# Patient Record
Sex: FEMALE | Race: WHITE | NOT HISPANIC OR LATINO | Employment: OTHER | URBAN - METROPOLITAN AREA
[De-identification: names, ages, dates, MRNs, and addresses within clinical notes are randomized per-mention and may not be internally consistent; named-entity substitution may affect disease eponyms.]

---

## 2017-01-17 ENCOUNTER — TELEPHONE (OUTPATIENT)
Dept: INTERNAL MEDICINE | Facility: CLINIC | Age: 82
End: 2017-01-17

## 2017-01-17 ENCOUNTER — HOSPITAL ENCOUNTER (EMERGENCY)
Facility: HOSPITAL | Age: 82
Discharge: HOME | End: 2017-01-17
Attending: EMERGENCY MEDICINE | Admitting: EMERGENCY MEDICINE

## 2017-01-17 VITALS
OXYGEN SATURATION: 95 % | DIASTOLIC BLOOD PRESSURE: 68 MMHG | HEART RATE: 83 BPM | BODY MASS INDEX: 21.53 KG/M2 | HEIGHT: 62 IN | TEMPERATURE: 98.3 F | WEIGHT: 117 LBS | SYSTOLIC BLOOD PRESSURE: 134 MMHG | RESPIRATION RATE: 18 BRPM

## 2017-01-17 DIAGNOSIS — K92.2 LOWER GI BLEED: Primary | ICD-10-CM

## 2017-01-17 LAB
ABO GROUP BLD: NORMAL
ALBUMIN SERPL-MCNC: 4.2 G/DL (ref 3.2–4.8)
ALBUMIN/GLOB SERPL: 1.5 G/DL (ref 1.5–2.5)
ALP SERPL-CCNC: 94 U/L (ref 25–100)
ALT SERPL W P-5'-P-CCNC: 12 U/L (ref 7–40)
ANION GAP SERPL CALCULATED.3IONS-SCNC: 9 MMOL/L (ref 3–11)
AST SERPL-CCNC: 18 U/L (ref 0–33)
BASOPHILS # BLD AUTO: 0.05 10*3/MM3 (ref 0–0.2)
BASOPHILS NFR BLD AUTO: 0.7 % (ref 0–1)
BILIRUB SERPL-MCNC: 0.5 MG/DL (ref 0.3–1.2)
BLD GP AB SCN SERPL QL: NEGATIVE
BUN BLD-MCNC: 18 MG/DL (ref 9–23)
BUN/CREAT SERPL: 20 (ref 7–25)
CALCIUM SPEC-SCNC: 10.1 MG/DL (ref 8.7–10.4)
CHLORIDE SERPL-SCNC: 104 MMOL/L (ref 99–109)
CO2 SERPL-SCNC: 32 MMOL/L (ref 20–31)
CREAT BLD-MCNC: 0.9 MG/DL (ref 0.6–1.3)
DEPRECATED RDW RBC AUTO: 48.8 FL (ref 37–54)
EOSINOPHIL # BLD AUTO: 0.07 10*3/MM3 (ref 0.1–0.3)
EOSINOPHIL NFR BLD AUTO: 1 % (ref 0–3)
ERYTHROCYTE [DISTWIDTH] IN BLOOD BY AUTOMATED COUNT: 14.6 % (ref 11.3–14.5)
GFR SERPL CREATININE-BSD FRML MDRD: 59 ML/MIN/1.73
GLOBULIN UR ELPH-MCNC: 2.8 GM/DL
GLUCOSE BLD-MCNC: 150 MG/DL (ref 70–100)
HCT VFR BLD AUTO: 39.4 % (ref 34.5–44)
HGB BLD-MCNC: 13.1 G/DL (ref 11.5–15.5)
HOLD SPECIMEN: NORMAL
HOLD SPECIMEN: NORMAL
IMM GRANULOCYTES # BLD: 0.02 10*3/MM3 (ref 0–0.03)
IMM GRANULOCYTES NFR BLD: 0.3 % (ref 0–0.6)
LYMPHOCYTES # BLD AUTO: 1.48 10*3/MM3 (ref 0.6–4.8)
LYMPHOCYTES NFR BLD AUTO: 21.4 % (ref 24–44)
MCH RBC QN AUTO: 30.2 PG (ref 27–31)
MCHC RBC AUTO-ENTMCNC: 33.2 G/DL (ref 32–36)
MCV RBC AUTO: 90.8 FL (ref 80–99)
MONOCYTES # BLD AUTO: 0.67 10*3/MM3 (ref 0–1)
MONOCYTES NFR BLD AUTO: 9.7 % (ref 0–12)
NEUTROPHILS # BLD AUTO: 4.63 10*3/MM3 (ref 1.5–8.3)
NEUTROPHILS NFR BLD AUTO: 66.9 % (ref 41–71)
PLATELET # BLD AUTO: 312 10*3/MM3 (ref 150–450)
PMV BLD AUTO: 9.9 FL (ref 6–12)
POTASSIUM BLD-SCNC: 4.2 MMOL/L (ref 3.5–5.5)
PROT SERPL-MCNC: 7 G/DL (ref 5.7–8.2)
RBC # BLD AUTO: 4.34 10*6/MM3 (ref 3.89–5.14)
RH BLD: POSITIVE
SODIUM BLD-SCNC: 145 MMOL/L (ref 132–146)
WBC NRBC COR # BLD: 6.92 10*3/MM3 (ref 3.5–10.8)
WHOLE BLOOD HOLD SPECIMEN: NORMAL
WHOLE BLOOD HOLD SPECIMEN: NORMAL

## 2017-01-17 PROCEDURE — 86850 RBC ANTIBODY SCREEN: CPT

## 2017-01-17 PROCEDURE — 86901 BLOOD TYPING SEROLOGIC RH(D): CPT

## 2017-01-17 PROCEDURE — 36415 COLL VENOUS BLD VENIPUNCTURE: CPT

## 2017-01-17 PROCEDURE — 99284 EMERGENCY DEPT VISIT MOD MDM: CPT

## 2017-01-17 PROCEDURE — 85025 COMPLETE CBC W/AUTO DIFF WBC: CPT | Performed by: EMERGENCY MEDICINE

## 2017-01-17 PROCEDURE — 80053 COMPREHEN METABOLIC PANEL: CPT | Performed by: EMERGENCY MEDICINE

## 2017-01-17 PROCEDURE — 86900 BLOOD TYPING SEROLOGIC ABO: CPT

## 2017-01-17 RX ORDER — SODIUM CHLORIDE 0.9 % (FLUSH) 0.9 %
10 SYRINGE (ML) INJECTION AS NEEDED
Status: DISCONTINUED | OUTPATIENT
Start: 2017-01-17 | End: 2017-01-17 | Stop reason: HOSPADM

## 2017-01-17 NOTE — ED PROVIDER NOTES
Subjective   HPI Comments: 89 y.o. female presents to the ED w/ c/o rectal bleeding starting at 0230 this morning. Pt passed a copious amount of bright red blood this morning without stool. She had a second episode of bleeding this afternoon around 1500 with dark red blood and clots. She is not having dizziness or light-headedness. She is not on any blood thinners.    Pt had a colonoscopy 15-20 years ago and had a polyp removed at that time. She has not been back for a follow-up colonoscopy.    Patient is a 89 y.o. female presenting with hematochezia.   History provided by:  Patient and relative  Rectal Bleeding   Quality:  Bright red and maroon  Amount:  Copious  Duration:  16 hours  Timin episodes.  Chronicity:  New  Context: spontaneously    Relieved by:  Nothing  Worsened by:  Nothing  Ineffective treatments:  None tried  Associated symptoms: no abdominal pain, no dizziness, no epistaxis, no fever, no hematemesis, no light-headedness, no loss of consciousness, no recent illness and no vomiting    Risk factors comment:  Hx polyps      Review of Systems   Constitutional: Negative for chills and fever.   HENT: Negative for nosebleeds.    Gastrointestinal: Positive for hematochezia. Negative for abdominal pain, hematemesis and vomiting.   Neurological: Negative for dizziness, loss of consciousness and light-headedness.   All other systems reviewed and are negative.      Past Medical History   Diagnosis Date   • Cataracts, bilateral      removed   • Generalized osteoarthritis    • Glaucoma    • Hearing loss      Wears hearing aids   • Herpes zoster    • History of compression fracture of spine    • Hyperlipidemia    • Hypertension    • Iron deficiency    • Low back pain    • Lumbosacral injury    • Shoulder pain, right    • Uses hearing aid      bilat    • Vitamin D deficiency    • Wears glasses    • Wrist fracture, left        Allergies   Allergen Reactions   • Lisinopril-Hydrochlorothiazide Rash   • Other Rash      STEROID INJECTION IN BACK       Past Surgical History   Procedure Laterality Date   • Hip fracture surgery Left 2014      hip pinning for acute fracture   • Colonoscopy     • Knee arthroplasty unicompartmental Right 9/22/2016     Procedure: RIGHT KNEE ARTHROPLASTY UNICOMPARTMENTAL;  Surgeon: Jesse Taveras MD;  Location:  [facility];  Service:    • Tonsillectomy  1947   • Eye surgery       bilat cataracts without lens    • Knee arthroplasty unicompartmental Left 12/1/2016     Procedure: LEFT KNEE ARTHROPLASTY UNICOMPARTMENTAL;  Surgeon: Jesse Taveras MD;  Location:  [facility];  Service:        Family History   Problem Relation Age of Onset   • Alzheimer's disease Mother    • No Known Problems Father    • Arthritis Sister    • Asthma Sister    • Breast cancer Sister    • Colon cancer Sister    • Lung cancer Brother    • Skin cancer Brother    • Heart disease Other      2009       Social History     Social History   • Marital status:      Spouse name: N/A   • Number of children: N/A   • Years of education: N/A     Social History Main Topics   • Smoking status: Never Smoker   • Smokeless tobacco: Never Used   • Alcohol use No   • Drug use: No   • Sexual activity: Defer     Other Topics Concern   • None     Social History Narrative    Domestic life- [place] -  independently alone good support from grown daughter who lives in [facility] 1 hour away        Buddhism- [employer]        Sleep hygiene- 7 hours nightly, adequate         Caffeine use- 2 cups coffee daily        Exercise habits- Walking daily as tolerated        Diet- Prudent well-balanced diet         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 She appears well-developed and well-nourished. No distress.   HENT:   Head: Normocephalic and atraumatic.    Eyes: Conjunctivae are normal. Pupils are equal, round, and reactive to light.   Neck: Normal range of motion. Neck supple.   Cardiovascular: Normal rate, regular rhythm and normal heart sounds.    Pulmonary/Chest: Effort normal and breath sounds normal. No respiratory distress. She exhibits no tenderness.   Abdominal: Soft. There is no tenderness.   Genitourinary:   Genitourinary Comments: Dark brown, nearly black stool. No active bleeding.   Musculoskeletal: Normal range of motion. She exhibits no edema.   Neurological: She is alert and oriented to person, place, and time.   Skin: Skin is warm and dry. She is not diaphoretic.   Psychiatric: She has a normal mood and affect. Her behavior is normal.   Nursing note and vitals reviewed.      Procedures         ED Course  ED Course         Course of Care      Lab Results (last 24 hours)     ** No results found for the last 24 hours. **          Note: In addition to lab results from this visit, the labs listed above may include labs taken at another facility or during a different encounter within the last 24 hours. Please correlate lab times with ED admission and discharge times for further clarification of the services performed during this visit.    No orders to display       Vitals:    01/17/17 1850 01/17/17 1853 01/17/17 1857 01/17/17 1900   BP: 135/65 118/81 133/84 134/68   BP Location:       Patient Position: Lying Sitting Standing    Pulse: 85 100 101 83   Resp:       Temp:       TempSrc:       SpO2:    95%   Weight:       Height:           Medications - No data to display    ECG/EMG Results (last 24 hours)     ** No results found for the last 24 hours. **                      [facility]    Final diagnoses:   Lower GI bleed       Documentation assistance provided by caridad Tatum.  Information recorded by the caridad was done at my direction and has been verified and validated by me.     Rosalia Tatum  01/17/17 1825       Rosalia Tatum  01/17/17 1826       Rosalia  Deepti  01/17/17 2008       Tino Benton MD  01/19/17 3644

## 2017-01-17 NOTE — TELEPHONE ENCOUNTER
Pt called very concern about uncontrollable rectal bleeding that started earlier this am and happened 2 more times since. She states just blood coming out, no stool, started as bright red now darker, denies any pain. Pt strongly advised to go to ER per TGF but pt insistent on seeing TGF this pm. Per TGF unable to see pt this pm, she needs to go to ER and call back tomorrow am. Pt refuses for me to call and talk with her dght and states she will call her dght and then call us back.

## 2017-01-18 NOTE — DISCHARGE INSTRUCTIONS
Dr. Bales's office staff should call you tomorrow to arrange bowel prep and colonoscopy.  If you haven't heard from them by midday, call the office.

## 2017-01-23 ENCOUNTER — HOSPITAL ENCOUNTER (INPATIENT)
Facility: HOSPITAL | Age: 82
LOS: 3 days | Discharge: HOME HEALTH | End: 2017-01-26
Attending: INTERNAL MEDICINE | Admitting: INTERNAL MEDICINE

## 2017-01-23 ENCOUNTER — APPOINTMENT (OUTPATIENT)
Dept: NUCLEAR MEDICINE | Facility: HOSPITAL | Age: 82
End: 2017-01-23

## 2017-01-23 DIAGNOSIS — R26.9 ABNORMAL GAIT: ICD-10-CM

## 2017-01-23 DIAGNOSIS — K92.1 HEMATOCHEZIA: Primary | ICD-10-CM

## 2017-01-23 DIAGNOSIS — D62 ACUTE BLOOD LOSS ANEMIA: ICD-10-CM

## 2017-01-23 PROBLEM — F41.9 ANXIETY: Status: ACTIVE | Noted: 2017-01-23

## 2017-01-23 PROBLEM — R30.0 DYSURIA: Status: ACTIVE | Noted: 2017-01-23

## 2017-01-23 PROBLEM — K92.2 GI BLEED: Status: ACTIVE | Noted: 2017-01-23

## 2017-01-23 LAB
ABO GROUP BLD: NORMAL
ALBUMIN SERPL-MCNC: 3.6 G/DL (ref 3.2–4.8)
ALBUMIN/GLOB SERPL: 1.6 G/DL (ref 1.5–2.5)
ALP SERPL-CCNC: 73 U/L (ref 25–100)
ALT SERPL W P-5'-P-CCNC: 8 U/L (ref 7–40)
ANION GAP SERPL CALCULATED.3IONS-SCNC: 9 MMOL/L (ref 3–11)
APTT PPP: 27.8 SECONDS (ref 24–31)
AST SERPL-CCNC: 19 U/L (ref 0–33)
BACTERIA UR QL AUTO: ABNORMAL /HPF
BASOPHILS # BLD AUTO: 0.03 10*3/MM3 (ref 0–0.2)
BASOPHILS NFR BLD AUTO: 0.3 % (ref 0–1)
BILIRUB SERPL-MCNC: 0.5 MG/DL (ref 0.3–1.2)
BILIRUB UR QL STRIP: NEGATIVE
BLD GP AB SCN SERPL QL: NEGATIVE
BUN BLD-MCNC: 11 MG/DL (ref 9–23)
BUN/CREAT SERPL: 15.7 (ref 7–25)
CALCIUM SPEC-SCNC: 9.2 MG/DL (ref 8.7–10.4)
CHLORIDE SERPL-SCNC: 109 MMOL/L (ref 99–109)
CLARITY UR: ABNORMAL
CO2 SERPL-SCNC: 27 MMOL/L (ref 20–31)
COLOR UR: YELLOW
CREAT BLD-MCNC: 0.7 MG/DL (ref 0.6–1.3)
DEPRECATED RDW RBC AUTO: 48.7 FL (ref 37–54)
EOSINOPHIL # BLD AUTO: 0.03 10*3/MM3 (ref 0.1–0.3)
EOSINOPHIL NFR BLD AUTO: 0.3 % (ref 0–3)
ERYTHROCYTE [DISTWIDTH] IN BLOOD BY AUTOMATED COUNT: 14.6 % (ref 11.3–14.5)
GFR SERPL CREATININE-BSD FRML MDRD: 79 ML/MIN/1.73
GLOBULIN UR ELPH-MCNC: 2.3 GM/DL
GLUCOSE BLD-MCNC: 92 MG/DL (ref 70–100)
GLUCOSE UR STRIP-MCNC: NEGATIVE MG/DL
HCT VFR BLD AUTO: 30.5 % (ref 34.5–44)
HGB BLD-MCNC: 10.1 G/DL (ref 11.5–15.5)
HGB UR QL STRIP.AUTO: ABNORMAL
HYALINE CASTS UR QL AUTO: ABNORMAL /LPF
IMM GRANULOCYTES # BLD: 0.03 10*3/MM3 (ref 0–0.03)
IMM GRANULOCYTES NFR BLD: 0.3 % (ref 0–0.6)
INR PPP: 1
KETONES UR QL STRIP: ABNORMAL
LEUKOCYTE ESTERASE UR QL STRIP.AUTO: ABNORMAL
LYMPHOCYTES # BLD AUTO: 1.47 10*3/MM3 (ref 0.6–4.8)
LYMPHOCYTES NFR BLD AUTO: 13.8 % (ref 24–44)
MAGNESIUM SERPL-MCNC: 1.8 MG/DL (ref 1.3–2.7)
MCH RBC QN AUTO: 30.1 PG (ref 27–31)
MCHC RBC AUTO-ENTMCNC: 33.1 G/DL (ref 32–36)
MCV RBC AUTO: 91 FL (ref 80–99)
MONOCYTES # BLD AUTO: 0.47 10*3/MM3 (ref 0–1)
MONOCYTES NFR BLD AUTO: 4.4 % (ref 0–12)
NEUTROPHILS # BLD AUTO: 8.66 10*3/MM3 (ref 1.5–8.3)
NEUTROPHILS NFR BLD AUTO: 80.9 % (ref 41–71)
NITRITE UR QL STRIP: NEGATIVE
PH UR STRIP.AUTO: 7 [PH] (ref 5–8)
PLATELET # BLD AUTO: 239 10*3/MM3 (ref 150–450)
PMV BLD AUTO: 9.6 FL (ref 6–12)
POTASSIUM BLD-SCNC: 4.1 MMOL/L (ref 3.5–5.5)
PROT SERPL-MCNC: 5.9 G/DL (ref 5.7–8.2)
PROT UR QL STRIP: ABNORMAL
PROTHROMBIN TIME: 10.9 SECONDS (ref 9.6–11.5)
RBC # BLD AUTO: 3.35 10*6/MM3 (ref 3.89–5.14)
RBC # UR: ABNORMAL /HPF
REF LAB TEST METHOD: ABNORMAL
RH BLD: POSITIVE
SODIUM BLD-SCNC: 145 MMOL/L (ref 132–146)
SP GR UR STRIP: 1.01 (ref 1–1.03)
SQUAMOUS #/AREA URNS HPF: ABNORMAL /HPF
TSH SERPL DL<=0.05 MIU/L-ACNC: 1.69 MIU/ML (ref 0.35–5.35)
UROBILINOGEN UR QL STRIP: ABNORMAL
WBC NRBC COR # BLD: 10.69 10*3/MM3 (ref 3.5–10.8)
WBC UR QL AUTO: ABNORMAL /HPF

## 2017-01-23 PROCEDURE — 84443 ASSAY THYROID STIM HORMONE: CPT | Performed by: NURSE PRACTITIONER

## 2017-01-23 PROCEDURE — 87086 URINE CULTURE/COLONY COUNT: CPT | Performed by: NURSE PRACTITIONER

## 2017-01-23 PROCEDURE — 83735 ASSAY OF MAGNESIUM: CPT | Performed by: NURSE PRACTITIONER

## 2017-01-23 PROCEDURE — 85025 COMPLETE CBC W/AUTO DIFF WBC: CPT | Performed by: NURSE PRACTITIONER

## 2017-01-23 PROCEDURE — 85610 PROTHROMBIN TIME: CPT | Performed by: NURSE PRACTITIONER

## 2017-01-23 PROCEDURE — A9560 TC99M LABELED RBC: HCPCS | Performed by: INTERNAL MEDICINE

## 2017-01-23 PROCEDURE — 78278 ACUTE GI BLOOD LOSS IMAGING: CPT

## 2017-01-23 PROCEDURE — 25010000002 LORAZEPAM PER 2 MG: Performed by: NURSE PRACTITIONER

## 2017-01-23 PROCEDURE — 86850 RBC ANTIBODY SCREEN: CPT

## 2017-01-23 PROCEDURE — 85730 THROMBOPLASTIN TIME PARTIAL: CPT | Performed by: NURSE PRACTITIONER

## 2017-01-23 PROCEDURE — 99223 1ST HOSP IP/OBS HIGH 75: CPT | Performed by: INTERNAL MEDICINE

## 2017-01-23 PROCEDURE — 81001 URINALYSIS AUTO W/SCOPE: CPT | Performed by: NURSE PRACTITIONER

## 2017-01-23 PROCEDURE — G0378 HOSPITAL OBSERVATION PER HR: HCPCS

## 2017-01-23 PROCEDURE — 25010000002 MAGNESIUM SULFATE IN D5W 1G/100ML (PREMIX) 10-5 MG/ML-% SOLUTION: Performed by: NURSE PRACTITIONER

## 2017-01-23 PROCEDURE — 86901 BLOOD TYPING SEROLOGIC RH(D): CPT

## 2017-01-23 PROCEDURE — 86900 BLOOD TYPING SEROLOGIC ABO: CPT

## 2017-01-23 PROCEDURE — 0 TECHNETIUM LABELED RED BLOOD CELLS: Performed by: INTERNAL MEDICINE

## 2017-01-23 PROCEDURE — 87077 CULTURE AEROBIC IDENTIFY: CPT | Performed by: NURSE PRACTITIONER

## 2017-01-23 PROCEDURE — 80053 COMPREHEN METABOLIC PANEL: CPT | Performed by: NURSE PRACTITIONER

## 2017-01-23 PROCEDURE — 87186 SC STD MICRODIL/AGAR DIL: CPT | Performed by: NURSE PRACTITIONER

## 2017-01-23 RX ORDER — POTASSIUM CHLORIDE 750 MG/1
40 CAPSULE, EXTENDED RELEASE ORAL AS NEEDED
Status: DISCONTINUED | OUTPATIENT
Start: 2017-01-23 | End: 2017-01-26 | Stop reason: HOSPADM

## 2017-01-23 RX ORDER — POTASSIUM CHLORIDE 1.5 G/1.77G
40 POWDER, FOR SOLUTION ORAL AS NEEDED
Status: DISCONTINUED | OUTPATIENT
Start: 2017-01-23 | End: 2017-01-26 | Stop reason: HOSPADM

## 2017-01-23 RX ORDER — ONDANSETRON 4 MG/1
4 TABLET, FILM COATED ORAL EVERY 6 HOURS PRN
Status: DISCONTINUED | OUTPATIENT
Start: 2017-01-23 | End: 2017-01-26 | Stop reason: HOSPADM

## 2017-01-23 RX ORDER — POLYVINYL ALCOHOL 14 MG/ML
1 SOLUTION/ DROPS OPHTHALMIC
Status: DISCONTINUED | OUTPATIENT
Start: 2017-01-23 | End: 2017-01-26 | Stop reason: HOSPADM

## 2017-01-23 RX ORDER — ONDANSETRON 2 MG/ML
4 INJECTION INTRAMUSCULAR; INTRAVENOUS EVERY 6 HOURS PRN
Status: DISCONTINUED | OUTPATIENT
Start: 2017-01-23 | End: 2017-01-26 | Stop reason: HOSPADM

## 2017-01-23 RX ORDER — PANTOPRAZOLE SODIUM 40 MG/10ML
80 INJECTION, POWDER, LYOPHILIZED, FOR SOLUTION INTRAVENOUS ONCE
Status: COMPLETED | OUTPATIENT
Start: 2017-01-23 | End: 2017-01-23

## 2017-01-23 RX ORDER — CEFTRIAXONE SODIUM 1 G/50ML
1 INJECTION, SOLUTION INTRAVENOUS
Status: DISCONTINUED | OUTPATIENT
Start: 2017-01-23 | End: 2017-01-26 | Stop reason: HOSPADM

## 2017-01-23 RX ORDER — HYDROXYZINE HYDROCHLORIDE 50 MG/ML
25 INJECTION, SOLUTION INTRAMUSCULAR EVERY 8 HOURS PRN
Status: DISCONTINUED | OUTPATIENT
Start: 2017-01-23 | End: 2017-01-26 | Stop reason: HOSPADM

## 2017-01-23 RX ORDER — AMLODIPINE BESYLATE 2.5 MG/1
2.5 TABLET ORAL
Status: DISCONTINUED | OUTPATIENT
Start: 2017-01-23 | End: 2017-01-26 | Stop reason: HOSPADM

## 2017-01-23 RX ORDER — LORAZEPAM 2 MG/ML
0.25 INJECTION INTRAMUSCULAR EVERY 6 HOURS PRN
Status: DISCONTINUED | OUTPATIENT
Start: 2017-01-23 | End: 2017-01-26 | Stop reason: HOSPADM

## 2017-01-23 RX ORDER — SODIUM CHLORIDE 0.9 % (FLUSH) 0.9 %
1-10 SYRINGE (ML) INJECTION AS NEEDED
Status: DISCONTINUED | OUTPATIENT
Start: 2017-01-23 | End: 2017-01-26 | Stop reason: HOSPADM

## 2017-01-23 RX ORDER — POTASSIUM CHLORIDE 29.8 MG/ML
20 INJECTION INTRAVENOUS
Status: DISCONTINUED | OUTPATIENT
Start: 2017-01-23 | End: 2017-01-26 | Stop reason: HOSPADM

## 2017-01-23 RX ORDER — SACCHAROMYCES BOULARDII 250 MG
250 CAPSULE ORAL 2 TIMES DAILY
Status: DISCONTINUED | OUTPATIENT
Start: 2017-01-23 | End: 2017-01-26 | Stop reason: HOSPADM

## 2017-01-23 RX ORDER — SODIUM CHLORIDE 9 MG/ML
100 INJECTION, SOLUTION INTRAVENOUS CONTINUOUS
Status: DISCONTINUED | OUTPATIENT
Start: 2017-01-23 | End: 2017-01-24

## 2017-01-23 RX ADMIN — SODIUM CHLORIDE 8 MG/HR: 900 INJECTION INTRAVENOUS at 21:33

## 2017-01-23 RX ADMIN — LORAZEPAM 0.25 MG: 2 INJECTION INTRAMUSCULAR; INTRAVENOUS at 21:39

## 2017-01-23 RX ADMIN — PANTOPRAZOLE SODIUM 80 MG: 40 INJECTION, POWDER, FOR SOLUTION INTRAVENOUS at 16:05

## 2017-01-23 RX ADMIN — Medication 1 DOSE: at 17:12

## 2017-01-23 RX ADMIN — MAGNESIUM SULFATE HEPTAHYDRATE 1 G: 1 INJECTION, SOLUTION INTRAVENOUS at 21:34

## 2017-01-23 RX ADMIN — MAGNESIUM SULFATE HEPTAHYDRATE 1 G: 1 INJECTION, SOLUTION INTRAVENOUS at 18:35

## 2017-01-23 RX ADMIN — SODIUM CHLORIDE 100 ML/HR: 9 INJECTION, SOLUTION INTRAVENOUS at 15:43

## 2017-01-23 RX ADMIN — Medication 250 MG: at 21:33

## 2017-01-23 RX ADMIN — AMLODIPINE BESYLATE 2.5 MG: 2.5 TABLET ORAL at 16:06

## 2017-01-23 RX ADMIN — SODIUM CHLORIDE 8 MG/HR: 900 INJECTION INTRAVENOUS at 16:06

## 2017-01-23 NOTE — PLAN OF CARE
Problem: Patient Care Overview (Adult)  Goal: Plan of Care Review  Outcome: Ongoing (interventions implemented as appropriate)    01/23/17 1301   Coping/Psychosocial Response Interventions   Plan Of Care Reviewed With patient;daughter   Patient Care Overview   Progress improving         Problem: Fall Risk (Adult)  Goal: Identify Related Risk Factors and Signs and Symptoms  Outcome: Ongoing (interventions implemented as appropriate)    01/23/17 1301   Fall Risk   Fall Risk: Related Risk Factors age-related changes   Fall Risk: Signs and Symptoms presence of risk factors       Goal: Absence of Falls  Outcome: Ongoing (interventions implemented as appropriate)    01/23/17 1301   Fall Risk (Adult)   Absence of Falls making progress toward outcome         Problem: Pressure Ulcer (Adult)  Goal: Signs and Symptoms of Listed Potential Problems Will be Absent or Manageable (Pressure Ulcer)  Outcome: Ongoing (interventions implemented as appropriate)

## 2017-01-23 NOTE — H&P
Hospital Medicine History and Physical    Primary Care Physician: Jony Singh MD    Chief complaint BRBPR    Subjective     History of Present Illness  Ms. Mesa is an 89 year old female with essential hypertension, dyslipidemia, osteoarthritis who recently underwent left knee arthroscopy in December 2016 that presented to [facility] ED on 1/17/2017 with complaints of bright red bleeding per rectum.  While in the ER, bleeding spontaneously resolved and was associated with no abdominal pain, no nausea/vomiting/diarrhea.  She also denied any fever or chills.  CBC was stable and patient was discharged home with a follow-up to Dr. Johnson the same week.  She underwent colonoscopy with Dr. Johnson on 1/20/2017 with findings of diverticulosis active bleeding.    Patient returns today as a direct admission with complaints of bright red bleeding per rectum that started minimally yesterday evening with stool  This increased today to her she's had multiple episodes of bright red bleeding per rectum.  She denies any pain in rectum or abdomen.  Denies nausea/vomiting.  She did have her last regular bowel movement yesterday.  Patient also denies any fever, but states she has had chills since yesterday.  She also states she has had dysuria starting yesterday evening with increased frequency, dysuria and difficulty urinating.    Patient states she has been eating well, no change in appetite and no recent weight loss.  Patient was feeling at her regular state of health up until today, and still feels well with the exception of having bright red bleeding per rectum a few times today and dysuria..  Patient recently underwent left knee arthroscopically per Dr. Taveras and was placed on regular aspirin daily and has since decreased to half of a baby aspirin daily for the last 2 weeks. Patient has no other history of GI bleeding that she can recall.    Review of Systems   Constitutional: Positive for activity change and chills.  Negative for appetite change, fatigue and fever.   HENT: Negative.    Eyes: Negative.    Respiratory: Negative.    Cardiovascular: Negative.    Gastrointestinal: Positive for anal bleeding and blood in stool. Negative for abdominal distention, abdominal pain, constipation, diarrhea, nausea, rectal pain and vomiting.   Endocrine: Negative.    Genitourinary: Positive for difficulty urinating, dysuria and frequency.   Musculoskeletal: Positive for arthralgias (chronic), back pain (chronic) and joint swelling (mild- left knee).   Skin: Negative.    Neurological: Negative.    Psychiatric/Behavioral: Negative.     - reviewed and agree  Otherwise complete ROS is negative except as mentioned in the HPI.    Past Medical History:   Past Medical History   Diagnosis Date   • Cataracts, bilateral      removed   • Generalized osteoarthritis    • Glaucoma    • Hearing loss      Wears hearing aids   • Herpes zoster    • History of compression fracture of spine    • Hyperlipidemia    • Hypertension    • Iron deficiency    • Low back pain    • Lumbosacral injury    • Shoulder pain, right    • Uses hearing aid      bilat    • Vitamin D deficiency    • Wears glasses    • Wrist fracture, left      - reviewed and agree  Past Surgical History:  Past Surgical History   Procedure Laterality Date   • Hip fracture surgery Left 2014      hip pinning for acute fracture   • Colonoscopy     • Knee arthroplasty unicompartmental Right 9/22/2016     Procedure: RIGHT KNEE ARTHROPLASTY UNICOMPARTMENTAL;  Surgeon: Jesse Taveras MD;  Location:  [facility];  Service:    • Tonsillectomy  1947   • Eye surgery       bilat cataracts without lens    • Knee arthroplasty unicompartmental Left 12/1/2016     Procedure: LEFT KNEE ARTHROPLASTY UNICOMPARTMENTAL;  Surgeon: Jesse Taveras MD;  Location:  [facility];  Service:      - reviewed and agree  Family History: family history includes Alzheimer's disease in her mother; Arthritis in her sister; Asthma in her  sister; Breast cancer in her sister; Colon cancer in her sister; Heart disease in her other; Lung cancer in her brother; No Known Problems in her father; Skin cancer in her brother.   - reviewed and agree  Social History:  reports that she has never smoked. She has never used smokeless tobacco. She reports that she does not drink alcohol or use illicit drugs.  - reviewed and agree  Medications:  Prescriptions Prior to Admission   Medication Sig Dispense Refill Last Dose   • acetaminophen (TYLENOL) 500 MG tablet Take 1,000 mg by mouth 2 (two) times a day as needed for mild pain (1-3).   Past Week at Unknown time   • amLODIPine (NORVASC) 2.5 MG tablet Take 1 tablet by mouth Daily. (Patient taking differently: Take 2.5 mg by mouth Daily.) 90 tablet 0 1/22/2017 at Unknown time   • aspirin 81 MG tablet Take 0.5 tablets by mouth Daily. Resume in 30 days   Past Week at Unknown time   • calcium-vitamin D (OSCAL 500/200 D-3) 500-200 MG-UNIT per tablet Take 1 tablet by mouth daily.   1/22/2017 at Unknown time   • carboxymethylcellulose 1 % ophthalmic solution Apply 1 drop to eye every night. (Patient taking differently: Apply 2 drops to eye Every Night.)  12 1/22/2017 at Unknown time   • Cholecalciferol (VITAMIN D) 2000 UNITS capsule Take 1 capsule by mouth daily.   1/22/2017 at Unknown time   • CRANBERRY EXTRACT PO Take 2 capsules by mouth Daily.   1/22/2017 at Unknown time   • ferrous sulfate 325 (65 FE) MG tablet Take 325 mg by mouth daily with breakfast.   1/22/2017 at Unknown time   • Multiple Vitamins-Minerals (CENTRUM ADULTS) tablet Take 1 tablet by mouth daily.   1/22/2017 at Unknown time   • aspirin 325 MG EC tablet Take 1 tablet by mouth Daily. For 1 month 30 tablet 0 Unknown at Unknown time   • Docusate Sodium (DSS) 100 MG capsule Take 100 mg by mouth 2 (Two) Times a Day. 60 each 0 Unknown at Unknown time   - reviewed and agree  Allergies   Allergen Reactions   • Lisinopril-Hydrochlorothiazide Rash   • Other Rash      STEROID INJECTION IN BACK   - reviewed and agree    Objective    Physical Exam:   Vital Signs:   Visit Vitals   • /74 (BP Location: Left arm, Patient Position: Lying)   • Pulse 82   • Temp 97.3 °F (36.3 °C) (Oral)   • Resp 16   • Wt 114 lb 3.2 oz (51.8 kg)   • SpO2 97%   • BMI 20.89 kg/m2     Physical Exam   Constitutional: She is oriented to person, place, and time. She appears well-developed and well-nourished. No distress.   HENT:   Head: Normocephalic.   Eyes: Pupils are equal, round, and reactive to light. No scleral icterus.   Neck: Normal range of motion. No JVD present.   Cardiovascular: Normal rate, regular rhythm, normal heart sounds and intact distal pulses.    No murmur heard.  Pulmonary/Chest: Effort normal and breath sounds normal. No respiratory distress.   Abdominal: Soft. Bowel sounds are normal. She exhibits no distension. There is no tenderness.   Musculoskeletal: Normal range of motion. She exhibits no edema.   Neurological: She is alert and oriented to person, place, and time.   Skin: Skin is warm and dry.   Psychiatric: She has a normal mood and affect. Her behavior is normal. Judgment and thought content normal.         Results Reviewed:  I have personally reviewed current lab, radiology, and data and agree.    Results from last 7 days  Lab Units 01/17/17  1648   WBC 10*3/mm3 6.92   HEMOGLOBIN g/dL 13.1   PLATELETS 10*3/mm3 312       Results from last 7 days  Lab Units 01/17/17  1648   SODIUM mmol/L 145   POTASSIUM mmol/L 4.2   TOTAL CO2 mmol/L 32.0*   CREATININE mg/dL 0.90   GLUCOSE mg/dL 150*   CALCIUM mg/dL 10.1           Assessment/Plan   Assessment & Plan  Principal Problem:    Hematochezia  Active Problems:    Iron deficiency anemia    Essential hypertension    Anxiety    Dysuria        Plan:  Hematochezia  -- 2nd occurrence within one week. Recently in ER 1/17/17 for BRBPR without stool. Spontaneously resolved. Patient underwent Colonoscopy with Dr. Johnson on 1/20/17 with  reported findings of diverticulosis.  -- consult Dr. Johnson, aware of patient arrival- recommends tagged RBC scan which has been ordered  -- stat labs: cbc, pt/ptt, cmp. Then q 8hr h/h; am labs  -- protonix  -- npo for now  -- currently no pain or nausea, but treat prn    Dysuria  -- check UA, culture if indicated  -- start ABX prn    Anxiety/ itching  -- ongoing itchy scalp/ ears x 1 month since knee surgery. Suspect anxiety playing a role since patient has been taking care of self since recent left knee surgery and daughter's surgery.  -- vistaril prn  -- may need outpatient derm eval    Essential HTN  -- continue home norvasc with hold parameters    CHAYITO  -- monitor H/H last h/h 1/17: 13/39    Recent left knee surgery 12/2016  -- encourage ambulation with assist  -- PT/ OT    FULL CODE    I discussed the patients findings and my recommendations with:Patient, family, Dr. Ryder Langley, APRN 01/23/17 3:21 PM     Brief Attending Note       I have seen and examined the patient, performing an independent face-to-face diagnostic evaluation with plan of care reviewed and developed with the advanced practice clinician (APC).       Brief Summary Statement/HPI:   Delightful 88 yo with recent GI bleeding presents with recurrent hematochezia. Patient initially seen in ED on 1/17/17 for BRBPR - discharged home, then followed up with CRS Dr Pa on Friday for a colonoscopy. By patient and family report, only diverticular disease seen during this procedure. Ms Mesa had recurrent bleeding last night (light), with more frequent (10 total) bowel movements this morning consisting mostly of bright red blood. Denies abdominal pain. She had no prior history of PUD, but recently was taking full dose  mg after knee surgery. Currently she takes 1/2 of a baby aspirin daily. Rare NSAID use (less than 1x week) for insomnia - sometimes alleve, other times tylenol pm.         Attending Physical Exam:  Thin female  Non  toxic  Alert, speech clear, answers questions appropriately  RRR, no MRG  CTAB  abd soft, non tender, non distended, hyperactive bowel sounds, no high pitched sounds though  No LE Cyanosis, clubbing or edema  Moves all extremities      Brief Assessment/Plan:    Acute GI Bleed  - recurrent  - possibly diverticular, but cannot rule out brisk upper bleed  - serial hemoglobin checks, type and screen, protonix IV  - tagged RBC scan  - discussed IR regarding possible embolization  - CRS consult    Dysuria  - check UA    HTN    VTE proph: mechanical    For additional information see above per APC which I have reviewed and/or edited.      Roger Soler MD  01/23/17  4:25 PM

## 2017-01-23 NOTE — PROGRESS NOTES
Discharge Planning Assessment  [facility]     Patient Name: Marcelino Mesa  MRN: [id number]  Today's Date: 1/23/2017    Admit Date: 1/23/2017          Discharge Needs Assessment       01/23/17 1515    Living Environment    Lives With alone    Living Arrangements house    Provides Primary Care For no one    Quality Of Family Relationships supportive;helpful;involved    Able to Return to Prior Living Arrangements yes    Discharge Needs Assessment    Concerns To Be Addressed home safety concerns    Readmission Within The Last 30 Days no previous admission in last 30 days    Anticipated Changes Related to Illness inability to care for self    Equipment Currently Used at Home cane, quad;raised toilet   Has Rolling Walker, shower chair and stair lift    Equipment Needed After Discharge none    Transportation Available car;family or friend will provide    Discharge Disposition home or self-care    Discharge Contact Information if Applicable [place] daughter  [phone]- [phone]  [phone]- H            Discharge Plan       01/23/17 1517    Case Management/Social Work Plan    Plan Home    Patient/Family In Agreement With Plan yes    Additional Comments Spoke with patient and daughter at bedside regarding discharge planning.  Patient currently using [employer].  Uses a Quad Cane for ambulation and has a Rolling Walker and Shower Chair that she does not use.  Patient has a Stairlift and Raised Toilet Seat.  Denies difficulty affording medications.  Patient lives alone in a house with a basement.  CM to follow for discharge needs.  Patient will need a resumption of care order for Home Health at discharge.  Goal for patient is to discharge home via car with family to transport when medically ready.        Discharge Placement     No information found                Demographic Summary       01/23/17 1513    Referral Information    Arrived From home or self-care    Referral Source admission list    Reason  For Consult discharge planning    Record Reviewed clinical discipline documentation;history and physical;patient profile    Primary Care Physician Information    Name Jony Singh MD            Functional Status       01/23/17 9606    Functional Status Current    Current Functional Level Comment Per Nursing Assessment    Functional Status Prior    Ambulation 1-->assistive equipment    Transferring 1-->assistive equipment    Toileting 1-->assistive equipment    Bathing 0-->independent    Dressing 0-->independent    Eating 0-->independent    Communication 0-->understands/communicates without difficulty    Swallowing 0-->swallows foods/liquids without difficulty    IADL    Medications independent    Meal Preparation assistive equipment    Housekeeping assistive equipment    Laundry assistive equipment    Shopping assistive equipment    Oral Care independent    Activity Tolerance    Current Activity Limitations none    Usual Activity Tolerance good    Current Activity Tolerance moderate    Employment/Financial    Employment/Finance Comments Medicare/[employer] Med Supp            Psychosocial     None            Abuse/Neglect     None            Legal     None            Substance Abuse     None            Patient Forms     None          Diana Queen RN

## 2017-01-23 NOTE — IP AVS SNAPSHOT
AFTER VISIT SUMMARY             Marcelino Mesa           About your hospitalization     You were admitted on:  January 23, 2017 You last received care in the:  [facility]       Procedures & Surgeries         Medications    If you or your caregiver advised us that you are currently taking a medication and that medication is marked below as “Resume”, this simply indicates that we have reviewed those medications to make sure our new therapy recommendations do not interfere.  If you have concerns about medications other than those new ones which we are prescribing today, please consult the physician who prescribed them (or your primary physician).  Our review of your home medications is not meant to indicate that we are directing their use.             Your Medications      START taking these medications     cefpodoxime 100 MG tablet   Take 1 tablet by mouth Every 12 (Twelve) Hours for 4 days.   Commonly known as:  VANTIN             CHANGE how you take these medications     amLODIPine 2.5 MG tablet   Take 1 tablet by mouth Daily.   Last time this was given:  1/26/2017  9:03 AM   According to our records, you may have been taking this medication differently.   Commonly known as:  NORVASC   What changed:  when to take this   Notes to Patient:  Next dose will be tomorrow morning 1/27/2017.            carboxymethylcellulose 1 % ophthalmic solution   Apply 1 drop to eye every night.   According to our records, you may have been taking this medication differently.   What changed:  how much to take             CONTINUE taking these medications     CENTRUM ADULTS tablet   Take 1 tablet by mouth daily.   Notes to Patient:  Resume normal schedule, you did not take this medication today.            CRANBERRY EXTRACT PO   Take 2 capsules by mouth Daily.   Notes to Patient:  Resume normal schedule, you did not take this medication today.             MG capsule   Take 100 mg by mouth 2 (Two) Times a Day.    Notes to Patient:  Resume normal schedule, you did not take this medication today.            ferrous sulfate 325 (65 FE) MG tablet   Take 325 mg by mouth daily with breakfast.   Notes to Patient:  Resume normal schedule, you did not take this medication this morning.            OSCAL 500/200 D-3 500-200 MG-UNIT per tablet   Take 1 tablet by mouth daily.   Generic drug:  calcium-vitamin D   Notes to Patient:  Resume normal schedule, you did not take this medication today.            Vitamin D 2000 UNITS capsule   Take 1 capsule by mouth daily.   Notes to Patient:  Resume your normal schedule, you did not take this medication today.              STOP taking these medications     aspirin 325 MG EC tablet   Notes to Patient:  Do not resume this medication until you follow up with your primary care provider.            aspirin 81 MG tablet   Notes to Patient:  Do not resume until you follow up with your primary care provider. Ask them about resuming medication at your follow up.            TYLENOL 500 MG tablet   Generic drug:  acetaminophen                Where to Get Your Medications      These medications were sent to [id number] - [address] [phone] [facility] [phone]   [address] [facility] [address] [place] [address]     Phone:  [phone]     cefpodoxime 100 MG tablet                  Your Medications      Your Medication List           Morning Noon Evening Bedtime As Needed    amLODIPine 2.5 MG tablet   Take 1 tablet by mouth Daily.   Commonly known as:  NORVASC   Notes to Patient:  Next dose will be tomorrow morning 1/27/2017.                                    carboxymethylcellulose 1 % ophthalmic solution   Apply 1 drop to eye every night.                                   cefpodoxime 100 MG tablet   Take 1 tablet by mouth Every 12 (Twelve) Hours for 4 days.   Commonly known as:  VANTIN                                      CENTRUM ADULTS tablet   Take 1 tablet  by mouth daily.   Notes to Patient:  Resume normal schedule, you did not take this medication today.                                    CRANBERRY EXTRACT PO   Take 2 capsules by mouth Daily.   Notes to Patient:  Resume normal schedule, you did not take this medication today.                                     MG capsule   Take 100 mg by mouth 2 (Two) Times a Day.   Notes to Patient:  Resume normal schedule, you did not take this medication today.                                       ferrous sulfate 325 (65 FE) MG tablet   Take 325 mg by mouth daily with breakfast.   Notes to Patient:  Resume normal schedule, you did not take this medication this morning.                                    OSCAL 500/200 D-3 500-200 MG-UNIT per tablet   Take 1 tablet by mouth daily.   Generic drug:  calcium-vitamin D   Notes to Patient:  Resume normal schedule, you did not take this medication today.                                    Vitamin D 2000 UNITS capsule   Take 1 capsule by mouth daily.   Notes to Patient:  Resume your normal schedule, you did not take this medication today.                                             Instructions for After Discharge        Activity Instructions     Activity as Tolerated                 Diet Instructions     Diet: Regular, Cardiac; Thin Liquids, No Restrictions       Discharge Diet:   Regular  Cardiac      Fluid Consistency:  Thin Liquids, No Restrictions             Discharge References/Attachments     GASTROINTESTINAL BLEEDING (ENGLISH)    CEFPODOXIME TABLETS (ENGLISH)       Follow-ups for After Discharge        Follow-up Information     Follow up with Jony Singh MD .    Specialties:  Internal Medicine, Cardiology    Contact information:    [address]  [facility] [address] [phone]          Follow up with Riki Bales MD .    Specialty:  Colon and Rectal Surgery    Contact information:    [address]  [facility]  [address]  [phone]        Referrals and Follow-ups to Schedule     Follow-Up    As directed    PCP within 1 week for hospital follow up  Dr. Bales per his recommendation             Scheduled Appointments     Feb 09, 2017  1:30 PM EST   Follow Up with Jony Singh MD   [facility] (--)    [address]  [facility] [address] [phone]           Arrive 15 minutes prior to appointment.            Additional instructions:      Follow up with Dr. Bales if bleeding returns.               Mape Signup     Our records indicate that you have an active [employer] account.    You can view your After Visit Summary by going to [employer] and logging in with your Mape username and password.  If you don't have a Mape username and password but a parent or guardian has access to your record, the parent or guardian should login with their own Mape username and password and access your record to view the After Visit Summary.    If you have questions, you can email [email or web address] or call [phone] to talk to our Mape staff.  Remember, Mape is NOT to be used for urgent needs.  For medical emergencies, dial 911.           Summary of Your Hospitalization        Reason for Hospitalization     Your primary diagnosis was:  Hematochezia    Your diagnoses also included:  Iron Deficiency Anemia, High Blood Pressure, Anxiety Problem, Difficult Or Painful Urination, Bleeding Of The Stomach And Intestines      Care Providers     Provider Service Role Specialty    Charlie Cassidy MD Medicine Attending Provider Hospitalist    Riki Bales MD Colorectal Consulting Physician  Colon and Rectal Surgery      Your Allergies  Date Reviewed: 1/23/2017    Allergen Reactions    Lisinopril-Hydrochlorothiazide Rash         Other Rash    STEROID INJECTION IN BACK      Pending Labs     Order Current Status    Urine Culture  Preliminary result      Patient Belongings Returned     Document Return of Belongings Flowsheet     Were the patient bedside belongings sent home?   Yes   Belongings Retrieved from Security & Sent Home   N/A    Belongings Sent to Safe   --   Medications Retrieved from Pharmacy & Sent Home   N/A              More Information      Gastrointestinal Bleeding  Gastrointestinal (GI) bleeding means there is bleeding somewhere along the digestive tract, between the mouth and anus.  CAUSES   There are many different problems that can cause GI bleeding. Possible causes include:  · Esophagitis. This is inflammation, irritation, or swelling of the esophagus.  · Hemorrhoids. These are veins that are full of blood (engorged) in the rectum. They cause pain, inflammation, and may bleed.  · Anal fissures. These are areas of painful tearing which may bleed. They are often caused by passing hard stool.  · Diverticulosis. These are pouches that form on the colon over time, with age, and may bleed significantly.  · Diverticulitis. This is inflammation in areas with diverticulosis. It can cause pain, fever, and bloody stools, although bleeding is rare.  · Polyps and cancer. Colon cancer often starts out as precancerous polyps.  · Gastritis and ulcers. Bleeding from the upper gastrointestinal tract (near the stomach) may travel through the intestines and produce black, sometimes tarry, often bad smelling stools. In certain cases, if the bleeding is fast enough, the stools may not be black, but red. This condition may be life-threatening.  SYMPTOMS   · Vomiting bright red blood or material that looks like coffee grounds.  · Bloody, black, or tarry stools.  DIAGNOSIS   Your caregiver may diagnose your condition by taking your history and performing a physical exam. More tests may be needed, including:  · X-rays and other imaging tests.  · Esophagogastroduodenoscopy (EGD). This test uses a flexible, lighted tube to look at your esophagus,  stomach, and small intestine.  · Colonoscopy. This test uses a flexible, lighted tube to look at your colon.  TREATMENT   Treatment depends on the cause of your bleeding.   · For bleeding from the esophagus, stomach, small intestine, or colon, the caregiver doing your EGD or colonoscopy may be able to stop the bleeding as part of the procedure.  · Inflammation or infection of the colon can be treated with medicines.  · Many rectal problems can be treated with creams, suppositories, or warm baths.  · Surgery is sometimes needed.  · Blood transfusions are sometimes needed if you have lost a lot of blood.  If bleeding is slow, you may be allowed to go home. If there is a lot of bleeding, you will need to stay in the hospital for observation.  HOME CARE INSTRUCTIONS   · Take any medicines exactly as prescribed.  · Keep your stools soft by eating foods that are high in fiber. These foods include whole grains, legumes, fruits, and vegetables. Prunes (1 to 3 a day) work well for many people.  · Drink enough fluids to keep your urine clear or pale yellow.  SEEK IMMEDIATE MEDICAL CARE IF:   · Your bleeding increases.  · You feel lightheaded, weak, or you faint.  · You have severe cramps in your back or abdomen.  · You pass large blood clots in your stool.  · Your problems are getting worse.  MAKE SURE YOU:   · Understand these instructions.  · Will watch your condition.  · Will get help right away if you are not doing well or get worse.     This information is not intended to replace advice given to you by your health care provider. Make sure you discuss any questions you have with your health care provider.     Document Released: 12/15/2001 Document Revised: 12/04/2013 Document Reviewed: 06/06/2016  [employer] Interactive Patient Education ©2016 Elsevier Inc.          Cefpodoxime tablets  What is this medicine?  CEFPODOXIME (sef pode OX eem) is a cephalosporin antibiotic. It is used to treat certain kinds of bacterial  infections. It will not work for colds, flu, or other viral infections.  This medicine may be used for other purposes; ask your health care provider or pharmacist if you have questions.  What should I tell my health care provider before I take this medicine?  They need to know if you have any of these conditions:  -bleeding problems  -bowel disease, like colitis  -kidney disease  -other chronic illness  -an unusual or allergic reaction to cefpodoxime, other cephalosporin antibiotics, penicillin, penicillamine, other foods, dyes or preservatives  -pregnant or trying to get pregnant  -breast-feeding  How should I use this medicine?  Take this medicine by mouth with a full glass of water. Follow the directions on the prescription label. Take with food. Take your medicine at regular intervals. Do not take your medicine more often than directed. Take all of your medicine as directed even if you think you are better. Do not skip doses or stop your medicine early.  Talk to your pediatrician regarding the use of this medicine in children. Special care may be needed.  Overdosage: If you think you have taken too much of this medicine contact a poison control center or emergency room at once.  NOTE: This medicine is only for you. Do not share this medicine with others.  What if I miss a dose?  If you miss a dose, take it as soon as you can. If it is almost time for your next dose, take only that dose. Do not take double or extra doses.  What may interact with this medicine?  -antacids  -diuretics  -medicines for stomach acid or ulcer like cimetidine, famotidine, lansoprazole, nizatidine, omeprazole, ranitidine  -probenecid  -sodium bicarbonate  This list may not describe all possible interactions. Give your health care provider a list of all the medicines, herbs, non-prescription drugs, or dietary supplements you use. Also tell them if you smoke, drink alcohol, or use illegal drugs. Some items may interact with your  medicine.  What should I watch for while using this medicine?  Tell your doctor or health care professional if your symptoms do not improve or if you get new symptoms.  Do not treat diarrhea with over the counter products. Contact your doctor if you have diarrhea that lasts more than 2 days or if it is severe and watery.  What side effects may I notice from receiving this medicine?  Side effects that you should report to your doctor or health care professional as soon as possible:  -allergic reactions like skin rash, itching or hives, swelling of the face, lips, or tongue  -breathing problems  -confused, nervous, shaky  -fast, irregular heartbeat  -redness, blistering, peeling or loosening of the skin, including inside the mouth  -unusually weak or tired  -vaginal irritation, discharge  Side effects that usually do not require medical attention (report to your doctor or health care professional if they continue or are bothersome):  -diarrhea  -dizzy, drowsy  -dry mouth  -headache  -joint or muscle pain  -stomach upset, gas  -trouble sleeping  -vomiting  This list may not describe all possible side effects. Call your doctor for medical advice about side effects. You may report side effects to FDA at 7-205-FDA-8109.  Where should I keep my medicine?  Keep out of the reach of children.  Store at room temperature between 20 and 25 degrees C (68 and 77 degrees F). Keep container closed tightly. Throw away any unused medicine after the expiration date.  NOTE: This sheet is a summary. It may not cover all possible information. If you have questions about this medicine, talk to your doctor, pharmacist, or health care provider.     © 2016, Elsevier/[employer]. (2009-03-24 13:24:38)            SYMPTOMS OF A STROKE    Call 911 or have someone take you to the Emergency Department if you have any of the following:    · Sudden numbness or weakness of your face, arm or leg especially on one side of the body  · Sudden confusion,  diffiiculty speaking or trouble understanding   · Changes in your vision or loss of sight in one eye  · Sudden severe headache with no known cause  · sudden dizziness, trouble walking, loss of balance or coordination    It is important to seek emergency care right away if you have further stroke symptoms. If you get emergency help quickly, the powerful clot-dissolving medicines can reduce the disabilities caused by a stroke.     For more information:    American Stroke Association  3-227-9-STROKE  www.strokeassociation.org           IF YOU SMOKE OR USE TOBACCO PLEASE READ THE FOLLOWING:    Why is smoking bad for me?  Smoking increases the risk of heart disease, lung disease, vascular disease, stroke, and cancer.     If you smoke, STOP!    If you would like more information on quitting smoking, please visit the [employer] website: www.[employer]/healthier-together/smoke   This link will provide additional resources including the QUIT line and the Beat the Pack support groups.     For more information:    American Cancer Society  (813) 488-2272    American Heart Association  1-253.254.1974               YOU ARE THE MOST IMPORTANT FACTOR IN YOUR RECOVERY.     Follow all instructions carefully.     I have reviewed my discharge instructions with my nurse, including the following information, if applicable:     Information about my illness and diagnosis   Follow up appointments (including lab draws)   Wound Care   Equipment Needs   Medications (new and continuing) along with side effects   Preventative information such as vaccines and smoking cessations   Diet   Pain   I know when to contact my Doctor's office or seek emergency care      I want my nurse to describe the side effects of my medications: YES NO   If the answer is no, I understand the side effects of my medications: YES NO   My nurse described the side effects of my medications in a way that I could understand: YES NO   I have  taken my personal belongings and my own medications with me at discharge: YES NO            I have received this information and my questions have been answered. I have discussed any concerns I see with this plan with the nurse or physician. I understand these instructions.    Signature of Patient or Responsible Person: _____________________________________    Date: _________________  Time: __________________    Signature of Healthcare Provider: _______________________________________  Date: _________________  Time: __________________

## 2017-01-24 LAB
ANION GAP SERPL CALCULATED.3IONS-SCNC: 10 MMOL/L (ref 3–11)
BUN BLD-MCNC: 9 MG/DL (ref 9–23)
BUN/CREAT SERPL: 15 (ref 7–25)
CALCIUM SPEC-SCNC: 8.3 MG/DL (ref 8.7–10.4)
CHLORIDE SERPL-SCNC: 105 MMOL/L (ref 99–109)
CO2 SERPL-SCNC: 28 MMOL/L (ref 20–31)
CREAT BLD-MCNC: 0.6 MG/DL (ref 0.6–1.3)
FERRITIN SERPL-MCNC: 72 NG/ML (ref 10–291)
FOLATE SERPL-MCNC: 19.47 NG/ML (ref 3.2–20)
GFR SERPL CREATININE-BSD FRML MDRD: 94 ML/MIN/1.73
GLUCOSE BLD-MCNC: 85 MG/DL (ref 70–100)
HCT VFR BLD AUTO: 26.4 % (ref 34.5–44)
HCT VFR BLD AUTO: 27.5 % (ref 34.5–44)
HCT VFR BLD AUTO: 27.6 % (ref 34.5–44)
HGB BLD-MCNC: 8.8 G/DL (ref 11.5–15.5)
HGB BLD-MCNC: 8.9 G/DL (ref 11.5–15.5)
HGB BLD-MCNC: 9 G/DL (ref 11.5–15.5)
IRON 24H UR-MRATE: 29 MCG/DL (ref 50–175)
IRON SATN MFR SERPL: 12 % (ref 15–50)
POTASSIUM BLD-SCNC: 3.8 MMOL/L (ref 3.5–5.5)
RETICS/RBC NFR AUTO: 2 % (ref 0.5–1.5)
SODIUM BLD-SCNC: 143 MMOL/L (ref 132–146)
TIBC SERPL-MCNC: 236 MCG/DL (ref 250–450)
VIT B12 BLD-MCNC: 512 PG/ML (ref 211–911)

## 2017-01-24 PROCEDURE — 82746 ASSAY OF FOLIC ACID SERUM: CPT | Performed by: INTERNAL MEDICINE

## 2017-01-24 PROCEDURE — 85045 AUTOMATED RETICULOCYTE COUNT: CPT | Performed by: INTERNAL MEDICINE

## 2017-01-24 PROCEDURE — 82728 ASSAY OF FERRITIN: CPT | Performed by: INTERNAL MEDICINE

## 2017-01-24 PROCEDURE — 25010000002 MAGNESIUM SULFATE IN D5W 1G/100ML (PREMIX) 10-5 MG/ML-% SOLUTION: Performed by: NURSE PRACTITIONER

## 2017-01-24 PROCEDURE — 83540 ASSAY OF IRON: CPT | Performed by: INTERNAL MEDICINE

## 2017-01-24 PROCEDURE — 85014 HEMATOCRIT: CPT | Performed by: NURSE PRACTITIONER

## 2017-01-24 PROCEDURE — 80048 BASIC METABOLIC PNL TOTAL CA: CPT | Performed by: NURSE PRACTITIONER

## 2017-01-24 PROCEDURE — 85018 HEMOGLOBIN: CPT | Performed by: NURSE PRACTITIONER

## 2017-01-24 PROCEDURE — 25010000003 CEFTRIAXONE PER 250 MG: Performed by: INTERNAL MEDICINE

## 2017-01-24 PROCEDURE — 82607 VITAMIN B-12: CPT | Performed by: INTERNAL MEDICINE

## 2017-01-24 PROCEDURE — 83550 IRON BINDING TEST: CPT | Performed by: INTERNAL MEDICINE

## 2017-01-24 PROCEDURE — 99232 SBSQ HOSP IP/OBS MODERATE 35: CPT | Performed by: INTERNAL MEDICINE

## 2017-01-24 RX ADMIN — MAGNESIUM SULFATE HEPTAHYDRATE 1 G: 1 INJECTION, SOLUTION INTRAVENOUS at 05:30

## 2017-01-24 RX ADMIN — CEFTRIAXONE SODIUM 1 G: 1 INJECTION, SOLUTION INTRAVENOUS at 21:03

## 2017-01-24 RX ADMIN — Medication 250 MG: at 17:07

## 2017-01-24 RX ADMIN — CEFTRIAXONE SODIUM 1 G: 1 INJECTION, SOLUTION INTRAVENOUS at 00:01

## 2017-01-24 RX ADMIN — SODIUM CHLORIDE 8 MG/HR: 900 INJECTION INTRAVENOUS at 08:47

## 2017-01-24 RX ADMIN — SODIUM CHLORIDE 100 ML/HR: 9 INJECTION, SOLUTION INTRAVENOUS at 08:49

## 2017-01-24 RX ADMIN — SODIUM CHLORIDE 8 MG/HR: 900 INJECTION INTRAVENOUS at 14:14

## 2017-01-24 RX ADMIN — Medication 250 MG: at 08:47

## 2017-01-24 RX ADMIN — MAGNESIUM SULFATE HEPTAHYDRATE 1 G: 1 INJECTION, SOLUTION INTRAVENOUS at 02:13

## 2017-01-24 RX ADMIN — AMLODIPINE BESYLATE 2.5 MG: 2.5 TABLET ORAL at 08:47

## 2017-01-24 RX ADMIN — SODIUM CHLORIDE 8 MG/HR: 900 INJECTION INTRAVENOUS at 02:13

## 2017-01-24 RX ADMIN — SODIUM CHLORIDE 8 MG/HR: 900 INJECTION INTRAVENOUS at 18:41

## 2017-01-24 NOTE — PROGRESS NOTES
Colon and Rectal [[facility]]    Patient Care Team:  Jony Singh MD as PCP - General  Jony Singh MD as PCP - Family Medicine    Chief complaint continued sigmoid diverticular bleed    Subjective     HPI: 87-year-old female presented to the emergency room a week ago with hematochezia.  She was stable with a hemoglobin of 13.  She re-bled and I scoped her 3 days ago and there was a slow trickle from a diverticulum in the sigmoid colon.  The terminal ileum was clean and there were no AVMs.  No outright diverticulitis but some edema.  Several small polyps.  She bled again last night and today with a hemoglobin down to 10.1.  No nausea, vomiting or abdominal pain.    Review of Systems: Walks with a cane and is hard of hearing but otherwise in good health.     History  Past Medical History   Diagnosis Date   • Cataracts, bilateral      removed   • Generalized osteoarthritis    • Glaucoma    • Hearing loss      Wears hearing aids   • Herpes zoster    • History of compression fracture of spine    • Hyperlipidemia    • Hypertension    • Iron deficiency    • Low back pain    • Lumbosacral injury    • Shoulder pain, right    • Uses hearing aid      bilat    • Vitamin D deficiency    • Wears glasses    • Wrist fracture, left      Past Surgical History   Procedure Laterality Date   • Hip fracture surgery Left 2014      hip pinning for acute fracture   • Colonoscopy     • Knee arthroplasty unicompartmental Right 9/22/2016     Procedure: RIGHT KNEE ARTHROPLASTY UNICOMPARTMENTAL;  Surgeon: Jesse Taveras MD;  Location:  [facility];  Service:    • Tonsillectomy  1947   • Eye surgery       bilat cataracts without lens    • Knee arthroplasty unicompartmental Left 12/1/2016     Procedure: LEFT KNEE ARTHROPLASTY UNICOMPARTMENTAL;  Surgeon: Jesse Taveras MD;  Location:  [facility];  Service:      Family History   Problem Relation Age of Onset   • Alzheimer's disease Mother    • No Known Problems Father    • Arthritis Sister    •  "Asthma Sister    • Breast cancer Sister    • Colon cancer Sister    • Lung cancer Brother    • Skin cancer Brother    • Heart disease Other      2009     Social History   Substance Use Topics   • Smoking status: Never Smoker   • Smokeless tobacco: Never Used   • Alcohol use No     Prescriptions Prior to Admission   Medication Sig Dispense Refill Last Dose   • acetaminophen (TYLENOL) 500 MG tablet Take 1,000 mg by mouth 2 (two) times a day as needed for mild pain (1-3).   Past Week at Unknown time   • amLODIPine (NORVASC) 2.5 MG tablet Take 1 tablet by mouth Daily. (Patient taking differently: Take 2.5 mg by mouth Daily.) 90 tablet 0 1/22/2017 at Unknown time   • aspirin 81 MG tablet Take 0.5 tablets by mouth Daily. Resume in 30 days   Past Week at Unknown time   • calcium-vitamin D (OSCAL 500/200 D-3) 500-200 MG-UNIT per tablet Take 1 tablet by mouth daily.   1/22/2017 at Unknown time   • carboxymethylcellulose 1 % ophthalmic solution Apply 1 drop to eye every night. (Patient taking differently: Apply 2 drops to eye Every Night.)  12 1/22/2017 at Unknown time   • Cholecalciferol (VITAMIN D) 2000 UNITS capsule Take 1 capsule by mouth daily.   1/22/2017 at Unknown time   • CRANBERRY EXTRACT PO Take 2 capsules by mouth Daily.   1/22/2017 at Unknown time   • ferrous sulfate 325 (65 FE) MG tablet Take 325 mg by mouth daily with breakfast.   1/22/2017 at Unknown time   • Multiple Vitamins-Minerals (CENTRUM ADULTS) tablet Take 1 tablet by mouth daily.   1/22/2017 at Unknown time   • aspirin 325 MG EC tablet Take 1 tablet by mouth Daily. For 1 month 30 tablet 0 Unknown at Unknown time   • Docusate Sodium (DSS) 100 MG capsule Take 100 mg by mouth 2 (Two) Times a Day. 60 each 0 Unknown at Unknown time     Allergies:  Lisinopril-hydrochlorothiazide and Other    Objective     Vital Signs  Blood pressure 124/68, pulse 82, temperature 97.3 °F (36.3 °C), temperature source Oral, resp. rate 16, height 62\" (157.5 cm), weight 114 lb " 3.2 oz (51.8 kg), SpO2 97 %, not currently breastfeeding.    Physical Exam: No acute distress  HEENT normal  Neck supple  Lungs clear  Heart regular  Abdomen benign  Rectal several days ago showed some mild decreased sphincter tone.  Extremities normal     Results Review:  Lab Results (last 24 hours)     Procedure Component Value Units Date/Time    CBC Auto Differential [[id number]]  (Abnormal) Collected:  01/23/17 1529    Specimen:  Blood Updated:  01/23/17 1543     WBC 10.69 10*3/mm3      RBC 3.35 (L) 10*6/mm3      Hemoglobin 10.1 (L) g/dL      Hematocrit 30.5 (L) %      MCV 91.0 fL      MCH 30.1 pg      MCHC 33.1 g/dL      RDW 14.6 (H) %      RDW-SD 48.7 fl      MPV 9.6 fL      Platelets 239 10*3/mm3      Neutrophil % 80.9 (H) %      Lymphocyte % 13.8 (L) %      Monocyte % 4.4 %      Eosinophil % 0.3 %      Basophil % 0.3 %      Immature Grans % 0.3 %      Neutrophils, Absolute 8.66 (H) 10*3/mm3      Lymphocytes, Absolute 1.47 10*3/mm3      Monocytes, Absolute 0.47 10*3/mm3      Eosinophils, Absolute 0.03 (L) 10*3/mm3      Basophils, Absolute 0.03 10*3/mm3      Immature Grans, Absolute 0.03 10*3/mm3     aPTT [[id number]]  (Normal) Collected:  01/23/17 1529    Specimen:  Blood Updated:  01/23/17 1555     PTT 27.8 seconds     Narrative:       PTT = The equivalent PTT values for the therapeutic range of heparin levels at 0.3 to 0.5 U/ml are 45 to 60 seconds.    Protime-INR [[id number]] Collected:  01/23/17 1529    Specimen:  Blood Updated:  01/23/17 1555     Protime 10.9 Seconds      INR 1.00     Narrative:       Therapeutic Ranges for INR: 2.0-3.0 (PT 20-30)                              2.5-3.5 (PT 25-34)    Urine Culture [[id number]] Collected:  01/23/17 1551    Specimen:  Urine from Urine, Clean Catch Updated:  01/23/17 1604    Urinalysis With / Culture If Indicated [[id number]]  (Abnormal) Collected:  01/23/17 1551    Specimen:  Urine from Urine, Clean Catch Updated:  01/23/17 1626     Color, UA Yellow      Appearance, UA  Cloudy (A)      pH, UA 7.0      Specific Gravity, UA 1.007      Glucose, UA Negative      Ketones, UA Trace (A)      Bilirubin, UA Negative      Blood, UA Large (3+) (A)      Protein, UA 30 mg/dL (1+) (A)      Leuk Esterase, UA Moderate (2+) (A)      Nitrite, UA Negative      Urobilinogen, UA 0.2 E.U./dL     Urinalysis, Microscopic Only [[id number]]  (Abnormal) Collected:  01/23/17 1551    Specimen:  Urine from Urine, Clean Catch Updated:  01/23/17 1626     RBC, UA 7-12 (A) /HPF      WBC, UA 31-50 (A) /HPF      Bacteria, UA None Seen /HPF      Squamous Epithelial Cells, UA 0-2 /HPF      Hyaline Casts, UA 0-6 /LPF      Methodology Manual Light Microscopy     TSH [[id number]]  (Normal) Collected:  01/23/17 1529    Specimen:  Blood Updated:  01/23/17 1631     TSH 1.691 mIU/mL     Magnesium [[id number]]  (Normal) Collected:  01/23/17 1529    Specimen:  Blood Updated:  01/23/17 1631     Magnesium 1.8 mg/dL     Comprehensive Metabolic Panel [[id number]] Collected:  01/23/17 1529    Specimen:  Blood Updated:  01/23/17 1631     Glucose 92 mg/dL      BUN 11 mg/dL      Creatinine 0.70 mg/dL      Sodium 145 mmol/L      Potassium 4.1 mmol/L      Chloride 109 mmol/L      CO2 27.0 mmol/L      Calcium 9.2 mg/dL      Total Protein 5.9 g/dL      Albumin 3.60 g/dL      ALT (SGPT) 8 U/L      AST (SGOT) 19 U/L      Alkaline Phosphatase 73 U/L      Total Bilirubin 0.5 mg/dL      eGFR Non African Amer 79 mL/min/1.73      Globulin 2.3 gm/dL      A/G Ratio 1.6 g/dL      BUN/Creatinine Ratio 15.7      Anion Gap 9.0 mmol/L     Narrative:       National [employer] Guidelines    Stage                           Description                             GFR                      1                               Normal or High                          90+  2                               Mild decrease                            60-89  3                               Moderate decrease                   30-59  4                               Severe  decrease                       15-29  5                               Kidney failure                             <15         Imaging Results (last 24 hours)     Procedure Component Value Units Date/Time    NM GI Blood Loss [[id number]]  (Abnormal) Collected:  01/23/17 1606     Updated:  01/23/17 1837    Narrative:       EXAM:    NM GI Bleeding Scan.    CLINICAL HISTORY:    89 years old, female; Signs and symptoms; Symptoms: Hematochezia; Patient HX:   Dx diverticulitis. Anal bleeding; Additional info: Brbpr continued S/P cscope   with only diverticulosis    TECHNIQUE:    Frontal images of the abdomen and pelvis were obtained over 60 minutes   following the intravenous administration of 24 [id number] red blood cells.    EXAM DATE/TIME:    1/23/2017 4:06 PM    COMPARISON:    No relevant prior studies available.    FINDINGS:    Stomach and bowel:    No active GI bleeding.      Impression:         No active GI bleeding at this time.  If there are clinical symptoms of   subsequent active hemorrhage additional followup scintigraphic imaging   recommended.      THIS DOCUMENT HAS BEEN ELECTRONICALLY SIGNED BY KATIE JACOB MD           Assessment/Plan     Principal Problem:    Hematochezia  Active Problems:    Iron deficiency anemia    Essential hypertension    Anxiety    Dysuria   recurring sigmoid diverticular bleeding.  Unfortunately, the bleeding scan was negative.  The scan can be repeated in 24 hours without reinfusion.  Ideally, she will bleed fast enough to warrant angiographic ablation otherwise if this continues to be a problem, a sigmoid colectomy and colostomy might be in order.      I discussed the patients findings and my recommendations with patient and family.     Riki Bales MD  01/23/17  8:45 PM

## 2017-01-24 NOTE — PLAN OF CARE
Problem: Patient Care Overview (Adult)  Goal: Adult Individualization and Mutuality    01/24/17 1746   Individualization   Patient Specific Goals No skin breakdown, no falls or injury   Patient Specific Interventions Encourage ambulation & repositioning, hourly rounds & call light within reach         Problem: Fall Risk (Adult)  Goal: Identify Related Risk Factors and Signs and Symptoms  Outcome: Ongoing (interventions implemented as appropriate)    01/24/17 1746   Fall Risk   Fall Risk: Related Risk Factors environment unfamiliar   Fall Risk: Signs and Symptoms presence of risk factors       Goal: Absence of Falls  Outcome: Ongoing (interventions implemented as appropriate)    01/24/17 1746   Fall Risk (Adult)   Absence of Falls making progress toward outcome         Problem: Pressure Ulcer (Adult)  Goal: Signs and Symptoms of Listed Potential Problems Will be Absent or Manageable (Pressure Ulcer)  Outcome: Ongoing (interventions implemented as appropriate)    01/24/17 1746   Pressure Ulcer   Problems Present (Pressure Ulcer) none         Problem: Gastrointestinal Bleeding (Adult)  Goal: Signs and Symptoms of Listed Potential Problems Will be Absent or Manageable (Gastrointestinal Bleeding)  Outcome: Ongoing (interventions implemented as appropriate)    01/24/17 1746   Gastrointestinal Bleeding   Problems Assessed (GI Bleeding) situational response   Problems Present (GI Bleeding) situational response

## 2017-01-24 NOTE — PROGRESS NOTES
"      HOSPITALIST DAILY PROGRESS NOTE    Chief Complaint: f/u for GI bleed    Subjective   SUBJECTIVE/OVERNIGHT EVENTS   No acute events overnight, patient states that she has not had any new bleeding episodes, she denies any abd pain, no fevers or chills no, n/v    Review of Systems:  Gen-no fevers, no chills  CV-no chest pain, no palpitations  Resp-no cough, no dyspnea  GI-no N/V/D, no abd pain    Objective   OBJECTIVE   I have reviewed the vital signs.  Visit Vitals   • /67 (BP Location: Right arm, Patient Position: Lying)   • Pulse 73   • Temp 98.1 °F (36.7 °C) (Oral)   • Resp 18   • Ht 62\" (157.5 cm)   • Wt 114 lb 3.2 oz (51.8 kg)   • SpO2 98%   • BMI 20.89 kg/m2       Physical Exam:  Gen-no acute distress,comfortable  CV-RRR, S1 S2 normal, no m/r/g  Resp-CTAB, no wheezes  Abd-soft, NT, ND, +BS  Ext-no edema  Neuro-A&Ox3, no focal deficits  Psych-appropriate mood and affect    Results:  I have reviewed the labs, radiology results, and diagnostic studies.      Results from last 7 days  Lab Units 01/24/17  0440 01/24/17  0022 01/23/17  1529 01/17/17  1648   WBC 10*3/mm3  --   --  10.69 6.92   HEMOGLOBIN g/dL 8.9* 8.8* 10.1* 13.1   HEMATOCRIT % 27.5* 26.4* 30.5* 39.4   PLATELETS 10*3/mm3  --   --  239 312       Results from last 7 days  Lab Units 01/24/17  0440   SODIUM mmol/L 143   POTASSIUM mmol/L 3.8   CHLORIDE mmol/L 105   TOTAL CO2 mmol/L 28.0   BUN mg/dL 9   CREATININE mg/dL 0.60   GLUCOSE mg/dL 85   CALCIUM mg/dL 8.3*       Radiology Results:  Imaging Results (last 24 hours)     Procedure Component Value Units Date/Time    NM GI Blood Loss [[id number]]  (Abnormal) Collected:  01/23/17 1606     Updated:  01/23/17 1837    Narrative:       EXAM:    NM GI Bleeding Scan.    CLINICAL HISTORY:    89 years old, female; Signs and symptoms; Symptoms: Hematochezia; Patient HX:   Dx diverticulitis. Anal bleeding; Additional info: Brbpr continued S/P cscope   with only diverticulosis    TECHNIQUE:    Frontal images " of the abdomen and pelvis were obtained over 60 minutes   following the intravenous administration of 24 [id number] red blood cells.    EXAM DATE/TIME:    1/23/2017 4:06 PM    COMPARISON:    No relevant prior studies available.    FINDINGS:    Stomach and bowel:    No active GI bleeding.      Impression:         No active GI bleeding at this time.  If there are clinical symptoms of   subsequent active hemorrhage additional followup scintigraphic imaging   recommended.      THIS DOCUMENT HAS BEEN ELECTRONICALLY SIGNED BY KATIE JACOB MD          I have reviewed the medications.    Assessment/Plan   ASSESSMENT/PLAN    Principal Problem:    Hematochezia  Active Problems:    Iron deficiency anemia    Essential hypertension    Anxiety    Dysuria    GI bleed    89 yof p/w recurrent hematochezia,likely secondary to sigmoid diverticular bleed    # Recurrent sigmoid diverticular bleed, s/p c-scope 1/20 per   # Hypertension, controlled  # Dysuria, likely UTI    Plan  -  following, s/p bleeding scan that was negative, Hg low but stable  - Plan for repeat scan and ablation if bleeding is brisk or sigmoid colectomy and colostomy  - BP control, hold if systolic <110  - start rocephin, f/u cultures    Dispo: pedning w/u as above    Charlie Cassidy MD  01/24/17  12:10 PM

## 2017-01-25 LAB
HCT VFR BLD AUTO: 27.7 % (ref 34.5–44)
HCT VFR BLD AUTO: 28.5 % (ref 34.5–44)
HCT VFR BLD AUTO: 28.8 % (ref 34.5–44)
HGB BLD-MCNC: 9 G/DL (ref 11.5–15.5)
HGB BLD-MCNC: 9.2 G/DL (ref 11.5–15.5)
HGB BLD-MCNC: 9.4 G/DL (ref 11.5–15.5)
MAGNESIUM SERPL-MCNC: 2 MG/DL (ref 1.3–2.7)

## 2017-01-25 PROCEDURE — 85018 HEMOGLOBIN: CPT | Performed by: INTERNAL MEDICINE

## 2017-01-25 PROCEDURE — 85014 HEMATOCRIT: CPT | Performed by: INTERNAL MEDICINE

## 2017-01-25 PROCEDURE — 25010000002 LORAZEPAM PER 2 MG: Performed by: NURSE PRACTITIONER

## 2017-01-25 PROCEDURE — 99232 SBSQ HOSP IP/OBS MODERATE 35: CPT | Performed by: INTERNAL MEDICINE

## 2017-01-25 PROCEDURE — 25010000003 CEFTRIAXONE PER 250 MG: Performed by: INTERNAL MEDICINE

## 2017-01-25 PROCEDURE — 83735 ASSAY OF MAGNESIUM: CPT | Performed by: INTERNAL MEDICINE

## 2017-01-25 RX ADMIN — Medication 250 MG: at 08:35

## 2017-01-25 RX ADMIN — LORAZEPAM 0.25 MG: 2 INJECTION INTRAMUSCULAR; INTRAVENOUS at 20:35

## 2017-01-25 RX ADMIN — CEFTRIAXONE SODIUM 1 G: 1 INJECTION, SOLUTION INTRAVENOUS at 20:34

## 2017-01-25 RX ADMIN — AMLODIPINE BESYLATE 2.5 MG: 2.5 TABLET ORAL at 08:35

## 2017-01-25 RX ADMIN — Medication 250 MG: at 17:26

## 2017-01-25 NOTE — PROGRESS NOTES
"      HOSPITALIST DAILY PROGRESS NOTE    Chief Complaint: f/u GI bleed    Subjective   SUBJECTIVE/OVERNIGHT EVENTS   No acute events overnight, patient is doing well, denies any n/v/d    Review of Systems:  Gen-no fevers, no chills  CV-no chest pain, no palpitations  Resp-no cough, no dyspnea  GI-no N/V/D, no abd pain    Objective   OBJECTIVE   I have reviewed the vital signs.  Visit Vitals   • /66 (BP Location: Right arm, Patient Position: Lying)   • Pulse 72   • Temp 98.1 °F (36.7 °C) (Oral)   • Resp 16   • Ht 62\" (157.5 cm)   • Wt 114 lb 3.2 oz (51.8 kg)   • SpO2 96%   • BMI 20.89 kg/m2       Physical Exam:  Gen-no acute distress, comfortable  CV-RRR, S1 S2 normal, no m/r/g  Resp-CTAB, no wheezes  Abd-soft, NT, ND, +BS  Ext-no edema  Neuro-A&Ox3, no focal deficits  Psych-appropriate mood    Results:  I have reviewed the labs,     Results from last 7 days  Lab Units 01/24/17  2350 01/24/17  1543 01/24/17  0440  01/23/17  1529   WBC 10*3/mm3  --   --   --   --  10.69   HEMOGLOBIN g/dL 9.0* 9.0* 8.9*  < > 10.1*   HEMATOCRIT % 27.7* 27.6* 27.5*  < > 30.5*   PLATELETS 10*3/mm3  --   --   --   --  239   < > = values in this interval not displayed.    Results from last 7 days  Lab Units 01/24/17  0440   SODIUM mmol/L 143   POTASSIUM mmol/L 3.8   CHLORIDE mmol/L 105   TOTAL CO2 mmol/L 28.0   BUN mg/dL 9   CREATININE mg/dL 0.60   GLUCOSE mg/dL 85   CALCIUM mg/dL 8.3*     I have reviewed the medications.    Assessment/Plan   ASSESSMENT/PLAN    Principal Problem:    Hematochezia  Active Problems:    Iron deficiency anemia    Essential hypertension    Anxiety    Dysuria    GI bleed    89 yof p/w recurrent hematochezia,likely secondary to sigmoid diverticular bleed     # Recurrent sigmoid diverticular bleed, s/p c-scope 1/20 per , h/h stable  # Blood loss anemia- seems stable  # Hypertension, controlled  # Dysuria, likely UTI     Plan  -  following, s/p bleeding scan that was negative, Hg low but " stable  - Plan for repeat scan and ablation if bleeding is brisk or sigmoid colectomy and colostomy  - BP control, hold if systolic <110  - continue rocephin, f/u cultures     Dispo: anticipate d/c in 1-2 if CRS agreeable    Charlie Cassidy MD  01/25/17  6:36 AM

## 2017-01-25 NOTE — PLAN OF CARE
Problem: Patient Care Overview (Adult)  Goal: Plan of Care Review  Outcome: Ongoing (interventions implemented as appropriate)  Goal: Adult Individualization and Mutuality  Outcome: Ongoing (interventions implemented as appropriate)  Goal: Discharge Needs Assessment  Outcome: Ongoing (interventions implemented as appropriate)    Problem: Fall Risk (Adult)  Goal: Identify Related Risk Factors and Signs and Symptoms  Outcome: Ongoing (interventions implemented as appropriate)  Goal: Absence of Falls  Outcome: Ongoing (interventions implemented as appropriate)    Problem: Pressure Ulcer (Adult)  Goal: Signs and Symptoms of Listed Potential Problems Will be Absent or Manageable (Pressure Ulcer)  Outcome: Ongoing (interventions implemented as appropriate)    Problem: Gastrointestinal Bleeding (Adult)  Goal: Signs and Symptoms of Listed Potential Problems Will be Absent or Manageable (Gastrointestinal Bleeding)  Outcome: Ongoing (interventions implemented as appropriate)

## 2017-01-25 NOTE — PLAN OF CARE
Problem: Patient Care Overview (Adult)  Goal: Adult Individualization and Mutuality  Outcome: Ongoing (interventions implemented as appropriate)    01/25/17 1503   Individualization   Patient Specific Interventions Encourage ambulation and repositioning. Pt denies pain.          Problem: Fall Risk (Adult)  Goal: Identify Related Risk Factors and Signs and Symptoms  Outcome: Ongoing (interventions implemented as appropriate)    01/24/17 1746 01/25/17 1503   Fall Risk   Fall Risk: Related Risk Factors environment unfamiliar --    Fall Risk: Signs and Symptoms --  presence of risk factors       Goal: Absence of Falls  Outcome: Ongoing (interventions implemented as appropriate)    01/25/17 1503   Fall Risk (Adult)   Absence of Falls making progress toward outcome         Problem: Pressure Ulcer (Adult)  Goal: Signs and Symptoms of Listed Potential Problems Will be Absent or Manageable (Pressure Ulcer)  Outcome: Ongoing (interventions implemented as appropriate)    01/25/17 1503   Pressure Ulcer   Problems Assessed (Pressure Ulcer) all   Problems Present (Pressure Ulcer) none         Problem: Gastrointestinal Bleeding (Adult)  Goal: Signs and Symptoms of Listed Potential Problems Will be Absent or Manageable (Gastrointestinal Bleeding)  Outcome: Ongoing (interventions implemented as appropriate)    01/25/17 1503   Gastrointestinal Bleeding   Problems Assessed (GI Bleeding) all   Problems Present (GI Bleeding) situational response         01/25/17 1503   Gastrointestinal Bleeding   Problems Assessed (GI Bleeding) all   Problems Present (GI Bleeding) situational response

## 2017-01-26 VITALS
WEIGHT: 114.2 LBS | TEMPERATURE: 97.5 F | BODY MASS INDEX: 21.02 KG/M2 | DIASTOLIC BLOOD PRESSURE: 65 MMHG | HEART RATE: 78 BPM | OXYGEN SATURATION: 96 % | SYSTOLIC BLOOD PRESSURE: 117 MMHG | RESPIRATION RATE: 16 BRPM | HEIGHT: 62 IN

## 2017-01-26 PROBLEM — K92.1 HEMATOCHEZIA: Status: RESOLVED | Noted: 2017-01-23 | Resolved: 2017-01-26

## 2017-01-26 PROBLEM — K92.2 GI BLEED: Status: RESOLVED | Noted: 2017-01-23 | Resolved: 2017-01-26

## 2017-01-26 LAB
BACTERIA SPEC AEROBE CULT: ABNORMAL
BACTERIA SPEC AEROBE CULT: ABNORMAL
HCT VFR BLD AUTO: 29.5 % (ref 34.5–44)
HGB BLD-MCNC: 9.8 G/DL (ref 11.5–15.5)

## 2017-01-26 PROCEDURE — 85018 HEMOGLOBIN: CPT | Performed by: INTERNAL MEDICINE

## 2017-01-26 PROCEDURE — 99239 HOSP IP/OBS DSCHRG MGMT >30: CPT | Performed by: NURSE PRACTITIONER

## 2017-01-26 PROCEDURE — 85014 HEMATOCRIT: CPT | Performed by: INTERNAL MEDICINE

## 2017-01-26 RX ORDER — CEFPODOXIME PROXETIL 100 MG/1
100 TABLET, FILM COATED ORAL EVERY 12 HOURS
Qty: 8 TABLET | Refills: 0 | Status: SHIPPED | OUTPATIENT
Start: 2017-01-26 | End: 2017-01-30

## 2017-01-26 RX ORDER — CEFPODOXIME PROXETIL 100 MG/1
100 TABLET, FILM COATED ORAL EVERY 12 HOURS
Qty: 8 TABLET | Refills: 0 | Status: SHIPPED | OUTPATIENT
Start: 2017-01-26 | End: 2017-01-26

## 2017-01-26 RX ADMIN — AMLODIPINE BESYLATE 2.5 MG: 2.5 TABLET ORAL at 09:03

## 2017-01-26 RX ADMIN — Medication 250 MG: at 09:04

## 2017-01-26 NOTE — PLAN OF CARE
Problem: Fall Risk (Adult)  Goal: Identify Related Risk Factors and Signs and Symptoms  Outcome: Ongoing (interventions implemented as appropriate)    01/26/17 0923   Fall Risk   Fall Risk: Related Risk Factors age-related changes;environment unfamiliar   Fall Risk: Signs and Symptoms presence of risk factors

## 2017-01-26 NOTE — PLAN OF CARE
Problem: Gastrointestinal Bleeding (Adult)  Goal: Signs and Symptoms of Listed Potential Problems Will be Absent or Manageable (Gastrointestinal Bleeding)  Outcome: Ongoing (interventions implemented as appropriate)    01/26/17 0956   Gastrointestinal Bleeding   Problems Assessed (GI Bleeding) all   Problems Present (GI Bleeding) none

## 2017-01-26 NOTE — PROGRESS NOTES
Good appetite.  Abdomen benign.  No significant bleeding.  Bleeding scan next time she bleeds.  This may be as an outpatient.

## 2017-01-26 NOTE — PLAN OF CARE
Problem: Patient Care Overview (Adult)  Goal: Plan of Care Review  Outcome: Ongoing (interventions implemented as appropriate)  Goal: Adult Individualization and Mutuality  Outcome: Ongoing (interventions implemented as appropriate)    Problem: Fall Risk (Adult)  Goal: Absence of Falls  Outcome: Ongoing (interventions implemented as appropriate)    Problem: Pressure Ulcer (Adult)  Goal: Signs and Symptoms of Listed Potential Problems Will be Absent or Manageable (Pressure Ulcer)  Outcome: Ongoing (interventions implemented as appropriate)    Problem: Gastrointestinal Bleeding (Adult)  Goal: Signs and Symptoms of Listed Potential Problems Will be Absent or Manageable (Gastrointestinal Bleeding)  Outcome: Ongoing (interventions implemented as appropriate)

## 2017-01-26 NOTE — PROGRESS NOTES
Continued Stay Note  [facility]     Patient Name: Marcelino Mesa  MRN: [id number]  Today's Date: 1/26/2017    Admit Date: 1/23/2017          Discharge Plan       01/26/17 1005    Case Management/Social Work Plan    Plan Home with [facility]    Patient/Family In Agreement With Plan yes    Additional Comments Patient hoping to go home soon.  Family in the room.  Patient would like to resume [facility] at discharge.  CM to follow for discharge needs.                Discharge Codes     None        Expected Discharge Date and Time     Expected Discharge Date Expected Discharge Time    Jan 26, 2017             Diana Queen RN

## 2017-01-26 NOTE — PLAN OF CARE
Problem: Patient Care Overview (Adult)  Goal: Plan of Care Review  Outcome: Ongoing (interventions implemented as appropriate)    01/26/17 0969   Coping/Psychosocial Response Interventions   Plan Of Care Reviewed With patient   Patient Care Overview   Progress improving   Outcome Evaluation   Outcome Summary/Follow up Plan pt denies bleeding at this time. pt reports she is ready to go home. has ambulated well with stand by assist. has no c/o at this time.

## 2017-01-26 NOTE — PLAN OF CARE
Problem: Fall Risk (Adult)  Goal: Absence of Falls  Outcome: Ongoing (interventions implemented as appropriate)    01/26/17 0955   Fall Risk (Adult)   Absence of Falls making progress toward outcome

## 2017-01-26 NOTE — DISCHARGE PLACEMENT REQUEST
"BRITTNEY HernandezN, RN  Case Management  [phone]    Moshe Elyse NICO (89 y.o. Female)     Date of Birth Social Security Number Address Home Phone MRN    02/19/1927  [address] [place], [place] [address] [phone] [id number]    Mormonism Marital Status          Moravian        Admission Date Admission Type Admitting Provider Attending Provider Department, Room/Bed    1/23/17 Elective Charlie Cassidy MD Opii, [place], MD [address], [address]/1    Discharge Date Discharge Disposition Discharge Destination         Home or Self Care             Attending Provider: Charlie Cassidy MD     Allergies:  Lisinopril-hydrochlorothiazide, Other    Isolation:  None   Infection:  None   Code Status:  FULL    Ht:  62\" (157.5 cm)   Wt:  114 lb 3.2 oz (51.8 kg)    Admission Cmt:  None   Principal Problem:  Hematochezia [K92.1]                 Active Insurance as of 1/23/2017     Primary Coverage     Payor Plan Insurance Group Employer/Plan Group    MEDICARE MEDICARE A & B      Payor Plan Address Payor Plan Phone Number Effective From Effective To    [address] [phone] 2/[birth year]1992     [place] [address]       Subscriber Name Subscriber Birth Date Member ID       ELYSE MESA 2/19/1927 [id number]           Secondary Coverage     Payor Plan Insurance Group Employer/Plan Group    AARP MED SUPP AAR HEALTH CARE OPTIONS      Payor Plan Address Payor Plan Phone Number Effective From Effective To    [facility] [phone] 1/1/2016     [address]       [place] [address]       Subscriber Name Subscriber Birth Date Member ID       ELYSE MESA 2/19/1927 [id number]                 Emergency Contacts      (Rel.) Home Phone Work Phone Mobile Phone    [facility] (Daughter) [phone] -- [phone]            Insurance Information                MEDICARE/MEDICARE A & B Phone: [phone]    Subscriber: Elyse Mesa Subscriber#: [id number]    Group#:  " Precert#:         AARP MED SUPP/AAR HEALTH CARE OPTIONS Phone: [phone]    Subscriber: Marcelino Mesa Subscriber#: [id number]    Group#:  Precert#:           [address]  [address] [facility] [address]  Phone: [phone]  Fax:         Patient:     Marcelino Mesa MRN: [id number]   [address] [facility] [address] : 1927  SSN:    Phone: [phone] Sex: F      INSURANCE PAYOR PLAN GROUP # SUBSCRIBER ID   Primary:  Secondary: MEDICARE  AARP MED SUPP [id number]  [id number]   [id number]  [id number]   Admitting Diagnosis: Hematochezia [K92.1]  Order Date: 2017               Ambulatory Referral to Home Health    (Order ID: [id number])   Diagnosis: Hematochezia (K92.1 [ICD-10-CM] 578.1 [ICD-9-CM])  Abnormal gait (R26.9 [ICD-10-CM] 781.2 [ICD-9-CM])  Acute blood loss anemia (D62 [ICD-10-CM] 285.1 [ICD-9-CM])      Priority: Routine Expected Date:   Expiration Date:        Interval:  Count:    Face to Face Visit Date: 2017  Follow-up Provider for Plan of Care? I treated the patient in an acute care facility and will not continue treatment after discharge.  Follow-up Provider: KIMO KAUR [[id number]]  Reason/Clinical Findings: weakness  Describe mobility limitations that make leaving home difficult: weakness,  Nursing/Therapeutic Services Requested: Other (Resume Care)     Specimen Type:   Specimen Source:   Specimen Taken Date:   Specimen Taken Time:         Electronically Signed by: DANNI Warner 2017 10:22 AM                     Electronically signed by: DANNI Warner (NPI: [id number])

## 2017-01-26 NOTE — PROGRESS NOTES
Continued Stay Note  [facility]     Patient Name: Marcelino Mesa  MRN: [id number]  Today's Date: 1/26/2017    Admit Date: 1/23/2017          Discharge Plan       01/26/17 1028    Case Management/Social Work Plan    Plan Home with Home Health    Patient/Family In Agreement With Plan yes    Additional Comments Spoke with patient retgarding discharge plan.  Patient happy to be going home.  Plan is to resume care with [facility], patient in agreement.  Called [facility] and spoke with Kaye who accepted the referral/resumttion of care order.  Faxed appropriate documents to [phone].      01/26/17 1005    Case Management/Social Work Plan    Plan Home with [facility]    Patient/Family In Agreement With Plan yes    Additional Comments Patient hoping to go home soon.  Family in the room.  Patient would like to resume [facility] at discharge.  CM to follow for discharge needs.                Discharge Codes     None        Expected Discharge Date and Time     Expected Discharge Date Expected Discharge Time    Jan 26, 2017             Diana Queen RN

## 2017-01-26 NOTE — PLAN OF CARE
Problem: Pressure Ulcer (Adult)  Goal: Signs and Symptoms of Listed Potential Problems Will be Absent or Manageable (Pressure Ulcer)  Outcome: Ongoing (interventions implemented as appropriate)    01/26/17 0955   Pressure Ulcer   Problems Assessed (Pressure Ulcer) all   Problems Present (Pressure Ulcer) none

## 2017-01-26 NOTE — DISCHARGE SUMMARY
[facility] Medicine Services  DISCHARGE SUMMARY       Date of Admission: 1/23/2017  Date of Discharge:  1/26/2017  Primary Care Physician: Jony Singh MD    Discharge Diagnoses:  Active Hospital Problems (** Indicates Principal Problem)    Diagnosis Date Noted   • Anxiety [F41.9] 01/23/2017   • Dysuria [R30.0] 01/23/2017   • Essential hypertension [I10] 07/12/2016   • Iron deficiency anemia [D50.9] 05/24/2016      Resolved Hospital Problems    Diagnosis Date Noted Date Resolved   • **Hematochezia [K92.1] 01/23/2017 01/26/2017   • GI bleed [K92.2] 01/23/2017 01/26/2017       Presenting Problem/History of Present Illness  Hematochezia [K92.1]  GI bleed [K92.2]  Hematochezia [K92.1]     Chief Complaint on Day of Discharge: f/u GI bleed    History of Present Illness on Day of Discharge:   Patient is sitting up in bed with no new complaints or issues.  She feels great.  Had BM this morning and no blood noted.  Denies nausea or vomiting.  No chest pain, shortness of breath, or abdominal pain.  She is ready to go home.  Hospital Course  Patient is a 89 y.o. female with past medical history of essential hypertension, dyslipidemia, and osteoarthritis who recently underwent left knee arthroscopy in December 2016.  She presented to [facility] ED on 1/17/17 with complaints of bright red blood per rectum.  While in the ED, bleeding spontaneously resolved and was associated with no abdominal pain or nausea/vomiting.  He was sent home and had follow-up with Dr. Brown the same week.  She underwent colonoscopy with Dr. Brown on 1/20/17 with findings of diverticulosis active bleeding.  Patient return to [facility] as a direct admit on 1/23/17 with complaints of bright red bleeding per rectum.  She denies any pain in rectum or abdomen.  No nausea or vomiting.  She did complain of dysuria with increased frequency and difficulty urinating.  Patient was admitted to the [facility] medicine  service for further evaluation and management.  Dr. Bales consulted and recommended bleeding scan which was negative.  Patient has had serial H&H's that have remained stable.  No further bleeding noted.  Bleeding scan can be repeated as outpatient if patient has recurrence of bleeding.  Aspirin has been held upon admission and will continue to hold at discharge.  Will defer to PCP to restart if no further GI bleeding noted.  Patient was started on IV Rocephin for likely UTI.  Urine culture positive for small amount of E. Coli bacteria.  Will give patietnt prescription for cefpodoxime at discharge for 4 additional days for a total of 7 day treatment course since patient is still having some dysuria.  Patient is medically stable and ready for discharge home today.  She will follow-up with her PCP within 1 week.  Discharge plans and instructions were reviewed with patient and she verbalized understanding.    Procedures Performed:  None       Consults:   Consults     Date and Time Order Name Status Description    1/23/2017 1515 Inpatient Consult to Colorectal Surgery          Pertinent Test Results:    Results from last 7 days  Lab Units 01/26/17  0815 01/25/17 2009 01/25/17  0733  01/23/17  1529   WBC 10*3/mm3  --   --   --   --  10.69   HEMOGLOBIN g/dL 9.8* 9.2* 9.4*  < > 10.1*   HEMATOCRIT % 29.5* 28.5* 28.8*  < > 30.5*   PLATELETS 10*3/mm3  --   --   --   --  239   < > = values in this interval not displayed.    Results from last 7 days  Lab Units 01/24/17  0440 01/23/17  1529   SODIUM mmol/L 143 145   POTASSIUM mmol/L 3.8 4.1   CHLORIDE mmol/L 105 109   TOTAL CO2 mmol/L 28.0 27.0   BUN mg/dL 9 11   CREATININE mg/dL 0.60 0.70   GLUCOSE mg/dL 85 92   CALCIUM mg/dL 8.3* 9.2     Imaging Results (last 72 hours)     Procedure Component Value Units Date/Time    NM GI Blood Loss [[id number]]  (Abnormal) Collected:  01/23/17 1606     Updated:  01/23/17 1837    Narrative:       EXAM:    NM GI Bleeding Scan.    CLINICAL  "HISTORY:    89 years old, female; Signs and symptoms; Symptoms: Hematochezia; Patient HX:   Dx diverticulitis. Anal bleeding; Additional info: Brbpr continued S/P cscope   with only diverticulosis    TECHNIQUE:    Frontal images of the abdomen and pelvis were obtained over 60 minutes   following the intravenous administration of 24 [id number] red blood cells.    EXAM DATE/TIME:    1/23/2017 4:06 PM    COMPARISON:    No relevant prior studies available.    FINDINGS:    Stomach and bowel:    No active GI bleeding.      Impression:         No active GI bleeding at this time.  If there are clinical symptoms of   subsequent active hemorrhage additional followup scintigraphic imaging   recommended.      THIS DOCUMENT HAS BEEN ELECTRONICALLY SIGNED BY KATIE JACOB MD      Cultures:    URINE CULTURE   Date Value Ref Range Status   01/23/2017 10,000-20,000 CFU/mL Escherichia coli (A)  Preliminary   01/23/2017 10,000-20,000 CFU/mL Morganella morganii (A)  Preliminary       Condition on Discharge:  stable    Physical Exam on Discharge:  Visit Vitals   • /65 (BP Location: Left arm, Patient Position: Sitting)   • Pulse 78   • Temp 97.6 °F (36.4 °C) (Axillary)   • Resp 16   • Ht 62\" (157.5 cm)   • Wt 114 lb 3.2 oz (51.8 kg)   • SpO2 96%   • BMI 20.89 kg/m2     Physical Exam   Constitutional: She is oriented to person, place, and time. She appears well-developed and well-nourished. No distress.   HENT:   Head: Normocephalic and atraumatic.   Eyes: Conjunctivae are normal.   Neck: Normal range of motion. Neck supple.   Cardiovascular: Normal rate, regular rhythm and normal heart sounds.    No murmur heard.  Pulmonary/Chest: Effort normal and breath sounds normal. No respiratory distress. She has no wheezes.   Abdominal: Soft. Bowel sounds are normal. She exhibits no distension. There is no tenderness.   BM today without blood noted   Musculoskeletal: Normal range of motion. She exhibits no edema.   Neurological: She is " alert and oriented to person, place, and time.   Skin: Skin is warm and dry. No erythema.   Psychiatric: She has a normal mood and affect. Her behavior is normal. Judgment and thought content normal.     Discharge Disposition  Home or Self Care    Discharge Medications   Marcelino Mesa   Home Medication Instructions SOCORRO:[id number]    Printed on:01/26/17 0933   Medication Information                      amLODIPine (NORVASC) 2.5 MG tablet  Take 1 tablet by mouth Daily.             calcium-vitamin D (OSCAL 500/200 D-3) 500-200 MG-UNIT per tablet  Take 1 tablet by mouth daily.             carboxymethylcellulose 1 % ophthalmic solution  Apply 1 drop to eye every night.             cefpodoxime (VANTIN) 100 MG tablet  Take 1 tablet by mouth Every 12 (Twelve) Hours for 4 days.             Cholecalciferol (VITAMIN D) 2000 UNITS capsule  Take 1 capsule by mouth daily.             CRANBERRY EXTRACT PO  Take 2 capsules by mouth Daily.             Docusate Sodium (DSS) 100 MG capsule  Take 100 mg by mouth 2 (Two) Times a Day.             ferrous sulfate 325 (65 FE) MG tablet  Take 325 mg by mouth daily with breakfast.             Multiple Vitamins-Minerals (CENTRUM ADULTS) tablet  Take 1 tablet by mouth daily.                 Discharge Diet:   Diet Instructions     Diet: Regular, Cardiac; Thin Liquids, No Restrictions       Discharge Diet:   Regular  Cardiac      Fluid Consistency:  Thin Liquids, No Restrictions               Activity at Discharge:   Activity Instructions     Activity as Tolerated                   Follow-up Appointments  Future Appointments  Date Time Provider Department Center   2/9/2017 1:30 PM MD PAULINE Moseley None     Referrals and Follow-ups to Schedule     Follow-Up    As directed    PCP within 1 week for hospital follow up  Dr. Bales per his recommendation               Test Results Pending at Discharge   Order Current Status    Urine Culture Preliminary result           Olga Lidia HUERTA  DANNI Pelayo 01/26/17 9:33 AM    Time: Discharge 45 min    Please note that portions of this note may have been completed with a voice recognition program. Efforts were made to edit the dictations, but occasionally words are mistranscribed.

## 2017-02-04 ENCOUNTER — TREATMENT (OUTPATIENT)
Dept: INTERNAL MEDICINE | Facility: CLINIC | Age: 82
End: 2017-02-04

## 2017-02-04 DIAGNOSIS — Z96.652 PRESENCE OF LEFT ARTIFICIAL KNEE JOINT: Primary | ICD-10-CM

## 2017-02-04 DIAGNOSIS — Z96.652 AFTERCARE FOLLOWING LEFT KNEE JOINT REPLACEMENT SURGERY: ICD-10-CM

## 2017-02-04 DIAGNOSIS — Z47.1 AFTERCARE FOLLOWING LEFT KNEE JOINT REPLACEMENT SURGERY: ICD-10-CM

## 2017-02-09 ENCOUNTER — APPOINTMENT (OUTPATIENT)
Dept: LAB | Facility: HOSPITAL | Age: 82
End: 2017-02-09

## 2017-02-09 ENCOUNTER — OFFICE VISIT (OUTPATIENT)
Dept: INTERNAL MEDICINE | Facility: CLINIC | Age: 82
End: 2017-02-09

## 2017-02-09 VITALS
SYSTOLIC BLOOD PRESSURE: 134 MMHG | RESPIRATION RATE: 16 BRPM | DIASTOLIC BLOOD PRESSURE: 74 MMHG | OXYGEN SATURATION: 96 % | TEMPERATURE: 98 F | BODY MASS INDEX: 21.77 KG/M2 | HEART RATE: 76 BPM | WEIGHT: 119 LBS

## 2017-02-09 DIAGNOSIS — D62 ACUTE BLOOD LOSS ANEMIA: ICD-10-CM

## 2017-02-09 DIAGNOSIS — M54.42 CHRONIC LEFT-SIDED LOW BACK PAIN WITH LEFT-SIDED SCIATICA: ICD-10-CM

## 2017-02-09 DIAGNOSIS — R26.9 ABNORMAL GAIT: ICD-10-CM

## 2017-02-09 DIAGNOSIS — G89.29 CHRONIC LEFT-SIDED LOW BACK PAIN WITH LEFT-SIDED SCIATICA: ICD-10-CM

## 2017-02-09 DIAGNOSIS — D50.8 OTHER IRON DEFICIENCY ANEMIA: ICD-10-CM

## 2017-02-09 DIAGNOSIS — E61.1 IRON DEFICIENCY: ICD-10-CM

## 2017-02-09 DIAGNOSIS — F41.9 ANXIETY: ICD-10-CM

## 2017-02-09 DIAGNOSIS — I10 ESSENTIAL HYPERTENSION: Primary | ICD-10-CM

## 2017-02-09 LAB
ALBUMIN SERPL-MCNC: 4.3 G/DL (ref 3.2–4.8)
ALBUMIN/GLOB SERPL: 1.8 G/DL (ref 1.5–2.5)
ALP SERPL-CCNC: 85 U/L (ref 25–100)
ALT SERPL W P-5'-P-CCNC: 12 U/L (ref 7–40)
ANION GAP SERPL CALCULATED.3IONS-SCNC: 3 MMOL/L (ref 3–11)
AST SERPL-CCNC: 16 U/L (ref 0–33)
BASOPHILS # BLD AUTO: 0.12 10*3/MM3 (ref 0–0.2)
BASOPHILS NFR BLD AUTO: 2.1 % (ref 0–1)
BILIRUB SERPL-MCNC: 0.6 MG/DL (ref 0.3–1.2)
BUN BLD-MCNC: 9 MG/DL (ref 9–23)
BUN/CREAT SERPL: 11.3 (ref 7–25)
CALCIUM SPEC-SCNC: 9.7 MG/DL (ref 8.7–10.4)
CHLORIDE SERPL-SCNC: 105 MMOL/L (ref 99–109)
CO2 SERPL-SCNC: 33 MMOL/L (ref 20–31)
CREAT BLD-MCNC: 0.8 MG/DL (ref 0.6–1.3)
CRP SERPL-MCNC: 1.2 MG/DL (ref 0–10)
DEPRECATED RDW RBC AUTO: 51.8 FL (ref 37–54)
EOSINOPHIL # BLD AUTO: 0.12 10*3/MM3 (ref 0.1–0.3)
EOSINOPHIL NFR BLD AUTO: 2.1 % (ref 0–3)
ERYTHROCYTE [DISTWIDTH] IN BLOOD BY AUTOMATED COUNT: 15.2 % (ref 11.3–14.5)
GFR SERPL CREATININE-BSD FRML MDRD: 68 ML/MIN/1.73
GLOBULIN UR ELPH-MCNC: 2.4 GM/DL
GLUCOSE BLD-MCNC: 91 MG/DL (ref 70–100)
HCT VFR BLD AUTO: 36.3 % (ref 34.5–44)
HGB BLD-MCNC: 11.6 G/DL (ref 11.5–15.5)
IMM GRANULOCYTES # BLD: 0.01 10*3/MM3 (ref 0–0.03)
IMM GRANULOCYTES NFR BLD: 0.2 % (ref 0–0.6)
IRON 24H UR-MRATE: 30 MCG/DL (ref 50–175)
IRON SATN MFR SERPL: 10 % (ref 15–50)
LYMPHOCYTES # BLD AUTO: 1.3 10*3/MM3 (ref 0.6–4.8)
LYMPHOCYTES NFR BLD AUTO: 23 % (ref 24–44)
MCH RBC QN AUTO: 29.8 PG (ref 27–31)
MCHC RBC AUTO-ENTMCNC: 32 G/DL (ref 32–36)
MCV RBC AUTO: 93.3 FL (ref 80–99)
MONOCYTES # BLD AUTO: 0.62 10*3/MM3 (ref 0–1)
MONOCYTES NFR BLD AUTO: 11 % (ref 0–12)
NEUTROPHILS # BLD AUTO: 3.49 10*3/MM3 (ref 1.5–8.3)
NEUTROPHILS NFR BLD AUTO: 61.6 % (ref 41–71)
PLATELET # BLD AUTO: 329 10*3/MM3 (ref 150–450)
PMV BLD AUTO: 10 FL (ref 6–12)
POTASSIUM BLD-SCNC: 4.1 MMOL/L (ref 3.5–5.5)
PROT SERPL-MCNC: 6.7 G/DL (ref 5.7–8.2)
RBC # BLD AUTO: 3.89 10*6/MM3 (ref 3.89–5.14)
RETICS/RBC NFR AUTO: 2.27 % (ref 0.5–1.5)
SODIUM BLD-SCNC: 141 MMOL/L (ref 132–146)
TIBC SERPL-MCNC: 315 MCG/DL (ref 250–450)
WBC NRBC COR # BLD: 5.66 10*3/MM3 (ref 3.5–10.8)

## 2017-02-09 PROCEDURE — 83550 IRON BINDING TEST: CPT | Performed by: INTERNAL MEDICINE

## 2017-02-09 PROCEDURE — 83540 ASSAY OF IRON: CPT | Performed by: INTERNAL MEDICINE

## 2017-02-09 PROCEDURE — 36415 COLL VENOUS BLD VENIPUNCTURE: CPT | Performed by: INTERNAL MEDICINE

## 2017-02-09 PROCEDURE — 85045 AUTOMATED RETICULOCYTE COUNT: CPT | Performed by: INTERNAL MEDICINE

## 2017-02-09 PROCEDURE — 80053 COMPREHEN METABOLIC PANEL: CPT | Performed by: INTERNAL MEDICINE

## 2017-02-09 PROCEDURE — 99214 OFFICE O/P EST MOD 30 MIN: CPT | Performed by: INTERNAL MEDICINE

## 2017-02-09 PROCEDURE — 86140 C-REACTIVE PROTEIN: CPT | Performed by: INTERNAL MEDICINE

## 2017-02-09 PROCEDURE — 85025 COMPLETE CBC W/AUTO DIFF WBC: CPT | Performed by: INTERNAL MEDICINE

## 2017-02-09 RX ORDER — AMLODIPINE BESYLATE 2.5 MG/1
2.5 TABLET ORAL
Qty: 90 TABLET | Refills: 1 | Status: SHIPPED | OUTPATIENT
Start: 2017-02-09 | End: 2017-07-26 | Stop reason: SDUPTHER

## 2017-02-09 NOTE — PATIENT INSTRUCTIONS
1.  Continue same medications and supplements - as listed.    2.  Use Tylenol as needed for pain - avoid all aspirin this winter.    3.  Use a cane or walker at all times - take short steps with good posture.    4.  The nurse will call - with test results.    5.  Return in 2 months - nonfasting checkup.

## 2017-02-09 NOTE — PROGRESS NOTES
Subjective   Marcelino Mesa is a 89 y.o. female.     History of Present Illness     The patient was admitted to [facility] January 23 through the 26th for acute hematochezia.  Her hematocrit had been 39% on January 17.  Her hematocrit dropped below 26% during the hospitalization.  Colonoscopy was performed which showed diverticulosis as the probable source of bleeding.  The patient was taken off of aspirin therapy and has had no bleeding since.  She is using stool softeners to prevent constipated bowels.  She remains on iron replacement.    The patient underwent left total knee arthroplasty on December 1.  She has worked with physical therapy and made steady progress.  She has good general strength range of motion and capacity for activities of daily living maintaining her own home.  She is using no pain medications this time and is ambulatory around her home using a cane.  She has generalized osteoarthritis and underwent right total knee arthroplasty last September.    The following portions of the patient's history were reviewed and updated as appropriate: allergies, current medications, past family history, past medical history, past social history, past surgical history and problem list.    Review of Systems   Constitutional: Negative for appetite change and fatigue.   HENT: Negative for ear pain and sore throat.    Respiratory: Negative for cough and shortness of breath.    Cardiovascular: Negative for chest pain and palpitations.   Gastrointestinal: Positive for blood in stool. Negative for abdominal pain and nausea.   Musculoskeletal: Positive for arthralgias, back pain and gait problem.   Neurological: Negative for dizziness and headaches.       Objective   Blood pressure 134/74, pulse 76, temperature 98 °F (36.7 °C), temperature source Oral, resp. rate 16, weight 119 lb (54 kg), SpO2 96 %, not currently breastfeeding.    Physical Exam   Constitutional: She is oriented to person, place, and  time. She appears well-developed and well-nourished. No distress.   HENT:   Right Ear: External ear normal.   Left Ear: External ear normal.   Nose: Nose normal.   Mouth/Throat: Oropharynx is clear and moist.   Eyes: EOM are normal. Pupils are equal, round, and reactive to light. No scleral icterus.   Cardiovascular: Normal rate, regular rhythm, normal heart sounds and intact distal pulses.    Pulmonary/Chest: Effort normal and breath sounds normal. She has no wheezes. She has no rales.   Abdominal: Soft. Bowel sounds are normal. She exhibits no distension and no mass. There is no tenderness.   Musculoskeletal: She exhibits no edema.   Left knee has 90° range of motion with 2+ stiffness minimal tenderness.  Gait is generally even with minimal limp favoring the left leg  Moderate osteoarthritis of hands with good  strength   Neurological: She is alert and oriented to person, place, and time. She exhibits normal muscle tone. Coordination normal.   Skin: Skin is warm and dry. No rash noted.   Left knee surgical wound well-healed   Psychiatric: She has a normal mood and affect. Her behavior is normal. Judgment and thought content normal.   Nursing note and vitals reviewed.    Procedures  Assessment/Plan   Marcelino was seen today for rectal bleeding.    Diagnoses and all orders for this visit:    Essential hypertension  -     Comprehensive Metabolic Panel    Iron deficiency    Chronic left-sided low back pain with left-sided sciatica  -     C-reactive Protein    Acute blood loss anemia, mild, asymptomatic    Other iron deficiency anemia  -     CBC & Differential  -     Iron Profile  -     Reticulocytes  -     CBC Auto Differential    Abnormal gait    Anxiety    Other orders  -     amLODIPine (NORVASC) 2.5 MG tablet; Take 1 tablet by mouth Daily.  -     carboxymethylcellulose 1 % ophthalmic solution; Apply 1 drop to eye Every Night.    The patient apparently had a proximally 3 movement bleed just 2 weeks ago.  This  likely was a diverticular bleed and a complication of aspirin.  I've asked the patient to stay off aspirin for the perceivable future and she may not be able to return at all that this medication.    The patient developed a profound anemia during her hospitalization just 2 weeks ago.  Her hematocrit has returned to normal and her reticulocyte count remains mildly elevated.  Her iron level is low and she will need long-term iron replacement likely for 1 year.    The patient's hypertension is well managed.  I've asked her to remain on amlodipine.  She apparently has had an intolerance to lisinopril HCTZ and we will monitor this expectantly.    Patient Instructions    1.  Continue same medications and supplements - as listed.    2.  Use Tylenol as needed for pain - avoid all aspirin this winter.    3.  Use a cane or walker at all times - take short steps with good posture.    4.  The nurse will call - with test results.    5.  Return in 2 months - nonfasting checkup.      Electronically signed Jony Singh M.D.2/12/2017 7:27 AM

## 2017-02-15 ENCOUNTER — TREATMENT (OUTPATIENT)
Dept: INTERNAL MEDICINE | Facility: CLINIC | Age: 82
End: 2017-02-15

## 2017-02-15 DIAGNOSIS — Z96.652 PRESENCE OF LEFT ARTIFICIAL KNEE JOINT: ICD-10-CM

## 2017-02-15 DIAGNOSIS — Z47.1 AFTERCARE FOLLOWING LEFT KNEE JOINT REPLACEMENT SURGERY: Primary | ICD-10-CM

## 2017-02-15 DIAGNOSIS — Z96.652 AFTERCARE FOLLOWING LEFT KNEE JOINT REPLACEMENT SURGERY: Primary | ICD-10-CM

## 2017-02-15 PROCEDURE — G0181 HOME HEALTH CARE SUPERVISION: HCPCS | Performed by: INTERNAL MEDICINE

## 2017-02-16 ENCOUNTER — TELEPHONE (OUTPATIENT)
Dept: INTERNAL MEDICINE | Facility: CLINIC | Age: 82
End: 2017-02-16

## 2017-02-16 NOTE — TELEPHONE ENCOUNTER
I called lab results to pt's daughter, [facility]. Left VM.  Per TGF iron BL low and pt will likely need to stay on 325 mg FeSul supplement QD for a year.

## 2017-03-06 PROCEDURE — G0179 MD RECERTIFICATION HHA PT: HCPCS | Performed by: INTERNAL MEDICINE

## 2017-04-07 NOTE — PROGRESS NOTES
Home health care plan oversight - greater than 30 minutes in this calender month.    Jony Singh M.D.

## 2017-04-17 ENCOUNTER — OFFICE VISIT (OUTPATIENT)
Dept: INTERNAL MEDICINE | Facility: CLINIC | Age: 82
End: 2017-04-17

## 2017-04-17 VITALS
HEIGHT: 62 IN | TEMPERATURE: 98.2 F | HEART RATE: 72 BPM | SYSTOLIC BLOOD PRESSURE: 144 MMHG | OXYGEN SATURATION: 95 % | DIASTOLIC BLOOD PRESSURE: 80 MMHG | BODY MASS INDEX: 22.08 KG/M2 | RESPIRATION RATE: 16 BRPM | WEIGHT: 120 LBS

## 2017-04-17 DIAGNOSIS — Z96.652 STATUS POST TOTAL LEFT KNEE REPLACEMENT: ICD-10-CM

## 2017-04-17 DIAGNOSIS — G89.29 CHRONIC LEFT-SIDED LOW BACK PAIN WITH LEFT-SIDED SCIATICA: ICD-10-CM

## 2017-04-17 DIAGNOSIS — D50.8 OTHER IRON DEFICIENCY ANEMIA: Primary | ICD-10-CM

## 2017-04-17 DIAGNOSIS — I10 ESSENTIAL HYPERTENSION: ICD-10-CM

## 2017-04-17 DIAGNOSIS — M54.42 CHRONIC LEFT-SIDED LOW BACK PAIN WITH LEFT-SIDED SCIATICA: ICD-10-CM

## 2017-04-17 DIAGNOSIS — M17.0 PRIMARY OSTEOARTHRITIS OF BOTH KNEES: ICD-10-CM

## 2017-04-17 PROBLEM — R30.0 DYSURIA: Status: RESOLVED | Noted: 2017-01-23 | Resolved: 2017-04-17

## 2017-04-17 LAB
ALBUMIN SERPL-MCNC: 3.9 G/DL (ref 3.2–4.8)
ALBUMIN/GLOB SERPL: 1.6 G/DL (ref 1.5–2.5)
ALP SERPL-CCNC: 82 U/L (ref 25–100)
ALT SERPL W P-5'-P-CCNC: 11 U/L (ref 7–40)
ANION GAP SERPL CALCULATED.3IONS-SCNC: 6 MMOL/L (ref 3–11)
AST SERPL-CCNC: 19 U/L (ref 0–33)
BASOPHILS # BLD AUTO: 0.08 10*3/MM3 (ref 0–0.2)
BASOPHILS NFR BLD AUTO: 1.4 % (ref 0–1)
BILIRUB SERPL-MCNC: 0.6 MG/DL (ref 0.3–1.2)
BUN BLD-MCNC: 14 MG/DL (ref 9–23)
BUN/CREAT SERPL: 20 (ref 7–25)
CALCIUM SPEC-SCNC: 9.5 MG/DL (ref 8.7–10.4)
CHLORIDE SERPL-SCNC: 108 MMOL/L (ref 99–109)
CO2 SERPL-SCNC: 28 MMOL/L (ref 20–31)
CREAT BLD-MCNC: 0.7 MG/DL (ref 0.6–1.3)
CRP SERPL-MCNC: 0.2 MG/DL (ref 0–1)
DEPRECATED RDW RBC AUTO: 48.1 FL (ref 37–54)
EOSINOPHIL # BLD AUTO: 0.07 10*3/MM3 (ref 0.1–0.3)
EOSINOPHIL NFR BLD AUTO: 1.2 % (ref 0–3)
ERYTHROCYTE [DISTWIDTH] IN BLOOD BY AUTOMATED COUNT: 14 % (ref 11.3–14.5)
GFR SERPL CREATININE-BSD FRML MDRD: 79 ML/MIN/1.73
GLOBULIN UR ELPH-MCNC: 2.5 GM/DL
GLUCOSE BLD-MCNC: 88 MG/DL (ref 70–100)
HCT VFR BLD AUTO: 40.2 % (ref 34.5–44)
HGB BLD-MCNC: 12.8 G/DL (ref 11.5–15.5)
IMM GRANULOCYTES # BLD: 0.02 10*3/MM3 (ref 0–0.03)
IMM GRANULOCYTES NFR BLD: 0.4 % (ref 0–0.6)
IRON 24H UR-MRATE: 47 MCG/DL (ref 50–175)
IRON SATN MFR SERPL: 16 % (ref 15–50)
LYMPHOCYTES # BLD AUTO: 1.81 10*3/MM3 (ref 0.6–4.8)
LYMPHOCYTES NFR BLD AUTO: 31.9 % (ref 24–44)
MCH RBC QN AUTO: 29.7 PG (ref 27–31)
MCHC RBC AUTO-ENTMCNC: 31.8 G/DL (ref 32–36)
MCV RBC AUTO: 93.3 FL (ref 80–99)
MONOCYTES # BLD AUTO: 0.56 10*3/MM3 (ref 0–1)
MONOCYTES NFR BLD AUTO: 9.9 % (ref 0–12)
NEUTROPHILS # BLD AUTO: 3.13 10*3/MM3 (ref 1.5–8.3)
NEUTROPHILS NFR BLD AUTO: 55.2 % (ref 41–71)
PLATELET # BLD AUTO: 280 10*3/MM3 (ref 150–450)
PMV BLD AUTO: 10.2 FL (ref 6–12)
POTASSIUM BLD-SCNC: 4 MMOL/L (ref 3.5–5.5)
PROT SERPL-MCNC: 6.4 G/DL (ref 5.7–8.2)
RBC # BLD AUTO: 4.31 10*6/MM3 (ref 3.89–5.14)
RETICS/RBC NFR AUTO: 0.92 % (ref 0.5–1.5)
SODIUM BLD-SCNC: 142 MMOL/L (ref 132–146)
TIBC SERPL-MCNC: 294 MCG/DL (ref 250–450)
WBC NRBC COR # BLD: 5.67 10*3/MM3 (ref 3.5–10.8)

## 2017-04-17 PROCEDURE — 85025 COMPLETE CBC W/AUTO DIFF WBC: CPT | Performed by: INTERNAL MEDICINE

## 2017-04-17 PROCEDURE — 86140 C-REACTIVE PROTEIN: CPT | Performed by: INTERNAL MEDICINE

## 2017-04-17 PROCEDURE — 83550 IRON BINDING TEST: CPT | Performed by: INTERNAL MEDICINE

## 2017-04-17 PROCEDURE — 99214 OFFICE O/P EST MOD 30 MIN: CPT | Performed by: INTERNAL MEDICINE

## 2017-04-17 PROCEDURE — 85045 AUTOMATED RETICULOCYTE COUNT: CPT | Performed by: INTERNAL MEDICINE

## 2017-04-17 PROCEDURE — 83540 ASSAY OF IRON: CPT | Performed by: INTERNAL MEDICINE

## 2017-04-17 PROCEDURE — 36415 COLL VENOUS BLD VENIPUNCTURE: CPT | Performed by: INTERNAL MEDICINE

## 2017-04-17 PROCEDURE — 80053 COMPREHEN METABOLIC PANEL: CPT | Performed by: INTERNAL MEDICINE

## 2017-04-17 NOTE — PATIENT INSTRUCTIONS
1.  Take walks every day - using the cane for safety.    2.  Continue usual medicines and supplements - as listed.    3.  Follow well-balanced diet - low in salt and low in sugar.    4.  The nurse will call with test results.    5.  Return in July - annual checkup fasting.

## 2017-04-21 ENCOUNTER — TELEPHONE (OUTPATIENT)
Dept: INTERNAL MEDICINE | Facility: CLINIC | Age: 82
End: 2017-04-21

## 2017-06-15 ENCOUNTER — TELEPHONE (OUTPATIENT)
Dept: INTERNAL MEDICINE | Facility: CLINIC | Age: 82
End: 2017-06-15

## 2017-06-15 NOTE — TELEPHONE ENCOUNTER
----- Message from Sheldon Hinojosa sent at 6/15/2017  3:55 PM EDT -----  Contact: ELYSE MORRIS COMPLAINT:  Monday WHEN SHE WOKE UP FROM A NAP, HER LEFT EYE WAS BLURRY, SHE WET TO THE OPTOMETRIST, HE TOLD HER TO SEE TGF.  SHE IS VERY CONCERNED THAT IT MIGHT HAPPEN AGAIN AND COULD BE A STROKE.  ELYSE [phone]    Called pt who was having a very difficult time hearing me on phone; calling pt back to schedule appt soon; Sheldon states pt said having no other symptoms

## 2017-06-20 ENCOUNTER — OFFICE VISIT (OUTPATIENT)
Dept: INTERNAL MEDICINE | Facility: CLINIC | Age: 82
End: 2017-06-20

## 2017-06-20 VITALS
HEIGHT: 62 IN | HEART RATE: 72 BPM | OXYGEN SATURATION: 95 % | BODY MASS INDEX: 22.08 KG/M2 | WEIGHT: 120 LBS | TEMPERATURE: 99 F | DIASTOLIC BLOOD PRESSURE: 76 MMHG | SYSTOLIC BLOOD PRESSURE: 130 MMHG | RESPIRATION RATE: 12 BRPM

## 2017-06-20 DIAGNOSIS — G89.29 CHRONIC LEFT-SIDED LOW BACK PAIN WITH LEFT-SIDED SCIATICA: ICD-10-CM

## 2017-06-20 DIAGNOSIS — G45.3 AMAUROSIS FUGAX: Primary | ICD-10-CM

## 2017-06-20 DIAGNOSIS — M41.9 SCOLIOSIS OF LUMBAR SPINE, UNSPECIFIED SCOLIOSIS TYPE: ICD-10-CM

## 2017-06-20 DIAGNOSIS — M54.42 CHRONIC LEFT-SIDED LOW BACK PAIN WITH LEFT-SIDED SCIATICA: ICD-10-CM

## 2017-06-20 DIAGNOSIS — I10 ESSENTIAL HYPERTENSION: ICD-10-CM

## 2017-06-20 PROCEDURE — 99214 OFFICE O/P EST MOD 30 MIN: CPT | Performed by: INTERNAL MEDICINE

## 2017-06-20 PROCEDURE — 93000 ELECTROCARDIOGRAM COMPLETE: CPT | Performed by: INTERNAL MEDICINE

## 2017-06-20 RX ORDER — MOMETASONE FUROATE 50 UG/1
1 SPRAY, METERED NASAL DAILY PRN
COMMUNITY
End: 2017-08-04

## 2017-06-20 RX ORDER — CLOPIDOGREL BISULFATE 75 MG/1
37.5 TABLET ORAL DAILY
Qty: 30 TABLET | Refills: 3 | Status: ON HOLD | OUTPATIENT
Start: 2017-06-20 | End: 2017-08-12

## 2017-06-20 NOTE — PROGRESS NOTES
"Subjective   Marcelino Mesa is a 89 y.o. female.     History of Present Illness     The patient will call from a nap yesterday afternoon and awoke with  loss of vision in her left eye.  Within 5 minutes of vision returned.  She had no eye pain or redness.  She's never had a similar episode.  She saw her ophthalmologist yesterday who felt that there was no residual eye disease.  She was referred here for further evaluation.  She does not have a family history of stroke or heart disease and has never used tobacco.  She has no severe cholesterol disease.  She has been on preventative aspirin until January of this year.  She experienced an acute GI bleed and aspirin was discontinued.    The following portions of the patient's history were reviewed and updated as appropriate: allergies, current medications, past family history, past medical history, past social history, past surgical history and problem list.    Review of Systems   Constitutional: Negative for appetite change and fatigue.   HENT: Negative for ear pain and sore throat.    Eyes: Positive for visual disturbance. Negative for photophobia and pain.   Respiratory: Negative for cough and shortness of breath.    Cardiovascular: Negative for chest pain and palpitations.   Gastrointestinal: Positive for blood in stool. Negative for abdominal pain and nausea.        GI bleed in January while on aspirin   Musculoskeletal: Positive for back pain and gait problem. Negative for arthralgias.   Neurological: Negative for dizziness and headaches.       Objective   Blood pressure 130/76, pulse 72, temperature 99 °F (37.2 °C), temperature source Temporal Artery , resp. rate 12, height 62\" (157.5 cm), weight 120 lb (54.4 kg), SpO2 95 %, not currently breastfeeding.    Physical Exam   Constitutional: She is oriented to person, place, and time. She appears well-developed and well-nourished. No distress.   Eyes: EOM are normal. Pupils are equal, round, and reactive to light. " No scleral icterus.   Funduscopic  examination normal   Neck: Normal range of motion. Neck supple. No JVD present.   There are no cervical bruits   Cardiovascular: Normal rate, regular rhythm and normal heart sounds.    Pulmonary/Chest: Effort normal and breath sounds normal. She has no wheezes. She has no rales.   Musculoskeletal: Normal range of motion. She exhibits no edema.   Moderate paralumbar tightness tenderness   Lymphadenopathy:     She has no cervical adenopathy.   Neurological: She is alert and oriented to person, place, and time. She exhibits normal muscle tone. Coordination normal.   Mild limp but independently safe with ambulation   Psychiatric: She has a normal mood and affect. Her behavior is normal. Judgment and thought content normal.   Nursing note and vitals reviewed.      ECG 12 Lead  Date/Time: 6/20/2017 3:02 PM  Performed by: JONY SINGH  Authorized by: JONY SINGH   Interpreted by ED physician: Jony Singh M.D.  Comparison: compared with previous ECG from 11/7/2016  Similar to previous ECG  Rhythm: sinus rhythm  Ectopy: atrial premature contractions  Rate: normal  BPM: 70  Conduction: right bundle branch block  ST Segments: ST segments normal  T Waves: T waves normal  QRS axis: left  Other: no other findings  Clinical impression: abnormal ECG  Comments: Indication - amaurosis fugax  PACs are new  Otherwise baseline EKG          Assessment/Plan   Marcelino was seen today for blurred vision.    Diagnoses and all orders for this visit:    Amaurosis fugax  -     ECG 12 Lead  -     Holter Monitor - 24 Hour  -     Adult Transthoracic Echo Complete; Future  -     Duplex Carotid - Left Ultrasound CAR; Future  -     Duplex Carotid - Right Ultrasound CAR; Future    Essential hypertension    Scoliosis of lumbar spine, unspecified scoliosis type    Chronic left-sided low back pain with left-sided sciatica    Other orders  -     clopidogrel (PLAVIX) 75 MG tablet; Take 0.5 tablets by  mouth Daily.      The patient's history is consistent with amaurosis fugax - left eye.  We will start her on Plavix because of her previous bleeding with aspirin.  In view of her age and her bleeding tendency we will start with one half dose.  I've cautioned her to avoid aspirin and Advil due to complicating side effects.    The patient has hypertension with a benign EKG.  She does have PACs and should be reevaluated for atrial fibrillation.  She should also be evaluated with echocardiography to look for source of embolus.    The patient does have advanced age would has no carotid bruits.  A carotid duplex will give us a understanding about her atherosclerotic risks in treatment.    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  She's been evaluated by the neurosurgeon is now seeing the anesthesia pain specialist.  She is scheduled for a lumbar injection tomorrow and I've encouraged her to follow through with this pain relieving treatment.    Patient Instructions   1.  Wear 24-hour Holter heart monitor - till tomorrow afternoon - return to this office.    2.  Schedule echocardiogram of heart and carotid artery ultrasound - in the next week.    3.  Start clopedigrel 75 MG - 1/2 TAB daily to prevent circulation.    4.  Avoid aspirin, Advil, and Aleve  -  due to side effects.    5.  Return visit August 10 - annual checkup fasting.    Electronically signed Jony Singh M.D.6/20/2017 3:10 PM

## 2017-06-20 NOTE — PATIENT INSTRUCTIONS
1.  Wear 24-hour Holter heart monitor - till tomorrow afternoon - return to this office.    2.  Schedule echocardiogram of heart and carotid artery ultrasound - in the next week.    3.  Start clopedigrel 75 MG - 1/2 TAB daily to prevent circulation.    4.  Avoid aspirin, Advil, and Aleve  -  due to side effects.    5.  Return visit August 10 - annual checkup fasting.

## 2017-06-22 ENCOUNTER — TELEPHONE (OUTPATIENT)
Dept: INTERNAL MEDICINE | Facility: CLINIC | Age: 82
End: 2017-06-22

## 2017-06-22 NOTE — TELEPHONE ENCOUNTER
Belia Gorman called and notified per TGF holter benign, just shows few skip beats. She will let pt know.

## 2017-07-11 ENCOUNTER — HOSPITAL ENCOUNTER (OUTPATIENT)
Dept: CARDIOLOGY | Facility: HOSPITAL | Age: 82
Discharge: HOME | End: 2017-07-11
Attending: INTERNAL MEDICINE

## 2017-07-11 ENCOUNTER — HOSPITAL ENCOUNTER (OUTPATIENT)
Dept: CARDIOLOGY | Facility: HOSPITAL | Age: 82
Discharge: HOME | End: 2017-07-11
Attending: INTERNAL MEDICINE | Admitting: INTERNAL MEDICINE

## 2017-07-11 VITALS — HEIGHT: 62 IN | WEIGHT: 120 LBS | BODY MASS INDEX: 22.08 KG/M2

## 2017-07-11 VITALS — WEIGHT: 120 LBS | BODY MASS INDEX: 22.08 KG/M2 | HEIGHT: 62 IN

## 2017-07-11 DIAGNOSIS — G45.3 AMAUROSIS FUGAX: ICD-10-CM

## 2017-07-11 LAB
ASCENDING AORTA: 3.4 CM
BH CV ECHO MEAS - AI DEC SLOPE: 234.5 CM/SEC^2
BH CV ECHO MEAS - AI MAX PG: 70.6 MMHG
BH CV ECHO MEAS - AI MAX VEL: 420 CM/SEC
BH CV ECHO MEAS - AI P1/2T: 524.6 MSEC
BH CV ECHO MEAS - AO ROOT AREA: 7.5 CM^2
BH CV ECHO MEAS - AO ROOT DIAM: 3.1 CM
BH CV ECHO MEAS - CONTRAST EF (2CH): 65.1 ML/M^2
BH CV ECHO MEAS - CONTRAST EF 4CH: 61.5 ML/M^2
BH CV ECHO MEAS - EDV(CUBED): 65.5 ML
BH CV ECHO MEAS - EDV(MOD-SP2): 43 ML
BH CV ECHO MEAS - EDV(MOD-SP4): 52 ML
BH CV ECHO MEAS - EDV(TEICH): 71.3 ML
BH CV ECHO MEAS - EF(CUBED): 57.5 %
BH CV ECHO MEAS - EF(MOD-SP2): 65.1 %
BH CV ECHO MEAS - EF(MOD-SP4): 61.5 %
BH CV ECHO MEAS - EF(TEICH): 49.7 %
BH CV ECHO MEAS - ESV(CUBED): 27.8 ML
BH CV ECHO MEAS - ESV(MOD-SP2): 15 ML
BH CV ECHO MEAS - ESV(MOD-SP4): 20 ML
BH CV ECHO MEAS - ESV(TEICH): 35.9 ML
BH CV ECHO MEAS - FS: 24.8 %
BH CV ECHO MEAS - IVS/LVPW: 1
BH CV ECHO MEAS - IVSD: 1.2 CM
BH CV ECHO MEAS - LA DIMENSION: 3 CM
BH CV ECHO MEAS - LA/AO: 0.97
BH CV ECHO MEAS - LAT PEAK E' VEL: 10.3 CM/SEC
BH CV ECHO MEAS - LV MASS(C)D: 160.2 GRAMS
BH CV ECHO MEAS - LVIDD: 4 CM
BH CV ECHO MEAS - LVIDS: 2.6 CM
BH CV ECHO MEAS - LVLD AP2: 6.4 CM
BH CV ECHO MEAS - LVLD AP4: 6.4 CM
BH CV ECHO MEAS - LVLS AP2: 5.1 CM
BH CV ECHO MEAS - LVLS AP4: 5.6 CM
BH CV ECHO MEAS - LVPWD: 1.2 CM
BH CV ECHO MEAS - MED PEAK E' VEL: 8.66 CM/SEC
BH CV ECHO MEAS - MV A MAX VEL: 101 CM/SEC
BH CV ECHO MEAS - MV DEC TIME: 0.22 SEC
BH CV ECHO MEAS - MV E MAX VEL: 61 CM/SEC
BH CV ECHO MEAS - MV E/A: 0.6
BH CV ECHO MEAS - PA ACC SLOPE: 348 CM/SEC^2
BH CV ECHO MEAS - PA ACC TIME: 0.18 SEC
BH CV ECHO MEAS - PA PR(ACCEL): -3.4 MMHG
BH CV ECHO MEAS - RAP SYSTOLE: 3 MMHG
BH CV ECHO MEAS - RVDD: 2.6 CM
BH CV ECHO MEAS - RVSP: 21.7 MMHG
BH CV ECHO MEAS - SV(CUBED): 37.6 ML
BH CV ECHO MEAS - SV(MOD-SP2): 28 ML
BH CV ECHO MEAS - SV(MOD-SP4): 32 ML
BH CV ECHO MEAS - SV(TEICH): 35.4 ML
BH CV ECHO MEAS - TAPSE (>1.6): 1.8 CM2
BH CV ECHO MEAS - TR MAX VEL: 215 CM/SEC
BH CV VAS BP LEFT ARM: NORMAL MMHG
BH CV XLRA - RV BASE: 3.4 CM
BH CV XLRA - RV LENGTH: 6.2 CM
BH CV XLRA - RV MID: 2.3 CM
BH CV XLRA - TDI S': 14.5 CM/SEC
BH CV XLRA MEAS LEFT CCA RATIO VEL: 65.6 CM/SEC
BH CV XLRA MEAS LEFT DIST CCA EDV: 15.4 CM/SEC
BH CV XLRA MEAS LEFT DIST CCA PSV: 65.6 CM/SEC
BH CV XLRA MEAS LEFT DIST ICA EDV: 22 CM/SEC
BH CV XLRA MEAS LEFT DIST ICA PSV: 67.3 CM/SEC
BH CV XLRA MEAS LEFT ICA RATIO VEL: 74.8 CM/SEC
BH CV XLRA MEAS LEFT ICA/CCA RATIO: 1.1
BH CV XLRA MEAS LEFT MID ICA EDV: 30.8 CM/SEC
BH CV XLRA MEAS LEFT MID ICA PSV: 74.8 CM/SEC
BH CV XLRA MEAS LEFT PROX CCA EDV: 15.7 CM/SEC
BH CV XLRA MEAS LEFT PROX CCA PSV: 68.8 CM/SEC
BH CV XLRA MEAS LEFT PROX ECA PSV: 90.1 CM/SEC
BH CV XLRA MEAS LEFT PROX ICA EDV: 16.3 CM/SEC
BH CV XLRA MEAS LEFT PROX ICA PSV: 63.5 CM/SEC
BH CV XLRA MEAS LEFT PROX SCLA PSV: 132 CM/SEC
BH CV XLRA MEAS LEFT VERTEBRAL A PSV: 53.4 CM/SEC
BH CV XLRA MEAS RIGHT CCA RATIO VEL: 57.2 CM/SEC
BH CV XLRA MEAS RIGHT DIST CCA EDV: 12.6 CM/SEC
BH CV XLRA MEAS RIGHT DIST CCA PSV: 57.2 CM/SEC
BH CV XLRA MEAS RIGHT DIST ICA EDV: 31.4 CM/SEC
BH CV XLRA MEAS RIGHT DIST ICA PSV: 93.7 CM/SEC
BH CV XLRA MEAS RIGHT ICA RATIO VEL: 84.9 CM/SEC
BH CV XLRA MEAS RIGHT ICA/CCA RATIO: 1.5
BH CV XLRA MEAS RIGHT MID ICA EDV: 25.1 CM/SEC
BH CV XLRA MEAS RIGHT MID ICA PSV: 84.9 CM/SEC
BH CV XLRA MEAS RIGHT PROX CCA EDV: 15.1 CM/SEC
BH CV XLRA MEAS RIGHT PROX CCA PSV: 73.5 CM/SEC
BH CV XLRA MEAS RIGHT PROX ECA PSV: 73.5 CM/SEC
BH CV XLRA MEAS RIGHT PROX ICA EDV: 19.5 CM/SEC
BH CV XLRA MEAS RIGHT PROX ICA PSV: 59.1 CM/SEC
BH CV XLRA MEAS RIGHT PROX SCLA PSV: 141 CM/SEC
BH CV XLRA MEAS RIGHT VERTEBRAL A PSV: 62.2 CM/SEC
E/E' RATIO: 6.4
LEFT ARM BP: NORMAL MMHG
LEFT ATRIUM VOLUME: 37 CM3
LV EF 2D ECHO EST: 65 %

## 2017-07-11 PROCEDURE — 93306 TTE W/DOPPLER COMPLETE: CPT | Performed by: INTERNAL MEDICINE

## 2017-07-11 PROCEDURE — 93880 EXTRACRANIAL BILAT STUDY: CPT | Performed by: INTERNAL MEDICINE

## 2017-07-11 PROCEDURE — 93880 EXTRACRANIAL BILAT STUDY: CPT

## 2017-07-11 PROCEDURE — 93306 TTE W/DOPPLER COMPLETE: CPT

## 2017-07-21 ENCOUNTER — TELEPHONE (OUTPATIENT)
Dept: INTERNAL MEDICINE | Facility: CLINIC | Age: 82
End: 2017-07-21

## 2017-07-21 NOTE — TELEPHONE ENCOUNTER
----- Message from Nargis Francisco sent at 7/21/2017  2:00 PM EDT -----  Contact: daughter- [facility]  CALL BACK:  Daughter states that patient had some tests run recently and has been waiting for TGF to call and discuss them.  Please call [phone] -cell      Per TGF Echo looks pretty good, shows strong, well functioning heart; carotid ultrasound just shows very mild hardening in the arteries but nothing that will cause problems; just make sure she's started on the plavix 75mg 1/2 tab daily. [facility] understanding.

## 2017-07-26 RX ORDER — AMLODIPINE BESYLATE 2.5 MG/1
TABLET ORAL
Qty: 90 TABLET | Refills: 1 | Status: SHIPPED | OUTPATIENT
Start: 2017-07-26 | End: 2017-11-06 | Stop reason: SDUPTHER

## 2017-08-04 ENCOUNTER — HOSPITAL ENCOUNTER (OUTPATIENT)
Dept: GENERAL RADIOLOGY | Facility: HOSPITAL | Age: 82
Discharge: HOME | End: 2017-08-04
Admitting: ORTHOPAEDIC SURGERY

## 2017-08-04 ENCOUNTER — APPOINTMENT (OUTPATIENT)
Dept: PREADMISSION TESTING | Facility: HOSPITAL | Age: 82
End: 2017-08-04

## 2017-08-04 VITALS — HEIGHT: 62 IN | WEIGHT: 119.93 LBS | BODY MASS INDEX: 22.07 KG/M2

## 2017-08-04 LAB
ALBUMIN SERPL-MCNC: 3.9 G/DL (ref 3.2–4.8)
ALBUMIN/GLOB SERPL: 1.4 G/DL (ref 1.5–2.5)
ALP SERPL-CCNC: 91 U/L (ref 25–100)
ALT SERPL W P-5'-P-CCNC: 15 U/L (ref 7–40)
ANION GAP SERPL CALCULATED.3IONS-SCNC: 6 MMOL/L (ref 3–11)
AST SERPL-CCNC: 19 U/L (ref 0–33)
BILIRUB SERPL-MCNC: 0.6 MG/DL (ref 0.3–1.2)
BUN BLD-MCNC: 13 MG/DL (ref 9–23)
BUN/CREAT SERPL: 18.6 (ref 7–25)
CALCIUM SPEC-SCNC: 9.1 MG/DL (ref 8.7–10.4)
CHLORIDE SERPL-SCNC: 104 MMOL/L (ref 99–109)
CO2 SERPL-SCNC: 28 MMOL/L (ref 20–31)
CREAT BLD-MCNC: 0.7 MG/DL (ref 0.6–1.3)
DEPRECATED RDW RBC AUTO: 49.9 FL (ref 37–54)
ERYTHROCYTE [DISTWIDTH] IN BLOOD BY AUTOMATED COUNT: 14.6 % (ref 11.3–14.5)
GFR SERPL CREATININE-BSD FRML MDRD: 79 ML/MIN/1.73
GLOBULIN UR ELPH-MCNC: 2.8 GM/DL
GLUCOSE BLD-MCNC: 88 MG/DL (ref 70–100)
HBA1C MFR BLD: 5.5 % (ref 4.8–5.6)
HCT VFR BLD AUTO: 41.6 % (ref 34.5–44)
HGB BLD-MCNC: 13.7 G/DL (ref 11.5–15.5)
MCH RBC QN AUTO: 30.9 PG (ref 27–31)
MCHC RBC AUTO-ENTMCNC: 32.9 G/DL (ref 32–36)
MCV RBC AUTO: 93.7 FL (ref 80–99)
PLATELET # BLD AUTO: 259 10*3/MM3 (ref 150–450)
PMV BLD AUTO: 9.7 FL (ref 6–12)
POTASSIUM BLD-SCNC: 3.8 MMOL/L (ref 3.5–5.5)
PROT SERPL-MCNC: 6.7 G/DL (ref 5.7–8.2)
RBC # BLD AUTO: 4.44 10*6/MM3 (ref 3.89–5.14)
SODIUM BLD-SCNC: 138 MMOL/L (ref 132–146)
WBC NRBC COR # BLD: 6.59 10*3/MM3 (ref 3.5–10.8)

## 2017-08-04 PROCEDURE — 80053 COMPREHEN METABOLIC PANEL: CPT | Performed by: ORTHOPAEDIC SURGERY

## 2017-08-04 PROCEDURE — 85027 COMPLETE CBC AUTOMATED: CPT | Performed by: ORTHOPAEDIC SURGERY

## 2017-08-04 PROCEDURE — 36415 COLL VENOUS BLD VENIPUNCTURE: CPT

## 2017-08-04 PROCEDURE — 71020 HC CHEST PA AND LATERAL: CPT

## 2017-08-04 PROCEDURE — 83036 HEMOGLOBIN GLYCOSYLATED A1C: CPT | Performed by: ORTHOPAEDIC SURGERY

## 2017-08-04 NOTE — PAT
T&s too early so please draw in preop and do blood permit.       Patient attend joint class about a year ago so will not be returning.       Measured for TEDS/SCDS in PAT-     calf measurement     14     Length measurement   18

## 2017-08-10 ENCOUNTER — APPOINTMENT (OUTPATIENT)
Dept: GENERAL RADIOLOGY | Facility: HOSPITAL | Age: 82
End: 2017-08-10

## 2017-08-10 ENCOUNTER — ANESTHESIA EVENT (OUTPATIENT)
Dept: PERIOP | Facility: HOSPITAL | Age: 82
End: 2017-08-10

## 2017-08-10 ENCOUNTER — ANESTHESIA (OUTPATIENT)
Dept: PERIOP | Facility: HOSPITAL | Age: 82
End: 2017-08-10

## 2017-08-10 ENCOUNTER — HOSPITAL ENCOUNTER (INPATIENT)
Facility: HOSPITAL | Age: 82
LOS: 2 days | Discharge: HOME HEALTH | End: 2017-08-12
Attending: ORTHOPAEDIC SURGERY | Admitting: ORTHOPAEDIC SURGERY

## 2017-08-10 DIAGNOSIS — Z78.9 IMPAIRED MOBILITY AND ADLS: ICD-10-CM

## 2017-08-10 DIAGNOSIS — Z74.09 IMPAIRED FUNCTIONAL MOBILITY, BALANCE, GAIT, AND ENDURANCE: Primary | ICD-10-CM

## 2017-08-10 DIAGNOSIS — Z96.649 S/P REVISION OF TOTAL HIP: ICD-10-CM

## 2017-08-10 DIAGNOSIS — Z74.09 IMPAIRED MOBILITY AND ADLS: ICD-10-CM

## 2017-08-10 PROBLEM — S72.009A FRACTURE OF HIP (HCC): Status: ACTIVE | Noted: 2017-08-10

## 2017-08-10 PROBLEM — Z96.642 STATUS POST TOTAL REPLACEMENT OF LEFT HIP: Status: ACTIVE | Noted: 2017-08-10

## 2017-08-10 LAB
ABO GROUP BLD: NORMAL
BLD GP AB SCN SERPL QL: NEGATIVE
POTASSIUM BLDA-SCNC: 3.84 MMOL/L (ref 3.5–5.3)
RH BLD: POSITIVE

## 2017-08-10 PROCEDURE — 25010000002 PROPOFOL 10 MG/ML EMULSION: Performed by: NURSE ANESTHETIST, CERTIFIED REGISTERED

## 2017-08-10 PROCEDURE — 25010000002 ROPIVACAINE PER 1 MG: Performed by: ORTHOPAEDIC SURGERY

## 2017-08-10 PROCEDURE — 94799 UNLISTED PULMONARY SVC/PX: CPT

## 2017-08-10 PROCEDURE — 25010000003 CEFAZOLIN IN DEXTROSE 2-4 GM/100ML-% SOLUTION: Performed by: ORTHOPAEDIC SURGERY

## 2017-08-10 PROCEDURE — 73502 X-RAY EXAM HIP UNI 2-3 VIEWS: CPT

## 2017-08-10 PROCEDURE — 86901 BLOOD TYPING SEROLOGIC RH(D): CPT | Performed by: ORTHOPAEDIC SURGERY

## 2017-08-10 PROCEDURE — 25010000002 PROPOFOL 1000 MG/ML EMULSION: Performed by: NURSE ANESTHETIST, CERTIFIED REGISTERED

## 2017-08-10 PROCEDURE — C1776 JOINT DEVICE (IMPLANTABLE): HCPCS | Performed by: ORTHOPAEDIC SURGERY

## 2017-08-10 PROCEDURE — 87116 MYCOBACTERIA CULTURE: CPT | Performed by: ORTHOPAEDIC SURGERY

## 2017-08-10 PROCEDURE — 0SRB02A REPLACEMENT OF LEFT HIP JOINT WITH METAL ON POLYETHYLENE SYNTHETIC SUBSTITUTE, UNCEMENTED, OPEN APPROACH: ICD-10-PCS | Performed by: ORTHOPAEDIC SURGERY

## 2017-08-10 PROCEDURE — 87070 CULTURE OTHR SPECIMN AEROBIC: CPT | Performed by: ORTHOPAEDIC SURGERY

## 2017-08-10 PROCEDURE — 86850 RBC ANTIBODY SCREEN: CPT | Performed by: ORTHOPAEDIC SURGERY

## 2017-08-10 PROCEDURE — C1713 ANCHOR/SCREW BN/BN,TIS/BN: HCPCS | Performed by: ORTHOPAEDIC SURGERY

## 2017-08-10 PROCEDURE — 87102 FUNGUS ISOLATION CULTURE: CPT | Performed by: ORTHOPAEDIC SURGERY

## 2017-08-10 PROCEDURE — 76001 HC FLUORO GREATER THAN 1 HOUR: CPT

## 2017-08-10 PROCEDURE — C1755 CATHETER, INTRASPINAL: HCPCS | Performed by: ORTHOPAEDIC SURGERY

## 2017-08-10 PROCEDURE — 87075 CULTR BACTERIA EXCEPT BLOOD: CPT | Performed by: ORTHOPAEDIC SURGERY

## 2017-08-10 PROCEDURE — 25010000002 PHENYLEPHRINE PER 1 ML: Performed by: NURSE ANESTHETIST, CERTIFIED REGISTERED

## 2017-08-10 PROCEDURE — 87205 SMEAR GRAM STAIN: CPT | Performed by: ORTHOPAEDIC SURGERY

## 2017-08-10 PROCEDURE — 97162 PT EVAL MOD COMPLEX 30 MIN: CPT

## 2017-08-10 PROCEDURE — 76000 FLUOROSCOPY <1 HR PHYS/QHP: CPT

## 2017-08-10 PROCEDURE — 87176 TISSUE HOMOGENIZATION CULTR: CPT | Performed by: ORTHOPAEDIC SURGERY

## 2017-08-10 PROCEDURE — 0QP704Z REMOVAL OF INTERNAL FIXATION DEVICE FROM LEFT UPPER FEMUR, OPEN APPROACH: ICD-10-PCS | Performed by: ORTHOPAEDIC SURGERY

## 2017-08-10 PROCEDURE — 86900 BLOOD TYPING SEROLOGIC ABO: CPT | Performed by: ORTHOPAEDIC SURGERY

## 2017-08-10 PROCEDURE — 97116 GAIT TRAINING THERAPY: CPT

## 2017-08-10 PROCEDURE — 87206 SMEAR FLUORESCENT/ACID STAI: CPT | Performed by: ORTHOPAEDIC SURGERY

## 2017-08-10 PROCEDURE — 84132 ASSAY OF SERUM POTASSIUM: CPT | Performed by: ANESTHESIOLOGY

## 2017-08-10 DEVICE — PINNACLE GRIPTION ACETABULAR SHELL SECTOR 52MM OD
Type: IMPLANTABLE DEVICE | Site: HIP | Status: FUNCTIONAL
Brand: PINNACLE GRIPTION

## 2017-08-10 DEVICE — PINNACLE HIP SOLUTIONS ALTRX POLYETHYLENE ACETABULAR LINER NEUTRAL 36MM ID 52MM OD
Type: IMPLANTABLE DEVICE | Site: HIP | Status: FUNCTIONAL
Brand: PINNACLE ALTRX

## 2017-08-10 DEVICE — PINNACLE CANCELLOUS BONE SCREW 6.5MM X 30MM
Type: IMPLANTABLE DEVICE | Site: HIP | Status: FUNCTIONAL
Brand: PINNACLE

## 2017-08-10 DEVICE — CORAIL HIP SYSTEM CEMENTLESS FEMORAL STEM HA COATED REVISION 12/14 AMT 135 DEGREES STANDARD COLLAR SIZE 12
Type: IMPLANTABLE DEVICE | Site: HIP | Status: FUNCTIONAL
Brand: CORAIL

## 2017-08-10 DEVICE — PINNACLE CANCELLOUS BONE SCREW 6.5MM X 20MM
Type: IMPLANTABLE DEVICE | Site: HIP | Status: FUNCTIONAL
Brand: PINNACLE

## 2017-08-10 DEVICE — M-SPEC METAL FEMORAL HEAD 12/14 TAPER DIAMETER 36MM +1.5: Type: IMPLANTABLE DEVICE | Site: HIP | Status: FUNCTIONAL

## 2017-08-10 RX ORDER — SODIUM CHLORIDE 9 MG/ML
150 INJECTION, SOLUTION INTRAVENOUS CONTINUOUS
Status: DISCONTINUED | OUTPATIENT
Start: 2017-08-10 | End: 2017-08-12 | Stop reason: HOSPADM

## 2017-08-10 RX ORDER — MAGNESIUM HYDROXIDE 1200 MG/15ML
LIQUID ORAL AS NEEDED
Status: DISCONTINUED | OUTPATIENT
Start: 2017-08-10 | End: 2017-08-10 | Stop reason: HOSPADM

## 2017-08-10 RX ORDER — ONDANSETRON 2 MG/ML
4 INJECTION INTRAMUSCULAR; INTRAVENOUS EVERY 6 HOURS PRN
Status: DISCONTINUED | OUTPATIENT
Start: 2017-08-10 | End: 2017-08-12 | Stop reason: HOSPADM

## 2017-08-10 RX ORDER — SODIUM CHLORIDE 0.9 % (FLUSH) 0.9 %
1-10 SYRINGE (ML) INJECTION AS NEEDED
Status: DISCONTINUED | OUTPATIENT
Start: 2017-08-10 | End: 2017-08-12 | Stop reason: HOSPADM

## 2017-08-10 RX ORDER — PROMETHAZINE HYDROCHLORIDE 25 MG/ML
6.25 INJECTION, SOLUTION INTRAMUSCULAR; INTRAVENOUS ONCE AS NEEDED
Status: DISCONTINUED | OUTPATIENT
Start: 2017-08-10 | End: 2017-08-10 | Stop reason: HOSPADM

## 2017-08-10 RX ORDER — PROPOFOL 10 MG/ML
VIAL (ML) INTRAVENOUS AS NEEDED
Status: DISCONTINUED | OUTPATIENT
Start: 2017-08-10 | End: 2017-08-10 | Stop reason: SURG

## 2017-08-10 RX ORDER — FENTANYL CITRATE 50 UG/ML
25 INJECTION, SOLUTION INTRAMUSCULAR; INTRAVENOUS
Status: DISCONTINUED | OUTPATIENT
Start: 2017-08-10 | End: 2017-08-10 | Stop reason: HOSPADM

## 2017-08-10 RX ORDER — PROMETHAZINE HYDROCHLORIDE 25 MG/1
25 SUPPOSITORY RECTAL ONCE AS NEEDED
Status: DISCONTINUED | OUTPATIENT
Start: 2017-08-10 | End: 2017-08-10 | Stop reason: HOSPADM

## 2017-08-10 RX ORDER — BISACODYL 5 MG/1
10 TABLET, DELAYED RELEASE ORAL DAILY PRN
Status: DISCONTINUED | OUTPATIENT
Start: 2017-08-10 | End: 2017-08-12 | Stop reason: HOSPADM

## 2017-08-10 RX ORDER — SODIUM CHLORIDE 0.9 % (FLUSH) 0.9 %
1-10 SYRINGE (ML) INJECTION AS NEEDED
Status: DISCONTINUED | OUTPATIENT
Start: 2017-08-10 | End: 2017-08-10 | Stop reason: HOSPADM

## 2017-08-10 RX ORDER — FAMOTIDINE 20 MG/1
20 TABLET, FILM COATED ORAL ONCE
Status: COMPLETED | OUTPATIENT
Start: 2017-08-10 | End: 2017-08-10

## 2017-08-10 RX ORDER — ACETAMINOPHEN 325 MG/1
650 TABLET ORAL EVERY 4 HOURS PRN
Status: DISCONTINUED | OUTPATIENT
Start: 2017-08-10 | End: 2017-08-12 | Stop reason: HOSPADM

## 2017-08-10 RX ORDER — PREGABALIN 75 MG/1
75 CAPSULE ORAL ONCE
Status: COMPLETED | OUTPATIENT
Start: 2017-08-10 | End: 2017-08-10

## 2017-08-10 RX ORDER — ACETAMINOPHEN 325 MG/1
650 TABLET ORAL ONCE
Status: COMPLETED | OUTPATIENT
Start: 2017-08-10 | End: 2017-08-10

## 2017-08-10 RX ORDER — BUPIVACAINE HYDROCHLORIDE 5 MG/ML
INJECTION, SOLUTION EPIDURAL; INTRACAUDAL AS NEEDED
Status: DISCONTINUED | OUTPATIENT
Start: 2017-08-10 | End: 2017-08-10 | Stop reason: SURG

## 2017-08-10 RX ORDER — TRAMADOL HYDROCHLORIDE 50 MG/1
50 TABLET ORAL EVERY 6 HOURS PRN
Status: DISCONTINUED | OUTPATIENT
Start: 2017-08-10 | End: 2017-08-12 | Stop reason: HOSPADM

## 2017-08-10 RX ORDER — BISACODYL 10 MG
10 SUPPOSITORY, RECTAL RECTAL DAILY PRN
Status: DISCONTINUED | OUTPATIENT
Start: 2017-08-10 | End: 2017-08-12 | Stop reason: HOSPADM

## 2017-08-10 RX ORDER — HYDROMORPHONE HYDROCHLORIDE 1 MG/ML
0.25 INJECTION, SOLUTION INTRAMUSCULAR; INTRAVENOUS; SUBCUTANEOUS
Status: DISCONTINUED | OUTPATIENT
Start: 2017-08-10 | End: 2017-08-10 | Stop reason: HOSPADM

## 2017-08-10 RX ORDER — ONDANSETRON 2 MG/ML
4 INJECTION INTRAMUSCULAR; INTRAVENOUS ONCE AS NEEDED
Status: DISCONTINUED | OUTPATIENT
Start: 2017-08-10 | End: 2017-08-10 | Stop reason: HOSPADM

## 2017-08-10 RX ORDER — HYDRALAZINE HYDROCHLORIDE 20 MG/ML
10 INJECTION INTRAMUSCULAR; INTRAVENOUS EVERY 6 HOURS PRN
Status: DISCONTINUED | OUTPATIENT
Start: 2017-08-10 | End: 2017-08-12 | Stop reason: HOSPADM

## 2017-08-10 RX ORDER — ASPIRIN 325 MG
325 TABLET, DELAYED RELEASE (ENTERIC COATED) ORAL DAILY
Status: DISCONTINUED | OUTPATIENT
Start: 2017-08-11 | End: 2017-08-11

## 2017-08-10 RX ORDER — LIDOCAINE HYDROCHLORIDE 10 MG/ML
0.5 INJECTION, SOLUTION EPIDURAL; INFILTRATION; INTRACAUDAL; PERINEURAL ONCE AS NEEDED
Status: COMPLETED | OUTPATIENT
Start: 2017-08-10 | End: 2017-08-10

## 2017-08-10 RX ORDER — DOCUSATE SODIUM 100 MG/1
100 CAPSULE, LIQUID FILLED ORAL 2 TIMES DAILY
Status: DISCONTINUED | OUTPATIENT
Start: 2017-08-10 | End: 2017-08-12 | Stop reason: HOSPADM

## 2017-08-10 RX ORDER — FAMOTIDINE 10 MG/ML
20 INJECTION, SOLUTION INTRAVENOUS ONCE
Status: DISCONTINUED | OUTPATIENT
Start: 2017-08-10 | End: 2017-08-10 | Stop reason: HOSPADM

## 2017-08-10 RX ORDER — PROMETHAZINE HYDROCHLORIDE 25 MG/1
25 TABLET ORAL ONCE AS NEEDED
Status: DISCONTINUED | OUTPATIENT
Start: 2017-08-10 | End: 2017-08-10 | Stop reason: HOSPADM

## 2017-08-10 RX ORDER — CEFAZOLIN SODIUM 2 G/100ML
2 INJECTION, SOLUTION INTRAVENOUS ONCE
Status: COMPLETED | OUTPATIENT
Start: 2017-08-10 | End: 2017-08-10

## 2017-08-10 RX ORDER — AMLODIPINE BESYLATE 2.5 MG/1
2.5 TABLET ORAL
Status: DISCONTINUED | OUTPATIENT
Start: 2017-08-10 | End: 2017-08-12 | Stop reason: HOSPADM

## 2017-08-10 RX ORDER — SODIUM CHLORIDE, SODIUM LACTATE, POTASSIUM CHLORIDE, CALCIUM CHLORIDE 600; 310; 30; 20 MG/100ML; MG/100ML; MG/100ML; MG/100ML
9 INJECTION, SOLUTION INTRAVENOUS CONTINUOUS
Status: DISCONTINUED | OUTPATIENT
Start: 2017-08-10 | End: 2017-08-12 | Stop reason: HOSPADM

## 2017-08-10 RX ORDER — CEFAZOLIN SODIUM 2 G/100ML
2 INJECTION, SOLUTION INTRAVENOUS EVERY 8 HOURS
Status: COMPLETED | OUTPATIENT
Start: 2017-08-10 | End: 2017-08-11

## 2017-08-10 RX ADMIN — SODIUM CHLORIDE, POTASSIUM CHLORIDE, SODIUM LACTATE AND CALCIUM CHLORIDE 9 ML/HR: 600; 310; 30; 20 INJECTION, SOLUTION INTRAVENOUS at 08:09

## 2017-08-10 RX ADMIN — ACETAMINOPHEN 650 MG: 325 TABLET, FILM COATED ORAL at 17:35

## 2017-08-10 RX ADMIN — AMLODIPINE BESYLATE 2.5 MG: 2.5 TABLET ORAL at 17:06

## 2017-08-10 RX ADMIN — TRANEXAMIC ACID 540 MG: 100 INJECTION, SOLUTION INTRAVENOUS at 10:51

## 2017-08-10 RX ADMIN — MUPIROCIN 1 APPLICATION: 20 OINTMENT TOPICAL at 08:10

## 2017-08-10 RX ADMIN — TRANEXAMIC ACID 540 MG: 100 INJECTION, SOLUTION INTRAVENOUS at 13:27

## 2017-08-10 RX ADMIN — PROPOFOL 50 MG: 10 INJECTION, EMULSION INTRAVENOUS at 13:19

## 2017-08-10 RX ADMIN — PREGABALIN 75 MG: 75 CAPSULE ORAL at 08:10

## 2017-08-10 RX ADMIN — BUPIVACAINE HYDROCHLORIDE 12 ML: 5 INJECTION, SOLUTION EPIDURAL; INTRACAUDAL; PERINEURAL at 10:37

## 2017-08-10 RX ADMIN — LIDOCAINE HYDROCHLORIDE 0.5 ML: 10 INJECTION, SOLUTION EPIDURAL; INFILTRATION; INTRACAUDAL; PERINEURAL at 08:09

## 2017-08-10 RX ADMIN — CEFAZOLIN SODIUM 2 G: 2 INJECTION, SOLUTION INTRAVENOUS at 10:27

## 2017-08-10 RX ADMIN — SODIUM CHLORIDE 150 ML/HR: 9 INJECTION, SOLUTION INTRAVENOUS at 17:03

## 2017-08-10 RX ADMIN — FAMOTIDINE 20 MG: 20 TABLET ORAL at 08:10

## 2017-08-10 RX ADMIN — SODIUM CHLORIDE, POTASSIUM CHLORIDE, SODIUM LACTATE AND CALCIUM CHLORIDE: 600; 310; 30; 20 INJECTION, SOLUTION INTRAVENOUS at 10:27

## 2017-08-10 RX ADMIN — CEFAZOLIN SODIUM 2 G: 2 INJECTION, SOLUTION INTRAVENOUS at 18:43

## 2017-08-10 RX ADMIN — SODIUM CHLORIDE, POTASSIUM CHLORIDE, SODIUM LACTATE AND CALCIUM CHLORIDE: 600; 310; 30; 20 INJECTION, SOLUTION INTRAVENOUS at 12:58

## 2017-08-10 RX ADMIN — ACETAMINOPHEN 650 MG: 325 TABLET ORAL at 08:09

## 2017-08-10 RX ADMIN — PROPOFOL 40 MCG/KG/MIN: 10 INJECTION, EMULSION INTRAVENOUS at 10:38

## 2017-08-10 RX ADMIN — PHENYLEPHRINE HYDROCHLORIDE 100 MCG: 10 INJECTION INTRAVENOUS at 13:21

## 2017-08-10 RX ADMIN — PHENYLEPHRINE HYDROCHLORIDE 100 MCG: 10 INJECTION INTRAVENOUS at 13:27

## 2017-08-10 RX ADMIN — PHENYLEPHRINE HYDROCHLORIDE 50 MCG: 10 INJECTION INTRAVENOUS at 12:32

## 2017-08-10 RX ADMIN — PHENYLEPHRINE HYDROCHLORIDE 100 MCG: 10 INJECTION INTRAVENOUS at 13:23

## 2017-08-10 NOTE — THERAPY EVALUATION
Acute Care - Physical Therapy Initial Evaluation  [facility]     Patient Name: Marcelino Mesa  : 1927  MRN: [id number]  Today's Date: 8/10/2017   Onset of Illness/Injury or Date of Surgery Date: 08/10/17  Date of Referral to PT: 08/10/17  Referring Physician: MD Nitin      Admit Date: 8/10/2017     Visit Dx:    ICD-10-CM ICD-9-CM   1. Impaired functional mobility, balance, gait, and endurance Z74.09 V49.89   2. S/P revision of total hip Z96.649 V43.64     Patient Active Problem List   Diagnosis   • Abnormal gait   • Generalized osteoarthritis   • Hyperlipidemia   • Iron deficiency anemia   • Left-sided low back pain with left-sided sciatica   • Macular degeneration   • Scoliosis of lumbar spine   • Post-menopausal osteoporosis   • Vaginitis   • Vitamin D deficiency   • Hip fracture, left   • Essential hypertension   • Preventative health care   • Incomplete RBBB   • Status post unicompartmental right knee arthroplasty   • Primary osteoarthritis of both knees   • Status post left knee medial unicompartmental replacement   • Anxiety   • Amaurosis fugax   • Fracture of hip   • 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     Past Medical History:   Diagnosis Date   • Amaurosis fugax of left eye 2017    5 minutes at home   • Cataracts, bilateral     removed   • Compression fracture of L1 lumbar vertebra    • Generalized osteoarthritis     Bilateral knee replacements   • Glaucoma     h/o   • Hearing impairment     Wears hearing aids   • Herpes zoster    • Hip fracture, left    • [place] (hard of hearing)    • Hyperlipidemia    • Hypertension    • Iron deficiency    • Low back pain    • Macular degeneration    • PONV (postoperative nausea and vomiting)     preprocedural medications will help    • Scoliosis    • Vitamin D deficiency    • Wears glasses    • Wears hearing aid     bilat ears   • Wrist fracture, left      Past Surgical History:   Procedure Laterality Date   • CATARACT  EXTRACTION Bilateral    • COLONOSCOPY  2006   • HIP FRACTURE SURGERY Left 2014     hip pinning for acute fracture   • KNEE ARTHROPLASTY UNICOMPARTMENTAL Right 9/22/2016    Procedure: RIGHT KNEE ARTHROPLASTY UNICOMPARTMENTAL;  Surgeon: Jesse Taveras MD;  Location:  [facility];  Service:    • KNEE ARTHROPLASTY UNICOMPARTMENTAL Left 12/1/2016    Procedure: LEFT KNEE ARTHROPLASTY UNICOMPARTMENTAL;  Surgeon: Jesse Taveras MD;  Location:  [facility];  Service:    • TONSILLECTOMY  1947          PT ASSESSMENT (last 72 hours)      PT Evaluation       08/10/17 1326       Rehab Evaluation    Document Type evaluation  -MJ     Subjective Information agree to therapy;complains of;pain  -MJ     Patient Effort, Rehab Treatment good  -MJ     Symptoms Noted During/After Treatment increased pain  -MJ     Symptoms Noted Comment RN notified  -     General Information    Patient Profile Review yes  -MJ     Onset of Illness/Injury or Date of Surgery Date 08/10/17  -     Referring Physician MD Nitin  -     General Observations Pt supine in bed, IV, SCDs. Family present  -MJ     Pertinent History Of Current Problem Pt presents for surgical revision of L hip pain and dysfunction. Pt underwent L hip pinning for hip fx in 2014  -MJ     Precautions/Limitations anterior hip precautions- left;fall precautions;other (see comments)   very [place]  -MJ     Prior Level of Function min assist:;all household mobility;community mobility;gait;transfer;bed mobility   Pain with activity  -MJ     Equipment Currently Used at Home walker, rolling;commode;bath bench  -MJ     Plans/Goals Discussed With patient and family;agreed upon  -MJ     Risks Reviewed patient and family:;LOB;increased discomfort  -MJ     Benefits Reviewed patient and family:;improve function;increase independence;increase strength;increase balance;decrease pain  -MJ     Barriers to Rehab hearing deficit  -MJ     Living Environment    Lives With alone  -MJ     Living Arrangements house  -      Home Accessibility ramps present at home;tub/shower is not walk in;stairs to enter home  -MJ     Number of Stairs to Enter Home 1  -MJ     Living Environment Comment Pt's dtr to assist at discharge  -MJ     Clinical Impression    Date of Referral to PT 08/10/17  -MJ     PT Diagnosis s/p L MALINI revision via anterior approach  -     Criteria for Skilled Therapeutic Interventions Met yes;treatment indicated  -MJ     Pain Assessment    Pain Assessment 0-10  -MJ     Pain Score 4  -MJ     Post Pain Score 10  -MJ     Pain Type Acute pain  -MJ     Pain Location Hip  -MJ     Pain Orientation Left  -MJ     Pain Intervention(s) Repositioned;Ambulation/increased activity;Cold pack  -MJ     Cognitive Assessment/Intervention    Current Cognitive/Communication Assessment functional  -MJ     Orientation Status oriented x 4  -MJ     Follows Commands/Answers Questions 100% of the time;able to follow single-step instructions;needs cueing;needs repetition  -MJ     Personal Safety mild impairment  -MJ     ROM (Range of Motion)    General ROM lower extremity range of motion deficits identified  -MJ     General LE Assessment    ROM Detail L hip AROM impaired 25%  -MJ     MMT (Manual Muscle Testing)    General MMT Assessment lower extremity strength deficits identified  -MJ     Lower Extremity    Lower Ext Manual Muscle Testing Detail L LE 4-/5; R LE 4+/5  -MJ     Mobility Assessment/Training    Extremity Weight-Bearing Status left lower extremity  -MJ     Left Lower Extremity Weight-Bearing weight-bearing as tolerated  -MJ     Bed Mobility, Assessment/Treatment    Bed Mobility, Assistive Device bed rails;head of bed elevated  -MJ     Bed Mob, Supine to Sit, [place] supervision required;verbal cues required  -MJ     Bed Mob, Sit to Supine, [place] not tested   Pt UIC  -MJ     Bed Mobility, Safety Issues decreased use of legs for bridging/pushing  -MJ     Bed Mobility, Impairments ROM decreased;strength decreased;pain  -MJ      Bed Mobility, Comment Verbal cues to move LEs off EOB and to use UEs to push trunk to sitting. Increased time and effort to perform  -MJ     Transfer Assessment/Treatment    Transfers, Sit-Stand [place] contact guard assist;2 person assist required;verbal cues required  -MJ     Transfers, Stand-Sit [place] contact guard assist;2 person assist required;verbal cues required  -MJ     Transfers, Sit-Stand-Sit, Assist Device rolling walker  -MJ     Transfer, Safety Issues step length decreased;weight-shifting ability decreased  -MJ     Transfer, Impairments ROM decreased;strength decreased;pain  -MJ     Transfer, Comment Verbal cues to push up from bed with UEs to stand and to reach back for chair to lower into sitting. Cues to step L LE out prior to t/f.   -     Gait Assessment/Treatment    Gait, [place] Level contact guard assist;2 person assist required;verbal cues required  -MJ     Gait, Assistive Device rolling walker  -     Gait, Distance (Feet) 80  -MJ     Gait, Gait Pattern Analysis swing-through gait  -     Gait, Gait Deviations left:;antalgic;janina decreased;step length decreased;weight-shifting ability decreased;forward flexed posture;narrow base  -MJ     Gait, Safety Issues step length decreased;weight-shifting ability decreased  -     Gait, Impairments ROM decreased;strength decreased;pain  -MJ     Gait, Comment Pt demo step through gait pattern at slow pace. Verbal cues for upright posture and to stay in middle of RW. Narrow base initially due to spinal, improved with cues and practice. Gait limited by pain  -MJ     Therapy Exercises    Exercise Protocols total hip   anterior revision  -MJ     Total Hip Exercises left:;10 reps;ankle pumps/circles;quad set;glut set  -     Sensory Assessment/Intervention    Light Touch LLE  -MJ     LLE Light Touch --   denies numbness/tingling; can actively DF both ankles  -MJ     Positioning and Restraints    Pre-Treatment Position in bed  -MJ      Post Treatment Position chair  -MJ     In Chair notified nsg;reclined;call light within reach;encouraged to call for assist;with family/caregiver  -       User Key  (r) = Recorded By, (t) = Taken By, (c) = Cosigned By    Initials Name Provider Type    ELIAS Garza PT Physical Therapist          Physical Therapy Education     Title: PT OT SLP Therapies (Active)     Topic: Physical Therapy (Active)     Point: Mobility training (Done)    Learning Progress Summary    Learner Readiness Method Response Comment Documented by Status   Patient Acceptance E,D VU,NR Reviewed anterior hip precautions  08/10/17 1811 Done   Family Acceptance E,D VU,NR Reviewed anterior hip precautions  08/10/17 1811 Done               Point: Body mechanics (Done)    Learning Progress Summary    Learner Readiness Method Response Comment Documented by Status   Patient Acceptance ED VU,NR Reviewed anterior hip precautions  08/10/17 1811 Done   Family Acceptance ED VUNR Reviewed anterior hip precautions  08/10/17 1811 Done               Point: Precautions (Done)    Learning Progress Summary    Learner Readiness Method Response Comment Documented by Status   Patient Acceptance MG BHAKTA VU,NR Reviewed anterior hip precautions  08/10/17 1811 Done   Family Acceptance E,D VUNR Reviewed anterior hip precautions  08/10/17 1811 Done                      User Key     Initials Effective Dates Name Provider Type Discipline     03/14/16 -  Alexis Garza PT Physical Therapist PT                PT Recommendation and Plan  Anticipated Discharge Disposition: home with assist, home with home health  Demonstrates Need for Referral to Another Service: home health care  Planned Therapy Interventions: balance training, bed mobility training, gait training, home exercise program, patient/family education, stair training, strengthening, transfer training  PT Frequency: 2 times/day  Plan of Care Review  Plan Of Care Reviewed With: patient  Outcome  Summary/Follow up Plan: Pt ambulated 80 feet with CGAx2 and RW, limited by pain. PT will follow to increase strength and progress functional mobility. Plan is home with assist and HHPT at discharge          IP PT Goals       08/10/17 1812          Bed Mobility PT LTG    Bed Mobility PT LTG, Date Established 08/10/17  -MJ      Bed Mobility PT LTG, Time to Achieve 5 days  -MJ      Bed Mobility PT LTG, Activity Type supine to sit/sit to supine  -MJ      Bed Mobility PT LTG, [place] Level conditional independence  -MJ      Transfer Training PT LTG    Transfer Training PT LTG, Date Established 08/10/17  -MJ      Transfer Training PT LTG, Time to Achieve 5 days  -MJ      Transfer Training PT LTG, Activity Type sit to stand/stand to sit  -MJ      Transfer Training PT LTG, [place] Level conditional independence  -MJ      Transfer Training PT LTG, Assist Device walker, rolling  -MJ      Gait Training PT LTG    Gait Training Goal PT LTG, Date Established 08/10/17  -MJ      Gait Training Goal PT LTG, Time to Achieve 5 days  -MJ      Gait Training Goal PT LTG, [place] Level conditional independence  -MJ      Gait Training Goal PT LTG, Assist Device walker, rolling  -MJ      Gait Training Goal PT LTG, Distance to Achieve 500 feet  -MJ        User Key  (r) = Recorded By, (t) = Taken By, (c) = Cosigned By    Initials Name Provider Type    ELIAS Garza, PT Physical Therapist                Outcome Measures       08/10/17 1715          How much help from another person do you currently need...    Turning from your back to your side while in flat bed without using bedrails? 3  -MJ      Moving from lying on back to sitting on the side of a flat bed without bedrails? 3  -MJ      Moving to and from a bed to a chair (including a wheelchair)? 3  -MJ      Standing up from a chair using your arms (e.g., wheelchair, bedside chair)? 3  -MJ      Climbing 3-5 steps with a railing? 2  -MJ      To walk in hospital room? 3  -MJ       AM-PAC 6 Clicks Score 17  -      Functional Assessment    Outcome Measure Options AM-PAC 6 Clicks Basic Mobility (PT)  -        User Key  (r) = Recorded By, (t) = Taken By, (c) = Cosigned By    Initials Name Provider Type    ELIAS Garza PT Physical Therapist           Time Calculation:         PT Charges       08/10/17 1813          Time Calculation    Start Time 1715  -MJ      PT Received On 08/10/17  -      PT Goal Re-Cert Due Date 08/20/17  -      Time Calculation- PT    Total Timed Code Minutes- PT 10 minute(s)  -        User Key  (r) = Recorded By, (t) = Taken By, (c) = Cosigned By    Initials Name Provider Type    ELIAS Garza PT Physical Therapist          Therapy Charges for Today     Code Description Service Date Service Provider Modifiers Qty    33433285346 HC PT EVAL MOD COMPLEXITY 2 8/10/2017 Alexis Garza, PT GP 1    06603639886 HC GAIT TRAINING EA 15 MIN 8/10/2017 Alexis Garza, PT GP 1    82030364296 HC PT THER SUPP EA 15 MIN 8/10/2017 Alexis Garza, PT GP 3          PT G-Codes  Outcome Measure Options: AM-PAC 6 Clicks Basic Mobility (PT)      Alexis Garza PT  8/10/2017

## 2017-08-10 NOTE — H&P
Patient Name: Marcelino Mesa  MRN: [id number]  : 1927  DOS: 8/10/2017    Attending: Jesse Taveras MD    Primary Care Provider: Jony Singh MD      Chief complaint:  left hip and thigh pain    Subjective   Patient is a 89 y.o. female presented for removal of left hip hardware and total left hip arthroplasty by Dr. Taveras under spinal anesthesia. She tolerated surgery well and is admitted for further medical management. She had previous cephalomedullary left intertrochanteric hip fracture fixation in . 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    She is known to us from previous admissions. She had unicompartmental right and left knee replacements in Sept and Dec of 2016 and recovered well.     When seen postop she is drowsy. She denies pain, but is still under effects of spinal anesthesia. She denies nausea, shortness of breath or chest pain. No hx of DVT or PE.       Allergies:  Allergies   Allergen Reactions   • Asa [Aspirin] GI Bleeding   • Lisinopril-Hydrochlorothiazide Rash   • Other Rash     STEROID INJECTION IN BACK       Meds:  Prescriptions Prior to Admission   Medication Sig Dispense Refill Last Dose   • amLODIPine (NORVASC) 2.5 MG tablet TAKE 1 TABLET ONCE A DAY 90 tablet 1 2017 at 0700   • calcium-vitamin D (OSCAL 500/200 D-3) 500-200 MG-UNIT per tablet Take 1 tablet by mouth daily.   2017   • carboxymethylcellulose 1 % ophthalmic solution Apply 1 drop to eye Every Night.  12 2017   • Cholecalciferol (VITAMIN D) 2000 UNITS capsule Take 1 capsule by mouth daily.   2017   • clopidogrel (PLAVIX) 75 MG tablet Take 0.5 tablets by mouth Daily. (Patient taking differently: Take 37.5 mg by mouth Daily. Instructed to stop for surgery per dr taveras) 30 tablet 3 2017   • CRANBERRY EXTRACT PO Take 2 capsules by mouth Daily.   2017   • ferrous sulfate 325 (65 FE) MG tablet Take 325 mg by mouth daily with breakfast.   2017   • Multiple Vitamins-Minerals (CENTRUM ADULTS)  tablet Take 1 tablet by mouth daily.   8/9/2017       History:   Past Medical History:   Diagnosis Date   • Amaurosis fugax of left eye 06/19/2017    5 minutes at home   • Cataracts, bilateral     removed   • Compression fracture of L1 lumbar vertebra    • Generalized osteoarthritis     Bilateral knee replacements   • Glaucoma     h/o   • Hearing impairment     Wears hearing aids   • Herpes zoster    • Hip fracture, left    • [place] (hard of hearing)    • Hyperlipidemia    • Hypertension    • Iron deficiency    • Low back pain    • Macular degeneration    • PONV (postoperative nausea and vomiting)     preprocedural medications will help    • Scoliosis    • Vitamin D deficiency    • Wears glasses    • Wears hearing aid     bilat ears   • Wrist fracture, left      Past Surgical History:   Procedure Laterality Date   • CATARACT EXTRACTION Bilateral    • COLONOSCOPY  2006   • HIP FRACTURE SURGERY Left 2014     hip pinning for acute fracture   • KNEE ARTHROPLASTY UNICOMPARTMENTAL Right 9/22/2016    Procedure: RIGHT KNEE ARTHROPLASTY UNICOMPARTMENTAL;  Surgeon: Jesse Taveras MD;  Location:  [facility];  Service:    • KNEE ARTHROPLASTY UNICOMPARTMENTAL Left 12/1/2016    Procedure: LEFT KNEE ARTHROPLASTY UNICOMPARTMENTAL;  Surgeon: Jesse Taveras MD;  Location:  [facility];  Service:    • TONSILLECTOMY  1947     Family History   Problem Relation Age of Onset   • Alzheimer's disease Mother    • No Known Problems Father    • Arthritis Sister    • Breast cancer Sister    • Colon cancer Sister    • Lung cancer Brother    • Heart disease Other      2009   • Asthma Sister    • Colon cancer Sister    • Breast cancer Sister    • Lumbar disc disease Sister    • Skin cancer Brother      Social History   Substance Use Topics   • Smoking status: Never Smoker   • Smokeless tobacco: Never Used   • Alcohol use No   Lives home alone ( daughter expected to stay with her several days after discharge)wy    Review of Systems  Review of systems could  not be obtained due to   patient sedation status.    Vital Signs  /70 (BP Location: Right arm, Patient Position: Lying)  Pulse 65  Temp 97.5 °F (36.4 °C) (Axillary)   Resp 16  SpO2 96%    Physical Exam:    General Appearance:    Drowsy, in no acute distress( more alert later/NAD)wy   Head:    Normocephalic, without obvious abnormality, atraumatic   Eyes:            Lids and lashes normal, conjunctivae and sclerae normal, no   icterus, no pallor, corneas clear, PERRLA   Ears:    Ears appear intact with no abnormalities noted   Neck:   No adenopathy, supple, trachea midline, no thyromegaly, no     carotid bruit    Lungs:     Clear to auscultation,respirations regular, even and                   unlabored    Heart:    Regular rhythm and normal rate, normal S1 and S2, no            murmur, no gallop, no rub, no click   Abdomen:     Normal bowel sounds, no masses, no organomegaly, soft        non-tender, non-distended, no guarding, no rebound                 tenderness   Genitalia:    Deferred   Extremities:   Left hip with Prineo x2 CDI.   Pulses:   Pulses palpable and equal bilaterally   Skin:   No bleeding, bruising or rash   Neurologic:   Cranial nerves 2 - 12 grossly intact, sensation and movement limited due to spinal block  Exam later afternoon: Flexion and dorsiflexion intact bilateral feet.          I reviewed the patient's new clinical results.         Results from last 7 days  Lab Units 08/04/17  1145   WBC 10*3/mm3 6.59   HEMOGLOBIN g/dL 13.7   HEMATOCRIT % 41.6   PLATELETS 10*3/mm3 259       Results from last 7 days  Lab Units 08/04/17  1145   SODIUM mmol/L 138   POTASSIUM mmol/L 3.8   CHLORIDE mmol/L 104   CO2 mmol/L 28.0   BUN mg/dL 13   CREATININE mg/dL 0.70   CALCIUM mg/dL 9.1   BILIRUBIN mg/dL 0.6   ALK PHOS U/L 91   ALT (SGPT) U/L 15   AST (SGOT) U/L 19   GLUCOSE mg/dL 88     Lab Results   Component Value Date    HGBA1C 5.50 08/04/2017       Assessment and Plan:   Principal Problem:    S/P  removal of left hip hardware and total left hip arthroplasty  Active Problems:    Hyperlipidemia    Essential hypertension    Fracture of hip      Plan  1. PT/OT- early ambulation post op  2. Pain control-prns   3. IS-encourage  4. DVT proph- Mechs/ASA  5. Bowel regimen  6. Resume home medications as appropriate  7. Monitor post-op labs  8. DC planning for home when ready    HTN, HLD,   - Continue home Norvasc  - Resume Plavix when ok with Dr. Taveras  - Monitor BP q4 hrs  - Holding parameters for BP meds  - PRN hydralazine for SBP >170    Will consider Lovenox at discharge after reviewing reported history of GIB January of 2017.     Edmund Cates MD  08/10/17  6:55 PM

## 2017-08-10 NOTE — PLAN OF CARE
Problem: Patient Care Overview (Adult)  Goal: Plan of Care Review  Outcome: Ongoing (interventions implemented as appropriate)    08/10/17 1943   Coping/Psychosocial Response Interventions   Plan Of Care Reviewed With patient   Patient Care Overview   Progress improving         Problem: Perioperative Period (Adult)  Goal: Signs and Symptoms of Listed Potential Problems Will be Absent or Manageable (Perioperative Period)  Outcome: Ongoing (interventions implemented as appropriate)    08/10/17 1943   Perioperative Period   Problems Assessed (Perioperative Period) all   Problems Present (Perioperative Period) pain

## 2017-08-10 NOTE — PLAN OF CARE
Problem: Patient Care Overview (Adult)  Goal: Plan of Care Review  Outcome: Ongoing (interventions implemented as appropriate)    08/10/17 1812   Coping/Psychosocial Response Interventions   Plan Of Care Reviewed With patient   Outcome Evaluation   Outcome Summary/Follow up Plan Pt ambulated 80 feet with CGAx2 and RW, limited by pain. PT will follow to increase strength and progress functional mobility. Plan is home with assist and HHPT at discharge         Problem: Inpatient Physical Therapy  Goal: Bed Mobility Goal LTG- PT  Outcome: Ongoing (interventions implemented as appropriate)    08/10/17 1812   Bed Mobility PT LTG   Bed Mobility PT LTG, Date Established 08/10/17   Bed Mobility PT LTG, Time to Achieve 5 days   Bed Mobility PT LTG, Activity Type supine to sit/sit to supine   Bed Mobility PT LTG, [place] Level conditional independence       Goal: Transfer Training Goal 1 LTG- PT  Outcome: Ongoing (interventions implemented as appropriate)    08/10/17 1812   Transfer Training PT LTG   Transfer Training PT LTG, Date Established 08/10/17   Transfer Training PT LTG, Time to Achieve 5 days   Transfer Training PT LTG, Activity Type sit to stand/stand to sit   Transfer Training PT LTG, [place] Level conditional independence   Transfer Training PT LTG, Assist Device walker, rolling       Goal: Gait Training Goal LTG- PT  Outcome: Ongoing (interventions implemented as appropriate)    08/10/17 1812   Gait Training PT LTG   Gait Training Goal PT LTG, Date Established 08/10/17   Gait Training Goal PT LTG, Time to Achieve 5 days   Gait Training Goal PT LTG, [place] Level conditional independence   Gait Training Goal PT LTG, Assist Device walker, rolling   Gait Training Goal PT LTG, Distance to Achieve 500 feet

## 2017-08-10 NOTE — H&P
Patient Care Team:      Chief complaint: left hip and thigh pain    Subjective:    Patient is a 89 y.o.female with increasing pain left hip anterior and posterior radiating the left thigh. She had a left hip fracture treated by Dr. Jasmine at [facility] in [place] of 2014. 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 She is here today for surgical intervention    Review of Systems:  General ROS: negative for - chills, fatigue or fever  Cardiovascular ROS: no chest pain or dyspnea on exertion. HAS BEEN OFF PLAVIX FOR 7 DAYS.  Respiratory ROS: no cough, shortness of breath, or wheezingM N           [address]      Allergies:   Allergies   Allergen Reactions   • Asa [Aspirin] GI Bleeding   • Lisinopril-Hydrochlorothiazide Rash   • Other Rash     STEROID INJECTION IN BACK          Latex:Denies  Contrast Dye: Denies    Home Meds    Prescriptions Prior to Admission   Medication Sig Dispense Refill Last Dose   • amLODIPine (NORVASC) 2.5 MG tablet TAKE 1 TABLET ONCE A DAY 90 tablet 1    • calcium-vitamin D (OSCAL 500/200 D-3) 500-200 MG-UNIT per tablet Take 1 tablet by mouth daily.   1/22/2017 at Unknown time   • carboxymethylcellulose 1 % ophthalmic solution Apply 1 drop to eye Every Night.  12    • Cholecalciferol (VITAMIN D) 2000 UNITS capsule Take 1 capsule by mouth daily.   1/22/2017 at Unknown time   • clopidogrel (PLAVIX) 75 MG tablet Take 0.5 tablets by mouth Daily. (Patient taking differently: Take 37.5 mg by mouth Daily. Instructed to stop for surgery per dr nelson) 30 tablet 3    • CRANBERRY EXTRACT PO Take 2 capsules by mouth Daily.   1/22/2017 at Unknown time   • ferrous sulfate 325 (65 FE) MG tablet Take 325 mg by mouth daily with breakfast.   1/22/2017 at Unknown time   • Multiple Vitamins-Minerals (CENTRUM ADULTS) tablet Take 1 tablet by mouth daily.   1/22/2017 at Unknown time     PMH:   Past Medical History:   Diagnosis Date   • Amaurosis fugax of left eye 06/19/2017    5 minutes at home   • Cataracts,  bilateral     removed   • Compression fracture of L1 lumbar vertebra    • Generalized osteoarthritis     Bilateral knee replacements   • Glaucoma     h/o   • Hearing impairment     Wears hearing aids   • Herpes zoster    • Hip fracture, left    • [place] (hard of hearing)    • Hyperlipidemia    • Hypertension    • Iron deficiency    • Low back pain    • Macular degeneration    • PONV (postoperative nausea and vomiting)     preprocedural medications will help    • Scoliosis    • Vitamin D deficiency    • Wears glasses    • Wears hearing aid     bilat ears   • Wrist fracture, left      PSH:    Past Surgical History:   Procedure Laterality Date   • CATARACT EXTRACTION Bilateral    • COLONOSCOPY  2006   • HIP FRACTURE SURGERY Left 2014     hip pinning for acute fracture   • KNEE ARTHROPLASTY UNICOMPARTMENTAL Right 9/22/2016    Procedure: RIGHT KNEE ARTHROPLASTY UNICOMPARTMENTAL;  Surgeon: Jesse Taveras MD;  Location:  [facility];  Service:    • KNEE ARTHROPLASTY UNICOMPARTMENTAL Left 12/1/2016    Procedure: LEFT KNEE ARTHROPLASTY UNICOMPARTMENTAL;  Surgeon: Jesse Taveras MD;  Location:  [facility];  Service:    • TONSILLECTOMY  1947     Immunization History: pneumo: Yes   Flu: Yes Tetanus: Unknown      Social History:   Tobacco: Never   Alcohol: No      Physical Exam:    Visit Vitals   • Pulse 72   • Temp 97.7 °F (36.5 °C) (Temporal Artery )   • Resp 18   • SpO2 95%         General Appearance:    Alert, cooperative, no distress, appears stated age   Head:    Normocephalic, without obvious abnormality, atraumatic   Lungs:     Clear to auscultation bilaterally, respirations unlabored    Heart: Regular rate and rhythm, S1 and S2 normal, no murmur, rub    or gallop    Abdomen:    Soft without tenderness   Breast Exam:    deferred   Genitalia:    deferred   Extremities:   Extremities normal, atraumatic, no cyanosis or edema   Skin:   Skin color, texture, turgor normal, no rashes or lesions   Neurologic:   Grossly intact      Results Review: Labs were reviewed    Results for ELYSE BOWLING (MRN [id number]) as of 8/10/2017 08:09   Ref. Range 8/4/2017 11:45   Glucose Latest Ref Range: 70 - 100 mg/dL 88   Sodium Latest Ref Range: 132 - 146 mmol/L 138   Potassium Latest Ref Range: 3.5 - 5.5 mmol/L 3.8   CO2 Latest Ref Range: 20.0 - 31.0 mmol/L 28.0   Chloride Latest Ref Range: 99 - 109 mmol/L 104   Anion Gap Latest Ref Range: 3.0 - 11.0 mmol/L 6.0   Creatinine Latest Ref Range: 0.60 - 1.30 mg/dL 0.70   BUN Latest Ref Range: 9 - 23 mg/dL 13   BUN/Creatinine Ratio Latest Ref Range: 7.0 - 25.0  18.6   Calcium Latest Ref Range: 8.7 - 10.4 mg/dL 9.1   eGFR Non African Amer Latest Ref Range: >60 mL/min/1.73 79   Alkaline Phosphatase Latest Ref Range: 25 - 100 U/L 91   Total Protein Latest Ref Range: 5.7 - 8.2 g/dL 6.7   ALT (SGPT) Latest Ref Range: 7 - 40 U/L 15   AST (SGOT) Latest Ref Range: 0 - 33 U/L 19   Total Bilirubin Latest Ref Range: 0.3 - 1.2 mg/dL 0.6   Albumin Latest Ref Range: 3.20 - 4.80 g/dL 3.90   Globulin Latest Units: gm/dL 2.8   A/G Ratio Latest Ref Range: 1.5 - 2.5 g/dL 1.4 (L)   Hemoglobin A1C Latest Ref Range: 4.80 - 5.60 % 5.50   WBC Latest Ref Range: 3.50 - 10.80 10*3/mm3 6.59   RBC Latest Ref Range: 3.89 - 5.14 10*6/mm3 4.44   Hemoglobin Latest Ref Range: 11.5 - 15.5 g/dL 13.7   Hematocrit Latest Ref Range: 34.5 - 44.0 % 41.6   RDW Latest Ref Range: 11.3 - 14.5 % 14.6 (H)   MCV Latest Ref Range: 80.0 - 99.0 fL 93.7   MCH Latest Ref Range: 27.0 - 31.0 pg 30.9   MCHC Latest Ref Range: 32.0 - 36.0 g/dL 32.9   MPV Latest Ref Range: 6.0 - 12.0 fL 9.7   Platelets Latest Ref Range: 150 - 450 10*3/mm3 259   RDW-SD Latest Ref Range: 37.0 - 54.0 fl 49.9         Impression: post traumatic osteoarthritis of left hip    Plan:Left MALINI, revision left, removal of hardware    DANNI Phoenix 8/10/2017 7:57 AM

## 2017-08-10 NOTE — ANESTHESIA PREPROCEDURE EVALUATION
Anesthesia Evaluation     Patient summary reviewed and Nursing notes reviewed   history of anesthetic complications: PONV    NPO Liquid Status: > 8 hours     Airway   Mallampati: II  TM distance: >3 FB  Neck ROM: full  no difficulty expected  Dental      Pulmonary    Cardiovascular     ECG reviewed    (+) hypertension, dysrhythmias PAC, hyperlipidemia    ROS comment: EKG   NSR P{AC's RBBB     ECHO  LVH Concen   LV DD (grade I) consistent with impaired relaxation.  EF65%.  Mild TVR MVR  AV sclerosis  ·         Neuro/Psych  (+) TIA (possible TIA in eye no residual.  Put on plavix),    GI/Hepatic/Renal/Endo      Musculoskeletal     (+) back pain,   Abdominal    Substance History      OB/GYN          Other   (+) arthritis     ROS/Med Hx Other: plavix stopped 2 Aug   9 days ago                                 Anesthesia Plan    ASA 3     MAC and spinal   (Aim to keep BIS >40 ; MAP >65 (risk for POCD)  Prop Inf )  intravenous induction   Anesthetic plan and risks discussed with patient.    Plan discussed with CRNA.

## 2017-08-10 NOTE — BRIEF OP NOTE
TOTAL HIP ARTHROPLASTY ANTERIOR WITH HANA TABLE  Procedure Note    Marcelino Mesa  8/10/2017    Pre-op Diagnosis:   Left hip Post-traumatic arthritis with retained hardware    Post-op Diagnosis:     Left hip Post-traumatic arthritis with retained hardware    Procedure/CPT® Codes:  21058 - removal of old hardware and total hip arthroplasty    Procedure(s):  REMOVAL OF HARDWARE LEFT HIP, LEFT ANTERIOR TOTAL HIP ARTHROPLASTY    Surgeon(s):  Jesse Taveras MD    Anesthesia: Spinal    Staff:   Circulator: Aldair Vu, RN; Jamaica Dillon RN; Loretta Francisco RN  Scrub Person: David Villanueva; Jacoby Bustillo  Vendor Representative: Kory Garcia; Norman Lai  Nursing Assistant: Elle Marlow; Christina Walters; Trey Sutton  Assistant: Jared Copeland PA-C    Estimated Blood Loss: 400 mL  Urine Voided: * No values recorded between 8/10/2017 10:27 AM and 8/10/2017  1:44 PM *    Specimens:                  ID Type Source Tests Collected by Time Destination   1 : left hip joint synovium Synovium Hip, Left ANAEROBIC CULTURE, FUNGAL CULTURE, TISSUE/BONE CULTURE, AFB CULTURE Jesse Taveras MD 8/10/2017 1156          Drains:           Findings: Expected    Complications: None    Indications: 89-year-old woman with left hip intertrochanteric fracture 3 years ago treated with gamma nail. She has had increasing left hip pain with x-rays identifying superior femoral head collapse but no gross hardware loosening or protrusion. Risks and benefits indication rationale for total hip arthroplasty simultaneous with removal discussed with patient and family. She is eager to proceed pain relief and signs are all questions were answered.    Procedure: While in the OR spinal anesthetic started. Ultimately general by laryngeal mask supplemental was required. Patient turned to the supine position and prepped and draped in standard fashion for anterior approach left hip on the [place] table. Fluoroscopy used to assess  limb lengths. These were somewhat lengthened with final component selection compensating for her limb length shortening from fracture subsidence. Standard anterior incision made and routine dissection carried down to the fascia which was then raised over the medial aspect of the tensor fascia hadley. Circumflex vessels identified and cauterized. Incision made independently over the distal screw which was fluoroscopic assistance. Femoral neck isolated and T-shaped capsulotomy created and anterior capsular releases was performed. It was then extended and dissection carried down posteriorly to the greater trochanter and proximal hardware. Bone removal was required for exposure of the hardware. Sensory was released. New dissection carried around the distal and lateral protruding lag screw. Lag screw removed without difficulty. Remaining nail removed without further difficulty. Standard neck cut made and head and neck removed piecemeal. Attention was then returned to the acetabulum which was exposed circumferentially. Reamed from size 43 to 51 with good exposure of subchondral bone in satisfactory radiographic fit and fill. Final 52 component seated with excellent press-fit characteristics. 2 additional fixation screws were used due to the soft bone. Final position showed horizontal tilt around 40° from [place] line and anteversion about 20-25°. Polyethylene insert seated without difficulty. Acetabulum was thoroughly irrigated before during and after component placement.    Attention was turned to the proximal femur which was externally rotated initially to 160° and eventually to 170°. After adequate extension and comprehensive lateral releases there was adequate exposure of the proximal canal. Broached with standard sizes 8 and 9 and 10. Canal was then reamed and sizes 11 and 12 and 13. Broached with the revision [place] stems from sized 10 to size 13. Size 13 had excessive height. Size 12 had adequate height with  good recovery of limb lengths as above and safe soft tissue tension using the +1.5 mm head and neck adapter. Calcar reamer was passed. Trials were removed. Canal thoroughly irrigated. Final component was seated to a full depth relative to the remaining. The final head and neck size +1.5 x 36 mm was assembled and reduced with fluoroscopy showing adequate position. Wound was then thoroughly irrigated and closed in layers without a drain. Joint space injected through a spinal needle placed under direct vision prior to closure. Standard Prineo dressing applied. Estimated blood loss 400 mL. Patient stable to recovery having tolerated procedure well throughout with all counts correct.        Jesse Taveras MD     Date: 8/10/2017  Time: 2:20 PM

## 2017-08-10 NOTE — ANESTHESIA POSTPROCEDURE EVALUATION
Patient: Marcelino Mesa    Procedure Summary     Date Anesthesia Start Anesthesia Stop Room / Location    08/10/17 1027 1349 [facility] / [facility]       Procedure Diagnosis Surgeon Provider    REMOVAL OF HARDWARE LEFT HIP, LEFT ANTERIOR TOTAL HIP ARTHROPLASTY (Left Hip) No diagnosis on file. MD Clay Yanez MD          Anesthesia Type: MAC, spinal  Last vitals  BP   95/59 (08/10/17 1345)    Temp   98.5 °F (36.9 °C) (08/10/17 1345)    Pulse   64 (08/10/17 1345)   Resp   18 (08/10/17 1345)    SpO2   94 % (08/10/17 1345)      Post Anesthesia Care and Evaluation    Patient location during evaluation: PACU  Patient participation: complete - patient participated  Level of consciousness: sleepy but conscious  Pain management: adequate  Airway patency: patent  Anesthetic complications: No anesthetic complications  PONV Status: none  Cardiovascular status: hemodynamically stable and acceptable  Respiratory status: nonlabored ventilation, acceptable and nasal cannula  Hydration status: acceptable    Comments: Pt transported to PACU with O2 via nasal cannula at 4 L/MIN. Vital signs stable.  Pt shows no signs of distress.  Report given to PACU RN. BP 95/59 (BP Location: Left arm, Patient Position: Lying)  Pulse 64  Temp 98.5 °F (36.9 °C) (Tympanic)   Resp 18  SpO2 94%

## 2017-08-11 LAB
ANION GAP SERPL CALCULATED.3IONS-SCNC: 4 MMOL/L (ref 3–11)
BASOPHILS # BLD AUTO: 0.03 10*3/MM3 (ref 0–0.2)
BASOPHILS NFR BLD AUTO: 0.4 % (ref 0–1)
BUN BLD-MCNC: 12 MG/DL (ref 9–23)
BUN/CREAT SERPL: 20 (ref 7–25)
CALCIUM SPEC-SCNC: 7.6 MG/DL (ref 8.7–10.4)
CHLORIDE SERPL-SCNC: 110 MMOL/L (ref 99–109)
CO2 SERPL-SCNC: 24 MMOL/L (ref 20–31)
CREAT BLD-MCNC: 0.6 MG/DL (ref 0.6–1.3)
DEPRECATED RDW RBC AUTO: 50.3 FL (ref 37–54)
EOSINOPHIL # BLD AUTO: 0.01 10*3/MM3 (ref 0–0.3)
EOSINOPHIL NFR BLD AUTO: 0.1 % (ref 0–3)
ERYTHROCYTE [DISTWIDTH] IN BLOOD BY AUTOMATED COUNT: 14.5 % (ref 11.3–14.5)
GFR SERPL CREATININE-BSD FRML MDRD: 94 ML/MIN/1.73
GLUCOSE BLD-MCNC: 130 MG/DL (ref 70–100)
HCT VFR BLD AUTO: 29.2 % (ref 34.5–44)
HGB BLD-MCNC: 9.6 G/DL (ref 11.5–15.5)
IMM GRANULOCYTES # BLD: 0.03 10*3/MM3 (ref 0–0.03)
IMM GRANULOCYTES NFR BLD: 0.4 % (ref 0–0.6)
LYMPHOCYTES # BLD AUTO: 0.74 10*3/MM3 (ref 0.6–4.8)
LYMPHOCYTES NFR BLD AUTO: 8.8 % (ref 24–44)
MCH RBC QN AUTO: 31.1 PG (ref 27–31)
MCHC RBC AUTO-ENTMCNC: 32.9 G/DL (ref 32–36)
MCV RBC AUTO: 94.5 FL (ref 80–99)
MONOCYTES # BLD AUTO: 0.93 10*3/MM3 (ref 0–1)
MONOCYTES NFR BLD AUTO: 11 % (ref 0–12)
NEUTROPHILS # BLD AUTO: 6.7 10*3/MM3 (ref 1.5–8.3)
NEUTROPHILS NFR BLD AUTO: 79.3 % (ref 41–71)
PLATELET # BLD AUTO: 205 10*3/MM3 (ref 150–450)
PMV BLD AUTO: 10.1 FL (ref 6–12)
POTASSIUM BLD-SCNC: 3.5 MMOL/L (ref 3.5–5.5)
RBC # BLD AUTO: 3.09 10*6/MM3 (ref 3.89–5.14)
SODIUM BLD-SCNC: 138 MMOL/L (ref 132–146)
WBC NRBC COR # BLD: 8.44 10*3/MM3 (ref 3.5–10.8)

## 2017-08-11 PROCEDURE — 97116 GAIT TRAINING THERAPY: CPT

## 2017-08-11 PROCEDURE — 97166 OT EVAL MOD COMPLEX 45 MIN: CPT

## 2017-08-11 PROCEDURE — 85025 COMPLETE CBC W/AUTO DIFF WBC: CPT | Performed by: ORTHOPAEDIC SURGERY

## 2017-08-11 PROCEDURE — 80048 BASIC METABOLIC PNL TOTAL CA: CPT | Performed by: NURSE PRACTITIONER

## 2017-08-11 PROCEDURE — 94799 UNLISTED PULMONARY SVC/PX: CPT

## 2017-08-11 PROCEDURE — 97110 THERAPEUTIC EXERCISES: CPT

## 2017-08-11 PROCEDURE — 25010000002 ONDANSETRON PER 1 MG: Performed by: NURSE PRACTITIONER

## 2017-08-11 PROCEDURE — 25010000003 CEFAZOLIN IN DEXTROSE 2-4 GM/100ML-% SOLUTION: Performed by: ORTHOPAEDIC SURGERY

## 2017-08-11 RX ADMIN — ONDANSETRON 4 MG: 2 INJECTION INTRAMUSCULAR; INTRAVENOUS at 03:14

## 2017-08-11 RX ADMIN — DOCUSATE SODIUM 100 MG: 100 CAPSULE, LIQUID FILLED ORAL at 08:52

## 2017-08-11 RX ADMIN — ACETAMINOPHEN 650 MG: 325 TABLET, FILM COATED ORAL at 08:52

## 2017-08-11 RX ADMIN — ASPIRIN 325 MG: 325 TABLET, DELAYED RELEASE ORAL at 08:52

## 2017-08-11 RX ADMIN — SODIUM CHLORIDE 150 ML/HR: 9 INJECTION, SOLUTION INTRAVENOUS at 00:24

## 2017-08-11 RX ADMIN — DOCUSATE SODIUM 100 MG: 100 CAPSULE, LIQUID FILLED ORAL at 17:17

## 2017-08-11 RX ADMIN — CEFAZOLIN SODIUM 2 G: 2 INJECTION, SOLUTION INTRAVENOUS at 02:18

## 2017-08-11 RX ADMIN — ACETAMINOPHEN 650 MG: 325 TABLET, FILM COATED ORAL at 17:17

## 2017-08-11 RX ADMIN — ACETAMINOPHEN 650 MG: 325 TABLET, FILM COATED ORAL at 03:13

## 2017-08-11 NOTE — PROGRESS NOTES
"Discharge Planning Assessment  [facility]     Patient Name: Marcelino Meas  MRN: [id number]  Today's Date: 8/11/2017    Admit Date: 8/10/2017          Discharge Needs Assessment       08/11/17 1520    Living Environment    Lives With alone    Living Arrangements house    Home Accessibility no concerns    Stair Railings at Home outside, present at both sides    Type of Financial/Environmental Concern none    Transportation Available car;family or friend will provide    Living Environment    Provides Primary Care For no one    Quality Of Family Relationships supportive    Able to Return to Prior Living Arrangements yes    Discharge Needs Assessment    Concerns To Be Addressed discharge planning concerns    Readmission Within The Last 30 Days no previous admission in last 30 days    Anticipated Changes Related to Illness none    Equipment Currently Used at Home walker, rolling;cane, straight    Equipment Needed After Discharge none    Discharge Disposition home or self-care    Discharge Planning Comments [phone]- Vita (daughter)            Discharge Plan       08/11/17 1521    Case Management/Social Work Plan    Plan IDP    Patient/Family In Agreement With Plan yes    Additional Comments Pt lives alone in [place] and has transportation home. pt has a RW and a cane that she has had for \"years\" and couldn't remember the company they came from. Pt has used HH in the past w [employer] and prefers \"[facility]eriy\" for the PT who works w this company. Pt will need HH PT at d/c.  set this up w Valerie at [employer] who spoke w [employer]. and Valeriy christianson call pt on Monday or Tuesday to work w her. Pt goal is to return home w family in car and HH/PT w [employer]. No other needs at this time.        Discharge Placement     No information found        Expected Discharge Date and Time     Expected Discharge Date Expected Discharge Time    Aug 12, 2017               Demographic Summary       08/11/17 1518    Referral Information    " Admission Type inpatient    Arrived From admitted as an inpatient    Referral Source physician    Reason For Consult discharge planning    Record Reviewed history and physical    Contact Information    Permission Granted to Share Information With     Primary Care Physician Information    Name KIMO KAUR            Functional Status       08/11/17 1519    Functional Status Prior    Ambulation 1-->assistive equipment    Transferring 1-->assistive equipment    Toileting 1-->assistive equipment    Bathing 0-->independent    Dressing 0-->independent    Eating 0-->independent    Communication 0-->understands/communicates without difficulty    Swallowing 0-->swallows foods/liquids without difficulty    IADL    Medications independent    Meal Preparation independent    Housekeeping independent    Laundry independent    Shopping independent    Oral Care independent    Activity Tolerance    Usual Activity Tolerance moderate    Employment/Financial    Financial Concerns none            Psychosocial     None            Abuse/Neglect     None            Legal     None            Substance Abuse     None            Patient Forms     None          Claudia Stephens, RN

## 2017-08-11 NOTE — THERAPY TREATMENT NOTE
Acute Care - Physical Therapy Treatment Note  [facility]     Patient Name: Marcelino Mesa  : 1927  MRN: [id number]  Today's Date: 2017  Onset of Illness/Injury or Date of Surgery Date: 08/10/17  Date of Referral to PT: 08/10/17  Referring Physician: MD Nitin    Admit Date: 8/10/2017    Visit Dx:    ICD-10-CM ICD-9-CM   1. Impaired functional mobility, balance, gait, and endurance Z74.09 V49.89   2. S/P revision of total hip Z96.649 V43.64     Patient Active Problem List   Diagnosis   • Abnormal gait   • Generalized osteoarthritis   • Hyperlipidemia   • Iron deficiency anemia   • Left-sided low back pain with left-sided sciatica   • Macular degeneration   • Scoliosis of lumbar spine   • Post-menopausal osteoporosis   • Vaginitis   • Vitamin D deficiency   • Hip fracture, left   • Essential hypertension   • Preventative health care   • Incomplete RBBB   • Status post unicompartmental right knee arthroplasty   • Primary osteoarthritis of both knees   • Status post left knee medial unicompartmental replacement   • Acute blood loss anemia, mild, asymptomatic   • Anxiety   • Amaurosis fugax   • Fracture of hip   • 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               Adult Rehabilitation Note       17 1101          Rehab Assessment/Intervention    Discipline physical therapist  -MJ      Document Type therapy note (daily note)  -      Subjective Information agree to therapy;complains of;pain  -MJ      Patient Effort, Rehab Treatment good  -MJ      Precautions/Limitations fall precautions;anterior hip precautions- left   very [place]  -MJ      Recorded by [MJ] Alexis Garza, PT      Pain Assessment    Pain Assessment 0-10  -MJ      Pain Score 3  -MJ      Post Pain Score 2  -MJ      Pain Type Acute pain  -MJ      Pain Location Hip  -MJ      Pain Orientation Left  -MJ      Pain Intervention(s) Repositioned;Ambulation/increased activity  -MJ      Recorded by [MJ] Alexis Garza, PT       Cognitive Assessment/Intervention    Current Cognitive/Communication Assessment functional  -MJ      Orientation Status oriented x 4  -MJ      Follows Commands/Answers Questions 100% of the time;able to follow multi-step instructions;needs cueing  -MJ      Personal Safety WNL/WFL  -MJ      Recorded by [MJ] Alexis Garza, PT      Mobility Assessment/Training    Extremity Weight-Bearing Status left lower extremity  -MJ      Left Lower Extremity Weight-Bearing weight-bearing as tolerated  -MJ      Recorded by [MJ] Alexis Garza, PT      Bed Mobility, Assessment/Treatment    Bed Mobility, Assistive Device bed rails;head of bed elevated  -MJ      Bed Mob, Supine to Sit, Cecil not tested   Pt UIC  -MJ      Bed Mob, Sit to Supine, [place] minimum assist (75% patient effort);verbal cues required  -MJ      Bed Mobility, Safety Issues decreased use of legs for bridging/pushing  -MJ      Bed Mobility, Impairments ROM decreased;strength decreased;pain  -MJ      Bed Mobility, Comment Verbal cues for sequencing, assist with L LE, increased time and effort to perform  -MJ      Recorded by [MJ] Alexis Garza, PT      Transfer Assessment/Treatment    Transfers, Sit-Stand Cecil contact guard assist;verbal cues required  -MJ      Transfers, Stand-Sit [place] contact guard assist;verbal cues required  -MJ      Transfers, Sit-Stand-Sit, Assist Device rolling walker  -MJ      Toilet Transfer, [place] contact guard assist;verbal cues required  -MJ      Toilet Transfer, Assistive Device bedside commode without drop arms;rolling walker  -MJ      Transfer, Safety Issues step length decreased;weight-shifting ability decreased  -MJ      Transfer, Impairments ROM decreased;strength decreased;pain  -MJ      Transfer, Comment Verbal cues for correct hand placement and to step L LE out prior to t/f. Assisted pt to bathroom, verbal cues to reach back for BSC prior to t/f.  -MJ      Recorded by [MJ] Alexis Garza, PT      Gait  Assessment/Treatment    Gait, [place] Level contact guard assist;verbal cues required  -      Gait, Assistive Device rolling walker  -      Gait, Distance (Feet) 120  -      Gait, Gait Pattern Analysis swing-to gait  -      Gait, Gait Deviations left:;antalgic;janina decreased;step length decreased;weight-shifting ability decreased;forward flexed posture  -      Gait, Safety Issues step length decreased;weight-shifting ability decreased  -      Gait, Impairments ROM decreased;strength decreased;pain  -      Gait, Comment Pt initially demo step to gait pattern at slow pace, moments of progression to step through with practice but regressed with fatigue. Cues to  feet during turn to avoid pivoting on hip. Gait limited by pain and fatigue  -      Recorded by [ELIAS] Alexis Garza PT      Therapy Exercises    Exercise Protocols total hip   anterior revision  -      Total Hip Exercises left:;15 reps;completed protocol;ankle pumps/circles;quad set;glut set;heel slides;hip abduction;SAQ;LAQ;SLR;hip flexion   Cues for technique. David w/ SLR, hip abd  -MJ      Recorded by [MJ] Alexis Garaz PT      Positioning and Restraints    Pre-Treatment Position sitting in chair/recliner  -      Post Treatment Position bed  -MJ      In Bed notified nsg;supine;call light within reach;encouraged to call for assist  -MJ      Recorded by [ELIAS] Alexis Garza PT        User Key  (r) = Recorded By, (t) = Taken By, (c) = Cosigned By    Initials Name Effective Dates    ELIAS Garza PT 03/14/16 -                 IP PT Goals       08/11/17 1329 08/10/17 1812       Bed Mobility PT LTG    Bed Mobility PT LTG, Date Established  08/10/17  -     Bed Mobility PT LTG, Time to Achieve  5 days  -     Bed Mobility PT LTG, Activity Type  supine to sit/sit to supine  -     Bed Mobility PT LTG, [place] Level  conditional independence  -     Bed Mobility PT LTG, Date Goal Reviewed 08/11/17  -      Bed Mobility PT LTG,  Outcome goal ongoing  -MJ      Transfer Training PT LTG    Transfer Training PT LTG, Date Established  08/10/17  -MJ     Transfer Training PT LTG, Time to Achieve  5 days  -MJ     Transfer Training PT LTG, Activity Type  sit to stand/stand to sit  -MJ     Transfer Training PT LTG, [place] Level  conditional independence  -MJ     Transfer Training PT LTG, Assist Device  walker, rolling  -MJ     Transfer Training PT  LTG, Date Goal Reviewed 08/11/17  -MJ      Transfer Training PT LTG, Outcome goal ongoing  -MJ      Gait Training PT LTG    Gait Training Goal PT LTG, Date Established  08/10/17  -MJ     Gait Training Goal PT LTG, Time to Achieve  5 days  -MJ     Gait Training Goal PT LTG, [place] Level  conditional independence  -MJ     Gait Training Goal PT LTG, Assist Device  walker, rolling  -MJ     Gait Training Goal PT LTG, Distance to Achieve  500 feet  -MJ     Gait Training Goal PT LTG, Date Goal Reviewed 08/11/17  -MJ      Gait Training Goal PT LTG, Outcome goal ongoing  -MJ        User Key  (r) = Recorded By, (t) = Taken By, (c) = Cosigned By    Initials Name Provider Type    ELIAS Garza PT Physical Therapist          Physical Therapy Education     Title: PT OT SLP Therapies (Active)     Topic: Physical Therapy (Done)     Point: Mobility training (Done)    Learning Progress Summary    Learner Readiness Method Response Comment Documented by Status   Patient Acceptance MG BHAKTA NR Reviewed anterionr hip precautions, HEP, benefits of activity  08/11/17 1329 Done    Acceptance MG BHAKTA NR Reviewed anterior hip precautions  08/10/17 1811 Done   Family Acceptance MG BHAKTA NR Reviewed anterior hip precautions  08/10/17 1811 Done               Point: Home exercise program (Done)    Learning Progress Summary    Learner Readiness Method Response Comment Documented by Status   Patient Acceptance MG BHAKTA NR Reviewed anterionr hip precautions, HEP, benefits of activity  08/11/17 1329 Done               Point:  Body mechanics (Done)    Learning Progress Summary    Learner Readiness Method Response Comment Documented by Status   Patient Acceptance E,D VU,NR Reviewed anterionr hip precautions, HEP, benefits of activity  08/11/17 1329 Done    Acceptance MG BHAKTA,NR Reviewed anterior hip precautions  08/10/17 1811 Done   Family Acceptance MG BHAKTA,NR Reviewed anterior hip precautions  08/10/17 1811 Done               Point: Precautions (Done)    Learning Progress Summary    Learner Readiness Method Response Comment Documented by Status   Patient Acceptance YONYD VU,NR Reviewed anterionr hip precautions, HEP, benefits of activity  08/11/17 1329 Done    Acceptance MG BHAKTA,NR Reviewed anterior hip precautions  08/10/17 1811 Done   Family Acceptance MG BHAKTANR Reviewed anterior hip precautions  08/10/17 1811 Done                      User Key     Initials Effective Dates Name Provider Type Discipline     03/14/16 -  Alexis Garza, COREY Physical Therapist PT                    PT Recommendation and Plan  Anticipated Discharge Disposition: home with assist, home with home health  Demonstrates Need for Referral to Another Service: home health care  Planned Therapy Interventions: balance training, bed mobility training, gait training, home exercise program, patient/family education, stair training, strengthening, transfer training  PT Frequency: 2 times/day  Plan of Care Review  Plan Of Care Reviewed With: patient  Progress: improving  Outcome Summary/Follow up Plan: Pt increased gait distance to 120 feet with CGA and RW, limited by pain and fatigue. Will continue to progress mobility as able.           Outcome Measures       08/11/17 1101 08/10/17 1715       How much help from another person do you currently need...    Turning from your back to your side while in flat bed without using bedrails? 3  -MJ 3  -MJ     Moving from lying on back to sitting on the side of a flat bed without bedrails? 3  -MJ 3  -MJ     Moving to and from a  bed to a chair (including a wheelchair)? 3  -MJ 3  -MJ     Standing up from a chair using your arms (e.g., wheelchair, bedside chair)? 3  -MJ 3  -MJ     Climbing 3-5 steps with a railing? 2  -MJ 2  -MJ     To walk in hospital room? 3  -MJ 3  -MJ     AM-PAC 6 Clicks Score 17  -MJ 17  -MJ     Functional Assessment    Outcome Measure Options AM-PAC 6 Clicks Basic Mobility (PT)  -MJ AM-PAC 6 Clicks Basic Mobility (PT)  -MJ       User Key  (r) = Recorded By, (t) = Taken By, (c) = Cosigned By    Initials Name Provider Type    ELIAS Garza PT Physical Therapist           Time Calculation:         PT Charges       08/11/17 1331          Time Calculation    Start Time 1101  -MJ      PT Received On 08/11/17  -MJ      PT Goal Re-Cert Due Date 08/20/17  -      Time Calculation- PT    Total Timed Code Minutes- PT 23 minute(s)  -MJ        User Key  (r) = Recorded By, (t) = Taken By, (c) = Cosigned By    Initials Name Provider Type    ELIAS Garza PT Physical Therapist          Therapy Charges for Today     Code Description Service Date Service Provider Modifiers Qty    92298829385 HC PT EVAL MOD COMPLEXITY 2 8/10/2017 Alexis Garza, PT GP 1    93026392142 HC GAIT TRAINING EA 15 MIN 8/10/2017 Alexis Garza PT GP 1    72469924702 HC PT THER SUPP EA 15 MIN 8/10/2017 Alexis Garza PT GP 3    94597261436 HC GAIT TRAINING EA 15 MIN 8/11/2017 Alexis Garza, PT GP 1    87849616201 HC PT THER PROC EA 15 MIN 8/11/2017 Alexis Garza PT GP 1          PT G-Codes  Outcome Measure Options: AM-PAC 6 Clicks Basic Mobility (PT)    Alexis Garza PT  8/11/2017

## 2017-08-11 NOTE — PROGRESS NOTES
"Marcelino Mesa  [id number]  2/19/1927    /58 (BP Location: Left arm, Patient Position: Lying)  Pulse 80  Temp 99.4 °F (37.4 °C) (Tympanic)   Resp 16  Ht 62\" (157.5 cm)  Wt 119 lb (54 kg)  SpO2 92%  BMI 21.77 kg/m2    Lab Results (last 24 hours)     Procedure Component Value Units Date/Time    OR Potassium [[id number]]  (Normal) Collected:  08/10/17 0814    Specimen:  Blood Updated:  08/10/17 0827     Potassium, OR 3.84 mmol/L     Anaerobic Culture [[id number]] Collected:  08/10/17 1156    Specimen:  Synovium from Hip, Left Updated:  08/10/17 1223    Fungus Culture [[id number]] Collected:  08/10/17 1156    Specimen:  Synovium from Hip, Left Updated:  08/10/17 1223    AFB Culture [[id number]] Collected:  08/10/17 1156    Specimen:  Synovium from Hip, Left Updated:  08/10/17 1223    Tissue/Bone Culture [[id number]] Collected:  08/10/17 1156    Specimen:  Synovium from Hip, Left Updated:  08/10/17 1423     Gram Stain Result Many (4+) WBCs seen      No organisms seen    CBC & Differential [[id number]] Collected:  08/11/17 0454    Specimen:  Blood Updated:  08/11/17 0619    Narrative:       The following orders were created for panel order CBC & Differential.  Procedure                               Abnormality         Status                     ---------                               -----------         ------                     CBC Auto Differential[[id number]]        Abnormal            Final result                 Please view results for these tests on the individual orders.    CBC Auto Differential [[id number]]  (Abnormal) Collected:  08/11/17 0454    Specimen:  Blood Updated:  08/11/17 0619     WBC 8.44 10*3/mm3      RBC 3.09 (L) 10*6/mm3      Hemoglobin 9.6 (L) g/dL      Hematocrit 29.2 (L) %      MCV 94.5 fL      MCH 31.1 (H) pg      MCHC 32.9 g/dL      RDW 14.5 %      RDW-SD 50.3 fl      MPV 10.1 fL      Platelets 205 10*3/mm3      Neutrophil % 79.3 (H) %      Lymphocyte % 8.8 (L) %      Monocyte % 11.0 % "      Eosinophil % 0.1 %      Basophil % 0.4 %      Immature Grans % 0.4 %      Neutrophils, Absolute 6.70 10*3/mm3      Lymphocytes, Absolute 0.74 10*3/mm3      Monocytes, Absolute 0.93 10*3/mm3      Eosinophils, Absolute 0.01 10*3/mm3      Basophils, Absolute 0.03 10*3/mm3      Immature Grans, Absolute 0.03 10*3/mm3     Basic Metabolic Panel [[id number]]  (Abnormal) Collected:  08/11/17 0454    Specimen:  Blood Updated:  08/11/17 0626     Glucose 130 (H) mg/dL      BUN 12 mg/dL      Creatinine 0.60 mg/dL      Sodium 138 mmol/L      Potassium 3.5 mmol/L      Chloride 110 (H) mmol/L      CO2 24.0 mmol/L      Calcium 7.6 (L) mg/dL      eGFR Non African Amer 94 mL/min/1.73      BUN/Creatinine Ratio 20.0     Anion Gap 4.0 mmol/L     Narrative:       [employer] Guidelines    Stage     Description        GFR  1         Normal or High     90+  2         Mild decrease      60-89  3         Moderate decrease  30-59  4         Severe decrease    15-29  5         Kidney failure     <15          Patient Care Team:  Jony Singh MD as PCP - General  Jony Singh MD as PCP - Family Medicine  Jony Singh MD as PCP - Claims Attributed    SUBJECTIVE  Pain well controlled.  Good start in physical therapy.    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

## 2017-08-11 NOTE — PLAN OF CARE
Problem: Patient Care Overview (Adult)  Goal: Plan of Care Review  Outcome: Ongoing (interventions implemented as appropriate)    08/11/17 0423   Coping/Psychosocial Response Interventions   Plan Of Care Reviewed With patient   Outcome Evaluation   Outcome Summary/Follow up Plan Pt sat in the chair for one hour at the beginning of the shift. VSS, pain controlled with oral medication, UOP-WNL. Pt slept well throughout the shift.    Patient Care Overview   Progress improving       Goal: Adult Individualization and Mutuality  Outcome: Ongoing (interventions implemented as appropriate)  Goal: Discharge Needs Assessment  Outcome: Ongoing (interventions implemented as appropriate)    Problem: Perioperative Period (Adult)  Goal: Signs and Symptoms of Listed Potential Problems Will be Absent or Manageable (Perioperative Period)  Outcome: Ongoing (interventions implemented as appropriate)

## 2017-08-11 NOTE — PLAN OF CARE
Problem: Hip Replacement, Total (Adult)  Goal: Signs and Symptoms of Listed Potential Problems Will be Absent or Manageable (Hip Replacement, Total)  Outcome: Ongoing (interventions implemented as appropriate)    08/11/17 1851   Hip Replacement, Total   Problems Assessed (Total Hip Replacement) all   Problems Present (Total Hip Replacement) pain

## 2017-08-11 NOTE — THERAPY EVALUATION
Acute Care - Occupational Therapy Initial Evaluation  [facility]     Patient Name: Marcelino Mesa  : 1927  MRN: [id number]  Today's Date: 2017  Onset of Illness/Injury or Date of Surgery Date: 08/10/17  Date of Referral to OT: 08/10/17  Referring Physician: MD Nitin    Admit Date: 8/10/2017       ICD-10-CM ICD-9-CM   1. Impaired functional mobility, balance, gait, and endurance Z74.09 V49.89   2. S/P revision of total hip Z96.649 V43.64   3. Impaired mobility and ADLs Z74.09 799.89     Patient Active Problem List   Diagnosis   • Abnormal gait   • Generalized osteoarthritis   • Hyperlipidemia   • Iron deficiency anemia   • Left-sided low back pain with left-sided sciatica   • Macular degeneration   • Scoliosis of lumbar spine   • Post-menopausal osteoporosis   • Vaginitis   • Vitamin D deficiency   • Hip fracture, left   • Essential hypertension   • Preventative health care   • Incomplete RBBB   • Status post unicompartmental right knee arthroplasty   • Primary osteoarthritis of both knees   • Status post left knee medial unicompartmental replacement   • Acute blood loss anemia, mild, asymptomatic   • Anxiety   • Amaurosis fugax   • Fracture of hip   • 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     Past Medical History:   Diagnosis Date   • Amaurosis fugax of left eye 2017    5 minutes at home   • Cataracts, bilateral     removed   • Compression fracture of L1 lumbar vertebra    • Generalized osteoarthritis     Bilateral knee replacements   • Glaucoma     h/o   • Hearing impairment     Wears hearing aids   • Herpes zoster    • Hip fracture, left    • [place] (hard of hearing)    • Hyperlipidemia    • Hypertension    • Iron deficiency    • Low back pain    • Macular degeneration    • PONV (postoperative nausea and vomiting)     preprocedural medications will help    • Scoliosis    • Vitamin D deficiency    • Wears glasses    • Wears hearing aid     bilat ears   • Wrist  fracture, left      Past Surgical History:   Procedure Laterality Date   • CATARACT EXTRACTION Bilateral    • COLONOSCOPY  2006   • HIP FRACTURE SURGERY Left 2014     hip pinning for acute fracture   • KNEE ARTHROPLASTY UNICOMPARTMENTAL Right 9/22/2016    Procedure: RIGHT KNEE ARTHROPLASTY UNICOMPARTMENTAL;  Surgeon: Jesse Taveras MD;  Location:  [facility];  Service:    • KNEE ARTHROPLASTY UNICOMPARTMENTAL Left 12/1/2016    Procedure: LEFT KNEE ARTHROPLASTY UNICOMPARTMENTAL;  Surgeon: Jesse Taveras MD;  Location:  [facility];  Service:    • TONSILLECTOMY  1947   • TOTAL HIP ARTHROPLASTY Left 8/10/2017    Procedure: REMOVAL OF HARDWARE LEFT HIP, LEFT ANTERIOR TOTAL HIP ARTHROPLASTY;  Surgeon: Jesse Taveras MD;  Location:  [facility];  Service:           OT ASSESSMENT FLOWSHEET (last 72 hours)      OT Evaluation       08/11/17 1336 08/11/17 1330 08/11/17 1101 08/11/17 0852 08/10/17 2000    Rehab Evaluation    Document Type therapy note (daily note)  - evaluation  -AC therapy note (daily note)  -      Subjective Information agree to therapy;complains of;pain  - agree to therapy  -AC agree to therapy;complains of;pain  -MJ      Patient Effort, Rehab Treatment good  -MJ good  -AC good  -MJ      General Information    Patient Profile Review  yes  -AC       Onset of Illness/Injury or Date of Surgery Date  08/10/17  -       Referring Physician  MD Nitin  -       General Observations  Pt received supine in bed.  -       Pertinent History Of Current Problem  S/P L MALINI anterior approach  -AC       Precautions/Limitations fall precautions;anterior hip precautions- left;other (see comments)   very [place]  -MJ fall precautions;anterior hip precautions- left  -AC fall precautions;anterior hip precautions- left   very [place]  -MJ      Prior Level of Function  min assist:;all household mobility;ADL's  -AC       Equipment Currently Used at Home  walker, rolling;bath bench;commode   has AE kit from prior surgery  -AC        Plans/Goals Discussed With  patient and family;agreed upon  -       Risks Reviewed  patient and family:;LOB  -AC       Benefits Reviewed  patient:;improve function;increase independence;increase balance;increase knowledge  -       Barriers to Rehab  hearing deficit  -       Living Environment    Lives With  alone  -       Living Arrangements  house  -AC       Home Accessibility  ramps present at home;tub/shower is not walk in;stairs to enter home  -       Number of Stairs to Enter Home  1  -AC       Living Environment Comment  Dtr will assist at d/c  -       Clinical Impression    Date of Referral to OT  08/10/17  -       OT Diagnosis  ADL decline  -AC       Patient/Family Goals Statement  pt would like ot increase independence with self care  -       Criteria for Skilled Therapeutic Interventions Met  yes;treatment indicated  -       Rehab Potential  good, to achieve stated therapy goals  -       Therapy Frequency  daily  -       Anticipated Equipment Needs At Discharge  --   has AE kit from prior surgery; issued leg   -       Anticipated Discharge Disposition  home with assist;home with home health  -       Pain Assessment    Pain Assessment 0-10  -MJ 0-10  -AC 0-10  -MJ      Pain Score 2  -MJ 0   4/10 WB with movement  -AC 3  -MJ      Post Pain Score 0  -MJ 0  -AC 2  -MJ      Pain Type Acute pain  -MJ Acute pain  -AC Acute pain  -MJ      Pain Location Hip  -MJ Hip  -AC Hip  -MJ      Pain Orientation Left  -MJ Left  -AC Left  -MJ      Pain Intervention(s) Repositioned;Ambulation/increased activity  -MJ Repositioned  -AC Repositioned;Ambulation/increased activity  -MJ      Vision Assessment/Intervention    Visual Impairment  WFL with corrective lenses  -       Cognitive Assessment/Intervention    Current Cognitive/Communication Assessment functional  -MJ --   [place]  -AC functional  -MJ      Orientation Status oriented x 4  -MJ oriented x 4  -AC oriented x 4  -MJ      Follows  Commands/Answers Questions 100% of the time;able to follow multi-step instructions;needs cueing  -% of the time  -% of the time;able to follow multi-step instructions;needs cueing  -MJ      Personal Safety WNL/WFL  -MJ WNL/WFL  -AC WNL/WFL  -MJ      ROM (Range of Motion)    General ROM  no range of motion deficits identified  -AC       MMT (Manual Muscle Testing)    General MMT Assessment  no strength deficits identified  -AC       Mobility Assessment/Training    Extremity Weight-Bearing Status left lower extremity  -MJ left lower extremity  -AC left lower extremity  -MJ      Left Lower Extremity Weight-Bearing weight-bearing as tolerated  -MJ weight-bearing as tolerated  -AC weight-bearing as tolerated  -MJ      Bed Mobility, Assessment/Treatment    Bed Mobility, Assistive Device bed rails;head of bed elevated  -MJ bed rails;head of bed elevated;leg   -AC bed rails;head of bed elevated  -MJ      Bed Mob, Supine to Sit, [place] not tested   Pt sitting EOB with OT upon arrival  -MJ verbal cues required;contact guard assist  -AC not tested   Pt UIC  -MJ      Bed Mob, Sit to Supine, [place] minimum assist (75% patient effort);verbal cues required  -MJ  minimum assist (75% patient effort);verbal cues required  -MJ      Bed Mobility, Safety Issues decreased use of legs for bridging/pushing  -MJ decreased use of legs for bridging/pushing  -AC decreased use of legs for bridging/pushing  -MJ      Bed Mobility, Impairments ROM decreased;strength decreased;pain  -MJ strength decreased;impaired balance  -AC ROM decreased;strength decreased;pain  -MJ      Bed Mobility, Comment Verbal cues for sequencing, assist with L LE into bed  -MJ VCs for use of leg   -AC Verbal cues for sequencing, assist with L LE, increased time and effort to perform  -MJ      Transfer Assessment/Treatment    Transfers, Sit-Stand [place] contact guard assist;verbal cues required  -MJ verbal cues required;contact  guard assist;2 person assist required  -AC contact guard assist;verbal cues required  -MJ      Transfers, Stand-Sit [place] contact guard assist;verbal cues required  -MJ verbal cues required;contact guard assist;2 person assist required  -AC contact guard assist;verbal cues required  -MJ      Transfers, Sit-Stand-Sit, Assist Device rolling walker  -MJ rolling walker  -AC rolling walker  -MJ      Toilet Transfer, [place]   contact guard assist;verbal cues required  -MJ      Toilet Transfer, Assistive Device   bedside commode without drop arms;rolling walker  -MJ      Transfer, Safety Issues step length decreased;weight-shifting ability decreased  -MJ weight-shifting ability decreased  -AC step length decreased;weight-shifting ability decreased  -MJ      Transfer, Impairments ROM decreased;strength decreased;pain  -MJ impaired balance;strength decreased  -AC ROM decreased;strength decreased;pain  -MJ      Transfer, Comment Verbal cues for correct hand placement and to step L LE out prior to t/f  -MJ  Verbal cues for correct hand placement and to step L LE out prior to t/f. Assisted pt to bathroom, verbal cues to reach back for BSC prior to t/f.  -MJ      Functional Mobility    Functional Mobility- Ind. Level  verbal cues required;contact guard assist;2 person assist required  -AC       Functional Mobility- Device  rolling walker  -AC       Functional Mobility-Distance (Feet)  134  -AC       Functional Mobility- Safety Issues  step length decreased;weight-shifting ability decreased  -AC       Lower Body Dressing Assessment/Training    LB Dressing Assess/Train, Clothing Type  donning:;doffing:;slipper socks  -AC       LB Dressing Assess/Train, Assist Device  reacher;sock-aid  -AC       LB Dressing Assess/Train, Position  sitting;edge of bed  -       LB Dressing Assess/Train, Comment  Vcs to doff socks, min a to don socks  -AC       Therapy Exercises    Exercise Protocols total hip   anterior revision  -   total hip   anterior revision  -      Total Hip Exercises left:;15 reps;completed protocol;ankle pumps/circles;quad set;glut set;heel slides;hip abduction;SAQ;LAQ;hip flexion;SLR   Cues for technique. David w/ SLR, hip abd  -MJ  left:;15 reps;completed protocol;ankle pumps/circles;quad set;glut set;heel slides;hip abduction;SAQ;LAQ;SLR;hip flexion   Cues for technique. David w/ SLR, hip abd  -MJ      Sensory Assessment/Intervention    Light Touch    LLE  - LLE  -    LLE Light Touch    WNL  - WNL  -    General Therapy Interventions    Planned Therapy Interventions  ADL retraining;adaptive equipment training;balance training;bed mobility training;transfer training  -       Positioning and Restraints    Pre-Treatment Position in bed  - in bed  - sitting in chair/recliner  -      Post Treatment Position bed  - bed  -AC bed  -      In Bed notified nsg;supine;call light within reach;encouraged to call for assist;with family/caregiver  - notified nsg;supine;with PT  - notified nsg;supine;call light within reach;encouraged to call for assist  -        08/10/17 1800 08/10/17 0788             Rehab Evaluation    Document Type  evaluation  -       Subjective Information  agree to therapy;complains of;pain  -       Patient Effort, Rehab Treatment  good  -       Symptoms Noted During/After Treatment  increased pain  -       Symptoms Noted Comment  RN notified  -       General Information    Patient Profile Review  yes  -       Onset of Illness/Injury or Date of Surgery Date  08/10/17  -       Referring Physician  MD Nitin  -       General Observations  Pt supine in bed, IV, SCDs. Family present  -       Pertinent History Of Current Problem  Pt presents for surgical revision of L hip pain and dysfunction. Pt underwent L hip pinning for hip fx in 2014  -       Precautions/Limitations  anterior hip precautions- left;fall precautions;other (see comments)   very [place]  -       Prior Level  of Function  min assist:;all household mobility;community mobility;gait;transfer;bed mobility   Pain with activity  -MJ       Equipment Currently Used at Home  walker, rolling;commode;bath bench  -MJ       Plans/Goals Discussed With  patient and family;agreed upon  -MJ       Risks Reviewed  patient and family:;LOB;increased discomfort  -MJ       Benefits Reviewed  patient and family:;improve function;increase independence;increase strength;increase balance;decrease pain  -MJ       Barriers to Rehab  hearing deficit  -MJ       Living Environment    Lives With  alone  -MJ       Living Arrangements  house  -MJ       Home Accessibility  ramps present at home;tub/shower is not walk in;stairs to enter home  -MJ       Number of Stairs to Enter Home  1  -MJ       Living Environment Comment  Pt's dtr to assist at discharge  -MJ       Functional Level Prior    Ambulation  1-->assistive equipment  -MW       Transferring  1-->assistive equipment  -MW       Toileting  0-->independent  -MW       Bathing  0-->independent  -MW       Dressing  0-->independent  -MW       Eating  0-->independent  -MW       Communication  0-->understands/communicates without difficulty  -MW       Swallowing  0-->swallows foods/liquids without difficulty  -MW       Prior Functional Level Comment  mostly independent  -MW       Pain Assessment    Pain Assessment  0-10  -MJ       Pain Score  4  -MJ       Post Pain Score  10  -MJ       Pain Type  Acute pain  -MJ       Pain Location  Hip  -MJ       Pain Orientation  Left  -MJ       Pain Intervention(s)  Repositioned;Ambulation/increased activity;Cold pack  -MJ       Cognitive Assessment/Intervention    Current Cognitive/Communication Assessment  functional  -MJ       Orientation Status  oriented x 4  -MJ       Follows Commands/Answers Questions  100% of the time;able to follow single-step instructions;needs cueing;needs repetition  -MJ       Personal Safety  mild impairment  -MJ       ROM (Range of Motion)     General ROM  lower extremity range of motion deficits identified  -MJ       MMT (Manual Muscle Testing)    General MMT Assessment  lower extremity strength deficits identified  -MJ       Mobility Assessment/Training    Extremity Weight-Bearing Status  left lower extremity  -MJ       Left Lower Extremity Weight-Bearing  weight-bearing as tolerated  -MJ       Bed Mobility, Assessment/Treatment    Bed Mobility, Assistive Device  bed rails;head of bed elevated  -MJ       Bed Mob, Supine to Sit, [place]  supervision required;verbal cues required  -MJ       Bed Mob, Sit to Supine, [place]  not tested   Pt UIC  -MJ       Bed Mobility, Safety Issues  decreased use of legs for bridging/pushing  -MJ       Bed Mobility, Impairments  ROM decreased;strength decreased;pain  -MJ       Bed Mobility, Comment  Verbal cues to move LEs off EOB and to use UEs to push trunk to sitting. Increased time and effort to perform  -MJ       Transfer Assessment/Treatment    Transfers, Sit-Stand [place]  contact guard assist;2 person assist required;verbal cues required  -MJ       Transfers, Stand-Sit [place]  contact guard assist;2 person assist required;verbal cues required  -MJ       Transfers, Sit-Stand-Sit, Assist Device  rolling walker  -MJ       Transfer, Safety Issues  step length decreased;weight-shifting ability decreased  -MJ       Transfer, Impairments  ROM decreased;strength decreased;pain  -MJ       Transfer, Comment  Verbal cues to push up from bed with UEs to stand and to reach back for chair to lower into sitting. Cues to step L LE out prior to t/f.   -       Therapy Exercises    Exercise Protocols  total hip   anterior revision  -MJ       Total Hip Exercises  left:;10 reps;ankle pumps/circles;quad set;glut set  -       Sensory Assessment/Intervention    Light Touch  LLE  -MJ       LLE Light Touch WNL  -MW --   denies numbness/tingling; can actively DF both ankles  -MJ       Positioning and Restraints     Pre-Treatment Position  in bed  -MJ       Post Treatment Position  chair  -MJ       In Chair  notified nsg;reclined;call light within reach;encouraged to call for assist;with family/caregiver  -MJ       General LE Assessment    ROM Detail  L hip AROM impaired 25%  -MJ       Lower Extremity    Lower Ext Manual Muscle Testing Detail  L LE 4-/5; R LE 4+/5  -MJ         User Key  (r) = Recorded By, (t) = Taken By, (c) = Cosigned By    Initials Name Effective Dates     Mary Bishop, OT 06/23/15 -     AUGUST Weston, LPN 01/25/16 -     JS [place] Carlos, RN 04/11/17 -     ELIAS Garza, PT 03/14/16 -     EMILY Recinos, CHRISTINE 06/16/16 -            Occupational Therapy Education     Title: PT OT SLP Therapies (Active)     Topic: Occupational Therapy (Active)     Point: ADL training (Active)    Description: Instruct learner(s) on proper safety adaptation and remediation techniques during self care or transfers.   Instruct in proper use of assistive devices.    Learning Progress Summary    Learner Readiness Method Response Comment Documented by Status   Patient Acceptance E NR benefits of activity, role of OT, us eof AE for LBB/LBD  08/11/17 1433 Active                      User Key     Initials Effective Dates Name Provider Type Discipline     06/23/15 -  Mary Bishop OT Occupational Therapist OT                  OT Recommendation and Plan  Anticipated Equipment Needs At Discharge:  (has AE kit from prior surgery; issued leg )  Anticipated Discharge Disposition: home with assist, home with home health  Planned Therapy Interventions: ADL retraining, adaptive equipment training, balance training, bed mobility training, transfer training  Therapy Frequency: daily  Plan of Care Review  Plan Of Care Reviewed With: patient  Outcome Summary/Follow up Plan: OT eval complete.  Pt owns AE kit from prior surgery.  Reviewed use of AE for LBB/LBD.  Pt min a to don socks with sockaide,.  Pt will benefit from OT to  advance pt toward increased independence with self care. Recommend home with HH and assist.           OT Goals       08/11/17 1434          Bed Mobility OT LTG    Bed Mobility OT LTG, Date Established 08/11/17  -AC      Bed Mobility OT LTG, Time to Achieve by discharge  -AC      Bed Mobility OT LTG, Activity Type supine to sit/sit to supine  -AC      Bed Mobility OT LTG, [place] Level supervision required  -AC      Bed Mobility OT LTG, Assist Device leg   -AC      Transfer Training OT LTG    Transfer Training OT LTG, Date Established 08/11/17  -AC      Transfer Training OT LTG, Time to Achieve by discharge  -AC      Transfer Training OT LTG, Activity Type tub  -AC      Transfer Training OT LTG, [place] Level contact guard assist  -AC      Transfer Training OT LTG, Assist Device tub bench   leg   -AC      Patient Education OT LTG    Patient Education OT LTG, Date Established 08/11/17  -AC      Patient Education OT LTG, Time to Achieve by discharge  -AC      Patient Education OT LTG, Education Type home safety;adaptive equipment mgmt  -AC      Patient Education OT LTG, Education Understanding demonstrates adequately;verbalizes understanding  -AC      LB Dressing OT LTG    LB Dressing Goal OT LTG, Date Established 08/11/17  -AC      LB Dressing Goal OT LTG, Time to Achieve by discharge  -AC      LB Dressing Goal OT LTG, [place] Level set up required;supervision required  -AC      LB Dressing Goal OT LTG, Adaptive Equipment reacher;shoe horn, long handled;sock-aid  -AC        User Key  (r) = Recorded By, (t) = Taken By, (c) = Cosigned By    Initials Name Provider Type    RENEE Bishop, OT Occupational Therapist                Outcome Measures       08/11/17 1336 08/11/17 1330 08/11/17 1101    How much help from another person do you currently need...    Turning from your back to your side while in flat bed without using bedrails? 3  -MJ  3  -MJ    Moving from lying on back to sitting on the  side of a flat bed without bedrails? 3  -MJ  3  -MJ    Moving to and from a bed to a chair (including a wheelchair)? 3  -MJ  3  -MJ    Standing up from a chair using your arms (e.g., wheelchair, bedside chair)? 3  -MJ  3  -MJ    Climbing 3-5 steps with a railing? 3  -MJ  2  -MJ    To walk in hospital room? 3  -MJ  3  -MJ    AM-PAC 6 Clicks Score 18  -MJ  17  -MJ    How much help from another is currently needed...    Putting on and taking off regular lower body clothing?  3  -AC     Bathing (including washing, rinsing, and drying)  3  -AC     Toileting (which includes using toilet bed pan or urinal)  3  -AC     Putting on and taking off regular upper body clothing  4  -AC     Taking care of personal grooming (such as brushing teeth)  4  -AC     Eating meals  4  -AC     Score  21  -AC     Functional Assessment    Outcome Measure Options AM-PAC 6 Clicks Basic Mobility (PT)  -MJ AM-PAC 6 Clicks Daily Activity (OT)  -AC AM-PAC 6 Clicks Basic Mobility (PT)  -MJ      08/10/17 1715          How much help from another person do you currently need...    Turning from your back to your side while in flat bed without using bedrails? 3  -MJ      Moving from lying on back to sitting on the side of a flat bed without bedrails? 3  -MJ      Moving to and from a bed to a chair (including a wheelchair)? 3  -MJ      Standing up from a chair using your arms (e.g., wheelchair, bedside chair)? 3  -MJ      Climbing 3-5 steps with a railing? 2  -MJ      To walk in hospital room? 3  -MJ      AM-PAC 6 Clicks Score 17  -MJ      Functional Assessment    Outcome Measure Options AM-PAC 6 Clicks Basic Mobility (PT)  -MJ        User Key  (r) = Recorded By, (t) = Taken By, (c) = Cosigned By    Initials Name Provider Type    RENEE Bishop OT Occupational Therapist    ELIAS Garza, PT Physical Therapist          Time Calculation:   OT Start Time: 1330    Therapy Charges for Today     Code Description Service Date Service Provider Modifiers Qty     [id number]  OT EVAL MOD COMPLEXITY 4 8/11/2017 Mary Bishop, OT GO 1               Mary Bishop, OT  8/11/2017

## 2017-08-11 NOTE — PLAN OF CARE
Problem: Patient Care Overview (Adult)  Goal: Plan of Care Review  Outcome: Ongoing (interventions implemented as appropriate)    08/11/17 1434   Coping/Psychosocial Response Interventions   Plan Of Care Reviewed With patient   Outcome Evaluation   Outcome Summary/Follow up Plan OT eval complete. Pt owns AE kit from prior surgery. Reviewed use of AE for LBB/LBD. Pt min a to don socks with sockaide,. Pt will benefit from OT to advance pt toward increased independence with self care. Recommend home with HH and assist.          Problem: Inpatient Occupational Therapy  Goal: Bed Mobility Goal LTG- OT  Outcome: Ongoing (interventions implemented as appropriate)    08/11/17 1434   Bed Mobility OT LTG   Bed Mobility OT LTG, Date Established 08/11/17   Bed Mobility OT LTG, Time to Achieve by discharge   Bed Mobility OT LTG, Activity Type supine to sit/sit to supine   Bed Mobility OT LTG, [place] Level supervision required   Bed Mobility OT LTG, Assist Device leg        Goal: Transfer Training Goal 1 LTG- OT  Outcome: Ongoing (interventions implemented as appropriate)    08/11/17 1434   Transfer Training OT LTG   Transfer Training OT LTG, Date Established 08/11/17   Transfer Training OT LTG, Time to Achieve by discharge   Transfer Training OT LTG, Activity Type tub   Transfer Training OT LTG, [place] Level contact guard assist   Transfer Training OT LTG, Assist Device tub bench  (leg )       Goal: Patient Education Goal LTG- OT  Outcome: Ongoing (interventions implemented as appropriate)    08/11/17 1434   Patient Education OT LTG   Patient Education OT LTG, Date Established 08/11/17   Patient Education OT LTG, Time to Achieve by discharge   Patient Education OT LTG, Education Type home safety;adaptive equipment mgmt   Patient Education OT LTG, Education Understanding demonstrates adequately;verbalizes understanding       Goal: LB Dressing Goal LTG- OT  Outcome: Ongoing (interventions implemented as  appropriate)    08/11/17 1434   LB Dressing OT LTG   LB Dressing Goal OT LTG, Date Established 08/11/17   LB Dressing Goal OT LTG, Time to Achieve by discharge   LB Dressing Goal OT LTG, [place] Level set up required;supervision required   LB Dressing Goal OT LTG, Adaptive Equipment reacher;shoe horn, long handled;sock-aid

## 2017-08-11 NOTE — THERAPY TREATMENT NOTE
Acute Care - Physical Therapy Treatment Note  [facility]     Patient Name: Marcelino Mesa  : 1927  MRN: [id number]  Today's Date: 2017  Onset of Illness/Injury or Date of Surgery Date: 08/10/17  Date of Referral to PT: 08/10/17  Referring Physician: MD Nitin    Admit Date: 8/10/2017    Visit Dx:    ICD-10-CM ICD-9-CM   1. Impaired functional mobility, balance, gait, and endurance Z74.09 V49.89   2. S/P revision of total hip Z96.649 V43.64   3. Impaired mobility and ADLs Z74.09 799.89     Patient Active Problem List   Diagnosis   • Abnormal gait   • Generalized osteoarthritis   • Hyperlipidemia   • Iron deficiency anemia   • Left-sided low back pain with left-sided sciatica   • Macular degeneration   • Scoliosis of lumbar spine   • Post-menopausal osteoporosis   • Vaginitis   • Vitamin D deficiency   • Hip fracture, left   • Essential hypertension   • Preventative health care   • Incomplete RBBB   • Status post unicompartmental right knee arthroplasty   • Primary osteoarthritis of both knees   • Status post left knee medial unicompartmental replacement   • Acute blood loss anemia, mild, asymptomatic   • Anxiety   • Amaurosis fugax   • Fracture of hip   • 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               Adult Rehabilitation Note       17 1336 17 1101       Rehab Assessment/Intervention    Discipline physical therapist  -MJ physical therapist  -MJ     Document Type therapy note (daily note)  -MJ therapy note (daily note)  -MJ     Subjective Information agree to therapy;complains of;pain  -MJ agree to therapy;complains of;pain  -MJ     Patient Effort, Rehab Treatment good  -MJ good  -MJ     Precautions/Limitations fall precautions;anterior hip precautions- left;other (see comments)   very Jamul  -MJ fall precautions;anterior hip precautions- left   very Jamul  -MJ     Recorded by [MJ] Alexis Garza, PT [MJ] Alexis Garza, PT     Pain Assessment    Pain Assessment 0-10   -MJ 0-10  -MJ     Pain Score 2  -MJ 3  -MJ     Post Pain Score 0  -MJ 2  -MJ     Pain Type Acute pain  -MJ Acute pain  -MJ     Pain Location Hip  -MJ Hip  -MJ     Pain Orientation Left  -MJ Left  -MJ     Pain Intervention(s) Repositioned;Ambulation/increased activity  -MJ Repositioned;Ambulation/increased activity  -MJ     Recorded by [MJ] Alexis Garza, PT [MJ] Alexis Garza PT     Cognitive Assessment/Intervention    Current Cognitive/Communication Assessment functional  -MJ functional  -MJ     Orientation Status oriented x 4  -MJ oriented x 4  -MJ     Follows Commands/Answers Questions 100% of the time;able to follow multi-step instructions;needs cueing  -% of the time;able to follow multi-step instructions;needs cueing  -MJ     Personal Safety WNL/WFL  -MJ WNL/WFL  -MJ     Recorded by [MJ] Alexis Garza, PT [MJ] Alexis Garza PT     Mobility Assessment/Training    Extremity Weight-Bearing Status left lower extremity  -MJ left lower extremity  -MJ     Left Lower Extremity Weight-Bearing weight-bearing as tolerated  -MJ weight-bearing as tolerated  -MJ     Recorded by [MJ] Alexis aGrza, PT [MJ] Alexis Garza PT     Bed Mobility, Assessment/Treatment    Bed Mobility, Assistive Device bed rails;head of bed elevated  -MJ bed rails;head of bed elevated  -MJ     Bed Mob, Supine to Sit, [place] not tested   Pt sitting EOB with OT upon arrival  -MJ not tested   Pt UIC  -MJ     Bed Mob, Sit to Supine, [place] minimum assist (75% patient effort);verbal cues required  -MJ minimum assist (75% patient effort);verbal cues required  -MJ     Bed Mobility, Safety Issues decreased use of legs for bridging/pushing  -MJ decreased use of legs for bridging/pushing  -MJ     Bed Mobility, Impairments ROM decreased;strength decreased;pain  -MJ ROM decreased;strength decreased;pain  -MJ     Bed Mobility, Comment Verbal cues for sequencing, assist with L LE into bed  -MJ Verbal cues for sequencing, assist with L LE, increased time  and effort to perform  -MJ     Recorded by [MJ] Alexis Garza, PT [MJ] Alexis Garza, PT     Transfer Assessment/Treatment    Transfers, Sit-Stand [place] contact guard assist;verbal cues required  -MJ contact guard assist;verbal cues required  -MJ     Transfers, Stand-Sit [place] contact guard assist;verbal cues required  -MJ contact guard assist;verbal cues required  -MJ     Transfers, Sit-Stand-Sit, Assist Device rolling walker  -MJ rolling walker  -MJ     Toilet Transfer, [place]  contact guard assist;verbal cues required  -MJ     Toilet Transfer, Assistive Device  bedside commode without drop arms;rolling walker  -MJ     Transfer, Safety Issues step length decreased;weight-shifting ability decreased  -MJ step length decreased;weight-shifting ability decreased  -MJ     Transfer, Impairments ROM decreased;strength decreased;pain  -MJ ROM decreased;strength decreased;pain  -MJ     Transfer, Comment Verbal cues for correct hand placement and to step L LE out prior to t/f  -MJ Verbal cues for correct hand placement and to step L LE out prior to t/f. Assisted pt to bathroom, verbal cues to reach back for BSC prior to t/f.  -MJ     Recorded by [MJ] Alexis Garza, PT [MJ] Alexis Garza, PT     Gait Assessment/Treatment    Gait, [place] Level contact guard assist;verbal cues required  -MJ contact guard assist;verbal cues required  -MJ     Gait, Assistive Device rolling walker  -MJ rolling walker  -MJ     Gait, Distance (Feet) 134  -  -MJ     Gait, Gait Pattern Analysis swing-through gait  -MJ swing-to gait  -MJ     Gait, Gait Deviations left:;antalgic;janina decreased;decreased heel strike;forward flexed posture;step length decreased;weight-shifting ability decreased  -MJ left:;antalgic;janina decreased;step length decreased;weight-shifting ability decreased;forward flexed posture  -MJ     Gait, Safety Issues step length decreased;weight-shifting ability decreased  -MJ step length  decreased;weight-shifting ability decreased  -MJ     Gait, Impairments ROM decreased;strength decreased;pain  -MJ ROM decreased;strength decreased;pain  -MJ     Gait, Comment Pt demo step through gait pattern at slow pace. Pt tends to toe walk on L due to pain, mild improvement to heel strike with cues and practice. Cues to increase LE weight bearing, no improvement. Gait limited by pain and fatigue  - Pt initially demo step to gait pattern at slow pace, moments of progression to step through with practice but regressed with fatigue. Cues to  feet during turn to avoid pivoting on hip. Gait limited by pain and fatigue  -MJ     Recorded by [MJ] Alexis Garza PT [MJ] Alexis Garza PT     Therapy Exercises    Exercise Protocols total hip   anterior revision  -MJ total hip   anterior revision  -MJ     Total Hip Exercises left:;15 reps;completed protocol;ankle pumps/circles;quad set;glut set;heel slides;hip abduction;SAQ;LAQ;hip flexion;SLR   Cues for technique. David w/ SLR, hip abd  -MJ left:;15 reps;completed protocol;ankle pumps/circles;quad set;glut set;heel slides;hip abduction;SAQ;LAQ;SLR;hip flexion   Cues for technique. David w/ SLR, hip abd  -MJ     Recorded by [MJ] Alexis Garza PT [MJ] Alexis Garza PT     Positioning and Restraints    Pre-Treatment Position in bed  -MJ sitting in chair/recliner  -MJ     Post Treatment Position bed  -MJ bed  -MJ     In Bed notified nsg;supine;call light within reach;encouraged to call for assist;with family/caregiver  -MJ notified nsg;supine;call light within reach;encouraged to call for assist  -MJ     Recorded by [MJ] Alexis Garza PT [MJ] Alexis Garza PT       User Key  (r) = Recorded By, (t) = Taken By, (c) = Cosigned By    Initials Name Effective Dates     Alexis Garza PT 03/14/16 -                 IP PT Goals       08/11/17 1431 08/11/17 1329 08/10/17 1812    Bed Mobility PT LTG    Bed Mobility PT LTG, Date Established   08/10/17  -    Bed Mobility PT LTG, Time to  Achieve   5 days  -MJ    Bed Mobility PT LTG, Activity Type   supine to sit/sit to supine  -MJ    Bed Mobility PT LTG, [place] Level   conditional independence  -MJ    Bed Mobility PT LTG, Date Goal Reviewed 08/11/17  -MJ 08/11/17  -MJ     Bed Mobility PT LTG, Outcome goal ongoing  -MJ goal ongoing  -MJ     Transfer Training PT LTG    Transfer Training PT LTG, Date Established   08/10/17  -MJ    Transfer Training PT LTG, Time to Achieve   5 days  -MJ    Transfer Training PT LTG, Activity Type   sit to stand/stand to sit  -MJ    Transfer Training PT LTG, [place] Level   conditional independence  -MJ    Transfer Training PT LTG, Assist Device   walker, rolling  -MJ    Transfer Training PT  LTG, Date Goal Reviewed 08/11/17  -MJ 08/11/17  -MJ     Transfer Training PT LTG, Outcome goal ongoing  -MJ goal ongoing  -MJ     Gait Training PT LTG    Gait Training Goal PT LTG, Date Established   08/10/17  -MJ    Gait Training Goal PT LTG, Time to Achieve   5 days  -MJ    Gait Training Goal PT LTG, [place] Level   conditional independence  -MJ    Gait Training Goal PT LTG, Assist Device   walker, rolling  -MJ    Gait Training Goal PT LTG, Distance to Achieve   500 feet  -MJ    Gait Training Goal PT LTG, Date Goal Reviewed 08/11/17  -MJ 08/11/17  -MJ     Gait Training Goal PT LTG, Outcome goal ongoing  -MJ goal ongoing  -MJ       User Key  (r) = Recorded By, (t) = Taken By, (c) = Cosigned By    Initials Name Provider Type    ELIAS Garza, COREY Physical Therapist          Physical Therapy Education     Title: PT OT SLP Therapies (Active)     Topic: Physical Therapy (Done)     Point: Mobility training (Done)    Learning Progress Summary    Learner Readiness Method Response Comment Documented by Status   Patient Acceptance MG BHAKTA,GABY Reviewed HEP, gait mechanics  08/11/17 1430 Done    Acceptance MG BHAKTA NR Reviewed anterionr hip precautions, HEP, benefits of activity  08/11/17 1329 Done    Acceptance MG BHAKTA,NR  Reviewed anterior hip precautions  08/10/17 1811 Done   Family Acceptance MG BHAKTANR Reviewed HEP, gait mechanics  08/11/17 1430 Done    Acceptance MG BHAKTANR Reviewed anterior hip precautions  08/10/17 1811 Done               Point: Home exercise program (Done)    Learning Progress Summary    Learner Readiness Method Response Comment Documented by Status   Patient Acceptance MG BHAKTANR Reviewed HEP, gait mechanics  08/11/17 1430 Done    Acceptance MG BHAKTANR Reviewed anterionr hip precautions, HEP, benefits of activity  08/11/17 1329 Done   Family Acceptance MG BHAKTANR Reviewed HEP, gait mechanics  08/11/17 1430 Done               Point: Body mechanics (Done)    Learning Progress Summary    Learner Readiness Method Response Comment Documented by Status   Patient Acceptance MG BHAKTANR Reviewed HEP, gait mechanics  08/11/17 1430 Done    Acceptance MG BHAKTANR Reviewed anterionr hip precautions, HEP, benefits of activity  08/11/17 1329 Done    Acceptance MG BHAKTA NR Reviewed anterior hip precautions  08/10/17 1811 Done   Family Acceptance MG BHAKTA NR Reviewed HEP, gait mechanics  08/11/17 1430 Done    Acceptance MG BHAKTANR Reviewed anterior hip precautions  08/10/17 1811 Done               Point: Precautions (Done)    Learning Progress Summary    Learner Readiness Method Response Comment Documented by Status   Patient Acceptance MG BHAKTA NR Reviewed HEP, gait mechanics  08/11/17 1430 Done    Acceptance MG BHAKTA NR Reviewed anterionr hip precautions, HEP, benefits of activity  08/11/17 1329 Done    Acceptance MG BHAKTA NR Reviewed anterior hip precautions  08/10/17 1811 Done   Family Acceptance MG BHAKTA NR Reviewed HEP, gait mechanics  08/11/17 1430 Done    Acceptance MG BHAKTANR Reviewed anterior hip precautions  08/10/17 1811 Done                      User Key     Initials Effective Dates Name Provider Type Discipline     03/14/16 -  Alexis Garza PT Physical Therapist PT                    PT Recommendation  and Plan  Anticipated Discharge Disposition: home with assist, home with home health  Demonstrates Need for Referral to Another Service: home health care  Planned Therapy Interventions: balance training, bed mobility training, gait training, home exercise program, patient/family education, stair training, strengthening, transfer training  PT Frequency: 2 times/day  Plan of Care Review  Plan Of Care Reviewed With: patient  Progress: improving  Outcome Summary/Follow up Plan: Pt increased gait distance to 134 feet with CGA and RW, still limited by pain and requires cues for sequencing. Will continue to progress mobility as able.           Outcome Measures       08/11/17 1336 08/11/17 1101 08/10/17 1715    How much help from another person do you currently need...    Turning from your back to your side while in flat bed without using bedrails? 3  -MJ 3  -MJ 3  -MJ    Moving from lying on back to sitting on the side of a flat bed without bedrails? 3  -MJ 3  -MJ 3  -MJ    Moving to and from a bed to a chair (including a wheelchair)? 3  -MJ 3  -MJ 3  -MJ    Standing up from a chair using your arms (e.g., wheelchair, bedside chair)? 3  -MJ 3  -MJ 3  -MJ    Climbing 3-5 steps with a railing? 3  -MJ 2  -MJ 2  -MJ    To walk in hospital room? 3  -MJ 3  -MJ 3  -MJ    AM-PAC 6 Clicks Score 18  -MJ 17  -MJ 17  -MJ    Functional Assessment    Outcome Measure Options AM-PAC 6 Clicks Basic Mobility (PT)  -MJ AM-PAC 6 Clicks Basic Mobility (PT)  - AM-PAC 6 Clicks Basic Mobility (PT)  -      User Key  (r) = Recorded By, (t) = Taken By, (c) = Cosigned By    Initials Name Provider Type    ELIAS Garza PT Physical Therapist           Time Calculation:         PT Charges       08/11/17 1432 08/11/17 1331       Time Calculation    Start Time 1336  -MJ 1101  -MJ     PT Received On 08/11/17  -MJ 08/11/17  -MJ     PT Goal Re-Cert Due Date 08/20/17  -MJ 08/20/17  -MJ     Time Calculation- PT    Total Timed Code Minutes- PT 17 minute(s)   -MJ 23 minute(s)  -MJ       User Key  (r) = Recorded By, (t) = Taken By, (c) = Cosigned By    Initials Name Provider Type    ELIAS Garza, PT Physical Therapist          Therapy Charges for Today     Code Description Service Date Service Provider Modifiers Qty    30520098046 HC PT EVAL MOD COMPLEXITY 2 8/10/2017 Alexis Garza, PT GP 1    35252937546 HC GAIT TRAINING EA 15 MIN 8/10/2017 Alexis Garza, PT GP 1    34429496905 HC PT THER SUPP EA 15 MIN 8/10/2017 Alexis Garza, PT GP 3    39448102931 HC GAIT TRAINING EA 15 MIN 8/11/2017 Alexis Garza, PT GP 1    69452277048 HC PT THER PROC EA 15 MIN 8/11/2017 Alexis Garza, PT GP 1    21694713368 HC GAIT TRAINING EA 15 MIN 8/11/2017 Alexis Garza, PT GP 1          PT G-Codes  Outcome Measure Options: AM-PAC 6 Clicks Basic Mobility (PT)    Alexis Garza PT  8/11/2017

## 2017-08-11 NOTE — PLAN OF CARE
Problem: Patient Care Overview (Adult)  Goal: Plan of Care Review  Outcome: Ongoing (interventions implemented as appropriate)    08/11/17 1431   Coping/Psychosocial Response Interventions   Plan Of Care Reviewed With patient   Outcome Evaluation   Outcome Summary/Follow up Plan Pt increased gait distance to 134 feet with CGA and RW, still limited by pain and requires cues for sequencing. Will continue to progress mobility as able.    Patient Care Overview   Progress improving         Problem: Inpatient Physical Therapy  Goal: Bed Mobility Goal LTG- PT  Outcome: Ongoing (interventions implemented as appropriate)    08/10/17 1812 08/11/17 1431   Bed Mobility PT LTG   Bed Mobility PT LTG, Date Established 08/10/17 --    Bed Mobility PT LTG, Time to Achieve 5 days --    Bed Mobility PT LTG, Activity Type supine to sit/sit to supine --    Bed Mobility PT LTG, [place] Level conditional independence --    Bed Mobility PT LTG, Date Goal Reviewed --  08/11/17   Bed Mobility PT LTG, Outcome --  goal ongoing       Goal: Transfer Training Goal 1 LTG- PT  Outcome: Ongoing (interventions implemented as appropriate)    08/10/17 1812 08/11/17 1431   Transfer Training PT LTG   Transfer Training PT LTG, Date Established 08/10/17 --    Transfer Training PT LTG, Time to Achieve 5 days --    Transfer Training PT LTG, Activity Type sit to stand/stand to sit --    Transfer Training PT LTG, [place] Level conditional independence --    Transfer Training PT LTG, Assist Device walker, rolling --    Transfer Training PT LTG, Date Goal Reviewed --  08/11/17   Transfer Training PT LTG, Outcome --  goal ongoing       Goal: Gait Training Goal LTG- PT  Outcome: Ongoing (interventions implemented as appropriate)    08/10/17 1812 08/11/17 1431   Gait Training PT LTG   Gait Training Goal PT LTG, Date Established 08/10/17 --    Gait Training Goal PT LTG, Time to Achieve 5 days --    Gait Training Goal PT LTG, [place] Level conditional  independence --    Gait Training Goal PT LTG, Assist Device walker, rolling --    Gait Training Goal PT LTG, Distance to Achieve 500 feet --    Gait Training Goal PT LTG, Date Goal Reviewed --  08/11/17   Gait Training Goal PT LTG, Outcome --  goal ongoing

## 2017-08-11 NOTE — PLAN OF CARE
Problem: Patient Care Overview (Adult)  Goal: Plan of Care Review  Outcome: Ongoing (interventions implemented as appropriate)    08/11/17 1329   Coping/Psychosocial Response Interventions   Plan Of Care Reviewed With patient   Outcome Evaluation   Outcome Summary/Follow up Plan Pt increased gait distance to 120 feet with CGA and RW, limited by pain and fatigue. Will continue to progress mobility as able.    Patient Care Overview   Progress improving         Problem: Inpatient Physical Therapy  Goal: Bed Mobility Goal LTG- PT  Outcome: Ongoing (interventions implemented as appropriate)    08/10/17 1812 08/11/17 1329   Bed Mobility PT LTG   Bed Mobility PT LTG, Date Established 08/10/17 --    Bed Mobility PT LTG, Time to Achieve 5 days --    Bed Mobility PT LTG, Activity Type supine to sit/sit to supine --    Bed Mobility PT LTG, [place] Level conditional independence --    Bed Mobility PT LTG, Date Goal Reviewed --  08/11/17   Bed Mobility PT LTG, Outcome --  goal ongoing       Goal: Transfer Training Goal 1 LTG- PT  Outcome: Ongoing (interventions implemented as appropriate)    08/10/17 1812 08/11/17 1329   Transfer Training PT LTG   Transfer Training PT LTG, Date Established 08/10/17 --    Transfer Training PT LTG, Time to Achieve 5 days --    Transfer Training PT LTG, Activity Type sit to stand/stand to sit --    Transfer Training PT LTG, [place] Level conditional independence --    Transfer Training PT LTG, Assist Device walker, rolling --    Transfer Training PT LTG, Date Goal Reviewed --  08/11/17   Transfer Training PT LTG, Outcome --  goal ongoing       Goal: Gait Training Goal LTG- PT  Outcome: Ongoing (interventions implemented as appropriate)    08/10/17 1812 08/11/17 1329   Gait Training PT LTG   Gait Training Goal PT LTG, Date Established 08/10/17 --    Gait Training Goal PT LTG, Time to Achieve 5 days --    Gait Training Goal PT LTG, [place] Level conditional independence --    Gait  Training Goal PT LTG, Assist Device walker, rolling --    Gait Training Goal PT LTG, Distance to Achieve 500 feet --    Gait Training Goal PT LTG, Date Goal Reviewed --  08/11/17   Gait Training Goal PT LTG, Outcome --  goal ongoing

## 2017-08-11 NOTE — PROGRESS NOTES
"IM progress note      Marcelino Mesa  [id number]  1927     LOS: 1 day     Attending: Jesse Taveras MD    Primary Care Provider: Jony Singh MD      Chief Complaint/Reason for visit: left hip pain    Subjective   Doing ok. Pain control is adequate. Denies f/c/n/v/sob/cp.  ( fair progress overall.   Objective     Vital Signs  Blood pressure 108/58, pulse 80, temperature 99.4 °F (37.4 °C), temperature source Tympanic, resp. rate 16, height 62\" (157.5 cm), weight 119 lb (54 kg), SpO2 92 %   Temp (24hrs), Av.3 °F (36.8 °C), Min:97.5 °F (36.4 °C), Max:99.4 °F (37.4 °C)      Intake/Output:    Intake/Output Summary (Last 24 hours) at 17 1258  Last data filed at 17 0955   Gross per 24 hour   Intake           1602.5 ml   Output             2100 ml   Net           -497.5 ml       Nutrition: PO    Respiratory: RA    Physical Therapy: Pt ambulated 80 feet with CGAx2 and RW, limited by pain. PT will follow to increase strength and progress functional mobility. Plan is home with assist and HHPT at discharge    Physical Exam:     General Appearance:    Alert, cooperative, in no acute distress   Head:    Normocephalic, without obvious abnormality, atraumatic    Lungs:     Normal effort, symmetric chest rise, no crepitus, clear to      auscultation bilaterally             Heart:    Regular rhythm and normal rate, normal S1 and S2   Abdomen:     Normal bowel sounds, no masses, no organomegaly, soft        non-tender, non-distended, no guarding, no rebound                tenderness   Extremities:   No clubbing, cyanosis or edema.  No deformities. Left hip with [place] x2 CDI.   Pulses:   Pulses palpable and equal bilaterally   Skin:   No bleeding, bruising or rash   Neurologic:   Moves all extremities with no obvious focal motor deficit.  Cranial nerves 2 - 12 grossly intact. Flexion and dorsiflexion intact bilateral feet.       Results Review:     I reviewed the patient's new clinical results. "     Results from last 7 days  Lab Units 08/11/17  0454   WBC 10*3/mm3 8.44   HEMOGLOBIN g/dL 9.6*   HEMATOCRIT % 29.2*   PLATELETS 10*3/mm3 205       Results from last 7 days  Lab Units 08/11/17  0454   SODIUM mmol/L 138   POTASSIUM mmol/L 3.5   CHLORIDE mmol/L 110*   CO2 mmol/L 24.0   BUN mg/dL 12   CREATININE mg/dL 0.60   CALCIUM mg/dL 7.6*   GLUCOSE mg/dL 130*     I reviewed the patient's new imaging including images and reports.    All medications reviewed.     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       Plan  1. PT/OT- WBAT LLE  2. Pain control-prns   3. IS-encouraged  4. DVT proph- [facility]. Changed to Lovenox due to hx of GIB with ASA  5. Bowel regimen  6. Monitor post-op labs  7. DC planning for home, possibly tomorrow    HTN, HLD,   - Continue home Norvasc  - Resume Plavix when ok with Dr. Taveras  - Monitor BP q4 hrs  - Holding parameters for BP meds  - PRN hydralazine for SBP >170    ( consider plavix only vs plavix and lovenox low dose at discharge for DVT prophylaxis)     Edmund Cates MD  08/11/17  12:58 PM

## 2017-08-12 VITALS
SYSTOLIC BLOOD PRESSURE: 126 MMHG | WEIGHT: 119 LBS | HEIGHT: 62 IN | OXYGEN SATURATION: 93 % | HEART RATE: 82 BPM | RESPIRATION RATE: 16 BRPM | TEMPERATURE: 98.9 F | DIASTOLIC BLOOD PRESSURE: 67 MMHG | BODY MASS INDEX: 21.9 KG/M2

## 2017-08-12 PROCEDURE — 97116 GAIT TRAINING THERAPY: CPT

## 2017-08-12 PROCEDURE — 94799 UNLISTED PULMONARY SVC/PX: CPT

## 2017-08-12 PROCEDURE — 97110 THERAPEUTIC EXERCISES: CPT

## 2017-08-12 RX ORDER — ACETAMINOPHEN 325 MG/1
650 TABLET ORAL EVERY 4 HOURS PRN
Refills: 0
Start: 2017-08-12

## 2017-08-12 RX ORDER — CLOPIDOGREL BISULFATE 75 MG/1
37.5 TABLET ORAL DAILY
Qty: 30 TABLET | Refills: 3 | Status: SHIPPED | OUTPATIENT
Start: 2017-08-12 | End: 2018-01-22 | Stop reason: SDUPTHER

## 2017-08-12 RX ADMIN — DOCUSATE SODIUM 100 MG: 100 CAPSULE, LIQUID FILLED ORAL at 09:16

## 2017-08-12 RX ADMIN — ACETAMINOPHEN 650 MG: 325 TABLET, FILM COATED ORAL at 09:16

## 2017-08-12 RX ADMIN — AMLODIPINE BESYLATE 2.5 MG: 2.5 TABLET ORAL at 09:16

## 2017-08-12 NOTE — OP NOTE
Jesse Taveras MD Physician Signed Orthopedics Brief Op Note Date of Service: 8/10/2017  2:20 PM     Case ID: [id number] Case Time: 8/10/2017 11:10 AM Surgeon: Jesse Taveras MD     Procedure: REMOVAL OF HARDWARE LEFT HIP, LEFT ANTERIOR TOTAL HIP ARTHROPLASTY Location:  [facility]          []Hide copied text  TOTAL HIP ARTHROPLASTY ANTERIOR WITH HANA TABLE  Procedure Note     Marcelino Mesa  8/10/2017     Pre-op Diagnosis:   Left hip Post-traumatic arthritis with retained hardware     Post-op Diagnosis:     Left hip Post-traumatic arthritis with retained hardware     Procedure/CPT® Codes:  38448 - removal of old hardware and total hip arthroplasty     Procedure(s):  REMOVAL OF HARDWARE LEFT HIP, LEFT ANTERIOR TOTAL HIP ARTHROPLASTY     Surgeon(s):  Jesse Taveras MD     Anesthesia: Spinal     Staff:   Circulator: Aldair Vu RN; Jamaica Dillon RN; Loretta Francisco RN  Scrub Person: David Villanueva; Jacoby Bustillo  Vendor Representative: Kory Garcia; Norman Lai  Nursing Assistant: Elle Marlow; Christina Sutton  Assistant: Jared Copeland PA-C     Estimated Blood Loss: 400 mL  Urine Voided: * No values recorded between 8/10/2017 10:27 AM and 8/10/2017  1:44 PM *     Specimens:                   ID Type Source Tests Collected by Time Destination   1 : left hip joint synovium Synovium Hip, Left ANAEROBIC CULTURE, FUNGAL CULTURE, TISSUE/BONE CULTURE, AFB CULTURE Jesse Taveras MD 8/10/2017 1156            Drains:              Findings: Expected     Complications: None     Indications: 89-year-old woman with left hip intertrochanteric fracture 3 years ago treated with gamma nail. She has had increasing left hip pain with x-rays identifying superior femoral head collapse but no gross hardware loosening or protrusion. Risks and benefits indication rationale for total hip arthroplasty simultaneous with removal discussed with patient and family. She is eager to proceed pain relief  and signs are all questions were answered.     Procedure: While in the OR spinal anesthetic started. Ultimately general by laryngeal mask supplemental was required. Patient turned to the supine position and prepped and draped in standard fashion for anterior approach left hip on the [place] table. Fluoroscopy used to assess limb lengths. These were somewhat lengthened with final component selection compensating for her limb length shortening from fracture subsidence. Standard anterior incision made and routine dissection carried down to the fascia which was then raised over the medial aspect of the tensor fascia hadley. Circumflex vessels identified and cauterized. Incision made independently over the distal screw which was fluoroscopic assistance. Femoral neck isolated and T-shaped capsulotomy created and anterior capsular releases was performed. It was then extended and dissection carried down posteriorly to the greater trochanter and proximal hardware. Bone removal was required for exposure of the hardware. Sensory was released. New dissection carried around the distal and lateral protruding lag screw. Lag screw removed without difficulty. Remaining nail removed without further difficulty. Standard neck cut made and head and neck removed piecemeal. Attention was then returned to the acetabulum which was exposed circumferentially. Reamed from size 43 to 51 with good exposure of subchondral bone in satisfactory radiographic fit and fill. Final 52 component seated with excellent press-fit characteristics. 2 additional fixation screws were used due to the soft bone. Final position showed horizontal tilt around 40° from Hilgenre[place] line and anteversion about 20-25°. Polyethylene insert seated without difficulty. Acetabulum was thoroughly irrigated before during and after component placement.     Attention was turned to the proximal femur which was externally rotated initially to 160° and eventually to 170°. After  adequate extension and comprehensive lateral releases there was adequate exposure of the proximal canal. Broached with standard sizes 8 and 9 and 10. Canal was then reamed and sizes 11 and 12 and 13. Broached with the revision Corail stems from sized 10 to size 13. Size 13 had excessive height. Size 12 had adequate height with good recovery of limb lengths as above and safe soft tissue tension using the +1.5 mm head and neck adapter. Calcar reamer was passed. Trials were removed. Canal thoroughly irrigated. Final component was seated to a full depth relative to the remaining. The final head and neck size +1.5 x 36 mm was assembled and reduced with fluoroscopy showing adequate position. Wound was then thoroughly irrigated and closed in layers without a drain. Joint space injected through a spinal needle placed under direct vision prior to closure. Standard Prineo dressing applied. Estimated blood loss 400 mL. Patient stable to recovery having tolerated procedure well throughout with all counts correct.           Jesse Taveras MD

## 2017-08-12 NOTE — PLAN OF CARE
Problem: Patient Care Overview (Adult)  Goal: Plan of Care Review  Outcome: Ongoing (interventions implemented as appropriate)    08/11/17 2045 08/12/17 0157   Coping/Psychosocial Response Interventions   Plan Of Care Reviewed With patient --    Outcome Evaluation   Outcome Summary/Follow up Plan --  Patient rested well this shift. As of this point, no complaints of pain or discomfort; will continue to assess for pain for remainder of shift.    Patient Care Overview   Progress --  improving

## 2017-08-12 NOTE — PLAN OF CARE
Problem: Patient Care Overview (Adult)  Goal: Plan of Care Review  Outcome: Ongoing (interventions implemented as appropriate)    08/12/17 1042   Coping/Psychosocial Response Interventions   Plan Of Care Reviewed With patient   Outcome Evaluation   Outcome Summary/Follow up Plan Patient increased gait distance to 200 feet with improved gait mechanics with cues for correction. Only required CGA to supervision for all mobility. Should be able to safely mobilize about the home with assist from daughter upon d/c home today.    Patient Care Overview   Progress progress toward functional goals as expected         Problem: Inpatient Physical Therapy  Goal: Bed Mobility Goal LTG- PT  Outcome: Unable to achieve outcome(s) by discharge Date Met:  08/12/17 08/12/17 1042   Bed Mobility PT LTG   Bed Mobility PT LTG, Date Established 08/10/17   Bed Mobility PT LTG, Time to Achieve 5 days   Bed Mobility PT LTG, Activity Type supine to sit/sit to supine   Bed Mobility PT LTG, [place] Level conditional independence   Bed Mobility PT LTG, Date Goal Reviewed 08/12/17   Bed Mobility PT LTG, Outcome goal not met   Bed Mobility PT LTG, Reason Goal Not Met discharged from facility       Goal: Transfer Training Goal 1 LTG- PT  Outcome: Unable to achieve outcome(s) by discharge Date Met:  08/12/17 08/12/17 1042   Transfer Training PT LTG   Transfer Training PT LTG, Date Established 08/10/17   Transfer Training PT LTG, Time to Achieve 5 days   Transfer Training PT LTG, Activity Type sit to stand/stand to sit   Transfer Training PT LTG, [place] Level conditional independence   Transfer Training PT LTG, Assist Device walker, rolling   Transfer Training PT LTG, Date Goal Reviewed 08/12/17   Transfer Training PT LTG, Outcome goal not met   Transfer Training PT LTG, Reason Goal Not Met discharged from facility       Goal: Gait Training Goal LTG- PT  Outcome: Unable to achieve outcome(s) by discharge Date Met:  08/12/17 08/12/17  1042   Gait Training PT LTG   Gait Training Goal PT LTG, Date Established 08/10/17   Gait Training Goal PT LTG, Time to Achieve 5 days   Gait Training Goal PT LTG, [place] Level conditional independence   Gait Training Goal PT LTG, Assist Device walker, rolling   Gait Training Goal PT LTG, Distance to Achieve 500 feet   Gait Training Goal PT LTG, Date Goal Reviewed 08/12/17   Gait Training Goal PT LTG, Outcome goal not met   Gait Training Goal PT LTG, Reason Goal Not Met discharged from facility         Problem: Hip Replacement, Total (Adult)  Goal: Signs and Symptoms of Listed Potential Problems Will be Absent or Manageable (Hip Replacement, Total)  Outcome: Ongoing (interventions implemented as appropriate)    08/12/17 1042   Hip Replacement, Total   Problems Assessed (Total Hip Replacement) pain;displacement of prosthesis;functional decline/self care deficit   Problems Present (Total Hip Replacement) pain;displacement of prosthesis;functional decline/self care deficit

## 2017-08-12 NOTE — PROGRESS NOTES
"Marcelino Mesa  [id number]  2/19/1927    /67 (BP Location: Right arm, Patient Position: Lying)  Pulse 82  Temp 98.9 °F (37.2 °C) (Tympanic)   Resp 16  Ht 62\" (157.5 cm)  Wt 119 lb (54 kg)  SpO2 93%  BMI 21.77 kg/m2    Lab Results (last 24 hours)     Procedure Component Value Units Date/Time    AFB Culture [[id number]] Collected:  08/10/17 1156    Specimen:  Synovium from Hip, Left Updated:  08/11/17 1053     AFB Stain No acid fast bacilli seen on concentrated smear    Tissue/Bone Culture [[id number]]  (Normal) Collected:  08/10/17 1156    Specimen:  Synovium from Hip, Left Updated:  08/11/17 1323     Culture No growth     Gram Stain Result Many (4+) WBCs seen      No organisms seen    Anaerobic Culture [[id number]]  (Normal) Collected:  08/10/17 1156    Specimen:  Synovium from Hip, Left Updated:  08/11/17 1400     Culture No anaerobes isolated          Patient Care Team:  Jony Singh MD as PCP - General  Jony Singh MD as PCP - Family Medicine  Jony Singh MD as PCP - Claims Attributed    SUBJECTIVE  Pain well controlled.  Good start in physical therapy.    PHYSICAL EXAM  Incision benign  Minimal thigh swelling  Neurovascularly intact to knees and toes    Principal Problem:    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  Active Problems:    Hyperlipidemia    Essential hypertension    Acute blood loss anemia, mild, asymptomatic    Fracture of hip      PLAN / DISPOSITION:  Cultures pending - no gross evidence for infection  Discharge home today if cleared by PT    Jesse Taveras MD  08/12/17  8:49 AM  "

## 2017-08-12 NOTE — DISCHARGE SUMMARY
Patient Name: Marcelino Mesa  MRN: [id number]  : 1927  DOS: 2017    Attending: Jesse Taveras MD    Primary Care Provider: Jony Singh MD    Date of Admission:.8/10/2017  7:15 AM    Date of Discharge:  2017    Discharge Diagnosis: Principal Problem:    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  Active Problems:    Hyperlipidemia    Essential hypertension    Acute blood loss anemia, mild, asymptomatic    Fracture of hip    History of GI bleed.      Hospital Course  Patient is a 89 y.o. female presented for scheduled surgery by .     Per his note:(89-year-old woman with left hip intertrochanteric fracture 3 years ago treated with gamma nail. She has had increasing left hip pain with x-rays identifying superior femoral head collapse but no gross hardware loosening or protrusion. Risks and benefits indication rationale for total hip arthroplasty simultaneous with removal discussed with patient and family. She is eager to proceed pain relief and signs are all questions were answered.)    The patient has done well postop. The patient has been able to ambulate 200 feet with PT.    The patient has had good pain control with PO pain medications. ( only took tylenol, and even that makes her drowsy).    The patient was placed on DVT prophylaxis including Plavix ( ASA and Lovenox were considered, but at this point risk of bleed with her history of GIB probably favors keeping on only 1 agent).     The patient was encouraged to use IS for atelectasis prophylaxis.   The patient was placed on a bowel regimen to prevent constipation while on pain medication.   The patient's H/H was monitored with a slight decrease that remained asymptomatic.    It is felt by all involved that the patient can discharge home at this time, and the patient has no further questions.( her daughter will stay with her for several days while she is progressing with rehab, HHPT)    Procedures  "Performed  Procedure(s):  REMOVAL OF HARDWARE LEFT HIP, LEFT ANTERIOR TOTAL HIP ARTHROPLASTY       Pertinent Test Results:        I reviewed the patient's new clinical results.     Results for ELYSE BOWLING (MRN [id number]) as of 2017 11:54   Ref. Range 2017 11:45   WBC Latest Ref Range: 3.50 - 10.80 10*3/mm3 6.59   RBC Latest Ref Range: 3.89 - 5.14 10*6/mm3 4.44   Hemoglobin Latest Ref Range: 11.5 - 15.5 g/dL 13.7   Hematocrit Latest Ref Range: 34.5 - 44.0 % 41.6   RDW Latest Ref Range: 11.3 - 14.5 % 14.6 (H)   MCV Latest Ref Range: 80.0 - 99.0 fL 93.7   MCH Latest Ref Range: 27.0 - 31.0 pg 30.9   MCHC Latest Ref Range: 32.0 - 36.0 g/dL 32.9   MPV Latest Ref Range: 6.0 - 12.0 fL 9.7   Platelets Latest Ref Range: 150 - 450 10*3/mm3 259       Results from last 7 days  Lab Units 17  0454   WBC 10*3/mm3 8.44   HEMOGLOBIN g/dL 9.6*   HEMATOCRIT % 29.2*   PLATELETS 10*3/mm3 205       Results from last 7 days  Lab Units 17  0454   SODIUM mmol/L 138   POTASSIUM mmol/L 3.5   CHLORIDE mmol/L 110*   CO2 mmol/L 24.0   BUN mg/dL 12   CREATININE mg/dL 0.60   CALCIUM mg/dL 7.6*   GLUCOSE mg/dL 130*     I reviewed the patient's new imaging including images and reports.    Physical therapy    Discharge Assessment:    Vital Signs  /67 (BP Location: Right arm, Patient Position: Lying)  Pulse 82  Temp 98.9 °F (37.2 °C) (Tympanic)   Resp 16  Ht 62\" (157.5 cm)  Wt 119 lb (54 kg)  SpO2 93%  BMI 21.77 kg/m2  Temp (24hrs), Av.9 °F (37.2 °C), Min:98.5 °F (36.9 °C), Max:99.6 °F (37.6 °C)      General Appearance:    Alert, cooperative, in no acute distress   Lungs:     Clear to auscultation,respirations regular, even and                   unlabored    Heart:    Regular rhythm and normal rate, normal S1 and S2    Abdomen:     Normal bowel sounds, no masses, no organomegaly, soft        non-tender    Extremities:   Moves all extremities well, no edema, no cyanosis, no              Redness    Left hip " with mild swelling. CDI prineo.   Pulses:   Pulses palpable and equal bilaterally   Skin:   No bleeding, bruising or rash   Neurologic:   Cranial nerves 2 - 12 grossly intact, sensation intact Flexion and dorsiflexion intact bilateral feet.         Discharge Disposition: Home with HHPT.     Discharge Medications   Marcelino Mesa   Home Medication Instructions [place]:[id number]    Printed on:08/12/17 8681   Medication Information                      acetaminophen (TYLENOL) 325 MG tablet  Take 2 tablets by mouth Every 4 (Four) Hours As Needed for Mild Pain (1-3).             amLODIPine (NORVASC) 2.5 MG tablet  TAKE 1 TABLET ONCE A DAY             calcium-vitamin D (OSCAL 500/200 D-3) 500-200 MG-UNIT per tablet  Take 1 tablet by mouth daily.             carboxymethylcellulose 1 % ophthalmic solution  Apply 1 drop to eye Every Night.             Cholecalciferol (VITAMIN D) 2000 UNITS capsule  Take 1 capsule by mouth daily.             clopidogrel (PLAVIX) 75 MG tablet  Take 0.5 tablets by mouth Daily. Take one tablet daily for 2 weeks, then back to 0.5 tablet per day.             CRANBERRY EXTRACT PO  Take 2 capsules by mouth Daily.             ferrous sulfate 325 (65 FE) MG tablet  Take 325 mg by mouth daily with breakfast.             Multiple Vitamins-Minerals (CENTRUM ADULTS) tablet  Take 1 tablet by mouth daily.                 Discharge Diet:  Regular.    Activity at Discharge:  Weight bearing as tolerated LLE.    Follow-up Appointments   per his orders.   as previously scheduled.     Discussed with patient and RN.  Discharge took over 30 min    Discussed with  regarding DVT prophylaxis with Plavix . In agreement.    Edmund Cates MD  08/12/17  11:36 AM

## 2017-08-12 NOTE — THERAPY DISCHARGE NOTE
Acute Care - Physical Therapy Treatment Note/Discharge   [place]     Patient Name: Marcelino Mesa  : 1927  MRN: [id number]  Today's Date: 2017  Onset of Illness/Injury or Date of Surgery Date: 08/10/17  Date of Referral to PT: 08/10/17  Referring Physician: MD Nitin    Admit Date: 8/10/2017    Visit Dx:    ICD-10-CM ICD-9-CM   1. Impaired functional mobility, balance, gait, and endurance Z74.09 V49.89   2. S/P revision of total hip Z96.649 V43.64   3. Impaired mobility and ADLs Z74.09 799.89     Patient Active Problem List   Diagnosis   • Abnormal gait   • Generalized osteoarthritis   • Hyperlipidemia   • Iron deficiency anemia   • Left-sided low back pain with left-sided sciatica   • Macular degeneration   • Scoliosis of lumbar spine   • Post-menopausal osteoporosis   • Vaginitis   • Vitamin D deficiency   • Hip fracture, left   • Essential hypertension   • Preventative health care   • Incomplete RBBB   • Status post unicompartmental right knee arthroplasty   • Primary osteoarthritis of both knees   • Status post left knee medial unicompartmental replacement   • Acute blood loss anemia, mild, asymptomatic   • Anxiety   • Amaurosis fugax   • Fracture of hip   • 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 Issued and reviewed written/illustrated HEP.  17 1118 Done    Acceptance EMG NR Reviewed HEP, gait mechanics  17 1430 Done    Acceptance MG BHAKTA NR Reviewed anterionr hip precautions, HEP, benefits of activity  17 1329 Done    Acceptance MG BHAKTA NR Reviewed anterior hip precautions  08/10/17 1811 Done   Family Acceptance MG BHAKTA  VU,NR Reviewed HEP, gait mechanics  08/11/17 1430 Done    Acceptance E,D VU,NR Reviewed anterior hip precautions  08/10/17 1811 Done               Point: Home exercise program (Done)    Learning Progress Summary    Learner Readiness Method Response Comment Documented by Status   Patient Acceptance E,D,H VU,DU Educated on hip precautions and correct car t/f technique. Issued and reviewed written/illustrated HEP.  08/12/17 1118 Done    Acceptance E,D VU,NR Reviewed HEP, gait mechanics  08/11/17 1430 Done    Acceptance E,D VU,NR Reviewed anterionr hip precautions, HEP, benefits of activity  08/11/17 1329 Done   Family Acceptance E,D RAYMUNDO,NR Reviewed HEP, gait mechanics  08/11/17 1430 Done               Point: Body mechanics (Done)    Learning Progress Summary    Learner Readiness Method Response Comment Documented by Status   Patient Acceptance E,D,H RAYMUNDO,ALYSSA Educated on hip precautions and correct car t/f technique. Issued and reviewed written/illustrated HEP.  08/12/17 1118 Done    Acceptance E,D RAYMUNDO,NR Reviewed HEP, gait mechanics  08/11/17 1430 Done    Acceptance E,D VU,NR Reviewed anterionr hip precautions, HEP, benefits of activity  08/11/17 1329 Done    Acceptance ED RAYMUNDO,NR Reviewed anterior hip precautions  08/10/17 1811 Done   Family Acceptance ED RAYMUNDO,NR Reviewed HEP, gait mechanics  08/11/17 1430 Done    Acceptance ED RAYMUNDO,NR Reviewed anterior hip precautions  08/10/17 1811 Done               Point: Precautions (Done)    Learning Progress Summary    Learner Readiness Method Response Comment Documented by Status   Patient Acceptance E,D,H RAYMUNDO,ALYSSA Educated on hip precautions and correct car t/f technique. Issued and reviewed written/illustrated HEP.  08/12/17 1118 Done    Acceptance E,D VU,NR Reviewed HEP, gait mechanics  08/11/17 1430 Done    Acceptance E,D VU,NR Reviewed anterionr hip precautions, HEP, benefits of activity  08/11/17 1329 Done    Acceptance ED RAYMUNDO,NR Reviewed anterior hip  precautions  08/10/17 1811 Done   Family Acceptance MG BHAKTA,NR Reviewed HEP, gait mechanics  08/11/17 1430 Done    Acceptance MG BHAKTA,GABY Reviewed anterior hip precautions  08/10/17 1811 Done                      User Key     Initials Effective Dates Name Provider Type Discipline    LR 06/19/15 -  Sophia Lugo, PT Physical Therapist PT    MJ 03/14/16 -  Alexis Garza, PT Physical Therapist PT                    IP PT Goals       08/12/17 1042 08/11/17 1431 08/11/17 1329    Bed Mobility PT LTG    Bed Mobility PT LTG, Date Established 08/10/17  -LR      Bed Mobility PT LTG, Time to Achieve 5 days  -LR      Bed Mobility PT LTG, Activity Type supine to sit/sit to supine  -LR      Bed Mobility PT LTG, [place] Level conditional independence  -LR      Bed Mobility PT LTG, Date Goal Reviewed 08/12/17  -LR 08/11/17  -MJ 08/11/17  -    Bed Mobility PT LTG, Outcome goal not met  -LR goal ongoing  -MJ goal ongoing  -MJ    Bed Mobility PT LTG, Reason Goal Not Met discharged from facility  -LR      Transfer Training PT LTG    Transfer Training PT LTG, Date Established 08/10/17  -LR      Transfer Training PT LTG, Time to Achieve 5 days  -LR      Transfer Training PT LTG, Activity Type sit to stand/stand to sit  -LR      Transfer Training PT LTG, [place] Level conditional independence  -LR      Transfer Training PT LTG, Assist Device walker, rolling  -LR      Transfer Training PT  LTG, Date Goal Reviewed 08/12/17  -LR 08/11/17  -MJ 08/11/17  -MJ    Transfer Training PT LTG, Outcome goal not met  -LR goal ongoing  -MJ goal ongoing  -MJ    Transfer Training PT LTG, Reason Goal Not Met discharged from facility  -LR      Gait Training PT LTG    Gait Training Goal PT LTG, Date Established 08/10/17  -LR      Gait Training Goal PT LTG, Time to Achieve 5 days  -LR      Gait Training Goal PT LTG, [place] Level conditional independence  -LR      Gait Training Goal PT LTG, Assist Device walker, rolling  -LR       Gait Training Goal PT LTG, Distance to Achieve 500 feet  -LR      Gait Training Goal PT LTG, Date Goal Reviewed 08/12/17  -LR 08/11/17  -MJ 08/11/17  -MJ    Gait Training Goal PT LTG, Outcome goal not met  -LR goal ongoing  -MJ goal ongoing  -MJ    Gait Training Goal PT LTG, Reason Goal Not Met discharged from facility  -LR        08/10/17 1812          Bed Mobility PT LTG    Bed Mobility PT LTG, Date Established 08/10/17  -MJ      Bed Mobility PT LTG, Time to Achieve 5 days  -MJ      Bed Mobility PT LTG, Activity Type supine to sit/sit to supine  -MJ      Bed Mobility PT LTG, [place] Level conditional independence  -MJ      Transfer Training PT LTG    Transfer Training PT LTG, Date Established 08/10/17  -MJ      Transfer Training PT LTG, Time to Achieve 5 days  -MJ      Transfer Training PT LTG, Activity Type sit to stand/stand to sit  -MJ      Transfer Training PT LTG, [place] Level conditional independence  -MJ      Transfer Training PT LTG, Assist Device walker, rolling  -MJ      Gait Training PT LTG    Gait Training Goal PT LTG, Date Established 08/10/17  -MJ      Gait Training Goal PT LTG, Time to Achieve 5 days  -MJ      Gait Training Goal PT LTG, [place] Level conditional independence  -MJ      Gait Training Goal PT LTG, Assist Device walker, rolling  -MJ      Gait Training Goal PT LTG, Distance to Achieve 500 feet  -MJ        User Key  (r) = Recorded By, (t) = Taken By, (c) = Cosigned By    Initials Name Provider Type    LR Sophia Lugo, PT Physical Therapist    MJ Alexis Garza, PT Physical Therapist              Adult Rehabilitation Note       08/12/17 1042 08/11/17 1336 08/11/17 1101    Rehab Assessment/Intervention    Discipline physical therapist  -LR physical therapist  -MJ physical therapist  -MJ    Document Type therapy note (daily note);discharge summary  -LR therapy note (daily note)  -MJ therapy note (daily note)  -MJ    Subjective Information agree to therapy;no complaints    denies pain at rest  -LR agree to therapy;complains of;pain  -MJ agree to therapy;complains of;pain  -MJ    Patient Effort, Rehab Treatment good  -LR good  -MJ good  -MJ    Precautions/Limitations fall precautions;anterior hip precautions- left;other (see comments)   very [place]  -LR fall precautions;anterior hip precautions- left;other (see comments)   very [place]  -MJ fall precautions;anterior hip precautions- left   very [place]  -MJ    Recorded by [LR] Sophia Lugo, PT [MJ] Alexis Garza, PT [MJ] Alexis Garza, PT    Pain Assessment    Pain Assessment 0-10  -LR 0-10  -MJ 0-10  -MJ    Pain Score 0  -LR 2  -MJ 3  -MJ    Post Pain Score 2  -LR 0  -MJ 2  -MJ    Pain Type Acute pain  -LR Acute pain  -MJ Acute pain  -MJ    Pain Location Hip  -LR Hip  -MJ Hip  -MJ    Pain Orientation Left;Anterior  -LR Left  -MJ Left  -MJ    Pain Intervention(s) Repositioned;Ambulation/increased activity  -LR Repositioned;Ambulation/increased activity  -MJ Repositioned;Ambulation/increased activity  -MJ    Recorded by [LR] Sophia Lugo, PT [MJ] Alexis Garza, PT [MJ] Alexis Garza, PT    Cognitive Assessment/Intervention    Current Cognitive/Communication Assessment functional  -LR functional  -MJ functional  -MJ    Orientation Status oriented x 4;required verbal cueing (specifiy in comments)  -LR oriented x 4  -MJ oriented x 4  -MJ    Follows Commands/Answers Questions 100% of the time;able to follow single-step instructions;needs cueing;needs repetition  -% of the time;able to follow multi-step instructions;needs cueing  -% of the time;able to follow multi-step instructions;needs cueing  -MJ    Personal Safety WNL/WFL  -LR WNL/WFL  -MJ WNL/WFL  -MJ    Recorded by [LR] Sophia Lugo, PT [MJ] Alexis Garza, PT [MJ] Alexis Garza, PT    Mobility Assessment/Training    Extremity Weight-Bearing Status left lower extremity  -LR left lower extremity  -MJ left lower extremity  -MJ    Left Lower Extremity Weight-Bearing  weight-bearing as tolerated  -LR weight-bearing as tolerated  -MJ weight-bearing as tolerated  -MJ    Recorded by [LR] Sophia Lugo, PT [MJ] Alexis Garza, PT [MJ] Alexis Garza, PT    Bed Mobility, Assessment/Treatment    Bed Mobility, Assistive Device bed rails;head of bed elevated  -LR bed rails;head of bed elevated  -MJ bed rails;head of bed elevated  -MJ    Bed Mob, Supine to Sit, [place] verbal cues required;contact guard assist  -LR not tested   Pt sitting EOB with OT upon arrival  -MJ not tested   Pt UIC  -MJ    Bed Mob, Sit to Supine, [place] not tested   UIC at end of treatment.   -LR minimum assist (75% patient effort);verbal cues required  -MJ minimum assist (75% patient effort);verbal cues required  -MJ    Bed Mobility, Safety Issues decreased use of legs for bridging/pushing;decreased use of arms for pushing/pulling  -LR decreased use of legs for bridging/pushing  -MJ decreased use of legs for bridging/pushing  -MJ    Bed Mobility, Impairments ROM decreased;strength decreased;pain  -LR ROM decreased;strength decreased;pain  -MJ ROM decreased;strength decreased;pain  -MJ    Bed Mobility, Comment Verbal cues for correct technique with leg . CGA only to trunk.   -LR Verbal cues for sequencing, assist with L LE into bed  -MJ Verbal cues for sequencing, assist with L LE, increased time and effort to perform  -MJ    Recorded by [LR] Sophia Lugo, PT [MJ] Alexis Garza, PT [MJ] Alexis Garza, PT    Transfer Assessment/Treatment    Transfers, Sit-Stand [place] verbal cues required;contact guard assist  -LR contact guard assist;verbal cues required  -MJ contact guard assist;verbal cues required  -MJ    Transfers, Stand-Sit [place] verbal cues required;contact guard assist  -LR contact guard assist;verbal cues required  -MJ contact guard assist;verbal cues required  -MJ    Transfers, Sit-Stand-Sit, Assist Device rolling walker  -LR rolling walker  -MJ rolling walker  -MJ     Toilet Transfer, [place]   contact guard assist;verbal cues required  -MJ    Toilet Transfer, Assistive Device   bedside commode without drop arms;rolling walker  -MJ    Transfer, Safety Issues sequencing ability decreased;step length decreased;weight-shifting ability decreased  -LR step length decreased;weight-shifting ability decreased  -MJ step length decreased;weight-shifting ability decreased  -MJ    Transfer, Impairments ROM decreased;strength decreased;pain  -LR ROM decreased;strength decreased;pain  -MJ ROM decreased;strength decreased;pain  -MJ    Transfer, Comment Verbal cues to push up from bed to stand and to reach back for chair to lower into sitting. Verbal cues to step L LE out before t/f for comfort.   -LR Verbal cues for correct hand placement and to step L LE out prior to t/f  -MJ Verbal cues for correct hand placement and to step L LE out prior to t/f. Assisted pt to bathroom, verbal cues to reach back for BSC prior to t/f.  -MJ    Recorded by [LR] Sophia Lugo, PT [MJ] Alexis Garza PT [MJ] Alexis Garza, PT    Gait Assessment/Treatment    Gait, [place] Level verbal cues required;contact guard assist  -LR contact guard assist;verbal cues required  -MJ contact guard assist;verbal cues required  -MJ    Gait, Assistive Device rolling walker  -LR rolling walker  -MJ rolling walker  -MJ    Gait, Distance (Feet) 200  -  -  -MJ    Gait, Gait Pattern Analysis swing-through gait  -LR swing-through gait  -MJ swing-to gait  -MJ    Gait, Gait Deviations left:;antalgic;decreased heel strike;forward flexed posture;step length decreased;toe-to-floor clearance decreased;weight-shifting ability decreased  -LR left:;antalgic;jan[place] decreased;decreased heel strike;forward flexed posture;step length decreased;weight-shifting ability decreased  -MJ left:;antalgic;jan[place] decreased;step length decreased;weight-shifting ability decreased;forward flexed posture  -MJ    Gait, Safety Issues  sequencing ability decreased;step length decreased;weight-shifting ability decreased  -LR step length decreased;weight-shifting ability decreased  -MJ step length decreased;weight-shifting ability decreased  -MJ    Gait, Impairments ROM decreased;strength decreased;pain  -LR ROM decreased;strength decreased;pain  -MJ ROM decreased;strength decreased;pain  -MJ    Gait, Comment Patient initiated gait with step to gait pattern at slow pace with no heel contact on L. With cues for correction, patient was able to progress to step through gait pattern with increased weight bearing through L heel. Gait limited by pain and fatigue.   -LR Pt demo step through gait pattern at slow pace. Pt tends to toe walk on L due to pain, mild improvement to heel strike with cues and practice. Cues to increase LE weight bearing, no improvement. Gait limited by pain and fatigue  -MJ Pt initially demo step to gait pattern at slow pace, moments of progression to step through with practice but regressed with fatigue. Cues to  feet during turn to avoid pivoting on hip. Gait limited by pain and fatigue  -MJ    Recorded by [LR] Sophia Lugo, PT [MJ] Alexis Garza, PT [MJ] Alexis Garza, PT    Stairs Assessment/Treatment    Stairs, Comment Patient reported she has ramp access to home.   -LR      Recorded by [LR] Sophia Lugo, PT      Therapy Exercises    Exercise Protocols total hip   anterior  -LR total hip   anterior revision  -MJ total hip   anterior revision  -MJ    Total Hip Exercises left:;15 reps;completed protocol;with assist;ankle pumps/circles;quad set;glut set;heel slides;hip abduction;SAQ;LAQ;SLR   cues for technique; min assist hip abd, heel slides  -LR left:;15 reps;completed protocol;ankle pumps/circles;quad set;glut set;heel slides;hip abduction;SAQ;LAQ;hip flexion;SLR   Cues for technique. David w/ SLR, hip abd  -MJ left:;15 reps;completed protocol;ankle pumps/circles;quad set;glut set;heel slides;hip  abduction;SAQ;LAQ;SLR;hip flexion   Cues for technique. [place] w/ SLR, hip abd  -MJ    Recorded by [LR] Sophia Lugo, PT [MJ] Alexis Garza, PT [MJ] Alexis Garza PT    Positioning and Restraints    Pre-Treatment Position in bed  -LR in bed  -MJ sitting in chair/recliner  -MJ    Post Treatment Position chair  -LR bed  -MJ bed  -MJ    In Bed  notified nsg;supine;call light within reach;encouraged to call for assist;with family/caregiver  -MJ notified nsg;supine;call light within reach;encouraged to call for assist  -MJ    In Chair notified nsg;reclined;sitting;call light within reach;encouraged to call for assist;exit alarm on;with nsg;legs elevated  -LR      Recorded by [LR] Sophia Lugo, PT [MJ] Alexis Garza PT [MJ] Alexis Garza PT      User Key  (r) = Recorded By, (t) = Taken By, (c) = Cosigned By    Initials Name Effective Dates    LR Sophia Lugo, PT 06/19/15 -     MJ Alexis Garza PT 03/14/16 -           PT Recommendation and Plan  Anticipated Discharge Disposition: home with assist, home with home health  Demonstrates Need for Referral to Another Service: home health care  Planned Therapy Interventions: balance training, bed mobility training, gait training, home exercise program, patient/family education, stair training, strengthening, transfer training  PT Frequency: 2 times/day  Plan of Care Review  Plan Of Care Reviewed With: patient  Progress: progress toward functional goals as expected  Outcome Summary/Follow up Plan: Patient increased gait distance to 200 feet with improved gait mechanics with cues for correction. Only required CGA to supervision for all mobility. Should be able to safely mobilize about the home with assist from daughter upon d/c home today.           Outcome Measures       08/12/17 1042 08/11/17 1336 08/11/17 1330    How much help from another person do you currently need...    Turning from your back to your side while in flat bed without using bedrails? 3  -LR 3   -MJ     Moving from lying on back to sitting on the side of a flat bed without bedrails? 3  -LR 3  -MJ     Moving to and from a bed to a chair (including a wheelchair)? 3  -LR 3  -MJ     Standing up from a chair using your arms (e.g., wheelchair, bedside chair)? 3  -LR 3  -MJ     Climbing 3-5 steps with a railing? 3  -LR 3  -MJ     To walk in hospital room? 3  -LR 3  -MJ     AM-PAC 6 Clicks Score 18  -LR 18  -MJ     How much help from another is currently needed...    Putting on and taking off regular lower body clothing?   3  -AC    Bathing (including washing, rinsing, and drying)   3  -AC    Toileting (which includes using toilet bed pan or urinal)   3  -AC    Putting on and taking off regular upper body clothing   4  -AC    Taking care of personal grooming (such as brushing teeth)   4  -AC    Eating meals   4  -AC    Score   21  -AC    Functional Assessment    Outcome Measure Options AM-PAC 6 Clicks Basic Mobility (PT)  -LR AM-PAC 6 Clicks Basic Mobility (PT)  -MJ AM-PAC 6 Clicks Daily Activity (OT)  -AC      08/11/17 1101 08/10/17 1715       How much help from another person do you currently need...    Turning from your back to your side while in flat bed without using bedrails? 3  -MJ 3  -MJ     Moving from lying on back to sitting on the side of a flat bed without bedrails? 3  -MJ 3  -MJ     Moving to and from a bed to a chair (including a wheelchair)? 3  -MJ 3  -MJ     Standing up from a chair using your arms (e.g., wheelchair, bedside chair)? 3  -MJ 3  -MJ     Climbing 3-5 steps with a railing? 2  -MJ 2  -MJ     To walk in hospital room? 3  -MJ 3  -MJ     AM-PAC 6 Clicks Score 17  -MJ 17  -MJ     Functional Assessment    Outcome Measure Options AM-PAC 6 Clicks Basic Mobility (PT)  -MJ AM-PAC 6 Clicks Basic Mobility (PT)  -MJ       User Key  (r) = Recorded By, (t) = Taken By, (c) = Cosigned By    Initials Name Provider Type    AC Mary Bishop, OT Occupational Therapist    LR Sophia Lugo, PT  Physical Therapist    ELIAS Garza, PT Physical Therapist           Time Calculation:         PT Charges       08/12/17 1120          Time Calculation    Start Time 1042  -LR      PT Received On 08/12/17  -LR      PT Goal Re-Cert Due Date 08/20/17  -LR      Time Calculation- PT    Total Timed Code Minutes- PT 23 minute(s)  -LR        User Key  (r) = Recorded By, (t) = Taken By, (c) = Cosigned By    Initials Name Provider Type    LR Sophia Lugo, PT Physical Therapist          Therapy Charges for Today     Code Description Service Date Service Provider Modifiers Qty    43658940563 HC GAIT TRAINING EA 15 MIN 8/12/2017 Sophia Lugo, PT GP 1    05989184367 HC PT THER PROC EA 15 MIN 8/12/2017 Sophia Lugo, PT GP 1          PT G-Codes  Outcome Measure Options: AM-PAC 6 Clicks Basic Mobility (PT)    PT Discharge Summary  Anticipated Discharge Disposition: home with assist, home with home health  Reason for Discharge: Discharge from facility  Outcomes Achieved: Patient able to partially acheive established goals  Discharge Destination: Home with assist, Home with home health    Sophia Lugo, PT  8/12/2017

## 2017-08-14 ENCOUNTER — TRANSITIONAL CARE MANAGEMENT TELEPHONE ENCOUNTER (OUTPATIENT)
Dept: INTERNAL MEDICINE | Facility: CLINIC | Age: 82
End: 2017-08-14

## 2017-08-14 LAB
BACTERIA SPEC AEROBE CULT: NORMAL
GRAM STN SPEC: NORMAL
GRAM STN SPEC: NORMAL

## 2017-08-14 NOTE — THERAPY DISCHARGE NOTE
Acute Care - Occupational Therapy Discharge Summary  [facility]     Patient Name: Marcelino Mesa  : 1927  MRN: [id number]    Today's Date: 2017  Onset of Illness/Injury or Date of Surgery Date: 08/10/17    Date of Referral to OT: 08/10/17  Referring Physician: MD Nitin      Admit Date: 8/10/2017        OT Recommendation and Plan    Visit Dx:    ICD-10-CM ICD-9-CM   1. Impaired functional mobility, balance, gait, and endurance Z74.09 V49.89   2. S/P revision of total hip Z96.649 V43.64   3. Impaired mobility and ADLs Z74.09 799.89                     OT Goals       17 1434          Bed Mobility OT LTG    Bed Mobility OT LTG, Date Established 17  -AC      Bed Mobility OT LTG, Time to Achieve by discharge  -AC      Bed Mobility OT LTG, Activity Type supine to sit/sit to supine  -AC      Bed Mobility OT LTG, [place] Level supervision required  -AC      Bed Mobility OT LTG, Assist Device leg   -AC      Transfer Training OT LTG    Transfer Training OT LTG, Date Established 17  -AC      Transfer Training OT LTG, Time to Achieve by discharge  -AC      Transfer Training OT LTG, Activity Type tub  -AC      Transfer Training OT LTG, [place] Level contact guard assist  -AC      Transfer Training OT LTG, Assist Device tub bench   leg   -AC      Patient Education OT LTG    Patient Education OT LTG, Date Established 17  -AC      Patient Education OT LTG, Time to Achieve by discharge  -AC      Patient Education OT LTG, Education Type home safety;adaptive equipment mgmt  -AC      Patient Education OT LTG, Education Understanding demonstrates adequately;verbalizes understanding  -AC      LB Dressing OT LTG    LB Dressing Goal OT LTG, Date Established 17  -AC      LB Dressing Goal OT LTG, Time to Achieve by discharge  -AC      LB Dressing Goal OT LTG, [place] Level set up required;supervision required  -AC      LB Dressing Goal OT LTG, Adaptive Equipment  reacher;shoe horn, long handled;sock-aid  -AC        User Key  (r) = Recorded By, (t) = Taken By, (c) = Cosigned By    Initials Name Provider Type    AC Mary Bishop OT Occupational Therapist                Outcome Measures       08/12/17 1042          How much help from another person do you currently need...    Turning from your back to your side while in flat bed without using bedrails? 3  -LR      Moving from lying on back to sitting on the side of a flat bed without bedrails? 3  -LR      Moving to and from a bed to a chair (including a wheelchair)? 3  -LR      Standing up from a chair using your arms (e.g., wheelchair, bedside chair)? 3  -LR      Climbing 3-5 steps with a railing? 3  -LR      To walk in hospital room? 3  -LR      AM-PAC 6 Clicks Score 18  -LR      Functional Assessment    Outcome Measure Options AM-PAC 6 Clicks Basic Mobility (PT)  -LR        User Key  (r) = Recorded By, (t) = Taken By, (c) = Cosigned By    Initials Name Provider Type    LR Sophia Lugo, PT Physical Therapist              OT Discharge Summary  Reason for Discharge: Discharge from facility  Outcomes Achieved: Refer to plan of care for updates on goals achieved  Discharge Destination: Home      Leanna Scott OT  8/14/2017

## 2017-08-14 NOTE — OUTREACH NOTE
[facility] call completed.  Please refer to TCM call flowsheet for call documentation.  Appointment was declined.  Patient is bruising easily.  They would like plavix decreased and aspirin increased to decrease bruising from plavix, please advise.

## 2017-08-16 ENCOUNTER — TELEPHONE (OUTPATIENT)
Dept: INTERNAL MEDICINE | Facility: CLINIC | Age: 82
End: 2017-08-16

## 2017-08-16 NOTE — TELEPHONE ENCOUNTER
Called [facility] for pt status since home from hospital 8/12. She states pt doing well, up moving with HH PT, pain almost gone, has not had to take any pain med or tylenol since home. They're asking if pt can stop plavix and get back on baby aspirin to decrease bruising from plavix, pt bruising easily. Currently taking plavix 75 mg 1 tab daily. Advised needs OX no later than Fri 8/25. [facility] verb understanding. She will encourage pt and call back to schedule.

## 2017-08-23 ENCOUNTER — HOSPITAL ENCOUNTER (EMERGENCY)
Facility: HOSPITAL | Age: 82
Discharge: HOME | End: 2017-08-23
Attending: EMERGENCY MEDICINE | Admitting: EMERGENCY MEDICINE

## 2017-08-23 VITALS
OXYGEN SATURATION: 97 % | WEIGHT: 119 LBS | SYSTOLIC BLOOD PRESSURE: 131 MMHG | TEMPERATURE: 98.2 F | DIASTOLIC BLOOD PRESSURE: 88 MMHG | RESPIRATION RATE: 16 BRPM | HEIGHT: 62 IN | HEART RATE: 80 BPM | BODY MASS INDEX: 21.9 KG/M2

## 2017-08-23 DIAGNOSIS — IMO0001 WOUND INFECTION AFTER SURGERY, INITIAL ENCOUNTER: Primary | ICD-10-CM

## 2017-08-23 LAB
ALBUMIN SERPL-MCNC: 3.8 G/DL (ref 3.2–4.8)
ALBUMIN/GLOB SERPL: 1.3 G/DL (ref 1.5–2.5)
ALP SERPL-CCNC: 142 U/L (ref 25–100)
ALT SERPL W P-5'-P-CCNC: 27 U/L (ref 7–40)
ANION GAP SERPL CALCULATED.3IONS-SCNC: 7 MMOL/L (ref 3–11)
AST SERPL-CCNC: 23 U/L (ref 0–33)
BASOPHILS # BLD AUTO: 0.1 10*3/MM3 (ref 0–0.2)
BASOPHILS NFR BLD AUTO: 1 % (ref 0–1)
BILIRUB SERPL-MCNC: 0.4 MG/DL (ref 0.3–1.2)
BUN BLD-MCNC: 9 MG/DL (ref 9–23)
BUN/CREAT SERPL: 12.9 (ref 7–25)
CALCIUM SPEC-SCNC: 8.9 MG/DL (ref 8.7–10.4)
CHLORIDE SERPL-SCNC: 107 MMOL/L (ref 99–109)
CO2 SERPL-SCNC: 27 MMOL/L (ref 20–31)
CREAT BLD-MCNC: 0.7 MG/DL (ref 0.6–1.3)
CRP SERPL-MCNC: 2.55 MG/DL (ref 0–1)
DEPRECATED RDW RBC AUTO: 50.7 FL (ref 37–54)
EOSINOPHIL # BLD AUTO: 0.57 10*3/MM3 (ref 0–0.3)
EOSINOPHIL NFR BLD AUTO: 6 % (ref 0–3)
ERYTHROCYTE [DISTWIDTH] IN BLOOD BY AUTOMATED COUNT: 14.9 % (ref 11.3–14.5)
ERYTHROCYTE [SEDIMENTATION RATE] IN BLOOD: 35 MM/HR (ref 0–30)
GFR SERPL CREATININE-BSD FRML MDRD: 79 ML/MIN/1.73
GLOBULIN UR ELPH-MCNC: 2.9 GM/DL
GLUCOSE BLD-MCNC: 134 MG/DL (ref 70–100)
HCT VFR BLD AUTO: 32.8 % (ref 34.5–44)
HGB BLD-MCNC: 10.4 G/DL (ref 11.5–15.5)
IMM GRANULOCYTES # BLD: 0.04 10*3/MM3 (ref 0–0.03)
IMM GRANULOCYTES NFR BLD: 0.4 % (ref 0–0.6)
LYMPHOCYTES # BLD AUTO: 1.49 10*3/MM3 (ref 0.6–4.8)
LYMPHOCYTES NFR BLD AUTO: 15.6 % (ref 24–44)
MCH RBC QN AUTO: 29.9 PG (ref 27–31)
MCHC RBC AUTO-ENTMCNC: 31.7 G/DL (ref 32–36)
MCV RBC AUTO: 94.3 FL (ref 80–99)
MONOCYTES # BLD AUTO: 0.65 10*3/MM3 (ref 0–1)
MONOCYTES NFR BLD AUTO: 6.8 % (ref 0–12)
NEUTROPHILS # BLD AUTO: 6.7 10*3/MM3 (ref 1.5–8.3)
NEUTROPHILS NFR BLD AUTO: 70.2 % (ref 41–71)
PLATELET # BLD AUTO: 646 10*3/MM3 (ref 150–450)
PMV BLD AUTO: 8.2 FL (ref 6–12)
POTASSIUM BLD-SCNC: 3.7 MMOL/L (ref 3.5–5.5)
PROT SERPL-MCNC: 6.7 G/DL (ref 5.7–8.2)
RBC # BLD AUTO: 3.48 10*6/MM3 (ref 3.89–5.14)
SODIUM BLD-SCNC: 141 MMOL/L (ref 132–146)
WBC NRBC COR # BLD: 9.55 10*3/MM3 (ref 3.5–10.8)

## 2017-08-23 PROCEDURE — 87070 CULTURE OTHR SPECIMN AEROBIC: CPT | Performed by: EMERGENCY MEDICINE

## 2017-08-23 PROCEDURE — 87186 SC STD MICRODIL/AGAR DIL: CPT | Performed by: EMERGENCY MEDICINE

## 2017-08-23 PROCEDURE — 99284 EMERGENCY DEPT VISIT MOD MDM: CPT

## 2017-08-23 PROCEDURE — 25010000002 CEFTRIAXONE PER 250 MG: Performed by: EMERGENCY MEDICINE

## 2017-08-23 PROCEDURE — 87205 SMEAR GRAM STAIN: CPT | Performed by: EMERGENCY MEDICINE

## 2017-08-23 PROCEDURE — 86140 C-REACTIVE PROTEIN: CPT | Performed by: EMERGENCY MEDICINE

## 2017-08-23 PROCEDURE — 85652 RBC SED RATE AUTOMATED: CPT | Performed by: EMERGENCY MEDICINE

## 2017-08-23 PROCEDURE — 96365 THER/PROPH/DIAG IV INF INIT: CPT

## 2017-08-23 PROCEDURE — 87077 CULTURE AEROBIC IDENTIFY: CPT | Performed by: EMERGENCY MEDICINE

## 2017-08-23 PROCEDURE — 36415 COLL VENOUS BLD VENIPUNCTURE: CPT

## 2017-08-23 PROCEDURE — 80053 COMPREHEN METABOLIC PANEL: CPT | Performed by: EMERGENCY MEDICINE

## 2017-08-23 PROCEDURE — 85025 COMPLETE CBC W/AUTO DIFF WBC: CPT | Performed by: EMERGENCY MEDICINE

## 2017-08-23 RX ORDER — MUPIROCIN CALCIUM 20 MG/G
CREAM TOPICAL 3 TIMES DAILY
Qty: 30 G | Refills: 0 | Status: SHIPPED | OUTPATIENT
Start: 2017-08-23 | End: 2017-10-19

## 2017-08-23 RX ORDER — CEPHALEXIN 500 MG/1
500 CAPSULE ORAL 3 TIMES DAILY
Qty: 24 CAPSULE | Refills: 0 | Status: SHIPPED | OUTPATIENT
Start: 2017-08-23 | End: 2017-08-31

## 2017-08-23 RX ORDER — SODIUM CHLORIDE 0.9 % (FLUSH) 0.9 %
10 SYRINGE (ML) INJECTION AS NEEDED
Status: DISCONTINUED | OUTPATIENT
Start: 2017-08-23 | End: 2017-08-24 | Stop reason: HOSPADM

## 2017-08-23 RX ORDER — CEFTRIAXONE SODIUM 1 G/50ML
1 INJECTION, SOLUTION INTRAVENOUS ONCE
Status: COMPLETED | OUTPATIENT
Start: 2017-08-23 | End: 2017-08-23

## 2017-08-23 RX ADMIN — Medication 10 ML: at 22:06

## 2017-08-23 RX ADMIN — CEFTRIAXONE SODIUM 1 G: 1 INJECTION, SOLUTION INTRAVENOUS at 22:09

## 2017-08-23 NOTE — ED PROVIDER NOTES
Subjective   HPI Comments: Marcelino Mesa is a 89 y.o.female s/p left hip replacement surgery by Dr. Taveras 10 days ago. She reports that her home health nurse was at her house earlier today for evaluation. She was referred to the ED for a possible infection due to a purulent drainage. Her family also complains of erythema and swelling surrounding the surgery site. She has had a cough for the last 48 hours. She denies fever, shortness of breath, or any other acute complaints at this time.    Patient is a 89 y.o. female presenting with wound check.   History provided by:  Patient  Wound Check   Location:  Left hip  Quality:  S/p hip replacement 10 days ago  Severity:  Moderate  Onset quality:  Sudden  Duration:  1 day  Timing:  Constant  Progression:  Unchanged  Chronicity:  New  Context:  Marcelino Mesa is a 89 y.o.female s/p left hip replacement surgery by Dr. Taveras 10 days ago. She reports that her home health nurse was at her house earlier today for evaluation. She was referred to the ED for a possible infection due to a purulent drainage. Her family also complains of erythema and swelling surrounding the surgery site.  Relieved by:  Nothing  Worsened by:  Nothing  Ineffective treatments:  None tried  Associated symptoms: cough    Associated symptoms: no fever and no shortness of breath    Cough:     Cough characteristics:  Dry    Severity:  Moderate    Onset quality:  Sudden    Duration:  48 hours    Timing:  Intermittent    Progression:  Unchanged    Chronicity:  New      Review of Systems   Constitutional: Negative for fever.   Respiratory: Positive for cough. Negative for shortness of breath.    Skin: Positive for color change (erythema and swelling surrounding the surgical site) and wound (s/p hip replacement surgery).   All other systems reviewed and are negative.      Past Medical History:   Diagnosis Date   • Amaurosis fugax of left eye 06/19/2017    5 minutes at home   • Cataracts, bilateral     removed    • Compression fracture of L1 lumbar vertebra    • Generalized osteoarthritis     Bilateral knee replacements   • Glaucoma     h/o   • Hearing impairment     Wears hearing aids   • Herpes zoster    • Hip fracture, left    • Tejon (hard of hearing)    • Hyperlipidemia    • Hypertension    • Iron deficiency    • Low back pain    • Macular degeneration    • PONV (postoperative nausea and vomiting)     preprocedural medications will help    • Scoliosis    • Vitamin D deficiency    • Wears glasses    • Wears hearing aid     bilat ears   • Wrist fracture, left        Allergies   Allergen Reactions   • Asa [Aspirin] GI Bleeding   • Lisinopril-Hydrochlorothiazide Rash   • Other Rash     STEROID INJECTION IN BACK       Past Surgical History:   Procedure Laterality Date   • CATARACT EXTRACTION Bilateral    • COLONOSCOPY  2006   • HIP FRACTURE SURGERY Left 2014     hip pinning for acute fracture   • KNEE ARTHROPLASTY UNICOMPARTMENTAL Right 9/22/2016    Procedure: RIGHT KNEE ARTHROPLASTY UNICOMPARTMENTAL;  Surgeon: Jesse Taveras MD;  Location:  [facility];  Service:    • KNEE ARTHROPLASTY UNICOMPARTMENTAL Left 12/1/2016    Procedure: LEFT KNEE ARTHROPLASTY UNICOMPARTMENTAL;  Surgeon: Jesse Taveras MD;  Location:  [facility];  Service:    • TONSILLECTOMY  1947   • TOTAL HIP ARTHROPLASTY Left 8/10/2017    Procedure: REMOVAL OF HARDWARE LEFT HIP, LEFT ANTERIOR TOTAL HIP ARTHROPLASTY;  Surgeon: Jesse Taveras MD;  Location:  [facility];  Service:        Family History   Problem Relation Age of Onset   • Alzheimer's disease Mother    • No Known Problems Father    • Arthritis Sister    • Breast cancer Sister    • Colon cancer Sister    • Lung cancer Brother    • Heart disease Other      2009   • Asthma Sister    • Colon cancer Sister    • Breast cancer Sister    • Lumbar disc disease Sister    • Skin cancer Brother        Social History     Social History   • Marital status:      Spouse name: N/A   • Number of children: N/A   •  Years of education: N/A     Social History Main Topics   • Smoking status: Never Smoker   • Smokeless tobacco: Never Used   • Alcohol use No   • Drug use: No   • Sexual activity: Defer     Other Topics Concern   • None     Social History Narrative    Domestic life-      [place]                                   good support from local daughter - who lives in [facility] - 1 hour away        Spiritism- Yazidism        Sleep hygiene- 7 hours nightly, adequate         Caffeine use- 2 cups coffee daily        Exercise habits- Walking daily as tolerated        Diet- Prudent well-balanced diet         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 She appears well-developed and well-nourished. No distress.   Alert, oriented, smiling, conversive. No acute distress.   HENT:   Head: Normocephalic and atraumatic.   Nose: Nose normal.   Mouth/Throat: Oropharynx is clear and moist.   Eyes: Conjunctivae are normal. No scleral icterus.   Neck: Normal range of motion. Neck supple.   Cardiovascular: Normal rate, regular rhythm and normal heart sounds.    No murmur heard.  Pulmonary/Chest: Effort normal and breath sounds normal. No respiratory distress. She has no wheezes. She has no rales.   Abdominal: Soft. Bowel sounds are normal. She exhibits no mass. There is no tenderness. There is no rebound and no guarding.   Musculoskeletal: Normal range of motion. She exhibits tenderness. She exhibits no edema.   Pt has good ROM of left hip without significant pain. Neurovascularly intact in all extremities.   Neurological: She is alert and oriented to person, place, and time.   Skin: Skin is warm and dry. There is erythema.   Mild erythema over the incision on the left hip.  No drainaige currently, however, there is evidence of previous drainage visible. Mild TTP over this same area.   Psychiatric: She has a normal mood and affect. Her behavior is normal.   Nursing note and vitals reviewed.      Procedures         ED Course  ED Course     Recent Results (from the past 24 hour(s))   Comprehensive Metabolic Panel    Collection Time: 08/23/17  7:30 PM   Result Value Ref Range    Glucose 134 (H) 70 - 100 mg/dL    BUN 9 9 - 23 mg/dL    Creatinine 0.70 0.60 - 1.30 mg/dL    Sodium 141 132 - 146 mmol/L    Potassium 3.7 3.5 - 5.5 mmol/L    Chloride 107 99 - 109 mmol/L    CO2 27.0 20.0 - 31.0 mmol/L    Calcium 8.9 8.7 - 10.4 mg/dL    Total Protein 6.7 5.7 - 8.2 g/dL    Albumin 3.80 3.20 - 4.80 g/dL    ALT (SGPT) 27 7 - 40 U/L    AST (SGOT) 23 0 - 33 U/L    Alkaline Phosphatase 142 (H) 25 - 100 U/L    Total Bilirubin 0.4 0.3 - 1.2 mg/dL    eGFR Non African Amer 79 >60 mL/min/1.73    Globulin 2.9 gm/dL    A/G Ratio 1.3 (L) 1.5 - 2.5 g/dL    BUN/Creatinine Ratio 12.9 7.0 - 25.0    Anion Gap 7.0 3.0 - 11.0 mmol/L   CBC Auto Differential    Collection Time: 08/23/17  7:30 PM   Result Value Ref Range    WBC 9.55 3.50 - 10.80 10*3/mm3    RBC 3.48 (L) 3.89 - 5.14 10*6/mm3    Hemoglobin 10.4 (L) 11.5 - 15.5 g/dL    Hematocrit 32.8 (L) 34.5 - 44.0 %    MCV 94.3 80.0 - 99.0 fL    MCH 29.9 27.0 - 31.0 pg    MCHC 31.7 (L) 32.0 - 36.0 g/dL    RDW 14.9 (H) 11.3 - 14.5 %    RDW-SD 50.7 37.0 - 54.0 fl    MPV 8.2 6.0 - 12.0 fL    Platelets 646 (H) 150 - 450 10*3/mm3    Neutrophil % 70.2 41.0 - 71.0 %    Lymphocyte % 15.6 (L) 24.0 - 44.0 %    Monocyte % 6.8 0.0 - 12.0 %    Eosinophil % 6.0 (H) 0.0 - 3.0 %    Basophil % 1.0 0.0 - 1.0 %    Immature Grans % 0.4 0.0 - 0.6 %    Neutrophils, Absolute 6.70 1.50 - 8.30 10*3/mm3    Lymphocytes, Absolute 1.49 0.60 - 4.80 10*3/mm3    Monocytes, Absolute 0.65 0.00 - 1.00 10*3/mm3    Eosinophils, Absolute 0.57 (H) 0.00 - 0.30 10*3/mm3    Basophils, Absolute 0.10 0.00 - 0.20  "10*3/mm3    Immature Grans, Absolute 0.04 (H) 0.00 - 0.03 10*3/mm3     Note: In addition to lab results from this visit, the labs listed above may include labs taken at another facility or during a different encounter within the last 24 hours. Please correlate lab times with ED admission and discharge times for further clarification of the services performed during this visit.    No orders to display     Vitals:    08/23/17 1615 08/23/17 1743 08/23/17 1854 08/23/17 1900   BP: 136/75 151/70 149/77 (!) 116/105   BP Location: Left arm  Left arm    Patient Position: Sitting Sitting Lying    Pulse: 85 79 84    Resp: 16      Temp: 98.2 °F (36.8 °C)      TempSrc: Oral      SpO2: 95% 97% 100% 99%   Weight: 119 lb (54 kg)      Height: 62\" (157.5 cm)        Medications   sodium chloride 0.9 % flush 10 mL (not administered)     ECG/EMG Results (last 24 hours)     ** No results found for the last 24 hours. **                   I discussed the case with Dr. Miller, currently on call for Dr. Taveras.  She is reviewed images of the current infection.  He recommended that we cover the patient with Keflex and she follows up with Dr. Taveras in the office.  Patient is completely nontoxic.  Patient and family are very comfortable with this plan.  We have prescribed Bactroban to be applied daily with dressing changes along with the oral antibiotics.  We have outlined the area of cellulitis so the patient can easily assess if the infection is worsening.  Patient received a phone call from a nurse associated with Dr. Taveras's office who stated the patient follow-up with Dr. Hutson's partner on Friday.  Dr. Tsai also mentioned that the [facility] orthopedic office, Dr. Eber Cedillo, should be available for Friday follow-up also.  Given the patient's age and possibility of progression I recommended this to the patient.      MDM    Final diagnoses:   Wound infection after surgery, initial encounter       Documentation assistance provided by caridad " Rhea Chacon.  Information recorded by the scribe was done at my direction and has been verified and validated by me.     Rhea Chacon  08/23/17 2017       Aramis Roberson DO  08/24/17 0020

## 2017-08-24 LAB — BACTERIA SPEC ANAEROBE CULT: NORMAL

## 2017-08-24 NOTE — DISCHARGE INSTRUCTIONS
Follow-up with Dr. Taveras at next availability.  As you mentioned, his practice is apparently able to see you on Friday.  If not, the [facility] [place] orthopedic office with Dr. Eber Cedillo is available on Friday.  The number for this offices [phone].    Monitor his cellulitis as we discussed.  We have marked the boundary of the cellulitis with skin marker to assist with this.  If you develop fevers, spreading of the cellulitis, increasing pain, or other concerns return to the emergency department immediately    It is important that you change the dressing daily, and apply the Bactroban cream as prescribed.    Please review the medications you are supposed to be taking according to prior physician recommendations. I have not changed your home medications during this visit. If your discharge instructions indicate that I have changed your home medications, this is not the case, and you should disregard. If you have any questions about the medication you should be taking at home, please call your physician.

## 2017-08-26 LAB
BACTERIA SPEC AEROBE CULT: ABNORMAL
GRAM STN SPEC: ABNORMAL
GRAM STN SPEC: ABNORMAL

## 2017-08-31 ENCOUNTER — TELEPHONE (OUTPATIENT)
Dept: EMERGENCY DEPT | Facility: HOSPITAL | Age: 82
End: 2017-08-31

## 2017-09-21 LAB
FUNGUS WND CULT: NORMAL
MYCOBACTERIUM SPEC CULT: NORMAL
NIGHT BLUE STAIN TISS: NORMAL

## 2017-10-02 ENCOUNTER — TELEPHONE (OUTPATIENT)
Dept: INTERNAL MEDICINE | Facility: CLINIC | Age: 82
End: 2017-10-02

## 2017-10-02 NOTE — TELEPHONE ENCOUNTER
----- Message from Sheldon Hinojosa sent at 10/2/2017 11:09 AM EDT -----  Contact: Marcelino Gusman Complaint:  Has come congestion, wanted to schedule her physical.  TGF thought to send her to the [facility], then said to have Heather problem solve over the phone, might not have to go to the clinic.  After the phone call, sent the call up to the front and I'll schedule her physical out after the new year. Marcelino [phone]

## 2017-10-02 NOTE — TELEPHONE ENCOUNTER
Reports increased congestion, cough (clear sputum), and nasal drainage x1 month.  Per TGF - OTC mucinex, Claritin, and Flonase.  Pt verb understanding and wrote instructions down.  F/u appointment with TGF made.

## 2017-10-19 ENCOUNTER — OFFICE VISIT (OUTPATIENT)
Dept: INTERNAL MEDICINE | Facility: CLINIC | Age: 82
End: 2017-10-19

## 2017-10-19 VITALS
RESPIRATION RATE: 16 BRPM | TEMPERATURE: 97.6 F | OXYGEN SATURATION: 95 % | DIASTOLIC BLOOD PRESSURE: 80 MMHG | HEART RATE: 80 BPM | SYSTOLIC BLOOD PRESSURE: 124 MMHG | WEIGHT: 117 LBS | BODY MASS INDEX: 21.4 KG/M2

## 2017-10-19 DIAGNOSIS — D50.8 OTHER IRON DEFICIENCY ANEMIA: Primary | ICD-10-CM

## 2017-10-19 DIAGNOSIS — E78.00 PURE HYPERCHOLESTEROLEMIA: ICD-10-CM

## 2017-10-19 DIAGNOSIS — IMO0001 POSTOPERATIVE WOUND INFECTION, SUBSEQUENT ENCOUNTER: ICD-10-CM

## 2017-10-19 DIAGNOSIS — M15.9 GENERALIZED OSTEOARTHRITIS: ICD-10-CM

## 2017-10-19 DIAGNOSIS — M17.0 PRIMARY OSTEOARTHRITIS OF BOTH KNEES: ICD-10-CM

## 2017-10-19 DIAGNOSIS — I10 ESSENTIAL HYPERTENSION: ICD-10-CM

## 2017-10-19 DIAGNOSIS — M41.9 SCOLIOSIS OF LUMBAR SPINE, UNSPECIFIED SCOLIOSIS TYPE: ICD-10-CM

## 2017-10-19 DIAGNOSIS — R26.9 ABNORMAL GAIT: ICD-10-CM

## 2017-10-19 PROBLEM — S72.009A: Status: RESOLVED | Noted: 2017-08-10 | Resolved: 2017-10-19

## 2017-10-19 PROBLEM — Z96.642 STATUS POST TOTAL REPLACEMENT OF LEFT HIP: Status: RESOLVED | Noted: 2017-08-10 | Resolved: 2017-10-19

## 2017-10-19 PROBLEM — T81.49XA POSTOPERATIVE WOUND INFECTION: Status: ACTIVE | Noted: 2017-10-19

## 2017-10-19 LAB
ALBUMIN SERPL-MCNC: 4.2 G/DL (ref 3.2–4.8)
ALBUMIN/GLOB SERPL: 1.6 G/DL (ref 1.5–2.5)
ALP SERPL-CCNC: 126 U/L (ref 25–100)
ALT SERPL W P-5'-P-CCNC: 14 U/L (ref 7–40)
ANION GAP SERPL CALCULATED.3IONS-SCNC: 6 MMOL/L (ref 3–11)
AST SERPL-CCNC: 20 U/L (ref 0–33)
BASOPHILS # BLD AUTO: 0.06 10*3/MM3 (ref 0–0.2)
BASOPHILS NFR BLD AUTO: 1 % (ref 0–1)
BILIRUB SERPL-MCNC: 0.7 MG/DL (ref 0.3–1.2)
BUN BLD-MCNC: 11 MG/DL (ref 9–23)
BUN/CREAT SERPL: 13.8 (ref 7–25)
CALCIUM SPEC-SCNC: 9.5 MG/DL (ref 8.7–10.4)
CHLORIDE SERPL-SCNC: 107 MMOL/L (ref 99–109)
CO2 SERPL-SCNC: 27 MMOL/L (ref 20–31)
CREAT BLD-MCNC: 0.8 MG/DL (ref 0.6–1.3)
CRP SERPL-MCNC: 0.13 MG/DL (ref 0–1)
DEPRECATED RDW RBC AUTO: 47 FL (ref 37–54)
EOSINOPHIL # BLD AUTO: 0.1 10*3/MM3 (ref 0–0.3)
EOSINOPHIL NFR BLD AUTO: 1.6 % (ref 0–3)
ERYTHROCYTE [DISTWIDTH] IN BLOOD BY AUTOMATED COUNT: 14.1 % (ref 11.3–14.5)
ERYTHROCYTE [SEDIMENTATION RATE] IN BLOOD: 34 MM/HR (ref 0–30)
GFR SERPL CREATININE-BSD FRML MDRD: 67 ML/MIN/1.73
GLOBULIN UR ELPH-MCNC: 2.6 GM/DL
GLUCOSE BLD-MCNC: 91 MG/DL (ref 70–100)
HCT VFR BLD AUTO: 42.6 % (ref 34.5–44)
HGB BLD-MCNC: 13.5 G/DL (ref 11.5–15.5)
IMM GRANULOCYTES # BLD: 0.01 10*3/MM3 (ref 0–0.03)
IMM GRANULOCYTES NFR BLD: 0.2 % (ref 0–0.6)
IRON 24H UR-MRATE: 56 MCG/DL (ref 50–175)
IRON SATN MFR SERPL: 19 % (ref 15–50)
LYMPHOCYTES # BLD AUTO: 1.69 10*3/MM3 (ref 0.6–4.8)
LYMPHOCYTES NFR BLD AUTO: 27.2 % (ref 24–44)
MCH RBC QN AUTO: 29.2 PG (ref 27–31)
MCHC RBC AUTO-ENTMCNC: 31.7 G/DL (ref 32–36)
MCV RBC AUTO: 92 FL (ref 80–99)
MONOCYTES # BLD AUTO: 0.55 10*3/MM3 (ref 0–1)
MONOCYTES NFR BLD AUTO: 8.9 % (ref 0–12)
NEUTROPHILS # BLD AUTO: 3.8 10*3/MM3 (ref 1.5–8.3)
NEUTROPHILS NFR BLD AUTO: 61.1 % (ref 41–71)
PLATELET # BLD AUTO: 281 10*3/MM3 (ref 150–450)
PMV BLD AUTO: 10.7 FL (ref 6–12)
POTASSIUM BLD-SCNC: 4.3 MMOL/L (ref 3.5–5.5)
PROT SERPL-MCNC: 6.8 G/DL (ref 5.7–8.2)
RBC # BLD AUTO: 4.63 10*6/MM3 (ref 3.89–5.14)
SODIUM BLD-SCNC: 140 MMOL/L (ref 132–146)
TIBC SERPL-MCNC: 294 MCG/DL (ref 250–450)
WBC NRBC COR # BLD: 6.21 10*3/MM3 (ref 3.5–10.8)

## 2017-10-19 PROCEDURE — 85652 RBC SED RATE AUTOMATED: CPT | Performed by: INTERNAL MEDICINE

## 2017-10-19 PROCEDURE — 86140 C-REACTIVE PROTEIN: CPT | Performed by: INTERNAL MEDICINE

## 2017-10-19 PROCEDURE — 99214 OFFICE O/P EST MOD 30 MIN: CPT | Performed by: INTERNAL MEDICINE

## 2017-10-19 PROCEDURE — 36415 COLL VENOUS BLD VENIPUNCTURE: CPT | Performed by: INTERNAL MEDICINE

## 2017-10-19 PROCEDURE — 80053 COMPREHEN METABOLIC PANEL: CPT | Performed by: INTERNAL MEDICINE

## 2017-10-19 PROCEDURE — 85025 COMPLETE CBC W/AUTO DIFF WBC: CPT | Performed by: INTERNAL MEDICINE

## 2017-10-19 PROCEDURE — 83540 ASSAY OF IRON: CPT | Performed by: INTERNAL MEDICINE

## 2017-10-19 PROCEDURE — 83550 IRON BINDING TEST: CPT | Performed by: INTERNAL MEDICINE

## 2017-10-19 NOTE — PROGRESS NOTES
Subjective   Marcelino Mesa is a 89 y.o. female.     History of Present Illness     The patient underwent left total hip arthroplasty on August 10.  She experienced some postoperative wound infection but this responded well to antibiotic treatment.  She has made excellent progress and feels she is taking care of her home without significant pain.  She occasionally takes Tylenol only.  She had fractured the hip in 2014 and had pinning performed.  Because of refractory pain she agreed to revision and is very happy with the outcome.  She has generalized osteoarthritis and uses Tylenol for pain.    The following portions of the patient's history were reviewed and updated as appropriate: allergies, current medications, past family history, past medical history, past social history, past surgical history and problem list.    Review of Systems   Constitutional: Negative for appetite change and fatigue.   HENT: Negative for ear pain and sore throat.    Eyes: Negative for pain and visual disturbance.   Respiratory: Negative for cough and shortness of breath.    Cardiovascular: Negative for chest pain and palpitations.   Gastrointestinal: Negative for abdominal pain and nausea.   Genitourinary: Negative for dysuria and hematuria.   Musculoskeletal: Positive for arthralgias, back pain and gait problem.   Neurological: Negative for dizziness and headaches.       Objective   Blood pressure 124/80, pulse 80, temperature 97.6 °F (36.4 °C), temperature source Oral, resp. rate 16, weight 117 lb (53.1 kg), SpO2 95 %, not currently breastfeeding.    Physical Exam   Constitutional: She is oriented to person, place, and time. She appears well-developed and well-nourished. No distress.   HENT:   Right Ear: External ear normal.   Left Ear: External ear normal.   Mouth/Throat: Oropharynx is clear and moist.   Neck: Normal range of motion. Neck supple. No JVD present.   Cardiovascular: Normal rate, regular rhythm and normal heart sounds.     Pulmonary/Chest: Effort normal and breath sounds normal. She has no wheezes. She has no rales.   Abdominal: Soft. Bowel sounds are normal. She exhibits no distension and no mass. There is no tenderness.   Musculoskeletal: Normal range of motion. She exhibits no edema.   Hips and knees have good range of motion with no tenderness.  Motor testing normal with no guarding.  The gait is independent and safe with a mild limp favoring the left leg.  Moderate osteoarthritis of hands bilaterally.  Good range of motion of elbows and shoulders.   Lymphadenopathy:     She has no cervical adenopathy.   Neurological: She is alert and oriented to person, place, and time. She exhibits normal muscle tone. Coordination normal.   Skin:   5 by 20 mm area of shallow dry ulceration in the surgical scar over the right hip.  There is no surrounding erythema.    Psychiatric: She has a normal mood and affect. Her behavior is normal. Judgment and thought content normal.   Nursing note and vitals reviewed.    Procedures  Assessment/Plan   Marcelino was seen today for hip pain.    Diagnoses and all orders for this visit:    Other iron deficiency anemia  -     CBC & Differential  -     Iron Profile  -     CBC Auto Differential    Essential hypertension  -     Cancel: Comprehensive Metabolic Panel  -     Comprehensive Metabolic Panel    Pure hypercholesterolemia    Scoliosis of lumbar spine, unspecified scoliosis type    Generalized osteoarthritis    Abnormal gait    Primary osteoarthritis of both knees    Postoperative wound infection, subsequent encounter  -     Cancel: CBC & Differential  -     Cancel: Comprehensive Metabolic Panel  -     Cancel: C-reactive Protein  -     Cancel: Sedimentation Rate  -     CBC & Differential  -     C-reactive Protein  -     Sedimentation Rate  -     CBC Auto Differential    The patient has severe generalized osteoarthritis with stiffness and achiness.  She should continue Tylenol regularly for pain.  She has  "greatly restricted her activities over the last few years to maintain independent living at home.  I've asked her to keep\" with her daughter and arrange for help around the house.  She has expressed a desire to keep other people out of her house and maintain independent living.    The patient is moving both hips and knees well now and is walking well.  I've advised her to stay routinely active to rebuild her strength and endurance.    Patient had a postoperative wound infection and still has a 5 x 20 mm area of dry ulceration.  This should respond to daily cleaning only.    Patient Instructions   1.  Continue usual medicines and supplements - as listed.    2.  Walk daily - avoid excessive standing and bending.    3.  Follow well-balanced diet - low in salt and low in sugar.    4.  Clean left hip wound - with soap and water daily - to improve healing.    5.  Return in January - annual checkup fasting.    6.  All laboratory tests are acceptable and require no change in treatment.    Electronically signed Jony Singh M.D.10/21/2017 3:25 PM          "

## 2017-10-24 ENCOUNTER — TELEPHONE (OUTPATIENT)
Dept: INTERNAL MEDICINE | Facility: CLINIC | Age: 82
End: 2017-10-24

## 2017-10-24 NOTE — TELEPHONE ENCOUNTER
Called labs to pt's daughter [facility]. Left VM. Per TGF:  All laboratory tests are acceptable and require no change in treatment.

## 2017-11-06 RX ORDER — AMLODIPINE BESYLATE 2.5 MG/1
TABLET ORAL
Qty: 90 TABLET | Refills: 1 | Status: SHIPPED | OUTPATIENT
Start: 2017-11-06 | End: 2018-01-22 | Stop reason: SDUPTHER

## 2018-01-22 ENCOUNTER — TELEPHONE (OUTPATIENT)
Dept: INTERNAL MEDICINE | Facility: CLINIC | Age: 83
End: 2018-01-22

## 2018-01-22 RX ORDER — AMLODIPINE BESYLATE 2.5 MG/1
2.5 TABLET ORAL DAILY
Qty: 90 TABLET | Refills: 1 | Status: SHIPPED | OUTPATIENT
Start: 2018-01-22 | End: 2018-07-06 | Stop reason: SDUPTHER

## 2018-01-22 RX ORDER — CLOPIDOGREL BISULFATE 75 MG/1
TABLET ORAL
Qty: 15 TABLET | Refills: 2 | Status: SHIPPED | OUTPATIENT
Start: 2018-01-22 | End: 2018-05-07 | Stop reason: SDUPTHER

## 2018-01-22 NOTE — TELEPHONE ENCOUNTER
patient needs refills on clopidogrel as well as amlodipine  Wants to pick them both up at the same time today

## 2018-03-23 ENCOUNTER — OFFICE VISIT (OUTPATIENT)
Dept: INTERNAL MEDICINE | Facility: CLINIC | Age: 83
End: 2018-03-23

## 2018-03-23 ENCOUNTER — APPOINTMENT (OUTPATIENT)
Dept: LAB | Facility: HOSPITAL | Age: 83
End: 2018-03-23

## 2018-03-23 VITALS
HEART RATE: 85 BPM | DIASTOLIC BLOOD PRESSURE: 78 MMHG | BODY MASS INDEX: 21.9 KG/M2 | TEMPERATURE: 97 F | HEIGHT: 61 IN | OXYGEN SATURATION: 98 % | WEIGHT: 116 LBS | RESPIRATION RATE: 16 BRPM | SYSTOLIC BLOOD PRESSURE: 122 MMHG

## 2018-03-23 DIAGNOSIS — G45.3 AMAUROSIS FUGAX: Primary | ICD-10-CM

## 2018-03-23 DIAGNOSIS — I45.10 INCOMPLETE RBBB: ICD-10-CM

## 2018-03-23 DIAGNOSIS — M17.0 PRIMARY OSTEOARTHRITIS OF BOTH KNEES: ICD-10-CM

## 2018-03-23 DIAGNOSIS — E78.00 PURE HYPERCHOLESTEROLEMIA: ICD-10-CM

## 2018-03-23 DIAGNOSIS — M15.9 GENERALIZED OSTEOARTHRITIS: ICD-10-CM

## 2018-03-23 DIAGNOSIS — D50.8 OTHER IRON DEFICIENCY ANEMIA: ICD-10-CM

## 2018-03-23 DIAGNOSIS — F41.9 ANXIETY DISORDER, UNSPECIFIED TYPE: ICD-10-CM

## 2018-03-23 DIAGNOSIS — M81.0 POST-MENOPAUSAL OSTEOPOROSIS: ICD-10-CM

## 2018-03-23 DIAGNOSIS — E55.9 VITAMIN D DEFICIENCY: ICD-10-CM

## 2018-03-23 DIAGNOSIS — M41.9 SCOLIOSIS OF LUMBAR SPINE, UNSPECIFIED SCOLIOSIS TYPE: ICD-10-CM

## 2018-03-23 DIAGNOSIS — I10 ESSENTIAL HYPERTENSION: ICD-10-CM

## 2018-03-23 DIAGNOSIS — N30.00 ACUTE CYSTITIS WITHOUT HEMATURIA: ICD-10-CM

## 2018-03-23 PROBLEM — T81.49XA POSTOPERATIVE WOUND INFECTION: Status: RESOLVED | Noted: 2017-10-19 | Resolved: 2018-03-23

## 2018-03-23 LAB
25(OH)D3 SERPL-MCNC: 54.7 NG/ML
ALBUMIN SERPL-MCNC: 4.2 G/DL (ref 3.2–4.8)
ALBUMIN/GLOB SERPL: 1.6 G/DL (ref 1.5–2.5)
ALP SERPL-CCNC: 113 U/L (ref 25–100)
ALT SERPL W P-5'-P-CCNC: 13 U/L (ref 7–40)
ANION GAP SERPL CALCULATED.3IONS-SCNC: 7 MMOL/L (ref 3–11)
ARTICHOKE IGE QN: 136 MG/DL (ref 0–130)
AST SERPL-CCNC: 17 U/L (ref 0–33)
BACTERIA UR QL AUTO: ABNORMAL /HPF
BILIRUB SERPL-MCNC: 0.8 MG/DL (ref 0.3–1.2)
BILIRUB UR QL STRIP: NEGATIVE
BUN BLD-MCNC: 10 MG/DL (ref 9–23)
BUN/CREAT SERPL: 12.5 (ref 7–25)
CALCIUM SPEC-SCNC: 9.2 MG/DL (ref 8.7–10.4)
CHLORIDE SERPL-SCNC: 106 MMOL/L (ref 99–109)
CHOLEST SERPL-MCNC: 242 MG/DL (ref 0–200)
CLARITY UR: CLEAR
CO2 SERPL-SCNC: 29 MMOL/L (ref 20–31)
COLOR UR: YELLOW
CREAT BLD-MCNC: 0.8 MG/DL (ref 0.6–1.3)
CRP SERPL-MCNC: 0.43 MG/DL (ref 0–1)
GFR SERPL CREATININE-BSD FRML MDRD: 67 ML/MIN/1.73
GLOBULIN UR ELPH-MCNC: 2.6 GM/DL
GLUCOSE BLD-MCNC: 95 MG/DL (ref 70–100)
GLUCOSE UR STRIP-MCNC: NEGATIVE MG/DL
HDLC SERPL-MCNC: 78 MG/DL (ref 40–60)
HGB UR QL STRIP.AUTO: NEGATIVE
HYALINE CASTS UR QL AUTO: ABNORMAL /LPF
IRON 24H UR-MRATE: 64 MCG/DL (ref 50–175)
IRON SATN MFR SERPL: 20 % (ref 15–50)
KETONES UR QL STRIP: ABNORMAL
LEUKOCYTE ESTERASE UR QL STRIP.AUTO: ABNORMAL
NITRITE UR QL STRIP: NEGATIVE
PH UR STRIP.AUTO: 6 [PH] (ref 5–8)
POTASSIUM BLD-SCNC: 4.1 MMOL/L (ref 3.5–5.5)
PROT SERPL-MCNC: 6.8 G/DL (ref 5.7–8.2)
PROT UR QL STRIP: NEGATIVE
RBC # UR: ABNORMAL /HPF
REF LAB TEST METHOD: ABNORMAL
SODIUM BLD-SCNC: 142 MMOL/L (ref 132–146)
SP GR UR STRIP: 1.02 (ref 1–1.03)
SQUAMOUS #/AREA URNS HPF: ABNORMAL /HPF
TIBC SERPL-MCNC: 325 MCG/DL (ref 250–450)
TRIGL SERPL-MCNC: 155 MG/DL (ref 0–150)
TSH SERPL DL<=0.05 MIU/L-ACNC: 1.91 MIU/ML (ref 0.35–5.35)
UROBILINOGEN UR QL STRIP: ABNORMAL
VIT B12 BLD-MCNC: 763 PG/ML (ref 211–911)
WBC UR QL AUTO: ABNORMAL /HPF

## 2018-03-23 PROCEDURE — 87086 URINE CULTURE/COLONY COUNT: CPT | Performed by: INTERNAL MEDICINE

## 2018-03-23 PROCEDURE — 84443 ASSAY THYROID STIM HORMONE: CPT | Performed by: INTERNAL MEDICINE

## 2018-03-23 PROCEDURE — 82607 VITAMIN B-12: CPT | Performed by: INTERNAL MEDICINE

## 2018-03-23 PROCEDURE — 87186 SC STD MICRODIL/AGAR DIL: CPT | Performed by: INTERNAL MEDICINE

## 2018-03-23 PROCEDURE — 86140 C-REACTIVE PROTEIN: CPT | Performed by: INTERNAL MEDICINE

## 2018-03-23 PROCEDURE — 82306 VITAMIN D 25 HYDROXY: CPT | Performed by: INTERNAL MEDICINE

## 2018-03-23 PROCEDURE — G0439 PPPS, SUBSEQ VISIT: HCPCS | Performed by: INTERNAL MEDICINE

## 2018-03-23 PROCEDURE — 87077 CULTURE AEROBIC IDENTIFY: CPT | Performed by: INTERNAL MEDICINE

## 2018-03-23 PROCEDURE — 83540 ASSAY OF IRON: CPT | Performed by: INTERNAL MEDICINE

## 2018-03-23 PROCEDURE — 81001 URINALYSIS AUTO W/SCOPE: CPT | Performed by: INTERNAL MEDICINE

## 2018-03-23 PROCEDURE — 36415 COLL VENOUS BLD VENIPUNCTURE: CPT | Performed by: INTERNAL MEDICINE

## 2018-03-23 PROCEDURE — 80053 COMPREHEN METABOLIC PANEL: CPT | Performed by: INTERNAL MEDICINE

## 2018-03-23 PROCEDURE — 93000 ELECTROCARDIOGRAM COMPLETE: CPT | Performed by: INTERNAL MEDICINE

## 2018-03-23 PROCEDURE — 83550 IRON BINDING TEST: CPT | Performed by: INTERNAL MEDICINE

## 2018-03-23 PROCEDURE — 99214 OFFICE O/P EST MOD 30 MIN: CPT | Performed by: INTERNAL MEDICINE

## 2018-03-23 PROCEDURE — 80061 LIPID PANEL: CPT | Performed by: INTERNAL MEDICINE

## 2018-03-23 RX ORDER — NITROFURANTOIN 25; 75 MG/1; MG/1
100 CAPSULE ORAL EVERY 12 HOURS SCHEDULED
Qty: 10 CAPSULE | Refills: 0 | Status: SHIPPED | OUTPATIENT
Start: 2018-03-23 | End: 2018-05-21 | Stop reason: ALTCHOICE

## 2018-03-23 NOTE — PATIENT INSTRUCTIONS
1.  Take Macrodantin 100 mg - morning and night - for 5 more days.    2.  Continue usual medicines and supplements - as listed.    3.  Follow well-balanced diet - low in salt low in sugar.    4.  Walk daily - maintain physical fitness.    5.  Return visit July - fasting checkup.

## 2018-03-25 NOTE — PROGRESS NOTES
Subjective   Marcelino Mesa is a 89 y.o. female.     Chief Complaint   Patient presents with   • Arthritis       History of Present Illness     The patient has many years of progressive lumbar spondylosis and generalized osteoarthritis.  In recent years she has had a left hip replacement, bilateral knee replacement, and lumbar injection.  She has persistent pain of each knees and uses Tylenol as needed.  She lives independently in a farmhouse & feels she is keeping up with all daily activities.  She continues to outdoor chores and recently developed a superficial laceration on her left leg working with tree branches.    The following portions of the patient's history were reviewed and updated as appropriate: allergies, current medications, past family history, past medical history, past social history, past surgical history and problem list.    Active Ambulatory Problems     Diagnosis Date Noted   • Abnormal gait 05/24/2016   • Generalized osteoarthritis 05/24/2016   • Hyperlipidemia 05/24/2016   • Iron deficiency anemia 05/24/2016   • Macular degeneration 05/24/2016   • Scoliosis of lumbar spine 05/24/2016   • Post-menopausal osteoporosis 05/24/2016   • Vaginitis 05/24/2016   • Vitamin D deficiency 05/24/2016   • Essential hypertension 07/12/2016   • Preventative health care 08/09/2016   • Incomplete RBBB 08/11/2016   • Primary osteoarthritis of both knees 11/07/2016   • Anxiety 01/23/2017   • Amaurosis fugax 06/20/2017     Resolved Ambulatory Problems     Diagnosis Date Noted   • Left-sided low back pain with left-sided sciatica 05/24/2016   • Syncope 05/24/2016   • Hip pain, left 05/24/2016   • Iron deficiency 05/26/2016   • Hip fracture, left 07/12/2016   • Knee pain, bilateral 07/12/2016   • Arthritis of knee, right 09/22/2016   • Status post unicompartmental right knee arthroplasty 09/22/2016   • Acute post-operative pain 09/22/2016   • Arthritis of knee, left 12/01/2016   • Status post left knee medial  unicompartmental replacement 12/01/2016   • Acute blood loss anemia, mild, asymptomatic 12/02/2016   • Leukocytosis, mild, likely reactive 12/02/2016   • Hematochezia 01/23/2017   • Dysuria 01/23/2017   • GI bleed 01/23/2017   • Fracture of hip 08/10/2017   • 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 08/10/2017   • Postoperative wound infection 10/19/2017     Past Medical History:   Diagnosis Date   • Amaurosis fugax of left eye 06/19/2017   • Compression fracture of L1 lumbar vertebra Remote   • Generalized osteoarthritis    • Hearing impairment 2000   • Herpes zoster    • Hip fracture, left 2014   • Hyperlipidemia Adulthood   • Hypertension 2016   • Iron deficiency    • Lumbar scoliosis Adulthood   • Lumbar spondylosis 2015   • Macular degeneration    • PONV (postoperative nausea and vomiting)    • Vitamin D deficiency    • Wrist fracture, left      Past Surgical History:   Procedure Laterality Date   • CATARACT EXTRACTION EXTRACAPSULAR W/ INTRAOCULAR LENS IMPLANTATION Bilateral 2016   • COLONOSCOPY  2006    Normal study   • HIP FRACTURE SURGERY Left 2014     hip pinning for acute fracture   • KNEE ARTHROPLASTY UNICOMPARTMENTAL Right 9/22/2016    Procedure: RIGHT KNEE ARTHROPLASTY UNICOMPARTMENTAL;  Surgeon: Jesse Taveras MD;  Location:  [facility];  Service:    • KNEE ARTHROPLASTY UNICOMPARTMENTAL Left 12/1/2016    Procedure: LEFT KNEE ARTHROPLASTY UNICOMPARTMENTAL;  Surgeon: Jesse Taveras MD;  Location:  [facility];  Service:    • LUMBAR EPIDURAL INJECTION  2015   • TONSILLECTOMY  1947   • TOTAL HIP ARTHROPLASTY Left 8/10/2017    Procedure: REMOVAL OF HARDWARE LEFT HIP, LEFT ANTERIOR TOTAL HIP ARTHROPLASTY;  Surgeon: Jesse Taveras MD;  Location:  [facility];  Service:      Family History   Problem Relation Age of Onset   • Alzheimer's disease Mother    • No Known Problems Father    • Arthritis Sister    • Breast cancer Sister    • Colon cancer Sister    • Lung cancer Brother    • Heart  disease Other      2009   • Asthma Sister    • Colon cancer Sister    • Breast cancer Sister    • Lumbar disc disease Sister    • Skin cancer Brother      Social History     Social History   • Marital status:      Spouse name: N/A   • Number of children: N/A   • Years of education: N/A     Occupational History   • Not on file.     Social History Main Topics   • Smoking status: Never Smoker   • Smokeless tobacco: Never Used   • Alcohol use No   • Drug use: No   • Sexual activity: Not on file     Other Topics Concern   • Not on file     Social History Narrative    Domestic life-      [place]                                   good support from local daughter - who lives in [facility] - 1 hour away        Muslim- Religious        Sleep hygiene-   in bed 8 PM to 6 AM for 8 hours broken sleep          Caffeine use- 2 cups coffee daily        Exercise habits- Walking daily as tolerated        Diet- Prudent well-balanced diet - low in salt        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   HENT: Negative for ear pain and sore throat.    Eyes: Negative for itching and visual disturbance.   Respiratory: Negative for cough and shortness of breath.    Cardiovascular: Negative for chest pain and palpitations.   Gastrointestinal: Negative for abdominal pain and nausea.   Endocrine: Negative for cold intolerance and heat intolerance.   Genitourinary: Positive for dysuria. Negative for hematuria.        Recent dysuria improved on Macrodantin.   Musculoskeletal: Positive for arthralgias and gait problem. Negative for back pain.        Mild intermittent knee aches.  Generally functional maintaining private  "home.   Skin: Negative for rash and wound.   Allergic/Immunologic: Negative for environmental allergies and food allergies.   Neurological: Negative for dizziness and headaches.   Hematological: Negative for adenopathy. Does not bruise/bleed easily.   Psychiatric/Behavioral: Negative for sleep disturbance. The patient is not nervous/anxious.        Objective   Blood pressure 122/78, pulse 85, temperature 97 °F (36.1 °C), temperature source Temporal Artery , resp. rate 16, height 156 cm (61.42\"), weight 52.6 kg (116 lb), SpO2 98 %, not currently breastfeeding.    Physical Exam   Constitutional: She is oriented to person, place, and time. She appears well-developed and well-nourished. No distress.   HENT:   Right Ear: External ear normal.   Left Ear: External ear normal.   Nose: Nose normal.   Mouth/Throat: Oropharynx is clear and moist.   Hearing improves with bilateral hearing aids   Eyes: EOM are normal. Pupils are equal, round, and reactive to light. No scleral icterus.   Neck: Normal range of motion. Neck supple. No JVD present. No thyromegaly present.   Cardiovascular: Normal rate, regular rhythm, normal heart sounds and intact distal pulses.    No murmur heard.  Pulmonary/Chest: Effort normal and breath sounds normal. She has no wheezes. She has no rales.   Abdominal: Soft. Bowel sounds are normal. There is no tenderness. There is no rebound.   Genitourinary:   Genitourinary Comments: Deferred   Musculoskeletal: Normal range of motion. She exhibits no edema.   Moderate osteoarthritis of hands bilaterally with good range of motion and  strength.  Moderate osteoarthritis of hips and knees with good range of motion and minimal tenderness.   Lymphadenopathy:     She has no cervical adenopathy.   Neurological: She is alert and oriented to person, place, and time. She exhibits normal muscle tone. Coordination normal.   Vibratory normal  Romberg negative  Gait normal gait is somewhat wide-based and independent " with cane.  Very mild limp.  Plantars downgoing     Skin: Skin is warm and dry. No rash noted.   Psychiatric: She has a normal mood and affect. Her behavior is normal. Judgment and thought content normal.   Nursing note and vitals reviewed.      ECG 12 Lead  Date/Time: 3/23/2018 1:30 PM  Performed by: JONY SINGH  Authorized by: JONY SINGH   Interpreted by ED physician: Jony Singh M.D.  Comparison: compared with previous ECG from 6/20/2017  Similar to previous ECG  Rhythm: sinus rhythm  Rate: normal  BPM: 75  Conduction: incomplete RBBB  ST Segments: ST segments normal  T Waves: T waves normal  QRS axis: left  Other findings: PRWP  Clinical impression: non-specific ECG  Comments: Indication - conduction delay  Baseline EKG          Assessment/Plan   Marcelino was seen today for arthritis.    Diagnoses and all orders for this visit:    Amaurosis fugax  -     TSH  -     Vitamin B12    Essential hypertension    Pure hypercholesterolemia  -     Comprehensive Metabolic Panel  -     Lipid Panel    Incomplete RBBB  -     ECG 12 Lead    Vitamin D deficiency  -     Vitamin D 25 Hydroxy    Generalized osteoarthritis    Post-menopausal osteoporosis    Scoliosis of lumbar spine, unspecified scoliosis type    Other iron deficiency anemia  -     Iron Profile    Primary osteoarthritis of both knees    Acute cystitis without hematuria  -     Cancel: CBC & Differential  -     C-reactive Protein  -     Urinalysis With / Culture If Indicated - Urine, Clean Catch  -     Cancel: CBC Auto Differential  -     Urinalysis, Microscopic Only - Urine, Clean Catch; Future  -     Urinalysis, Microscopic Only - Urine, Clean Catch  -     Urine Culture - Urine, Urine, Clean Catch; Future  -     Urine Culture - Urine, Urine, Clean Catch    Anxiety disorder, unspecified type   -     TSH    Other orders  -     nitrofurantoin, macrocrystal-monohydrate, (MACROBID) 100 MG capsule; Take 1 capsule by mouth Every 12 (Twelve)  Hours.      The patient has generalized osteoarthritis and clips well with the pain using Tylenol.  I've counseled her to daily stretching and good posture are important to maintain through exercise.  She must be cautious with outdoor activities because of high risk of falls and injuries.  She does have superficial laceration on her left posterior calf which appears to be healing well.    The patient has a 3 year history of hypertension and has excellent control of blood pressures on amlodipine and salt restriction.    Patient's had a recent urinary tract infection and shows mild residual evidence on urinalysis today.  She should finish nitrofurantoin morning and monitor symptoms.  Daily cranberry pills may be helpful for long-term prevention..    The patient had prior amaurosis fugax one year ago.  She should continue on Plavix for secondary prevention.  She has complained of easy bleeding but shows no evidence of generalized bruising suggesting excessive drug side effects.    The wellness exam has been reviewed in detail.  The patient has been fully counseled on preventative guidelines for vaccines, cancer screenings, and other health maintenance needs.  Functional testing has been performed to assess capacity for independent living and need for other medical interventions.   The patient was counseled on maintaining a lifestyle to promote good health and to minimize chronic diseases.  The patient has been assisted with scheduling healthcare procedures for the coming year and given a written document outlining these recommendations.    Patient Instructions   1.  Take Macrodantin 100 mg - morning and night - for 5 more days.    2.  Continue usual medicines and supplements - as listed.    3.  Follow well-balanced diet - low in salt low in sugar.    4.  Walk daily - maintain physical fitness.    5.  Return visit July - fasting checkup.    6.  LDL cholesterol is mildly elevated 136.  Continue American Heart Association  guidelines.    7.  Urinalysis shows mild evidence of cystitis.  Finish current antibiotics.    8.  Other laboratory tests are acceptable and require no change in treatment.    Current Outpatient Prescriptions:   •  acetaminophen (TYLENOL) 325 MG tablet, Take 2 tablets by mouth Every 4 (Four) Hours As Needed for Mild Pain (1-3)., Disp: , Rfl: 0  •  amLODIPine (NORVASC) 2.5 MG tablet, Take 1 tablet by mouth Daily., Disp: 90 tablet, Rfl: 1  •  calcium-vitamin D (OSCAL 500/200 D-3) 500-200 MG-UNIT per tablet, Take 1 tablet by mouth daily., Disp: , Rfl:   •  carboxymethylcellulose 1 % ophthalmic solution, Apply 1 drop to eye Every Night., Disp: , Rfl: 12  •  Cholecalciferol (VITAMIN D) 2000 UNITS capsule, Take 1 capsule by mouth daily., Disp: , Rfl:   •  clopidogrel (PLAVIX) 75 MG tablet, Take 1/2 tablet by mouth every day., Disp: 15 tablet, Rfl: 2  •  CRANBERRY EXTRACT PO, Take 2 capsules by mouth Daily., Disp: , Rfl:   •  ferrous sulfate 325 (65 FE) MG tablet, Take 325 mg by mouth daily with breakfast., Disp: , Rfl:   •  Multiple Vitamins-Minerals (CENTRUM ADULTS) tablet, Take 1 tablet by mouth daily., Disp: , Rfl:   •  nitrofurantoin, macrocrystal-monohydrate, (MACROBID) 100 MG capsule, Take 1 capsule by mouth Every 12 (Twelve) Hours., Disp: 10 capsule, Rfl: 0    Allergies   Allergen Reactions   • Asa [Aspirin] GI Bleeding   • Lisinopril-Hydrochlorothiazide Rash   • Other Rash     STEROID INJECTION IN BACK       Immunization History   Administered Date(s) Administered   • Flu Vaccine High Dose PF 65YR+ 09/28/2016   • Influenza, Quadrivalent 10/15/2015, 09/11/2017   • Pneumococcal Conjugate 13-Valent (PCV13) 06/28/2016   • Pneumococcal Polysaccharide (PPSV23) 01/01/2012, 09/20/2017   • Tdap 04/27/2011   • Zoster 01/01/2009     Electronically signed Jony Singh M.D.3/25/2018 2:02 PM

## 2018-03-25 NOTE — PROGRESS NOTES
QUICK REFERENCE INFORMATION:  The ABCs of the Annual Wellness Visit    Subsequent Medicare Wellness Visit    HEALTH RISK ASSESSMENT    2/19/1927    Recent Hospitalizations:  Recently treated at the following:  [facility].        Current Medical Providers:  Patient Care Team:  Jony Singh MD as PCP - General  Jony Singh MD as PCP - Family Medicine  Jony Singh MD as PCP - Claims Attributed        Smoking Status:  History   Smoking Status   • Never Smoker   Smokeless Tobacco   • Never Used       Alcohol Consumption:  History   Alcohol Use No       Depression Screen:   PHQ-2/PHQ-9 Depression Screening 3/25/2018   Little interest or pleasure in doing things 0   Feeling down, depressed, or hopeless 0   Trouble falling or staying asleep, or sleeping too much -   Feeling tired or having little energy -   Poor appetite or overeating -   Feeling bad about yourself - or that you are a failure or have let yourself or your family down -   Trouble concentrating on things, such as reading the newspaper or watching television -   Moving or speaking so slowly that other people could have noticed. Or the opposite - being so fidgety or restless that you have been moving around a lot more than usual -   Thoughts that you would be better off dead, or of hurting yourself in some way -   Total Score 0   If you checked off any problems, how difficult have these problems made it for you to do your work, take care of things at home, or get along with other people? -       Health Habits and Functional and Cognitive Screening:  Functional & Cognitive Status 5/26/2016   Do you have difficulty preparing food and eating? Yes   Do you have difficulty bathing yourself, getting dressed or grooming yourself? Yes   Do you have difficulty using the toilet? No   Do you have difficulty moving around from place to place? Yes   In the past year have you fallen or experienced a near fall? Yes   Do you need help using the  phone?  No   Are you deaf or do you have serious difficulty hearing?  Yes   Do you need help with transportation? Yes   Do you need help shopping? Yes   Do you need help preparing meals?  Yes   Do you need help with housework?  Yes   Do you need help with laundry? Yes   Do you need help taking your medications? No   Do you need help managing money? No   Do you have difficulty concentrating, remembering or making decisions? No           Does the patient have evidence of cognitive impairment? No    Aspirin use counseling: On clopidrogel as an alternative (due to ASA contraindication)      Recent Lab Results:  CMP:  Lab Results   Component Value Date    BUN 10 03/23/2018    CREATININE 0.80 03/23/2018    EGFRIFNONA 67 03/23/2018    BCR 12.5 03/23/2018     03/23/2018    K 4.1 03/23/2018    CO2 29.0 03/23/2018    CALCIUM 9.2 03/23/2018    ALBUMIN 4.20 03/23/2018    LABIL2 1.6 03/23/2018    BILITOT 0.8 03/23/2018    ALKPHOS 113 (H) 03/23/2018    AST 17 03/23/2018    ALT 13 03/23/2018     Lipid Panel:  Lab Results   Component Value Date    CHOL 242 (H) 03/23/2018    TRIG 155 (H) 03/23/2018    HDL 78 (H) 03/23/2018     HbA1c:  Lab Results   Component Value Date    HGBA1C 5.50 08/04/2017       Visual Acuity:  No exam data present    Age-appropriate Screening Schedule:  Refer to the list below for future screening recommendations based on patient's age, sex and/or medical conditions. Orders for these recommended tests are listed in the plan section. The patient has been provided with a written plan.    Health Maintenance   Topic Date Due   • MAMMOGRAM  05/24/2016   • DXA SCAN  07/27/2016   • LIPID PANEL  03/23/2019   • TDAP/TD VACCINES (2 - Td) 04/27/2021   • INFLUENZA VACCINE  Completed   • PNEUMOCOCCAL VACCINES (65+ LOW/MEDIUM RISK)  Completed   • ZOSTER VACCINE  Completed        Subjective   History of Present Illness    Marcelino Mesa is a 89 y.o. female who presents for an Subsequent Wellness Visit.    The  following portions of the patient's history were reviewed and updated as appropriate: allergies, current medications, past family history, past medical history, past social history, past surgical history and problem list.    Outpatient Medications Prior to Visit   Medication Sig Dispense Refill   • acetaminophen (TYLENOL) 325 MG tablet Take 2 tablets by mouth Every 4 (Four) Hours As Needed for Mild Pain (1-3).  0   • amLODIPine (NORVASC) 2.5 MG tablet Take 1 tablet by mouth Daily. 90 tablet 1   • calcium-vitamin D (OSCAL 500/200 D-3) 500-200 MG-UNIT per tablet Take 1 tablet by mouth daily.     • carboxymethylcellulose 1 % ophthalmic solution Apply 1 drop to eye Every Night.  12   • Cholecalciferol (VITAMIN D) 2000 UNITS capsule Take 1 capsule by mouth daily.     • clopidogrel (PLAVIX) 75 MG tablet Take 1/2 tablet by mouth every day. 15 tablet 2   • CRANBERRY EXTRACT PO Take 2 capsules by mouth Daily.     • ferrous sulfate 325 (65 FE) MG tablet Take 325 mg by mouth daily with breakfast.     • Multiple Vitamins-Minerals (CENTRUM ADULTS) tablet Take 1 tablet by mouth daily.       No facility-administered medications prior to visit.        Patient Active Problem List   Diagnosis   • Abnormal gait   • Generalized osteoarthritis   • Hyperlipidemia   • Iron deficiency anemia   • Macular degeneration   • Scoliosis of lumbar spine   • Post-menopausal osteoporosis   • Vaginitis   • Vitamin D deficiency   • Essential hypertension   • Preventative health care   • Incomplete RBBB   • Primary osteoarthritis of both knees   • Anxiety   • Amaurosis fugax       Advance Care Planning:  has an advance directive - a copy HAS NOT been provided    Identification of Risk Factors:  Risk factors include: cardiovascular risk, increased fall risk, chronic pain, inadequate social support, isolation and polypharmacy.    Review of Systems    Compared to one year ago, the patient feels her physical health is the same.  Compared to one year ago,  "the patient feels her mental health is the same.    Objective     Physical Exam    Vitals:    03/23/18 1236   BP: 122/78   BP Location: Left arm   Patient Position: Sitting   Cuff Size: Adult   Pulse: 85   Resp: 16   Temp: 97 °F (36.1 °C)   TempSrc: Temporal Artery    SpO2: 98%   Weight: 52.6 kg (116 lb)   Height: 156 cm (61.42\")       Body mass index is 21.62 kg/m².  Discussed the patient's BMI with her. BMI is within normal parameters. No follow-up required.    Assessment/Plan   Patient Self-Management and Personalized Health Advice  The patient has been provided with information about: diet, exercise, weight management, prevention of cardiac or vascular disease and fall prevention and preventive services including:   · Exercise counseling provided, Fall Risk assessment done, Fall Risk plan of care done, Nutrition counseling provided.    Visit Diagnoses:    ICD-10-CM ICD-9-CM   1. Amaurosis fugax G45.3 362.34   2. Essential hypertension I10 401.9   3. Pure hypercholesterolemia E78.00 272.0   4. Incomplete RBBB I45.10 426.4   5. Vitamin D deficiency E55.9 268.9   6. Generalized osteoarthritis M15.9 715.00   7. Post-menopausal osteoporosis M81.0 733.01   8. Scoliosis of lumbar spine, unspecified scoliosis type M41.9 737.30   9. Other iron deficiency anemia D50.8 280.8   10. Primary osteoarthritis of both knees M17.0 715.16   11. Acute cystitis without hematuria N30.00 595.0   12. Anxiety disorder, unspecified type  F41.9 300.00       Orders Placed This Encounter   Procedures   • Urine Culture - Urine, Urine, Clean Catch     Standing Status:   Future     Number of Occurrences:   1     Standing Expiration Date:   3/23/2019   • Comprehensive Metabolic Panel   • C-reactive Protein   • Lipid Panel   • Vitamin D 25 Hydroxy   • TSH   • Iron Profile   • Vitamin B12   • Urinalysis With / Culture If Indicated - Urine, Clean Catch   • Urinalysis, Microscopic Only - Urine, Clean Catch     Standing Status:   Future     Number of " Occurrences:   1     Standing Expiration Date:   3/23/2019   • ECG 12 Lead     Order Specific Question:   Reason for Exam:     Answer:   IRBBB       Outpatient Encounter Prescriptions as of 3/23/2018   Medication Sig Dispense Refill   • acetaminophen (TYLENOL) 325 MG tablet Take 2 tablets by mouth Every 4 (Four) Hours As Needed for Mild Pain (1-3).  0   • amLODIPine (NORVASC) 2.5 MG tablet Take 1 tablet by mouth Daily. 90 tablet 1   • calcium-vitamin D (OSCAL 500/200 D-3) 500-200 MG-UNIT per tablet Take 1 tablet by mouth daily.     • carboxymethylcellulose 1 % ophthalmic solution Apply 1 drop to eye Every Night.  12   • Cholecalciferol (VITAMIN D) 2000 UNITS capsule Take 1 capsule by mouth daily.     • clopidogrel (PLAVIX) 75 MG tablet Take 1/2 tablet by mouth every day. 15 tablet 2   • CRANBERRY EXTRACT PO Take 2 capsules by mouth Daily.     • ferrous sulfate 325 (65 FE) MG tablet Take 325 mg by mouth daily with breakfast.     • Multiple Vitamins-Minerals (CENTRUM ADULTS) tablet Take 1 tablet by mouth daily.     • nitrofurantoin, macrocrystal-monohydrate, (MACROBID) 100 MG capsule Take 1 capsule by mouth Every 12 (Twelve) Hours. 10 capsule 0     No facility-administered encounter medications on file as of 3/23/2018.        Reviewed use of high risk medication in the elderly: yes  Reviewed for potential of harmful drug interactions in the elderly: yes    Follow Up:  Return in about 4 months (around 7/23/2018) for Fasting checkup.     An After Visit Summary and PPPS with all of these plans were given to the patient.        The wellness exam has been reviewed in detail.  The patient has been fully counseled on preventative guidelines for vaccines, cancer screenings, and other health maintenance needs.  Functional testing has been performed to assess capacity for independent living and need for other medical interventions.   The patient was counseled on maintaining a lifestyle to promote good health and to minimize  chronic diseases.  The patient has been assisted with scheduling healthcare procedures for the coming year and given a written document outlining these recommendations.    Electronically signed Jony Singh M.D.3/25/2018 3:44 PM

## 2018-03-28 ENCOUNTER — TELEPHONE (OUTPATIENT)
Dept: INTERNAL MEDICINE | Facility: CLINIC | Age: 83
End: 2018-03-28

## 2018-03-28 LAB
BACTERIA SPEC AEROBE CULT: ABNORMAL
BACTERIA SPEC AEROBE CULT: ABNORMAL

## 2018-03-28 NOTE — TELEPHONE ENCOUNTER
Called labs.  Per TGF - LDL cholesterol is mildly elevated 136.  Continue American Heart Association guidelines.  Urinalysis shows mild evidence of cystitis.  Finish current antibiotics.  Other laboratory tests are acceptable and require no change in treatment.  Pt verb understanding.

## 2018-04-26 ENCOUNTER — TELEPHONE (OUTPATIENT)
Dept: INTERNAL MEDICINE | Facility: CLINIC | Age: 83
End: 2018-04-26

## 2018-04-26 NOTE — TELEPHONE ENCOUNTER
Med Complaint:  Sometime last week Marcelino hit her leg om something and started bleeding and told Vita that it bled for about 6 hours before it finally stopped.  Vita thinks she's on too much Plavix, blood thinner as she called it.

## 2018-04-27 NOTE — TELEPHONE ENCOUNTER
I called Vita back. She said she saw the leg wound and it was only about the size of finger nail.  I advised her that Marcelino needs to cont same plavix, and according to chart she shld only be on half a plavix 75 mg tab daily.  If pt injuries herself again, she shld apply pressure to wound and contact her or someone else for help; if needed to go to ER for uncontrollable bleeding. Vita verb understanding.

## 2018-05-07 ENCOUNTER — TELEPHONE (OUTPATIENT)
Dept: INTERNAL MEDICINE | Facility: CLINIC | Age: 83
End: 2018-05-07

## 2018-05-07 RX ORDER — CLOPIDOGREL BISULFATE 75 MG/1
TABLET ORAL
Qty: 15 TABLET | Refills: 2 | Status: SHIPPED | OUTPATIENT
Start: 2018-05-07 | End: 2018-07-06 | Stop reason: SDUPTHER

## 2018-05-21 ENCOUNTER — TELEPHONE (OUTPATIENT)
Dept: INTERNAL MEDICINE | Facility: CLINIC | Age: 83
End: 2018-05-21

## 2018-05-21 RX ORDER — SULFAMETHOXAZOLE AND TRIMETHOPRIM 800; 160 MG/1; MG/1
1 TABLET ORAL EVERY 12 HOURS
Qty: 14 TABLET | Refills: 0 | Status: SHIPPED | OUTPATIENT
Start: 2018-05-21 | End: 2018-07-06

## 2018-05-21 NOTE — TELEPHONE ENCOUNTER
iVta called. She has not been able to get a hold of pt either. She said the last 2 days when she talked to her on the phone she's been in bed. She says she takes cranberry pills daily. She will go to her house later this PM if she can't get a hold of her by phone.

## 2018-05-21 NOTE — TELEPHONE ENCOUNTER
PT IS CALLING SHE ADVISED SHE HAS HAD MANY KIDNEY INFECTIONS AND SHE KNOW SHE HAS ONE NOW - CAN TGF CALL HER IN MEDICATION FOR THIS?    [facility]- SHE SAID SHE WOULD BE THERE AROUND NOON TO PICK THIS UP

## 2018-05-21 NOTE — TELEPHONE ENCOUNTER
I returned pt's call. NA and no VM.  Attempted to contact pt's On license of [facility] Vita. Left VM for her to call back to inquire re pt's call.

## 2018-05-21 NOTE — TELEPHONE ENCOUNTER
TGF informed of pt's call. Per TGF, RX for bactrim DS sent to pharmacy. Attempted to contact pt 2nd time. NA, no VM.

## 2018-07-06 ENCOUNTER — OFFICE VISIT (OUTPATIENT)
Dept: INTERNAL MEDICINE | Facility: CLINIC | Age: 83
End: 2018-07-06

## 2018-07-06 ENCOUNTER — APPOINTMENT (OUTPATIENT)
Dept: LAB | Facility: HOSPITAL | Age: 83
End: 2018-07-06

## 2018-07-06 VITALS
HEART RATE: 88 BPM | OXYGEN SATURATION: 97 % | BODY MASS INDEX: 22.37 KG/M2 | TEMPERATURE: 97.2 F | DIASTOLIC BLOOD PRESSURE: 76 MMHG | RESPIRATION RATE: 20 BRPM | WEIGHT: 120 LBS | SYSTOLIC BLOOD PRESSURE: 152 MMHG

## 2018-07-06 DIAGNOSIS — H35.30 MACULAR DEGENERATION: ICD-10-CM

## 2018-07-06 DIAGNOSIS — R32 URINARY INCONTINENCE, UNSPECIFIED TYPE: ICD-10-CM

## 2018-07-06 DIAGNOSIS — D50.8 OTHER IRON DEFICIENCY ANEMIA: Primary | ICD-10-CM

## 2018-07-06 DIAGNOSIS — I10 ESSENTIAL HYPERTENSION: ICD-10-CM

## 2018-07-06 DIAGNOSIS — M17.0 PRIMARY OSTEOARTHRITIS OF BOTH KNEES: ICD-10-CM

## 2018-07-06 DIAGNOSIS — M15.9 GENERALIZED OSTEOARTHRITIS: ICD-10-CM

## 2018-07-06 LAB
ALBUMIN SERPL-MCNC: 4.4 G/DL (ref 3.2–4.8)
ALBUMIN/GLOB SERPL: 1.8 G/DL (ref 1.5–2.5)
ALP SERPL-CCNC: 109 U/L (ref 25–100)
ALT SERPL W P-5'-P-CCNC: 9 U/L (ref 7–40)
ANION GAP SERPL CALCULATED.3IONS-SCNC: 10 MMOL/L (ref 3–11)
AST SERPL-CCNC: 16 U/L (ref 0–33)
BACTERIA UR QL AUTO: ABNORMAL /HPF
BASOPHILS # BLD AUTO: 0.05 10*3/MM3 (ref 0–0.2)
BASOPHILS NFR BLD AUTO: 0.8 % (ref 0–1)
BILIRUB SERPL-MCNC: 0.8 MG/DL (ref 0.3–1.2)
BILIRUB UR QL STRIP: NEGATIVE
BUN BLD-MCNC: 12 MG/DL (ref 9–23)
BUN/CREAT SERPL: 14.1 (ref 7–25)
CALCIUM SPEC-SCNC: 9.2 MG/DL (ref 8.7–10.4)
CHLORIDE SERPL-SCNC: 107 MMOL/L (ref 99–109)
CLARITY UR: CLEAR
CO2 SERPL-SCNC: 25 MMOL/L (ref 20–31)
COLOR UR: YELLOW
CREAT BLD-MCNC: 0.85 MG/DL (ref 0.6–1.3)
CRP SERPL-MCNC: 0.16 MG/DL (ref 0–1)
DEPRECATED RDW RBC AUTO: 45.8 FL (ref 37–54)
EOSINOPHIL # BLD AUTO: 0.08 10*3/MM3 (ref 0–0.3)
EOSINOPHIL NFR BLD AUTO: 1.2 % (ref 0–3)
ERYTHROCYTE [DISTWIDTH] IN BLOOD BY AUTOMATED COUNT: 14.2 % (ref 11.3–14.5)
GFR SERPL CREATININE-BSD FRML MDRD: 63 ML/MIN/1.73
GLOBULIN UR ELPH-MCNC: 2.5 GM/DL
GLUCOSE BLD-MCNC: 91 MG/DL (ref 70–100)
GLUCOSE UR STRIP-MCNC: NEGATIVE MG/DL
HCT VFR BLD AUTO: 43.6 % (ref 34.5–44)
HGB BLD-MCNC: 13.9 G/DL (ref 11.5–15.5)
HGB UR QL STRIP.AUTO: NEGATIVE
HYALINE CASTS UR QL AUTO: ABNORMAL /LPF
IMM GRANULOCYTES # BLD: 0.01 10*3/MM3 (ref 0–0.03)
IMM GRANULOCYTES NFR BLD: 0.2 % (ref 0–0.6)
IRON 24H UR-MRATE: 47 MCG/DL (ref 50–175)
IRON SATN MFR SERPL: 13 % (ref 15–50)
KETONES UR QL STRIP: ABNORMAL
LEUKOCYTE ESTERASE UR QL STRIP.AUTO: ABNORMAL
LYMPHOCYTES # BLD AUTO: 1.96 10*3/MM3 (ref 0.6–4.8)
LYMPHOCYTES NFR BLD AUTO: 30.3 % (ref 24–44)
MCH RBC QN AUTO: 28.5 PG (ref 27–31)
MCHC RBC AUTO-ENTMCNC: 31.9 G/DL (ref 32–36)
MCV RBC AUTO: 89.5 FL (ref 80–99)
MONOCYTES # BLD AUTO: 0.53 10*3/MM3 (ref 0–1)
MONOCYTES NFR BLD AUTO: 8.2 % (ref 0–12)
NEUTROPHILS # BLD AUTO: 3.85 10*3/MM3 (ref 1.5–8.3)
NEUTROPHILS NFR BLD AUTO: 59.5 % (ref 41–71)
NITRITE UR QL STRIP: NEGATIVE
PH UR STRIP.AUTO: 6.5 [PH] (ref 5–8)
PLATELET # BLD AUTO: 262 10*3/MM3 (ref 150–450)
PMV BLD AUTO: 10.7 FL (ref 6–12)
POTASSIUM BLD-SCNC: 4.5 MMOL/L (ref 3.5–5.5)
PROT SERPL-MCNC: 6.9 G/DL (ref 5.7–8.2)
PROT UR QL STRIP: NEGATIVE
RBC # BLD AUTO: 4.87 10*6/MM3 (ref 3.89–5.14)
RBC # UR: ABNORMAL /HPF
REF LAB TEST METHOD: ABNORMAL
SODIUM BLD-SCNC: 142 MMOL/L (ref 132–146)
SP GR UR STRIP: 1.01 (ref 1–1.03)
SQUAMOUS #/AREA URNS HPF: ABNORMAL /HPF
TIBC SERPL-MCNC: 362 MCG/DL (ref 250–450)
UROBILINOGEN UR QL STRIP: ABNORMAL
WBC NRBC COR # BLD: 6.47 10*3/MM3 (ref 3.5–10.8)
WBC UR QL AUTO: ABNORMAL /HPF

## 2018-07-06 PROCEDURE — 86140 C-REACTIVE PROTEIN: CPT | Performed by: INTERNAL MEDICINE

## 2018-07-06 PROCEDURE — 99213 OFFICE O/P EST LOW 20 MIN: CPT | Performed by: INTERNAL MEDICINE

## 2018-07-06 PROCEDURE — 83550 IRON BINDING TEST: CPT | Performed by: INTERNAL MEDICINE

## 2018-07-06 PROCEDURE — 81001 URINALYSIS AUTO W/SCOPE: CPT | Performed by: INTERNAL MEDICINE

## 2018-07-06 PROCEDURE — 87086 URINE CULTURE/COLONY COUNT: CPT | Performed by: INTERNAL MEDICINE

## 2018-07-06 PROCEDURE — 80053 COMPREHEN METABOLIC PANEL: CPT | Performed by: INTERNAL MEDICINE

## 2018-07-06 PROCEDURE — 85025 COMPLETE CBC W/AUTO DIFF WBC: CPT | Performed by: INTERNAL MEDICINE

## 2018-07-06 PROCEDURE — 83540 ASSAY OF IRON: CPT | Performed by: INTERNAL MEDICINE

## 2018-07-06 PROCEDURE — 36415 COLL VENOUS BLD VENIPUNCTURE: CPT | Performed by: INTERNAL MEDICINE

## 2018-07-06 RX ORDER — CLOPIDOGREL BISULFATE 75 MG/1
TABLET ORAL
Qty: 45 TABLET | Refills: 3 | Status: SHIPPED | OUTPATIENT
Start: 2018-07-06 | End: 2019-04-23 | Stop reason: SDDI

## 2018-07-06 RX ORDER — AMLODIPINE BESYLATE 2.5 MG/1
2.5 TABLET ORAL DAILY
Qty: 90 TABLET | Refills: 3 | Status: SHIPPED | OUTPATIENT
Start: 2018-07-06 | End: 2019-07-10 | Stop reason: SDUPTHER

## 2018-07-06 RX ORDER — FERROUS SULFATE 325(65) MG
325 TABLET ORAL
Qty: 90 TABLET | Refills: 3 | Status: SHIPPED | OUTPATIENT
Start: 2018-07-06 | End: 2019-04-23 | Stop reason: SDDI

## 2018-07-06 NOTE — PROGRESS NOTES
Subjective   Marcelino Mesa is a 89 y.o. female.     History of Present Illness     Patient has 6 months of mild urinary incontinence.  She has times particularly in the morning when her bladder weeks for unclear reasons.  Much of the day her urine control is adequate.  She has had no burning on urination or hematuria.    The following portions of the patient's history were reviewed and updated as appropriate: allergies, current medications, past family history, past medical history, past social history, past surgical history and problem list.    Review of Systems   Constitutional: Negative for appetite change and fatigue.   Respiratory: Negative for cough and shortness of breath.    Cardiovascular: Negative for chest pain and palpitations.   Gastrointestinal: Negative for abdominal distention and nausea.   Neurological: Negative for dizziness and light-headedness.       Objective   Blood pressure 152/76, pulse 88, temperature 97.2 °F (36.2 °C), temperature source Oral, resp. rate 20, weight 54.4 kg (120 lb), SpO2 97 %, not currently breastfeeding.    Physical Exam   Constitutional: She is oriented to person, place, and time. She appears well-developed and well-nourished. No distress.   Cardiovascular: Normal rate, regular rhythm and normal heart sounds.    Pulmonary/Chest: Effort normal and breath sounds normal. She has no wheezes. She has no rales.   Neurological: She is alert and oriented to person, place, and time. She exhibits normal muscle tone. Coordination normal.   Psychiatric: She has a normal mood and affect. Her behavior is normal. Judgment and thought content normal.   Nursing note and vitals reviewed.    Procedures  Assessment/Plan   Marcelino was seen today for difficulty urinating.    Diagnoses and all orders for this visit:    Other iron deficiency anemia  -     CBC & Differential  -     Iron Profile  -     CBC Auto Differential    Essential hypertension  -     Comprehensive Metabolic  Panel    Generalized osteoarthritis  -     C-reactive Protein    Macular degeneration    Primary osteoarthritis of both knees    Urinary incontinence, unspecified type  -     Ambulatory Referral to Urology  -     Urinalysis With Culture If Indicated - Urine, Clean Catch  -     Urinalysis, Microscopic Only - Urine, Clean Catch; Future  -     Urinalysis, Microscopic Only - Urine, Clean Catch  -     Urine Culture - Urine,; Future  -     Urine Culture - Urine,    Other orders  -     clopidogrel (PLAVIX) 75 MG tablet; Take 1/2 tablet by mouth every day.  -     amLODIPine (NORVASC) 2.5 MG tablet; Take 1 tablet by mouth Daily.  -     ferrous sulfate 325 (65 FE) MG tablet; Take 1 tablet by mouth Daily With Breakfast.      Patient has a new problem of mild urinary incontinence.  It appears to be episodic and may be related to an overfilled bladder.  I've asked her to see the urologist for urodynamic testing and probable cystoscopy.    Patient has a long history of hypertension with blood pressures averaging 130/70 on amlodipine, salt restriction, daily walking.    The patient's had persistent iron deficiency anemia following multiple orthopedic surgeries over the last 4 years.  Her low iron blood levels suggested she still has significant insufficient bone marrow reserves.      Patient Instructions   1.  Continue usual medicines and supplements - as listed.    2.  Follow well-balanced diet - low in salt low in sugar.    3.  Walk daily - maintain physical fitness.    4.  Visit urologist - evaluate bladder.    5.  Next checkup in 3 months - with new physician.    6.  Blood count is normal but iron level is mildly low.  Continue daily iron over the next year.    7.  Blood chemistries, C-reactive protein, and urinalysis showed no significant abnormalities.    Electronically signed Jony Singh M.D.7/8/2018 3:02 PM

## 2018-07-06 NOTE — PATIENT INSTRUCTIONS
1.  Continue usual medicines and supplements - as listed.    2.  Follow well-balanced diet - low in salt low in sugar.    3.  Walk daily - maintain physical fitness.    4.  Visit urologist - evaluate bladder.    5.  Next checkup in 3 months - with new physician.

## 2018-07-08 LAB — BACTERIA SPEC AEROBE CULT: NORMAL

## 2018-07-13 ENCOUNTER — TELEPHONE (OUTPATIENT)
Dept: INTERNAL MEDICINE | Facility: CLINIC | Age: 83
End: 2018-07-13

## 2018-07-13 NOTE — TELEPHONE ENCOUNTER
Called labs to pt.  Left VM per  KEMAL.   Per TGF:  Next checkup in 3 mos - with new physician.  Blood count is normal but iron level is mildly low.  Continue daily iron over the next year.   Blood chems, CRP, and urinalysis showed no significant abnormalities.  Pt to call w Qs

## 2018-07-17 ENCOUNTER — TELEPHONE (OUTPATIENT)
Dept: INTERNAL MEDICINE | Facility: CLINIC | Age: 83
End: 2018-07-17

## 2018-07-17 RX ORDER — PHENAZOPYRIDINE HYDROCHLORIDE 200 MG/1
200 TABLET, FILM COATED ORAL 3 TIMES DAILY PRN
Qty: 30 TABLET | Refills: 0 | Status: SHIPPED | OUTPATIENT
Start: 2018-07-17 | End: 2019-01-15

## 2018-07-17 NOTE — TELEPHONE ENCOUNTER
Vita called back. She said she spoke with pt who c/o really bad burning while urinating. No hematuria. I advised her to start working with cranberry juice and the pyridium and call back tomorrow before noon if no better. She verb understanding and will relay this to pt.

## 2018-07-17 NOTE — TELEPHONE ENCOUNTER
Patient states that she has a kidney infection and was wondering if something could be called into the [facility] in [place].

## 2018-07-17 NOTE — TELEPHONE ENCOUNTER
I called pt back on home but N/A. So I called dght Vita per  KEMAL. Advised per TGF wld not treat with abx unless severe symptoms. Work with water, cranberry juice, and pyridium - RX sent. Has appt w urologist Dr Hampton 7/23 @ 1:30 per 7/6 referral. She verb understanding and will relay this to pt.

## 2018-10-05 ENCOUNTER — TELEPHONE (OUTPATIENT)
Dept: FAMILY MEDICINE CLINIC | Facility: CLINIC | Age: 83
End: 2018-10-05

## 2018-10-15 ENCOUNTER — OFFICE VISIT (OUTPATIENT)
Dept: FAMILY MEDICINE CLINIC | Facility: CLINIC | Age: 83
End: 2018-10-15

## 2018-10-15 VITALS
OXYGEN SATURATION: 94 % | RESPIRATION RATE: 12 BRPM | DIASTOLIC BLOOD PRESSURE: 80 MMHG | SYSTOLIC BLOOD PRESSURE: 134 MMHG | HEIGHT: 61 IN | HEART RATE: 75 BPM | BODY MASS INDEX: 22.84 KG/M2 | WEIGHT: 121 LBS

## 2018-10-15 DIAGNOSIS — D50.8 OTHER IRON DEFICIENCY ANEMIA: Primary | ICD-10-CM

## 2018-10-15 DIAGNOSIS — R32 URINARY INCONTINENCE, UNSPECIFIED TYPE: ICD-10-CM

## 2018-10-15 DIAGNOSIS — I10 ESSENTIAL HYPERTENSION: ICD-10-CM

## 2018-10-15 LAB
BACTERIA UR QL AUTO: ABNORMAL /HPF
BILIRUB UR QL STRIP: NEGATIVE
CLARITY UR: CLEAR
COLOR UR: YELLOW
DEPRECATED RDW RBC AUTO: 49.5 FL (ref 37–54)
ERYTHROCYTE [DISTWIDTH] IN BLOOD BY AUTOMATED COUNT: 14.4 % (ref 11.3–14.5)
FERRITIN SERPL-MCNC: 53 NG/ML (ref 10–291)
GLUCOSE UR STRIP-MCNC: NEGATIVE MG/DL
HCT VFR BLD AUTO: 45.7 % (ref 34.5–44)
HGB BLD-MCNC: 14.7 G/DL (ref 11.5–15.5)
HGB UR QL STRIP.AUTO: NEGATIVE
HYALINE CASTS UR QL AUTO: ABNORMAL /LPF
IRON 24H UR-MRATE: 73 MCG/DL (ref 50–175)
IRON SATN MFR SERPL: 24 % (ref 15–50)
KETONES UR QL STRIP: NEGATIVE
LEUKOCYTE ESTERASE UR QL STRIP.AUTO: ABNORMAL
MCH RBC QN AUTO: 30.2 PG (ref 27–31)
MCHC RBC AUTO-ENTMCNC: 32.2 G/DL (ref 32–36)
MCV RBC AUTO: 94 FL (ref 80–99)
NITRITE UR QL STRIP: NEGATIVE
PH UR STRIP.AUTO: 7 [PH] (ref 5–8)
PLATELET # BLD AUTO: 267 10*3/MM3 (ref 150–450)
PMV BLD AUTO: 10.3 FL (ref 6–12)
PROT UR QL STRIP: NEGATIVE
RBC # BLD AUTO: 4.86 10*6/MM3 (ref 3.89–5.14)
RBC # UR: ABNORMAL /HPF
REF LAB TEST METHOD: ABNORMAL
SP GR UR STRIP: 1.01 (ref 1–1.03)
SQUAMOUS #/AREA URNS HPF: ABNORMAL /HPF
TIBC SERPL-MCNC: 300 MCG/DL (ref 250–450)
UROBILINOGEN UR QL STRIP: ABNORMAL
WBC NRBC COR # BLD: 8.21 10*3/MM3 (ref 3.5–10.8)
WBC UR QL AUTO: ABNORMAL /HPF

## 2018-10-15 PROCEDURE — 36415 COLL VENOUS BLD VENIPUNCTURE: CPT | Performed by: FAMILY MEDICINE

## 2018-10-15 PROCEDURE — 82728 ASSAY OF FERRITIN: CPT | Performed by: FAMILY MEDICINE

## 2018-10-15 PROCEDURE — 87186 SC STD MICRODIL/AGAR DIL: CPT | Performed by: FAMILY MEDICINE

## 2018-10-15 PROCEDURE — 87086 URINE CULTURE/COLONY COUNT: CPT | Performed by: FAMILY MEDICINE

## 2018-10-15 PROCEDURE — 83540 ASSAY OF IRON: CPT | Performed by: FAMILY MEDICINE

## 2018-10-15 PROCEDURE — 85027 COMPLETE CBC AUTOMATED: CPT | Performed by: FAMILY MEDICINE

## 2018-10-15 PROCEDURE — 83550 IRON BINDING TEST: CPT | Performed by: FAMILY MEDICINE

## 2018-10-15 PROCEDURE — 99214 OFFICE O/P EST MOD 30 MIN: CPT | Performed by: FAMILY MEDICINE

## 2018-10-15 PROCEDURE — 81001 URINALYSIS AUTO W/SCOPE: CPT | Performed by: FAMILY MEDICINE

## 2018-10-15 PROCEDURE — 87077 CULTURE AEROBIC IDENTIFY: CPT | Performed by: FAMILY MEDICINE

## 2018-10-15 RX ORDER — OXYBUTYNIN CHLORIDE 5 MG/1
5 TABLET ORAL 2 TIMES DAILY
COMMUNITY
End: 2019-01-15

## 2018-10-15 NOTE — PATIENT INSTRUCTIONS
1.  Continue same medications and supplements - as listed.    2.  Continue well-balanced diet - low in salt and low in sugar.    3.  Maintain a routine exercise program - every week.    4.  A letter will be sent with your test results.

## 2018-10-15 NOTE — PROGRESS NOTES
Subjective   Marcelino Mesa is a 89 y.o. female.     Chief Complaint   Patient presents with   • Anemia     F/U    • Hypertension       History of Present Illness     Marcelino Mesa presents today to establish care. she is a former patient of Dr. Singh and is transitioning care after his long term. she reports no changes to her health since her last scheduled visit, and is here for a regularly scheduled follow-up appointment.    She was last seen in this office on 7/6/18.  She is seen today to follow-up on her iron deficiency anemia and blood pressure.  Her hypertension is fairly well-controlled on amlodipine 2.5 mg.  She takes ferrous sulfate daily for her anemia.  She reports good compliance with these medications. She reports she has had a bladder infection and Dr. Hampton gave her oxybutynin    Had a root canal, was given amoxicillin TID for 10 days. Had a bad reaction.    The following portions of the patient's history were reviewed and updated as appropriate: allergies, current medications, past family history, past medical history, past social history, past surgical history and problem list.    Active Ambulatory Problems     Diagnosis Date Noted   • Abnormal gait 05/24/2016   • Generalized osteoarthritis 05/24/2016   • Hyperlipidemia 05/24/2016   • Iron deficiency anemia 05/24/2016   • Macular degeneration 05/24/2016   • Scoliosis of lumbar spine 05/24/2016   • Post-menopausal osteoporosis 05/24/2016   • Vitamin D deficiency 05/24/2016   • Essential hypertension 07/12/2016   • Preventative health care 08/09/2016   • Incomplete RBBB 08/11/2016   • Primary osteoarthritis of both knees 11/07/2016   • Anxiety 01/23/2017   • Amaurosis fugax 06/20/2017   • Bladder incontinence 07/06/2018     Resolved Ambulatory Problems     Diagnosis Date Noted   • Left-sided low back pain with left-sided sciatica 05/24/2016   • Syncope 05/24/2016   • Vaginitis 05/24/2016   • Hip pain, left 05/24/2016   • Iron deficiency  05/26/2016   • Hip fracture, left (CMS/HCC) 07/12/2016   • Knee pain, bilateral 07/12/2016   • Arthritis of knee, right 09/22/2016   • Status post unicompartmental right knee arthroplasty 09/22/2016   • Acute post-operative pain 09/22/2016   • Arthritis of knee, left 12/01/2016   • Status post left knee medial unicompartmental replacement 12/01/2016   • Acute blood loss anemia, mild, asymptomatic 12/02/2016   • Leukocytosis, mild, likely reactive 12/02/2016   • Hematochezia 01/23/2017   • Dysuria 01/23/2017   • GI bleed 01/23/2017   • Fracture of hip (CMS/HCC) 08/10/2017   • 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 08/10/2017   • Postoperative wound infection 10/19/2017     Past Medical History:   Diagnosis Date   • Amaurosis fugax of left eye 06/19/2017   • Compression fracture of L1 lumbar vertebra (CMS/HCC) Remote   • Generalized osteoarthritis Adulthood   • Hearing impairment 2000   • Herpes zoster    • Hip fracture, left (CMS/HCC) 2014   • Hyperlipidemia Adulthood   • Hypertension 2016   • Iron deficiency 2017   • Lumbar scoliosis Adulthood   • Lumbar spondylosis 2015   • Macular degeneration    • PONV (postoperative nausea and vomiting)    • Vitamin D deficiency    • Wrist fracture, left      Past Surgical History:   Procedure Laterality Date   • CATARACT EXTRACTION EXTRACAPSULAR W/ INTRAOCULAR LENS IMPLANTATION Bilateral 2016   • COLONOSCOPY  2006    Normal study   • HIP FRACTURE SURGERY Left 2014     hip pinning for acute fracture   • KNEE ARTHROPLASTY UNICOMPARTMENTAL Right 9/22/2016    Procedure: RIGHT KNEE ARTHROPLASTY UNICOMPARTMENTAL;  Surgeon: Jesse Taveras MD;  Location:  [facility];  Service:    • KNEE ARTHROPLASTY UNICOMPARTMENTAL Left 12/1/2016    Procedure: LEFT KNEE ARTHROPLASTY UNICOMPARTMENTAL;  Surgeon: Jesse Taveras MD;  Location:  [facility];  Service:    • LUMBAR EPIDURAL INJECTION  2015   • TONSILLECTOMY  1947   • TOTAL HIP ARTHROPLASTY Left 8/10/2017    Procedure:  "REMOVAL OF HARDWARE LEFT HIP, LEFT ANTERIOR TOTAL HIP ARTHROPLASTY;  Surgeon: Jesse Taveras MD;  Location: [facility];  Service:      Family History   Problem Relation Age of Onset   • Alzheimer's disease Mother    • No Known Problems Father    • Arthritis Sister    • Breast cancer Sister    • Colon cancer Sister    • Lung cancer Brother    • Heart disease Other         2009   • Asthma Sister    • Colon cancer Sister    • Breast cancer Sister    • Lumbar disc disease Sister    • Skin cancer Brother      Social History     Social History   • Marital status:      Spouse name: N/A   • Number of children: N/A   • Years of education: N/A     Occupational History   • Not on file.     Social History Main Topics   • Smoking status: Never Smoker   • Smokeless tobacco: Never Used   • Alcohol use No   • Drug use: No   • Sexual activity: Not on file     Other Topics Concern   • Not on file     Social History Narrative    Domestic life-      [place]                                   good support from local daughter - who lives in 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    Respiratory: Negative.    Cardiovascular: Negative.    Musculoskeletal: Negative.    Hematological:        History of anemia       Objective   Blood pressure 134/80, pulse 75, resp. rate 12, height 156 cm (61.42\"), weight 54.9 kg (121 lb), SpO2 94 %, not currently breastfeeding.  Nursing note " reviewed  Physical Exam  Const: NAD, A&Ox4, Pleasant, Cooperative.  Thin, frail  Eyes: EOMI, no conjunctivitis  ENT: No nasal discharge present, neck supple  Cardiac: Regular rate and rhythm, no peripheral edema or cyanosis  Resp: Respiratory rate within normal limits, no increased work of breathing, no audible wheezing or retractions noted  GI: No distention or ascites  MSK: Motor and sensation grossly intact in bilateral upper extremities  Neurologic, CN II-XII grossly intact  Psych: Appropriate mood and behavior.  Skin: Pink, warm, dry  Procedures  Assessment/Plan   Marcelino was seen today for anemia and hypertension.    Diagnoses and all orders for this visit:    Other iron deficiency anemia  -     Ferritin; Future  -     Iron and TIBC; Future  -     CBC No Differential; Future  -     Ferritin  -     Iron and TIBC  -     CBC No Differential    Essential hypertension    Urinary incontinence, unspecified type  -     Urinalysis With Culture If Indicated - Urine, Clean Catch; Future  -     Mirabegron ER (MYRBETRIQ) 25 MG tablet sustained-release 24 hour 24 hr tablet; Take 1 tablet by mouth Daily.  -     Urinalysis With Culture If Indicated - Urine, Clean Catch  -     Urinalysis, Microscopic Only - Urine, Clean Catch; Future  -     Urinalysis, Microscopic Only - Urine, Clean Catch  -     Urine Culture - Urine,; Future  -     Urine Culture - Urine,  -     sulfamethoxazole-trimethoprim (BACTRIM DS,SEPTRA DS) 800-160 MG per tablet; Take 1 tablet by mouth 2 (Two) Times a Day.      Acutely she complains of some urinary incontinence  UA today consistent with a possible infection.  Bactrim called into her pharmacy    Chronic health conditions:  #1 anemia  Repeat CBC and iron studies today.  In the meantime she should continue the ferrous sulfate.  She tolerates this medication fairly well    #2 hypertension  She is well compensated on low-dose amlodipine 2.5 mg daily    Patient Instructions   1.  Continue same medications  and supplements - as listed.    2.  Continue well-balanced diet - low in salt and low in sugar.    3.  Maintain a routine exercise program - every week.    4.  A letter will be sent with your test results.      Ambulatory progress note signed and attested to by Earnest Ingram D.O.

## 2018-10-17 LAB
BACTERIA SPEC AEROBE CULT: ABNORMAL
BACTERIA SPEC AEROBE CULT: ABNORMAL

## 2018-10-28 RX ORDER — SULFAMETHOXAZOLE AND TRIMETHOPRIM 800; 160 MG/1; MG/1
1 TABLET ORAL 2 TIMES DAILY
Qty: 6 TABLET | Refills: 0 | Status: SHIPPED | OUTPATIENT
Start: 2018-10-28 | End: 2019-01-15

## 2019-01-15 ENCOUNTER — OFFICE VISIT (OUTPATIENT)
Dept: FAMILY MEDICINE CLINIC | Facility: CLINIC | Age: 84
End: 2019-01-15

## 2019-01-15 VITALS
SYSTOLIC BLOOD PRESSURE: 108 MMHG | DIASTOLIC BLOOD PRESSURE: 60 MMHG | TEMPERATURE: 97.5 F | WEIGHT: 124 LBS | RESPIRATION RATE: 18 BRPM | HEART RATE: 89 BPM | BODY MASS INDEX: 23.11 KG/M2 | OXYGEN SATURATION: 98 %

## 2019-01-15 DIAGNOSIS — I10 ESSENTIAL HYPERTENSION: Primary | ICD-10-CM

## 2019-01-15 DIAGNOSIS — R32 URINARY INCONTINENCE, UNSPECIFIED TYPE: ICD-10-CM

## 2019-01-15 PROCEDURE — 99214 OFFICE O/P EST MOD 30 MIN: CPT | Performed by: FAMILY MEDICINE

## 2019-01-15 RX ORDER — ESTRADIOL 0.1 MG/G
2 CREAM VAGINAL DAILY
Qty: 42.5 G | Refills: 12 | Status: SHIPPED | OUTPATIENT
Start: 2019-01-15 | End: 2019-04-23

## 2019-01-15 RX ORDER — NITROFURANTOIN 25; 75 MG/1; MG/1
100 CAPSULE ORAL 2 TIMES DAILY
Qty: 10 CAPSULE | Refills: 0 | Status: SHIPPED | OUTPATIENT
Start: 2019-01-15 | End: 2019-01-20

## 2019-01-15 NOTE — PROGRESS NOTES
Subjective   Marcelino Mesa is a 89 y.o. female.     Chief Complaint   Patient presents with   • Follow-up       History of Present Illness     Marcelino Mesa presents today to follow up on urinary incontinenece and HTN. She has fairly severe mixed stress/urge incontinence which has been refractory to mirabegron and oxybutynin. She also did not tolerate mirabegron due to diarrhea. Dr. Hampton tried her on oxybutynin for about a month with no effect. Otherwise she reports her health is stable. She is compliant with her medications.    The following portions of the patient's history were reviewed and updated as appropriate: allergies, current medications, past family history, past medical history, past social history, past surgical history and problem list.    Active Ambulatory Problems     Diagnosis Date Noted   • Abnormal gait 05/24/2016   • Generalized osteoarthritis 05/24/2016   • Hyperlipidemia 05/24/2016   • Iron deficiency anemia 05/24/2016   • Macular degeneration 05/24/2016   • Scoliosis of lumbar spine 05/24/2016   • Post-menopausal osteoporosis 05/24/2016   • Vitamin D deficiency 05/24/2016   • Essential hypertension 07/12/2016   • Preventative health care 08/09/2016   • Incomplete RBBB 08/11/2016   • Primary osteoarthritis of both knees 11/07/2016   • Anxiety 01/23/2017   • Amaurosis fugax 06/20/2017   • Bladder incontinence 07/06/2018     Resolved Ambulatory Problems     Diagnosis Date Noted   • Left-sided low back pain with left-sided sciatica 05/24/2016   • Syncope 05/24/2016   • Vaginitis 05/24/2016   • Hip pain, left 05/24/2016   • Iron deficiency 05/26/2016   • Hip fracture, left (CMS/[facility]) 07/12/2016   • Knee pain, bilateral 07/12/2016   • Arthritis of knee, right 09/22/2016   • Status post unicompartmental right knee arthroplasty 09/22/2016   • Acute post-operative pain 09/22/2016   • Arthritis of knee, left 12/01/2016   • Status post left knee medial unicompartmental replacement 12/01/2016   •  Acute blood loss anemia, mild, asymptomatic 12/02/2016   • Leukocytosis, mild, likely reactive 12/02/2016   • Hematochezia 01/23/2017   • Dysuria 01/23/2017   • GI bleed 01/23/2017   • Fracture of hip (CMS/[facility]) 08/10/2017   • 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 08/10/2017   • Postoperative wound infection 10/19/2017     Past Medical History:   Diagnosis Date   • Amaurosis fugax of left eye 06/19/2017   • Compression fracture of L1 lumbar vertebra (CMS/[facility]) Remote   • Generalized osteoarthritis Adulthood   • Hearing impairment 2000   • Herpes zoster    • Hip fracture, left (CMS/[facility]) 2014   • Hyperlipidemia Adulthood   • Hypertension 2016   • Iron deficiency 2017   • Lumbar scoliosis Adulthood   • Lumbar spondylosis 2015   • Macular degeneration    • PONV (postoperative nausea and vomiting)    • Vitamin D deficiency    • Wrist fracture, left      Past Surgical History:   Procedure Laterality Date   • CATARACT EXTRACTION EXTRACAPSULAR W/ INTRAOCULAR LENS IMPLANTATION Bilateral 2016   • COLONOSCOPY  2006    Normal study   • HIP FRACTURE SURGERY Left 2014     hip pinning for acute fracture   • KNEE ARTHROPLASTY UNICOMPARTMENTAL Right 9/22/2016    Procedure: RIGHT KNEE ARTHROPLASTY UNICOMPARTMENTAL;  Surgeon: Jesse Taveras MD;  Location:  [facility];  Service:    • KNEE ARTHROPLASTY UNICOMPARTMENTAL Left 12/1/2016    Procedure: LEFT KNEE ARTHROPLASTY UNICOMPARTMENTAL;  Surgeon: Jesse Taveras MD;  Location:  [facility];  Service:    • LUMBAR EPIDURAL INJECTION  2015   • TONSILLECTOMY  1947   • TOTAL HIP ARTHROPLASTY Left 8/10/2017    Procedure: REMOVAL OF HARDWARE LEFT HIP, LEFT ANTERIOR TOTAL HIP ARTHROPLASTY;  Surgeon: Jesse Taveras MD;  Location:  [facility];  Service:      Family History   Problem Relation Age of Onset   • Alzheimer's disease Mother    • No Known Problems Father    • Arthritis Sister    • Breast cancer Sister    • Colon cancer Sister    • Lung cancer Brother    • Heart disease  Other         2009   • Asthma Sister    • Colon cancer Sister    • Breast cancer Sister    • Lumbar disc disease Sister    • Skin cancer Brother      Social History     Socioeconomic History   • Marital status:      Spouse name: Not on file   • Number of children: Not on file   • Years of education: Not on file   • Highest education level: Not on file   Social Needs   • Financial resource strain: Not on file   • Food insecurity - worry: Not on file   • Food insecurity - inability: Not on file   • Transportation needs - medical: Not on file   • Transportation needs - non-medical: Not on file   Occupational History   • Not on file   Tobacco Use   • Smoking status: Never Smoker   • Smokeless tobacco: Never Used   Substance and Sexual Activity   • Alcohol use: No   • Drug use: No   • Sexual activity: Not on file   Other Topics Concern   • Not on file   Social History Narrative    Domestic life-      [place]                                   good support from local daughter - who lives in [facility] - 1 hour away        Ad[employer]ism- [employer]        Sleep hygiene-   in bed 8 PM to 6 AM for 8 hours broken sleep          Caffeine use- 2 cups coffee daily        Exercise habits- Walking daily as tolerated        Diet- Prudent well-balanced diet - low in salt        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    Respiratory: Negative.    Cardiovascular: Negative.    Genitourinary: Positive for frequency and urgency.   Musculoskeletal: Negative.    Hematological:        History of anemia       Objective   Blood pressure 108/60, pulse 89, temperature 97.5 °F (36.4 °C), temperature source Oral, resp. rate 18, weight 56.2 kg (124 lb), SpO2 98 %,  not currently breastfeeding.  Nursing note reviewed  Physical Exam  Const: NAD, A&Ox4, Pleasant, Cooperative.  Thin, frail  Eyes: EOMI, no conjunctivitis  ENT: No nasal discharge present, neck supple  Cardiac: Regular rate and rhythm, no peripheral edema or cyanosis  Resp: Respiratory rate within normal limits, no increased work of breathing, no audible wheezing or retractions noted  GI: No distention or ascites  MSK: Motor and sensation grossly intact in bilateral upper extremities  Neurologic, CN II-XII grossly intact  Psych: Appropriate mood and behavior.  Skin: Pink, warm, dry  Procedures  Assessment/Plan   Marcelino was seen today for follow-up.    Diagnoses and all orders for this visit:    Essential hypertension    Urinary incontinence, unspecified type  -     Ambulatory Referral to Physical Therapy Pelvic Floor  -     nitrofurantoin, macrocrystal-monohydrate, (MACROBID) 100 MG capsule; Take 1 capsule by mouth 2 (Two) Times a Day for 5 days.  -     estradiol (ESTRACE) 0.1 MG/GM vaginal cream; Insert 2 g into the vagina Daily. Apply to urethra.      #1 urinary incontinence  She has chronic recurrent UTI, try macrobid today. She also has some contribution from weak pelvic floor, I would like her to see the physical therapist either with [facility] near her or at [facility].  - Topical estrogen may also be beneficial    Chronic health conditions:  #1 anemia  Stable    #2 hypertension  She is well compensated on low-dose amlodipine 2.5 mg daily    Patient Instructions   1.  Use antibiotic for next 5 days.    2.  Use the topical cream on your urethra to help tone and rejuvenate tissues in the urethra and vaginal areas.    3.  Attend pelvic floor physical therapy.      Ambulatory progress note signed and attested to by Earnest Ingram D.O.

## 2019-01-15 NOTE — PATIENT INSTRUCTIONS
1.  Use antibiotic for next 5 days.    2.  Use the topical cream on your urethra to help tone and rejuvenate tissues in the urethra and vaginal areas.    3.  Attend pelvic floor physical therapy.

## 2019-02-08 ENCOUNTER — TELEPHONE (OUTPATIENT)
Dept: FAMILY MEDICINE CLINIC | Facility: CLINIC | Age: 84
End: 2019-02-08

## 2019-02-11 ENCOUNTER — TELEPHONE (OUTPATIENT)
Dept: FAMILY MEDICINE CLINIC | Facility: CLINIC | Age: 84
End: 2019-02-11

## 2019-02-11 NOTE — TELEPHONE ENCOUNTER
Pharmacy call and said that the Estradiol cream is not covered on Pt insurance, but they compound it at 0.015% with the cost of $35 per tub. Is this ok to change.

## 2019-04-23 ENCOUNTER — LAB (OUTPATIENT)
Dept: LAB | Facility: HOSPITAL | Age: 84
End: 2019-04-23

## 2019-04-23 ENCOUNTER — OFFICE VISIT (OUTPATIENT)
Dept: FAMILY MEDICINE CLINIC | Facility: CLINIC | Age: 84
End: 2019-04-23

## 2019-04-23 VITALS
DIASTOLIC BLOOD PRESSURE: 70 MMHG | OXYGEN SATURATION: 97 % | BODY MASS INDEX: 22.84 KG/M2 | HEART RATE: 78 BPM | WEIGHT: 121 LBS | HEIGHT: 61 IN | SYSTOLIC BLOOD PRESSURE: 110 MMHG

## 2019-04-23 DIAGNOSIS — D50.8 OTHER IRON DEFICIENCY ANEMIA: ICD-10-CM

## 2019-04-23 DIAGNOSIS — E55.9 VITAMIN D DEFICIENCY: ICD-10-CM

## 2019-04-23 DIAGNOSIS — S39.011A ABDOMINAL MUSCLE STRAIN, INITIAL ENCOUNTER: ICD-10-CM

## 2019-04-23 DIAGNOSIS — E78.00 PURE HYPERCHOLESTEROLEMIA: ICD-10-CM

## 2019-04-23 DIAGNOSIS — I10 ESSENTIAL HYPERTENSION: ICD-10-CM

## 2019-04-23 DIAGNOSIS — I10 ESSENTIAL HYPERTENSION: Primary | ICD-10-CM

## 2019-04-23 DIAGNOSIS — R32 URINARY INCONTINENCE, UNSPECIFIED TYPE: ICD-10-CM

## 2019-04-23 LAB
25(OH)D3 SERPL-MCNC: 44.6 NG/ML (ref 30–100)
ALBUMIN SERPL-MCNC: 4.4 G/DL (ref 3.5–5.2)
ALBUMIN/GLOB SERPL: 1.5 G/DL
ALP SERPL-CCNC: 118 U/L (ref 39–117)
ALT SERPL W P-5'-P-CCNC: 9 U/L (ref 1–33)
ANION GAP SERPL CALCULATED.3IONS-SCNC: 13.1 MMOL/L
AST SERPL-CCNC: 15 U/L (ref 1–32)
BASOPHILS # BLD AUTO: 0.07 10*3/MM3 (ref 0–0.2)
BASOPHILS NFR BLD AUTO: 1.1 % (ref 0–1.5)
BILIRUB SERPL-MCNC: 0.7 MG/DL (ref 0.2–1.2)
BILIRUB UR QL STRIP: NEGATIVE
BUN BLD-MCNC: 16 MG/DL (ref 8–23)
BUN/CREAT SERPL: 18.8 (ref 7–25)
CALCIUM SPEC-SCNC: 9.8 MG/DL (ref 8.2–9.6)
CHLORIDE SERPL-SCNC: 99 MMOL/L (ref 98–107)
CHOLEST SERPL-MCNC: 223 MG/DL (ref 0–200)
CLARITY UR: ABNORMAL
CO2 SERPL-SCNC: 25.9 MMOL/L (ref 22–29)
COLOR UR: ABNORMAL
CREAT BLD-MCNC: 0.85 MG/DL (ref 0.57–1)
DEPRECATED RDW RBC AUTO: 48.1 FL (ref 37–54)
EOSINOPHIL # BLD AUTO: 0.03 10*3/MM3 (ref 0–0.4)
EOSINOPHIL NFR BLD AUTO: 0.5 % (ref 0.3–6.2)
ERYTHROCYTE [DISTWIDTH] IN BLOOD BY AUTOMATED COUNT: 13.6 % (ref 12.3–15.4)
GFR SERPL CREATININE-BSD FRML MDRD: 63 ML/MIN/1.73
GLOBULIN UR ELPH-MCNC: 3 GM/DL
GLUCOSE BLD-MCNC: 99 MG/DL (ref 65–99)
GLUCOSE UR STRIP-MCNC: NEGATIVE MG/DL
HCT VFR BLD AUTO: 45.8 % (ref 34–46.6)
HDLC SERPL-MCNC: 61 MG/DL (ref 40–60)
HGB BLD-MCNC: 14.3 G/DL (ref 12–15.9)
HGB UR QL STRIP.AUTO: NEGATIVE
IMM GRANULOCYTES # BLD AUTO: 0.02 10*3/MM3 (ref 0–0.05)
IMM GRANULOCYTES NFR BLD AUTO: 0.3 % (ref 0–0.5)
KETONES UR QL STRIP: NEGATIVE
LDLC SERPL CALC-MCNC: 132 MG/DL (ref 0–100)
LDLC/HDLC SERPL: 2.16 {RATIO}
LEUKOCYTE ESTERASE UR QL STRIP.AUTO: NEGATIVE
LYMPHOCYTES # BLD AUTO: 1.56 10*3/MM3 (ref 0.7–3.1)
LYMPHOCYTES NFR BLD AUTO: 24.1 % (ref 19.6–45.3)
MCH RBC QN AUTO: 30.3 PG (ref 26.6–33)
MCHC RBC AUTO-ENTMCNC: 31.2 G/DL (ref 31.5–35.7)
MCV RBC AUTO: 97 FL (ref 79–97)
MONOCYTES # BLD AUTO: 0.51 10*3/MM3 (ref 0.1–0.9)
MONOCYTES NFR BLD AUTO: 7.9 % (ref 5–12)
NEUTROPHILS # BLD AUTO: 4.27 10*3/MM3 (ref 1.7–7)
NEUTROPHILS NFR BLD AUTO: 66.1 % (ref 42.7–76)
NITRITE UR QL STRIP: NEGATIVE
NRBC BLD AUTO-RTO: 0 /100 WBC (ref 0–0.2)
PH UR STRIP.AUTO: 6 [PH] (ref 5–8)
PLATELET # BLD AUTO: 290 10*3/MM3 (ref 140–450)
PMV BLD AUTO: 11.1 FL (ref 6–12)
POTASSIUM BLD-SCNC: 4.4 MMOL/L (ref 3.5–5.2)
PROT SERPL-MCNC: 7.4 G/DL (ref 6–8.5)
PROT UR QL STRIP: ABNORMAL
RBC # BLD AUTO: 4.72 10*6/MM3 (ref 3.77–5.28)
SODIUM BLD-SCNC: 138 MMOL/L (ref 136–145)
SP GR UR STRIP: 1.03 (ref 1–1.03)
TRIGL SERPL-MCNC: 150 MG/DL (ref 0–150)
UROBILINOGEN UR QL STRIP: ABNORMAL
VLDLC SERPL-MCNC: 30 MG/DL (ref 5–40)
WBC NRBC COR # BLD: 6.46 10*3/MM3 (ref 3.4–10.8)

## 2019-04-23 PROCEDURE — 80061 LIPID PANEL: CPT | Performed by: FAMILY MEDICINE

## 2019-04-23 PROCEDURE — 81003 URINALYSIS AUTO W/O SCOPE: CPT | Performed by: FAMILY MEDICINE

## 2019-04-23 PROCEDURE — 99214 OFFICE O/P EST MOD 30 MIN: CPT | Performed by: FAMILY MEDICINE

## 2019-04-23 PROCEDURE — 80053 COMPREHEN METABOLIC PANEL: CPT | Performed by: FAMILY MEDICINE

## 2019-04-23 PROCEDURE — 82306 VITAMIN D 25 HYDROXY: CPT | Performed by: FAMILY MEDICINE

## 2019-04-23 PROCEDURE — 85025 COMPLETE CBC W/AUTO DIFF WBC: CPT | Performed by: FAMILY MEDICINE

## 2019-04-23 RX ORDER — PHENOL 1.4 %
600 AEROSOL, SPRAY (ML) MUCOUS MEMBRANE DAILY
COMMUNITY
End: 2021-08-03

## 2019-04-23 NOTE — PROGRESS NOTES
Subjective   Marcelino Mesa is a 89 y.o. female.     Chief Complaint   Patient presents with   • Follow-up       History of Present Illness     Marcelino Mesa presents today to follow up on urinary incontinenece and HTN. She has fairly severe mixed stress/urge incontinence which has been refractory to mirabegron and oxybutynin. She also did not tolerate mirabegron due to diarrhea. Dr. Hampton tried her on oxybutynin for about a month with no effect.  She was sent to pelvic floor physical therapy and has been attending this, she reports that this has helped substantially.  She pulled some abdominal muscles when she was picking up a garden a couple of weeks ago, and so has been unable to do the exercises at home as much lately, but plans to resume them.  She is happy with the results so far.  Otherwise she is doing quite well for her age, she is compliant with her medications as prescribed, although she does not take all supplements listed, and reports no acute issues.    The following portions of the patient's history were reviewed and updated as appropriate: allergies, current medications, past family history, past medical history, past social history, past surgical history and problem list.    Active Ambulatory Problems     Diagnosis Date Noted   • Abnormal gait 05/24/2016   • Generalized osteoarthritis 05/24/2016   • Hyperlipidemia 05/24/2016   • Iron deficiency anemia 05/24/2016   • Macular degeneration 05/24/2016   • Scoliosis of lumbar spine 05/24/2016   • Post-menopausal osteoporosis 05/24/2016   • Vitamin D deficiency 05/24/2016   • Essential hypertension 07/12/2016   • Preventative health care 08/09/2016   • Incomplete RBBB 08/11/2016   • Primary osteoarthritis of both knees 11/07/2016   • Anxiety 01/23/2017   • Amaurosis fugax 06/20/2017   • Bladder incontinence 07/06/2018     Resolved Ambulatory Problems     Diagnosis Date Noted   • Left-sided low back pain with left-sided sciatica 05/24/2016   • Syncope  05/24/2016   • Vaginitis 05/24/2016   • Hip pain, left 05/24/2016   • Iron deficiency 05/26/2016   • Hip fracture, left (CMS/HCC) 07/12/2016   • Knee pain, bilateral 07/12/2016   • Arthritis of knee, right 09/22/2016   • Status post unicompartmental right knee arthroplasty 09/22/2016   • Acute post-operative pain 09/22/2016   • Arthritis of knee, left 12/01/2016   • Status post left knee medial unicompartmental replacement 12/01/2016   • Acute blood loss anemia, mild, asymptomatic 12/02/2016   • Leukocytosis, mild, likely reactive 12/02/2016   • Hematochezia 01/23/2017   • Dysuria 01/23/2017   • GI bleed 01/23/2017   • Fracture of hip (CMS/[facility]) 08/10/2017   • 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 08/10/2017   • Postoperative wound infection 10/19/2017     Past Medical History:   Diagnosis Date   • Amaurosis fugax of left eye 06/19/2017   • Compression fracture of L1 lumbar vertebra (CMS/HCC) Remote   • Generalized osteoarthritis Adulthood   • Hearing impairment 2000   • Herpes zoster    • Hip fracture, left (CMS/HCC) 2014   • Hyperlipidemia Adulthood   • Hypertension 2016   • Iron deficiency 2017   • Lumbar scoliosis Adulthood   • Lumbar spondylosis 2015   • Macular degeneration    • PONV (postoperative nausea and vomiting)    • Vitamin D deficiency    • Wrist fracture, left      Past Surgical History:   Procedure Laterality Date   • CATARACT EXTRACTION EXTRACAPSULAR W/ INTRAOCULAR LENS IMPLANTATION Bilateral 2016   • COLONOSCOPY  2006    Normal study   • HIP FRACTURE SURGERY Left 2014     hip pinning for acute fracture   • KNEE ARTHROPLASTY UNICOMPARTMENTAL Right 9/22/2016    Procedure: RIGHT KNEE ARTHROPLASTY UNICOMPARTMENTAL;  Surgeon: Jesse Taveras MD;  Location:  [facility];  Service:    • KNEE ARTHROPLASTY UNICOMPARTMENTAL Left 12/1/2016    Procedure: LEFT KNEE ARTHROPLASTY UNICOMPARTMENTAL;  Surgeon: Jesse Taveras MD;  Location:  [facility];  Service:    • LUMBAR EPIDURAL INJECTION   2015   • TONSILLECTOMY  1947   • TOTAL HIP ARTHROPLASTY Left 8/10/2017    Procedure: REMOVAL OF HARDWARE LEFT HIP, LEFT ANTERIOR TOTAL HIP ARTHROPLASTY;  Surgeon: Jesse Taveras MD;  [facility];  Service:      Family History   Problem Relation Age of Onset   • Alzheimer's disease Mother    • No Known Problems Father    • Arthritis Sister    • Breast cancer Sister    • Colon cancer Sister    • Lung cancer Brother    • Heart disease Other         2009   • Asthma Sister    • Colon cancer Sister    • Breast cancer Sister    • Lumbar disc disease Sister    • Skin cancer Brother      Social History     Socioeconomic History   • Marital status:      Spouse name: Not on file   • Number of children: Not on file   • Years of education: Not on file   • Highest education level: Not on file   Tobacco Use   • Smoking status: Never Smoker   • Smokeless tobacco: Never Used   Substance and Sexual Activity   • Alcohol use: No   • Drug use: No   Social History Narrative    Domestic life-      [place]                                   good support from local daughter - who lives in [place] - 1 hour away        Gnosticism- Buddhist        Sleep hygiene-   in bed 8 PM to 6 AM for 8 hours broken sleep          Caffeine use- 2 cups coffee daily        Exercise habits- Walking daily as tolerated        Diet- Prudent well-balanced diet - low in salt        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    Respiratory: Negative.    Cardiovascular: Negative.    Genitourinary: Positive for frequency and urgency.   Musculoskeletal: Negative.    Hematological:        History of anemia       Objective   Blood pressure 110/70, pulse 78, height 156 cm  "(61.42\"), weight 54.9 kg (121 lb), SpO2 97 %, not currently breastfeeding.  Nursing note reviewed  Physical Exam  Const: NAD, A&Ox4, Pleasant, Cooperative.  Thin, frail  Eyes: EOMI, no conjunctivitis  ENT: No nasal discharge present, neck supple  Cardiac: Regular rate and rhythm, no peripheral edema or cyanosis  Resp: Respiratory rate within normal limits, no increased work of breathing, no audible wheezing or retractions noted  GI: No distention or ascites  MSK: Motor and sensation grossly intact in bilateral upper extremities  Neurologic, CN II-XII grossly intact  Psych: Appropriate mood and behavior.  Skin: Pink, warm, dry  Procedures  Assessment/Plan   Marcelino was seen today for follow-up.    Diagnoses and all orders for this visit:    Essential hypertension  -     Comprehensive Metabolic Panel; Future  -     Urinalysis With Microscopic If Indicated (No Culture) - Urine, Clean Catch; Future    Urinary incontinence, unspecified type  -     Urinalysis With Microscopic If Indicated (No Culture) - Urine, Clean Catch; Future    Pure hypercholesterolemia  -     Comprehensive Metabolic Panel; Future  -     Lipid Panel; Future    Abdominal muscle strain, initial encounter    Other iron deficiency anemia  -     CBC & Differential; Future    Vitamin D deficiency  -     Vitamin D 25 Hydroxy; Future      #1 urinary incontinence  Continue pelvic floor physical therapy    Chronic health conditions:  #1 anemia  Recheck today.  She is not taking her iron supplement daily at this time    #2 hypertension  She is well compensated on low-dose amlodipine 2.5 mg daily    #3 vitamin D deficiency  She no longer takes her vitamin D supplement, we will recheck her vitamin D level today    #4 hyperlipidemia  Controlled with diet alone    We will see her back in 3 months for a wellness visit    There are no Patient Instructions on file for this visit.    Ambulatory progress note signed and attested to by Earnest Ingram D.O.             "

## 2019-06-21 RX ORDER — CLOPIDOGREL BISULFATE 75 MG/1
TABLET ORAL
Qty: 45 TABLET | Refills: 3 | OUTPATIENT
Start: 2019-06-21

## 2019-07-09 RX ORDER — CLOPIDOGREL BISULFATE 75 MG/1
TABLET ORAL
Qty: 45 TABLET | Refills: 3 | OUTPATIENT
Start: 2019-07-09

## 2019-07-09 NOTE — TELEPHONE ENCOUNTER
Who is calling for refill, pharmacy or patient?  Patient discontinued this medication on her own I was told, in any event it is no longer required

## 2019-07-09 NOTE — TELEPHONE ENCOUNTER
CHECKING ON THE STATUS OF THE PLAVIX REFILL. SENT TO US TWO WEEKS AGO AND THEN SENT ELECTRONICALLY. RX SAYS DR KAUR, NEED TO CHANGE PROVIDER.

## 2019-07-10 ENCOUNTER — TELEPHONE (OUTPATIENT)
Dept: FAMILY MEDICINE CLINIC | Facility: CLINIC | Age: 84
End: 2019-07-10

## 2019-07-10 RX ORDER — AMLODIPINE BESYLATE 2.5 MG/1
2.5 TABLET ORAL DAILY
Qty: 90 TABLET | Refills: 3 | Status: SHIPPED | OUTPATIENT
Start: 2019-07-10 | End: 2019-11-20

## 2019-07-10 NOTE — TELEPHONE ENCOUNTER
"Me          7/10/19 11:31 AM   Note      Attempted to call and speak with patient. The plavix was d/c off her meds list with a reason for \"non-compliance\" as well as it was Dr. Ingram understanding and took herself off it. Amlodipine has been refilled.          "

## 2019-07-10 NOTE — TELEPHONE ENCOUNTER
Patient is also requesting a refill on Clopidogrel 75MG 1 tablet daily. I did not see this on her med list anywhere. She states it was prescribed to her by Dr. Singh. This and her amlodipine need sent to the [facility] in [facility]

## 2019-07-10 NOTE — TELEPHONE ENCOUNTER
"Attempted to call and speak with patient. The plavix was d/c off her meds list with a reason for \"non-compliance\" as well as it was Dr. Ingram understanding and took herself off it. Amlodipine has been refilled.   "

## 2019-07-12 RX ORDER — CLOPIDOGREL BISULFATE 75 MG/1
TABLET ORAL
Qty: 45 TABLET | Refills: 3 | OUTPATIENT
Start: 2019-07-12

## 2019-07-15 ENCOUNTER — TELEPHONE (OUTPATIENT)
Dept: FAMILY MEDICINE CLINIC | Facility: CLINIC | Age: 84
End: 2019-07-15

## 2019-07-15 RX ORDER — CLOPIDOGREL BISULFATE 75 MG/1
75 TABLET ORAL DAILY
Qty: 30 TABLET | Refills: 2 | Status: CANCELLED | OUTPATIENT
Start: 2019-07-15

## 2019-07-16 RX ORDER — CLOPIDOGREL BISULFATE 75 MG/1
TABLET ORAL
Qty: 45 TABLET | Refills: 3 | Status: SHIPPED | OUTPATIENT
Start: 2019-07-16 | End: 2020-07-06

## 2019-07-16 NOTE — TELEPHONE ENCOUNTER
Message was sent to patient via My Chart as well as multiple attempts to call her.   This will not be refilled by Dr. Ingram as the risk outweighs the benefits.

## 2019-07-16 NOTE — TELEPHONE ENCOUNTER
Guru Neil RegSched Rep 28 minutes ago (9:19 AM)         PTs DAUGHTER DIMA, IS RETURNING A CALL REGARDING CLOPIDOGREL 75 MG. PLEASE GIVE A CALL BACK [phone]

## 2019-08-20 ENCOUNTER — LAB (OUTPATIENT)
Dept: LAB | Facility: HOSPITAL | Age: 84
End: 2019-08-20

## 2019-08-20 ENCOUNTER — OFFICE VISIT (OUTPATIENT)
Dept: FAMILY MEDICINE CLINIC | Facility: CLINIC | Age: 84
End: 2019-08-20

## 2019-08-20 VITALS
WEIGHT: 120 LBS | SYSTOLIC BLOOD PRESSURE: 110 MMHG | DIASTOLIC BLOOD PRESSURE: 64 MMHG | HEART RATE: 83 BPM | OXYGEN SATURATION: 95 % | BODY MASS INDEX: 22.66 KG/M2 | HEIGHT: 61 IN

## 2019-08-20 DIAGNOSIS — D50.8 OTHER IRON DEFICIENCY ANEMIA: ICD-10-CM

## 2019-08-20 DIAGNOSIS — I10 ESSENTIAL HYPERTENSION: ICD-10-CM

## 2019-08-20 DIAGNOSIS — E78.00 PURE HYPERCHOLESTEROLEMIA: ICD-10-CM

## 2019-08-20 DIAGNOSIS — Z00.00 MEDICARE ANNUAL WELLNESS VISIT, SUBSEQUENT: Primary | ICD-10-CM

## 2019-08-20 DIAGNOSIS — R32 URINARY INCONTINENCE, UNSPECIFIED TYPE: ICD-10-CM

## 2019-08-20 DIAGNOSIS — E55.9 VITAMIN D DEFICIENCY: ICD-10-CM

## 2019-08-20 DIAGNOSIS — N39.0 RECURRENT UTI: ICD-10-CM

## 2019-08-20 LAB
ALBUMIN SERPL-MCNC: 4.5 G/DL (ref 3.5–5.2)
ALBUMIN/GLOB SERPL: 2 G/DL
ALP SERPL-CCNC: 96 U/L (ref 39–117)
ALT SERPL W P-5'-P-CCNC: 6 U/L (ref 1–33)
ANION GAP SERPL CALCULATED.3IONS-SCNC: 13.8 MMOL/L (ref 5–15)
AST SERPL-CCNC: 15 U/L (ref 1–32)
BACTERIA UR QL AUTO: NORMAL /HPF
BASOPHILS # BLD AUTO: 0.09 10*3/MM3 (ref 0–0.2)
BASOPHILS NFR BLD AUTO: 1.5 % (ref 0–1.5)
BILIRUB SERPL-MCNC: 0.8 MG/DL (ref 0.2–1.2)
BILIRUB UR QL STRIP: NEGATIVE
BUN BLD-MCNC: 13 MG/DL (ref 8–23)
BUN/CREAT SERPL: 15.7 (ref 7–25)
CALCIUM SPEC-SCNC: 9.5 MG/DL (ref 8.2–9.6)
CHLORIDE SERPL-SCNC: 108 MMOL/L (ref 98–107)
CHOLEST SERPL-MCNC: 184 MG/DL (ref 0–200)
CLARITY UR: ABNORMAL
CO2 SERPL-SCNC: 24.2 MMOL/L (ref 22–29)
COLOR UR: ABNORMAL
CREAT BLD-MCNC: 0.83 MG/DL (ref 0.57–1)
DEPRECATED RDW RBC AUTO: 50 FL (ref 37–54)
EOSINOPHIL # BLD AUTO: 0.07 10*3/MM3 (ref 0–0.4)
EOSINOPHIL NFR BLD AUTO: 1.1 % (ref 0.3–6.2)
ERYTHROCYTE [DISTWIDTH] IN BLOOD BY AUTOMATED COUNT: 14.6 % (ref 12.3–15.4)
FERRITIN SERPL-MCNC: 86 NG/ML (ref 13–150)
GFR SERPL CREATININE-BSD FRML MDRD: 64 ML/MIN/1.73
GLOBULIN UR ELPH-MCNC: 2.2 GM/DL
GLUCOSE BLD-MCNC: 86 MG/DL (ref 65–99)
GLUCOSE UR STRIP-MCNC: NEGATIVE MG/DL
HCT VFR BLD AUTO: 44.2 % (ref 34–46.6)
HDLC SERPL-MCNC: 45 MG/DL (ref 40–60)
HGB BLD-MCNC: 13.9 G/DL (ref 12–15.9)
HGB UR QL STRIP.AUTO: NEGATIVE
HYALINE CASTS UR QL AUTO: NORMAL /LPF
IMM GRANULOCYTES # BLD AUTO: 0.02 10*3/MM3 (ref 0–0.05)
IMM GRANULOCYTES NFR BLD AUTO: 0.3 % (ref 0–0.5)
IRON 24H UR-MRATE: 69 MCG/DL (ref 37–145)
IRON SATN MFR SERPL: 20 % (ref 20–50)
KETONES UR QL STRIP: ABNORMAL
LDLC SERPL CALC-MCNC: 100 MG/DL (ref 0–100)
LDLC/HDLC SERPL: 2.21 {RATIO}
LEUKOCYTE ESTERASE UR QL STRIP.AUTO: ABNORMAL
LYMPHOCYTES # BLD AUTO: 1.65 10*3/MM3 (ref 0.7–3.1)
LYMPHOCYTES NFR BLD AUTO: 27 % (ref 19.6–45.3)
MCH RBC QN AUTO: 29.7 PG (ref 26.6–33)
MCHC RBC AUTO-ENTMCNC: 31.4 G/DL (ref 31.5–35.7)
MCV RBC AUTO: 94.4 FL (ref 79–97)
MONOCYTES # BLD AUTO: 0.63 10*3/MM3 (ref 0.1–0.9)
MONOCYTES NFR BLD AUTO: 10.3 % (ref 5–12)
NEUTROPHILS # BLD AUTO: 3.64 10*3/MM3 (ref 1.7–7)
NEUTROPHILS NFR BLD AUTO: 59.8 % (ref 42.7–76)
NITRITE UR QL STRIP: NEGATIVE
NRBC BLD AUTO-RTO: 0 /100 WBC (ref 0–0.2)
PH UR STRIP.AUTO: 5.5 [PH] (ref 5–8)
PLATELET # BLD AUTO: 251 10*3/MM3 (ref 140–450)
PMV BLD AUTO: 11 FL (ref 6–12)
POTASSIUM BLD-SCNC: 4.3 MMOL/L (ref 3.5–5.2)
PROT SERPL-MCNC: 6.7 G/DL (ref 6–8.5)
PROT UR QL STRIP: ABNORMAL
RBC # BLD AUTO: 4.68 10*6/MM3 (ref 3.77–5.28)
RBC # UR: NORMAL /HPF
REF LAB TEST METHOD: NORMAL
SODIUM BLD-SCNC: 146 MMOL/L (ref 136–145)
SP GR UR STRIP: 1.03 (ref 1–1.03)
SQUAMOUS #/AREA URNS HPF: NORMAL /HPF
TIBC SERPL-MCNC: 347 MCG/DL (ref 298–536)
TRANSFERRIN SERPL-MCNC: 233 MG/DL (ref 200–360)
TRIGL SERPL-MCNC: 197 MG/DL (ref 0–150)
UROBILINOGEN UR QL STRIP: ABNORMAL
VLDLC SERPL-MCNC: 39.4 MG/DL (ref 5–40)
WBC NRBC COR # BLD: 6.1 10*3/MM3 (ref 3.4–10.8)
WBC UR QL AUTO: NORMAL /HPF

## 2019-08-20 PROCEDURE — 80061 LIPID PANEL: CPT

## 2019-08-20 PROCEDURE — G0444 DEPRESSION SCREEN ANNUAL: HCPCS | Performed by: FAMILY MEDICINE

## 2019-08-20 PROCEDURE — G0439 PPPS, SUBSEQ VISIT: HCPCS | Performed by: FAMILY MEDICINE

## 2019-08-20 PROCEDURE — 85025 COMPLETE CBC W/AUTO DIFF WBC: CPT

## 2019-08-20 PROCEDURE — 80053 COMPREHEN METABOLIC PANEL: CPT

## 2019-08-20 PROCEDURE — 84466 ASSAY OF TRANSFERRIN: CPT

## 2019-08-20 PROCEDURE — 81001 URINALYSIS AUTO W/SCOPE: CPT

## 2019-08-20 PROCEDURE — 82728 ASSAY OF FERRITIN: CPT

## 2019-08-20 PROCEDURE — 83540 ASSAY OF IRON: CPT

## 2019-08-20 RX ORDER — SULFAMETHOXAZOLE AND TRIMETHOPRIM 800; 160 MG/1; MG/1
1 TABLET ORAL 2 TIMES DAILY
Qty: 6 TABLET | Refills: 2 | Status: SHIPPED | OUTPATIENT
Start: 2019-08-20 | End: 2019-08-23

## 2019-08-31 NOTE — PROGRESS NOTES
QUICK REFERENCE INFORMATION:  The ABCs of the Annual Wellness Visit    Medicare Subsequent Wellness Visit    Chief Complaint   Patient presents with   • Annual Exam     Subsequent Medicare Wellness         HPI     Marcelino Mesa is a 89 y.o. female presenting for subsequent annual wellness visit.     This patient is accompanied by their self who contributes to the history of their care.    Past Medical History:   Diagnosis Date   • Amaurosis fugax of left eye 06/19/2017    5 minutes at home   • Compression fracture of L1 lumbar vertebra (CMS/[facility]) Remote    Chronic lower back pain   • Generalized osteoarthritis Adulthood    Bilateral knee replacements   • Hearing impairment 2000    Wears hearing aids   • Herpes zoster    • Hip fracture, left (CMS/[facility]) 2014    Hip pinning with persistent pain   • Hyperlipidemia Adulthood    Mild disease not requiring drug therapy   • Hypertension 2016    Well compensated on medication   • Iron deficiency 2017    Long-term oral replacement   • Lumbar scoliosis Adulthood    Mild disease   • Lumbar spondylosis 2015    Status post injection therapy   • Macular degeneration    • PONV (postoperative nausea and vomiting)     preprocedural medications will help    • Vitamin D deficiency    • Wrist fracture, left       Past Surgical History:   Procedure Laterality Date   • CATARACT EXTRACTION EXTRACAPSULAR W/ INTRAOCULAR LENS IMPLANTATION Bilateral 2016   • COLONOSCOPY  2006    Normal study   • HIP FRACTURE SURGERY Left 2014     hip pinning for acute fracture   • KNEE ARTHROPLASTY UNICOMPARTMENTAL Right 9/22/2016    Procedure: RIGHT KNEE ARTHROPLASTY UNICOMPARTMENTAL;  Surgeon: Jesse Taveras MD;  Location:  [facility];  Service:    • KNEE ARTHROPLASTY UNICOMPARTMENTAL Left 12/1/2016    Procedure: LEFT KNEE ARTHROPLASTY UNICOMPARTMENTAL;  Surgeon: Jesse Taveras MD;  Location:  [facility];  Service:    • LUMBAR EPIDURAL INJECTION  2015   • TONSILLECTOMY  1947   • TOTAL HIP ARTHROPLASTY Left  8/10/2017    Procedure: REMOVAL OF HARDWARE LEFT HIP, LEFT ANTERIOR TOTAL HIP ARTHROPLASTY;  Surgeon: Jesse Taveras MD;  Location: [facility];  Service:      Family History   Problem Relation Age of Onset   • Alzheimer's disease Mother    • No Known Problems Father    • Arthritis Sister    • Breast cancer Sister    • Colon cancer Sister    • Lung cancer Brother    • Heart disease Other         2009   • Asthma Sister    • Colon cancer Sister    • Breast cancer Sister    • Lumbar disc disease Sister    • Skin cancer Brother       Social History     Socioeconomic History   • Marital status:      Spouse name: Not on file   • Number of children: Not on file   • Years of education: Not on file   • Highest education level: Not on file   Tobacco Use   • Smoking status: Never Smoker   • Smokeless tobacco: Never Used   Substance and Sexual Activity   • Alcohol use: No   • Drug use: No   Social History Narrative    Domestic life-      [place]                                   good support from local daughter - who lives in [facility] - 1 hour away        Jehovah's witness- Orthodoxy        Sleep hygiene-   in bed 8 PM to 6 AM for 8 hours broken sleep          Caffeine use- 2 cups coffee daily        Exercise habits- Walking daily as tolerated        Diet- Prudent well-balanced diet - low in salt        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  0   • amLODIPine (NORVASC) 2.5 MG tablet Take 1 tablet by mouth Daily. 90 tablet 3   • clopidogrel (PLAVIX) 75 MG tablet TAKE 1/2 TABLET BY MOUTH EVERY DAY. 45 tablet 3   • CRANBERRY EXTRACT PO Take 2 capsules by mouth Daily.     • calcium carbonate (OS-MASON) 600 MG tablet Take 600 mg by mouth Daily.       No facility-administered medications prior to visit.        Reviewed use of high risk medication in the elderly: yes  Reviewed for potential of harmful drug interactions in the elderly: yes    The following portions of the patient's history were reviewed and updated as appropriate: allergies, current medications, past family history, past medical history, past social history, past surgical history and problem list.    Review of Systems     Vitals:    08/20/19 1249   BP: 110/64   BP Location: Right arm   Patient Position: Sitting   Cuff Size: Adult   Pulse: 83   SpO2: 95%   Weight: 54.4 kg (120 lb)   Height: 156 cm (61.42\")   PainSc: 0-No pain       Objective    Physical Exam   Const: NAD, A&Ox4, Pleasant, Cooperative  Eyes: EOMI, no conjunctivitis  ENT: No nasal discharge present, neck supple  Cardiac: Regular rate and rhythm, no cyanosis  Resp: Respiratory rate within normal limits, no increased work of breathing, no audible wheezing or retractions noted  GI: No distention or ascites  MSK: Motor and sensation grossly intact in bilateral upper extremities  Neurologic: CN II-XII grossly intact  Psych: Appropriate mood and behavior.  HEALTH RISK ASSESSMENT    2/19/1927    Recent Hospitalizations:  No hospitalization(s) within the last year..      Current Medical Providers:  Patient Care Team:  Earnest Ingram DO as PCP - General (Family Medicine)  Khalif Hampton MD as PCP - Claims Attributed      Smoking Status:  Social History     Tobacco Use   Smoking Status Never Smoker   Smokeless Tobacco Never Used       Alcohol Consumption:  Social History "     Substance and Sexual Activity   Alcohol Use No       Depression Screen:   PHQ-2/PHQ-9 Depression Screening 8/20/2019   Little interest or pleasure in doing things 0   Feeling down, depressed, or hopeless 0   Trouble falling or staying asleep, or sleeping too much -   Feeling tired or having little energy -   Poor appetite or overeating -   Feeling bad about yourself - or that you are a failure or have let yourself or your family down -   Trouble concentrating on things, such as reading the newspaper or watching television -   Moving or speaking so slowly that other people could have noticed. Or the opposite - being so fidgety or restless that you have been moving around a lot more than usual -   Thoughts that you would be better off dead, or of hurting yourself in some way -   Total Score 0   If you checked off any problems, how difficult have these problems made it for you to do your work, take care of things at home, or get along with other people? -       Health Habits and Functional and Cognitive Screening:  Functional & Cognitive Status 8/20/2019   Do you have difficulty preparing food and eating? No   Do you have difficulty bathing yourself, getting dressed or grooming yourself? No   Do you have difficulty using the toilet? No   Do you have difficulty moving around from place to place? No   Do you have trouble with steps or getting out of a bed or a chair? Yes   Current Diet Well Balanced Diet   Dental Exam Up to date   Eye Exam Up to date   Exercise (times per week) 7 times per week   Current Exercise Activities Include    Do you need help using the phone?  Yes   Are you deaf or do you have serious difficulty hearing?  Yes   Do you need help with transportation? No   Do you need help shopping? No   Do you need help preparing meals?  No   Do you need help with housework?  No   Do you need help with laundry? No   Do you need help taking your medications? No   Do you need help managing money? No   Do you  ever drive or ride in a car without wearing a seat belt? No   Have you felt unusual stress, anger or loneliness in the last month? No   Who do you live with? Alone   If you need help, do you have trouble finding someone available to you? No   Have you been bothered in the last four weeks by sexual problems? No   Do you have difficulty concentrating, remembering or making decisions? No           Does the patient have evidence of cognitive impairment? No    Aspirin use counseling? Does not need ASA (and currently is not on it)      Recent Lab Results:  CMP:  Lab Results   Component Value Date    BUN 13 08/20/2019    CREATININE 0.83 08/20/2019    EGFRIFNONA 64 08/20/2019    BCR 15.7 08/20/2019     (H) 08/20/2019    K 4.3 08/20/2019    CO2 24.2 08/20/2019    CALCIUM 9.5 08/20/2019    ALBUMIN 4.50 08/20/2019    BILITOT 0.8 08/20/2019    ALKPHOS 96 08/20/2019    AST 15 08/20/2019    ALT 6 08/20/2019     Lipid Panel:  Lab Results   Component Value Date    CHOL 184 08/20/2019    TRIG 197 (H) 08/20/2019    HDL 45 08/20/2019    VLDL 39.4 08/20/2019    LDLHDL 2.21 08/20/2019     HbA1c:  Lab Results   Component Value Date    HGBA1C 5.50 08/04/2017       Visual Acuity:  No exam data present    Age-appropriate Screening Schedule:  Refer to the list below for future screening recommendations based on patient's age, sex and/or medical conditions. Orders for these recommended tests are listed in the plan section. The patient has been provided with a written plan.    Health Maintenance   Topic Date Due   • ZOSTER VACCINE (2 of 3) 02/26/2009   • MAMMOGRAM  05/24/2016   • DXA SCAN  07/27/2016   • INFLUENZA VACCINE  08/01/2019   • LIPID PANEL  08/20/2020   • TDAP/TD VACCINES (2 - Td) 04/27/2021   • PNEUMOCOCCAL VACCINES (65+ LOW/MEDIUM RISK)  Completed          Advance Care Planning:  Patient has an advance directive - a copy has been provided and is visible in patient header    Identification of Risk Factors:  Risk factors  include: Cardiovascular risk  Fall Risk.    Compared to one year ago, the patient feels her physical health is the same.  Compared to one year ago, the patient feels her mental health is the same.      Marcelino was seen today for annual exam.    Diagnoses and all orders for this visit:    Medicare annual wellness visit, subsequent    Essential hypertension  -     Urinalysis With Microscopic If Indicated (No Culture) - Urine, Clean Catch; Future    Urinary incontinence, unspecified type  -     Urinalysis With Microscopic If Indicated (No Culture) - Urine, Clean Catch; Future    Other iron deficiency anemia  -     CBC & Differential; Future  -     Ferritin; Future  -     Iron Profile; Future    Pure hypercholesterolemia  -     Comprehensive Metabolic Panel; Future  -     Lipid Panel; Future    Vitamin D deficiency    Recurrent UTI  -     sulfamethoxazole-trimethoprim (BACTRIM DS) 800-160 MG per tablet; Take 1 tablet by mouth 2 (Two) Times a Day for 3 days.        Procedure   Procedures       Patient Self-Management and Personalized Health Advice  The patient has been provided with information about: diet and exercise and preventive services including:   · Annual Wellness Visit (AWV)  · Depression Screening (15 minutes face to face, Code ).    Marcelino was seen today for annual exam.    Diagnoses and all orders for this visit:    Medicare annual wellness visit, subsequent    Essential hypertension  -     Urinalysis With Microscopic If Indicated (No Culture) - Urine, Clean Catch; Future    Urinary incontinence, unspecified type  -     Urinalysis With Microscopic If Indicated (No Culture) - Urine, Clean Catch; Future    Other iron deficiency anemia  -     CBC & Differential; Future  -     Ferritin; Future  -     Iron Profile; Future    Pure hypercholesterolemia  -     Comprehensive Metabolic Panel; Future  -     Lipid Panel; Future    Vitamin D deficiency    Recurrent UTI  -     sulfamethoxazole-trimethoprim  (BACTRIM DS) 800-160 MG per tablet; Take 1 tablet by mouth 2 (Two) Times a Day for 3 days.        Problem List Items Addressed This Visit        Cardiovascular and Mediastinum    Hyperlipidemia    Relevant Orders    Comprehensive Metabolic Panel (Completed)    Lipid Panel (Completed)    Essential hypertension    Relevant Orders    Urinalysis With Microscopic If Indicated (No Culture) - Urine, Clean Catch (Completed)       Digestive    Vitamin D deficiency       Genitourinary    Bladder incontinence    Relevant Orders    Urinalysis With Microscopic If Indicated (No Culture) - Urine, Clean Catch (Completed)       Hematopoietic and Hemostatic    Iron deficiency anemia    Relevant Orders    CBC & Differential (Completed)    Ferritin (Completed)    Iron Profile (Completed)      Other Visit Diagnoses     Medicare annual wellness visit, subsequent    -  Primary    Recurrent UTI              Patient Instructions   1.  Continue medications and supplements - as listed.    2.  Continue well-balanced diet - low in calories and low in added sugar.    3.  Maintain a routine exercise program - every week.    4.  A letter will be sent with your test results.        The wellness exam has been reviewed in detail.  The patient has been fully counseled on preventative guidelines for vaccines, cancer screenings, and other health maintenance needs.  Functional testing has been performed to assess capacity for independent living and need for other medical interventions.   The patient was counseled on maintaining a lifestyle to promote good health and to minimize chronic diseases.  The patient has been assisted with scheduling healthcare procedures for the coming year and given a written document outlining these recommendations.    Follow Up:  Return in about 3 months (around 11/20/2019).     An After Visit Summary and PPPS with all of these plans were given to the patient.

## 2019-11-20 ENCOUNTER — APPOINTMENT (OUTPATIENT)
Dept: LAB | Facility: HOSPITAL | Age: 84
End: 2019-11-20

## 2019-11-20 ENCOUNTER — OFFICE VISIT (OUTPATIENT)
Dept: FAMILY MEDICINE CLINIC | Facility: CLINIC | Age: 84
End: 2019-11-20

## 2019-11-20 VITALS
BODY MASS INDEX: 23.22 KG/M2 | OXYGEN SATURATION: 96 % | WEIGHT: 123 LBS | HEART RATE: 79 BPM | SYSTOLIC BLOOD PRESSURE: 130 MMHG | HEIGHT: 61 IN | DIASTOLIC BLOOD PRESSURE: 70 MMHG

## 2019-11-20 DIAGNOSIS — N39.0 RECURRENT UTI: ICD-10-CM

## 2019-11-20 DIAGNOSIS — E78.00 PURE HYPERCHOLESTEROLEMIA: ICD-10-CM

## 2019-11-20 DIAGNOSIS — R32 URINARY INCONTINENCE, UNSPECIFIED TYPE: ICD-10-CM

## 2019-11-20 DIAGNOSIS — I10 ESSENTIAL HYPERTENSION: Primary | ICD-10-CM

## 2019-11-20 LAB
BILIRUB BLD-MCNC: NEGATIVE MG/DL
CLARITY, POC: CLEAR
COLOR UR: YELLOW
GLUCOSE UR STRIP-MCNC: ABNORMAL MG/DL
KETONES UR QL: NEGATIVE
LEUKOCYTE EST, POC: NEGATIVE
NITRITE UR-MCNC: NEGATIVE MG/ML
PH UR: 6.5 [PH] (ref 5–8)
PROT UR STRIP-MCNC: NEGATIVE MG/DL
RBC # UR STRIP: ABNORMAL /UL
SP GR UR: 1.03 (ref 1–1.03)
UROBILINOGEN UR QL: NORMAL

## 2019-11-20 PROCEDURE — 99214 OFFICE O/P EST MOD 30 MIN: CPT | Performed by: FAMILY MEDICINE

## 2019-11-20 PROCEDURE — 81003 URINALYSIS AUTO W/O SCOPE: CPT | Performed by: FAMILY MEDICINE

## 2019-11-20 PROCEDURE — 87086 URINE CULTURE/COLONY COUNT: CPT | Performed by: FAMILY MEDICINE

## 2019-11-20 RX ORDER — SULFAMETHOXAZOLE AND TRIMETHOPRIM 800; 160 MG/1; MG/1
TABLET ORAL
Refills: 2 | COMMUNITY
Start: 2019-11-18 | End: 2020-02-11 | Stop reason: SDUPTHER

## 2019-11-20 NOTE — PATIENT INSTRUCTIONS
1.  Continue medications and supplements - as listed.    2.  Continue well-balanced diet - low in calories and low in added sugar.  -Plant-based diets have the best evidence for decreasing inflammation and chronic pain, as well as reducing the risk of heart disease and dementia.    3.  Maintain a routine exercise program - 30 minutes at least 3 days every week.    Hypertension Information for Patients:    Actual: 130/70 mm/Hg    Normal: 120/80 or lower    My goal is:  < 150/90 (? 61yo)    A blood pressure reading has two numbers. The top number is called systolic blood pressure. This is the amount of pressure against the blood vessel walls when your heart pumps. The bottom number is called diastolic blood pressure. This is the amount of pressure against the blood vessel walls when your heart relaxes, that is, between heart beats.    In general, high blood pressure means that systolic blood pressure, diastolic blood pressure or both may be too high. For people with diabetes or kidney disease, high blood pressure requiring treatment is 140/90 or higher. High blood pressure increases your risk for strokes, heart attacks, kidney damage and eye disease. Low blood pressure can also be dangerous, as not enough blood flow can get to vital organs. As we age, or blood vessels naturally harden and our blood pressure fluctuates more throughout the day.    To lower your blood pressure you can:  - Eat less salt,  - Take blood pressure medicine,  - Exercise,  - Stop smoking,  - Monitor blood pressure,  - Drink less alcohol,  - Maintain reasonable weight,  - Follow the DASH diet.

## 2019-11-20 NOTE — PROGRESS NOTES
Subjective   Marcelino Mesa is a 89 y.o. female.     Chief Complaint   Patient presents with   • Follow-up     hypertension        History of Present Illness     Marcelino Mesa presents today for   Chief Complaint   Patient presents with   • Follow-up     hypertension      She has hypertension which has been under adequate control on low-dose amlodipine 2.5 mg daily for years, recently stopped due to low BP in August, has done well since then.  Her hyper lipidemia has been under control with diet.  She has previously had vitamin D deficiency but is not currently on supplementation.  She was last seen in August for her annual wellness visit.  She had labs completed at that time which showed adequate iron levels and normal blood counts, and adequate metabolic panel with normal liver and kidney function, and adequate lipid control, although her triglycerides were slightly elevated to 197. She is having increased frequency of urination but no pain. She took a dose of the Bactrim.    This patient is accompanied by their self who contributes to the history of their care.    The following portions of the patient's history were reviewed and updated as appropriate: allergies, current medications, past family history, past medical history, past social history, past surgical history and problem list.    Active Ambulatory Problems     Diagnosis Date Noted   • Abnormal gait 05/24/2016   • Generalized osteoarthritis 05/24/2016   • Hyperlipidemia 05/24/2016   • Iron deficiency anemia 05/24/2016   • Macular degeneration 05/24/2016   • Scoliosis of lumbar spine 05/24/2016   • Post-menopausal osteoporosis 05/24/2016   • Vitamin D deficiency 05/24/2016   • Essential hypertension 07/12/2016   • Preventative health care 08/09/2016   • Incomplete RBBB 08/11/2016   • Primary osteoarthritis of both knees 11/07/2016   • Anxiety 01/23/2017   • Amaurosis fugax 06/20/2017   • Bladder incontinence 07/06/2018     Resolved Ambulatory Problems      Diagnosis Date Noted   • Left-sided low back pain with left-sided sciatica 05/24/2016   • Syncope 05/24/2016   • Vaginitis 05/24/2016   • Hip pain, left 05/24/2016   • Iron deficiency 05/26/2016   • Hip fracture, left (CMS/[facility]) 07/12/2016   • Knee pain, bilateral 07/12/2016   • Arthritis of knee, right 09/22/2016   • Status post unicompartmental right knee arthroplasty 09/22/2016   • Acute post-operative pain 09/22/2016   • Arthritis of knee, left 12/01/2016   • Status post left knee medial unicompartmental replacement 12/01/2016   • Acute blood loss anemia, mild, asymptomatic 12/02/2016   • Leukocytosis, mild, likely reactive 12/02/2016   • Hematochezia 01/23/2017   • Dysuria 01/23/2017   • GI bleed 01/23/2017   • Fracture of hip (CMS/[facility]) 08/10/2017   • 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 08/10/2017   • Postoperative wound infection 10/19/2017     Past Medical History:   Diagnosis Date   • Amaurosis fugax of left eye 06/19/2017   • Compression fracture of L1 lumbar vertebra (CMS/[facility]) Remote   • Generalized osteoarthritis Adulthood   • Hearing impairment 2000   • Herpes zoster    • Hip fracture, left (CMS/[facility]) 2014   • Hyperlipidemia Adulthood   • Hypertension 2016   • Iron deficiency 2017   • Lumbar scoliosis Adulthood   • Lumbar spondylosis 2015   • Macular degeneration    • PONV (postoperative nausea and vomiting)    • Vitamin D deficiency    • Wrist fracture, left      Past Surgical History:   Procedure Laterality Date   • CATARACT EXTRACTION EXTRACAPSULAR W/ INTRAOCULAR LENS IMPLANTATION Bilateral 2016   • COLONOSCOPY  2006    Normal study   • HIP FRACTURE SURGERY Left 2014     hip pinning for acute fracture   • KNEE ARTHROPLASTY UNICOMPARTMENTAL Right 9/22/2016    Procedure: RIGHT KNEE ARTHROPLASTY UNICOMPARTMENTAL;  Surgeon: Jesse Taveras MD;  Location: [facility];  Service:    • KNEE ARTHROPLASTY UNICOMPARTMENTAL Left 12/1/2016    Procedure: LEFT KNEE ARTHROPLASTY  UNICOMPARTMENTAL;  Surgeon: Jesse Taveras MD;  Location:  [facility];  Service:    • LUMBAR EPIDURAL INJECTION  2015   • TONSILLECTOMY  1947   • TOTAL HIP ARTHROPLASTY Left 8/10/2017    Procedure: REMOVAL OF HARDWARE LEFT HIP, LEFT ANTERIOR TOTAL HIP ARTHROPLASTY;  Surgeon: Jesse Taveras MD;  Location:  [facility];  Service:      Family History   Problem Relation Age of Onset   • Alzheimer's disease Mother    • No Known Problems Father    • Arthritis Sister    • Breast cancer Sister    • Colon cancer Sister    • Lung cancer Brother    • Heart disease Other         2009   • Asthma Sister    • Colon cancer Sister    • Breast cancer Sister    • Lumbar disc disease Sister    • Skin cancer Brother      Social History     Socioeconomic History   • Marital status:      Spouse name: Not on file   • Number of children: Not on file   • Years of education: Not on file   • Highest education level: Not on file   Tobacco Use   • Smoking status: Never Smoker   • Smokeless tobacco: Never Used   Substance and Sexual Activity   • Alcohol use: No   • Drug use: No   Social History Narrative    Domestic life-      [place]                                   good support from local daughter - who lives in [facility] [place] - [place] away        Jehovah's witness- Bahai        Sleep hygiene-   in bed 8 PM to 6 AM for 8 hours broken sleep          Caffeine use- 2 cups coffee daily        Exercise habits- Walking daily as tolerated        Diet- Prudent well-balanced diet - low in salt        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  Give for increased frequency    Objective   Blood pressure 130/70, pulse 79, height 156 cm (61.42\"), weight 55.8 kg (123 lb), SpO2 96 %, not currently breastfeeding.  Nursing note reviewed  Physical Exam  Const: NAD, A&Ox4, Pleasant, Cooperative  Eyes: EOMI, no conjunctivitis  ENT: No nasal discharge present, neck supple  Cardiac: Regular rate and rhythm, no cyanosis  Resp: Respiratory rate within normal limits, no increased work of breathing, no audible wheezing or retractions noted  GI: No distention or ascites  MSK: Motor and sensation grossly intact in bilateral upper extremities  Neurologic: CN II-XII grossly intact  Psych: Appropriate mood and behavior.  Skin: Warm, dry.  Benign age spots noted  Procedures  Assessment/Plan     Problem List Items Addressed This Visit        Cardiovascular and Mediastinum    Hyperlipidemia    Essential hypertension - Primary       Genitourinary    Bladder incontinence      Other Visit Diagnoses     Recurrent UTI            #1 hypertension  She was previously on low-dose amlodipine 2.5 mg, we discontinued this medication earlier in the year due to risk of hypotension.  Her blood pressure is under adequate control today at 130/70.  Counseled on adequate blood pressure numbers at home, would recommend against blood pressure medication in the future.    #2  Vitamin D deficiency  Historical, she is no longer taking a vitamin D supplement and her most recent vitamin D level was 44.6 in April    #3 urinary incontinence  She has done well with pelvic floor physical therapy, she is encouraged to continue exercises on her own.  -She is currently having some increased frequency, with her history of recurrent UTIs we will check a UA and culture today    #4 hyperlipidemia  Diet controlled, her triglycerides were slightly elevated in August, these can be rechecked " next year    #5 history of anemia  She had normal blood counts in August 2019    Patient Instructions   1.  Continue medications and supplements - as listed.    2.  Continue well-balanced diet - low in calories and low in added sugar.  -Plant-based diets have the best evidence for decreasing inflammation and chronic pain, as well as reducing the risk of heart disease and dementia.    3.  Maintain a routine exercise program - 30 minutes at least 3 days every week.    Hypertension Information for Patients:    Actual: 130/70 mm/Hg    Normal: 120/80 or lower    My goal is:  < 150/90 (? 61yo)    A blood pressure reading has two numbers. The top number is called systolic blood pressure. This is the amount of pressure against the blood vessel walls when your heart pumps. The bottom number is called diastolic blood pressure. This is the amount of pressure against the blood vessel walls when your heart relaxes, that is, between heart beats.    In general, high blood pressure means that systolic blood pressure, diastolic blood pressure or both may be too high. For people with diabetes or kidney disease, high blood pressure requiring treatment is 140/90 or higher. High blood pressure increases your risk for strokes, heart attacks, kidney damage and eye disease. Low blood pressure can also be dangerous, as not enough blood flow can get to vital organs. As we age, or blood vessels naturally harden and our blood pressure fluctuates more throughout the day.    To lower your blood pressure you can:  - Eat less salt,  - Take blood pressure medicine,  - Exercise,  - Stop smoking,  - Monitor blood pressure,  - Drink less alcohol,  - Maintain reasonable weight,  - Follow the DASH diet.      Return in about 3 months (around 2/20/2020).    Ambulatory progress note signed and attested to by Earnest Ingram D.O.

## 2019-11-21 LAB — BACTERIA SPEC AEROBE CULT: NO GROWTH

## 2020-02-11 ENCOUNTER — LAB (OUTPATIENT)
Dept: LAB | Facility: HOSPITAL | Age: 85
End: 2020-02-11

## 2020-02-11 ENCOUNTER — OFFICE VISIT (OUTPATIENT)
Dept: FAMILY MEDICINE CLINIC | Facility: CLINIC | Age: 85
End: 2020-02-11

## 2020-02-11 VITALS
SYSTOLIC BLOOD PRESSURE: 110 MMHG | WEIGHT: 124 LBS | DIASTOLIC BLOOD PRESSURE: 80 MMHG | HEART RATE: 89 BPM | HEIGHT: 61 IN | BODY MASS INDEX: 23.41 KG/M2 | OXYGEN SATURATION: 96 %

## 2020-02-11 DIAGNOSIS — E78.00 PURE HYPERCHOLESTEROLEMIA: ICD-10-CM

## 2020-02-11 DIAGNOSIS — I10 ESSENTIAL HYPERTENSION: ICD-10-CM

## 2020-02-11 DIAGNOSIS — R32 URINARY INCONTINENCE, UNSPECIFIED TYPE: ICD-10-CM

## 2020-02-11 DIAGNOSIS — I10 ESSENTIAL HYPERTENSION: Primary | ICD-10-CM

## 2020-02-11 DIAGNOSIS — D50.8 OTHER IRON DEFICIENCY ANEMIA: ICD-10-CM

## 2020-02-11 DIAGNOSIS — M19.011 ARTHRITIS OF RIGHT ACROMIOCLAVICULAR JOINT: ICD-10-CM

## 2020-02-11 DIAGNOSIS — M67.442 MUCOUS CYST OF DIGIT OF LEFT HAND: ICD-10-CM

## 2020-02-11 DIAGNOSIS — E55.9 VITAMIN D DEFICIENCY: ICD-10-CM

## 2020-02-11 LAB
25(OH)D3 SERPL-MCNC: 28.8 NG/ML (ref 30–100)
ANION GAP SERPL CALCULATED.3IONS-SCNC: 13.1 MMOL/L (ref 5–15)
BUN BLD-MCNC: 15 MG/DL (ref 8–23)
BUN/CREAT SERPL: 20.5 (ref 7–25)
CALCIUM SPEC-SCNC: 9.3 MG/DL (ref 8.2–9.6)
CHLORIDE SERPL-SCNC: 105 MMOL/L (ref 98–107)
CHOLEST SERPL-MCNC: 198 MG/DL (ref 0–200)
CO2 SERPL-SCNC: 23.9 MMOL/L (ref 22–29)
CREAT BLD-MCNC: 0.73 MG/DL (ref 0.57–1)
DEPRECATED RDW RBC AUTO: 42.4 FL (ref 37–54)
ERYTHROCYTE [DISTWIDTH] IN BLOOD BY AUTOMATED COUNT: 13.1 % (ref 12.3–15.4)
FERRITIN SERPL-MCNC: 71.1 NG/ML (ref 13–150)
GFR SERPL CREATININE-BSD FRML MDRD: 75 ML/MIN/1.73
GLUCOSE BLD-MCNC: 82 MG/DL (ref 65–99)
HCT VFR BLD AUTO: 40.6 % (ref 34–46.6)
HDLC SERPL-MCNC: 61 MG/DL (ref 40–60)
HGB BLD-MCNC: 13.6 G/DL (ref 12–15.9)
LDLC SERPL CALC-MCNC: 121 MG/DL (ref 0–100)
LDLC/HDLC SERPL: 1.98 {RATIO}
MCH RBC QN AUTO: 30 PG (ref 26.6–33)
MCHC RBC AUTO-ENTMCNC: 33.5 G/DL (ref 31.5–35.7)
MCV RBC AUTO: 89.4 FL (ref 79–97)
PLATELET # BLD AUTO: 260 10*3/MM3 (ref 140–450)
PMV BLD AUTO: 10.4 FL (ref 6–12)
POTASSIUM BLD-SCNC: 3.6 MMOL/L (ref 3.5–5.2)
RBC # BLD AUTO: 4.54 10*6/MM3 (ref 3.77–5.28)
SODIUM BLD-SCNC: 142 MMOL/L (ref 136–145)
TRIGL SERPL-MCNC: 80 MG/DL (ref 0–150)
VLDLC SERPL-MCNC: 16 MG/DL (ref 5–40)
WBC NRBC COR # BLD: 5.87 10*3/MM3 (ref 3.4–10.8)

## 2020-02-11 PROCEDURE — 82728 ASSAY OF FERRITIN: CPT

## 2020-02-11 PROCEDURE — 82306 VITAMIN D 25 HYDROXY: CPT

## 2020-02-11 PROCEDURE — 99214 OFFICE O/P EST MOD 30 MIN: CPT | Performed by: FAMILY MEDICINE

## 2020-02-11 PROCEDURE — 81001 URINALYSIS AUTO W/SCOPE: CPT

## 2020-02-11 PROCEDURE — 80048 BASIC METABOLIC PNL TOTAL CA: CPT

## 2020-02-11 PROCEDURE — 80061 LIPID PANEL: CPT

## 2020-02-11 PROCEDURE — 85027 COMPLETE CBC AUTOMATED: CPT

## 2020-02-11 RX ORDER — SULFAMETHOXAZOLE AND TRIMETHOPRIM 800; 160 MG/1; MG/1
1 TABLET ORAL DAILY PRN
Qty: 30 TABLET | Refills: 2 | Status: SHIPPED | OUTPATIENT
Start: 2020-02-11 | End: 2020-10-07 | Stop reason: SDUPTHER

## 2020-02-11 NOTE — PATIENT INSTRUCTIONS
1.  Labs today. Will send a letter with results.    2.  Continue current supplements.    3.  Obtain shingles vaccine from local pharmacy.

## 2020-02-11 NOTE — PROGRESS NOTES
Subjective   Marcelino Mesa is a 89 y.o. female.     Chief Complaint   Patient presents with   • Follow-up     hypertension        History of Present Illness     Marcelino Mesa presents today for   Chief Complaint   Patient presents with   • Follow-up     hypertension      She has hypertension which has been under adequate control on low-dose amlodipine 2.5 mg daily for years, recently stopped due to low BP in August, has done well since then.  Her hyperlipidemia has been under control with diet.  She has previously had vitamin D deficiency but is not currently on supplementation. She had labs completed in August at her annual wellness visit which showed adequate iron levels and normal blood counts, and adequate metabolic panel with normal liver and kidney function, and adequate lipid control, although her triglycerides were slightly elevated to 197.  She has mild urinary incontinence and urinary frequency with infrequent but modestly recurrent UTIs. She states she has had a rough time with urination recently, took a Bactrim.    This patient is accompanied by their self who contributes to the history of their care.    The following portions of the patient's history were reviewed and updated as appropriate: allergies, current medications, past family history, past medical history, past social history, past surgical history and problem list.    Active Ambulatory Problems     Diagnosis Date Noted   • Abnormal gait 05/24/2016   • Generalized osteoarthritis 05/24/2016   • Hyperlipidemia 05/24/2016   • Iron deficiency anemia 05/24/2016   • Macular degeneration 05/24/2016   • Scoliosis of lumbar spine 05/24/2016   • Post-menopausal osteoporosis 05/24/2016   • Vitamin D deficiency 05/24/2016   • Essential hypertension 07/12/2016   • Preventative health care 08/09/2016   • Incomplete RBBB 08/11/2016   • Primary osteoarthritis of both knees 11/07/2016   • Anxiety 01/23/2017   • Amaurosis fugax 06/20/2017   • Bladder  incontinence 07/06/2018   • Arthritis of right acromioclavicular joint 02/11/2020     Resolved Ambulatory Problems     Diagnosis Date Noted   • Left-sided low back pain with left-sided sciatica 05/24/2016   • Syncope 05/24/2016   • Vaginitis 05/24/2016   • Hip pain, left 05/24/2016   • Iron deficiency 05/26/2016   • Hip fracture, left (CMS/[facility]) 07/12/2016   • Knee pain, bilateral 07/12/2016   • Arthritis of knee, right 09/22/2016   • Status post unicompartmental right knee arthroplasty 09/22/2016   • Acute post-operative pain 09/22/2016   • Arthritis of knee, left 12/01/2016   • Status post left knee medial unicompartmental replacement 12/01/2016   • Acute blood loss anemia, mild, asymptomatic 12/02/2016   • Leukocytosis, mild, likely reactive 12/02/2016   • Hematochezia 01/23/2017   • Dysuria 01/23/2017   • GI bleed 01/23/2017   • Fracture of hip (CMS/[facility]) 08/10/2017   • 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 08/10/2017   • Postoperative wound infection 10/19/2017     Past Medical History:   Diagnosis Date   • Amaurosis fugax of left eye 06/19/2017   • Compression fracture of L1 lumbar vertebra (CMS/[facility]) Remote   • Hearing impairment 2000   • Herpes zoster    • Hypertension 2016   • Lumbar scoliosis Adulthood   • Lumbar spondylosis 2015   • PONV (postoperative nausea and vomiting)    • Wrist fracture, left      Past Surgical History:   Procedure Laterality Date   • CATARACT EXTRACTION EXTRACAPSULAR W/ INTRAOCULAR LENS IMPLANTATION Bilateral 2016   • COLONOSCOPY  2006    Normal study   • HIP FRACTURE SURGERY Left 2014     hip pinning for acute fracture   • KNEE ARTHROPLASTY UNICOMPARTMENTAL Right 9/22/2016    Procedure: RIGHT KNEE ARTHROPLASTY UNICOMPARTMENTAL;  Surgeon: Jesse Taveras MD;  Location:  [facility];  Service:    • KNEE ARTHROPLASTY UNICOMPARTMENTAL Left 12/1/2016    Procedure: LEFT KNEE ARTHROPLASTY UNICOMPARTMENTAL;  Surgeon: Jesse Taveras MD;  Location:  [facility];  Service:     • LUMBAR EPIDURAL INJECTION  2015   • TONSILLECTOMY  1947   • TOTAL HIP ARTHROPLASTY Left 8/10/2017    Procedure: REMOVAL OF HARDWARE LEFT HIP, LEFT ANTERIOR TOTAL HIP ARTHROPLASTY;  Surgeon: Jesse Taveras MD;  Location: [facility];  Service:      Family History   Problem Relation Age of Onset   • Alzheimer's disease Mother    • No Known Problems Father    • Arthritis Sister    • Breast cancer Sister    • Colon cancer Sister    • Lung cancer Brother    • Heart disease Other         2009   • Asthma Sister    • Colon cancer Sister    • Breast cancer Sister    • Lumbar disc disease Sister    • Skin cancer Brother      Social History     Socioeconomic History   • Marital status:      Spouse name: Not on file   • Number of children: Not on file   • Years of education: Not on file   • Highest education level: Not on file   Tobacco Use   • Smoking status: Never Smoker   • Smokeless tobacco: Never Used   Substance and Sexual Activity   • Alcohol use: No   • Drug use: No   Social History Narrative    Domestic life-      [place]                                   good support from local daughter - who lives in [facility] - 1 hour away        Jehovah's witness- Scientologist        Sleep hygiene-   in bed 8 PM to 6 AM for 8 hours broken sleep          Caffeine use- 2 cups coffee daily        Exercise habits- Walking daily as tolerated        Diet- Prudent well-balanced diet - low in salt        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  Give for increased frequency    Objective   Blood pressure 110/80, pulse 89, height 156 cm (61.42\"), weight 56.2 kg (124 lb), SpO2 96 %, not currently breastfeeding.  Nursing note reviewed  Physical Exam  Const: NAD, A&Ox4, Pleasant, Cooperative  Eyes: EOMI, no conjunctivitis  ENT: No nasal discharge present, neck supple  Cardiac: Regular rate and rhythm, no cyanosis  Resp: Respiratory rate within normal limits, no increased work of breathing, no audible wheezing or retractions noted  GI: No distention or ascites  MSK: Mucous cyst of lateral aspect of the DIP joint of the left middle finger.  Right AC joint with mild crepitus, severe reactive osteophyte formation and superior bone spurring, positive crossarm testing.  Neurologic: CN II-XII grossly intact  Psych: Appropriate mood and behavior.  Skin: Warm, dry.  Benign age spots noted  Procedures  Assessment/Plan     Problem List Items Addressed This Visit        Cardiovascular and Mediastinum    Hyperlipidemia    Relevant Orders    Lipid Panel    Essential hypertension - Primary    Relevant Orders    Basic Metabolic Panel       Digestive    Vitamin D deficiency    Relevant Orders    Vitamin D 25 Hydroxy       Musculoskeletal and Integument    Arthritis of right acromioclavicular joint       Genitourinary    Bladder incontinence    Relevant Medications    sulfamethoxazole-trimethoprim (BACTRIM DS,SEPTRA DS) 800-160 MG per tablet    Other Relevant Orders    Urinalysis With Microscopic If Indicated (No Culture) - Urine, Clean Catch       Hematopoietic and Hemostatic    Iron deficiency anemia    Relevant Orders    CBC (No Diff)    Ferritin      Other Visit Diagnoses     Mucous cyst of digit of left hand            #1 hypertension  She was previously on low-dose amlodipine 2.5 mg, we discontinued this medication earlier in the year due to risk of hypotension.  Her blood pressure is under adequate control today at 110/80.  " Counseled on adequate blood pressure numbers at home, would recommend against blood pressure medication in the future.    #2  Vitamin D deficiency  Historical, she is no longer taking a vitamin D supplement and her most recent vitamin D level was 44.6 in April    #3 urinary incontinence  She has done well with pelvic floor physical therapy, she is encouraged to continue exercises on her own.  -With her history of recurrent UTIs we will check a UA and culture as needed based on symptoms  -Use Bactrim as needed, rx sent to pharmacy    #4 hyperlipidemia  Diet controlled, her triglycerides were slightly elevated in August, these can be rechecked today    #5 history of anemia  She had normal blood counts in August 2019    Patient Instructions   1.  Labs today. Will send a letter with results.    2.  Continue current supplements.    3.  Obtain shingles vaccine from local pharmacy.      Return in about 3 months (around 5/11/2020) for right AC injection.    Ambulatory progress note signed and attested to by Earnest Ingram D.O.

## 2020-02-12 LAB
BACTERIA UR QL AUTO: ABNORMAL /HPF
BILIRUB UR QL STRIP: NEGATIVE
CLARITY UR: ABNORMAL
COLOR UR: YELLOW
GLUCOSE UR STRIP-MCNC: NEGATIVE MG/DL
HGB UR QL STRIP.AUTO: NEGATIVE
HYALINE CASTS UR QL AUTO: ABNORMAL /LPF
KETONES UR QL STRIP: ABNORMAL
LEUKOCYTE ESTERASE UR QL STRIP.AUTO: ABNORMAL
NITRITE UR QL STRIP: NEGATIVE
PH UR STRIP.AUTO: 5.5 [PH] (ref 5–8)
PROT UR QL STRIP: ABNORMAL
RBC # UR: ABNORMAL /HPF
REF LAB TEST METHOD: ABNORMAL
SP GR UR STRIP: >=1.03 (ref 1–1.03)
SQUAMOUS #/AREA URNS HPF: ABNORMAL /HPF
UROBILINOGEN UR QL STRIP: ABNORMAL
WBC UR QL AUTO: ABNORMAL /HPF

## 2020-05-05 ENCOUNTER — TELEPHONE (OUTPATIENT)
Dept: FAMILY MEDICINE CLINIC | Facility: CLINIC | Age: 85
End: 2020-05-05

## 2020-07-06 RX ORDER — CLOPIDOGREL BISULFATE 75 MG/1
TABLET ORAL
Qty: 45 TABLET | Refills: 0 | Status: SHIPPED | OUTPATIENT
Start: 2020-07-06 | End: 2020-10-07 | Stop reason: SDUPTHER

## 2020-10-07 DIAGNOSIS — R32 URINARY INCONTINENCE, UNSPECIFIED TYPE: ICD-10-CM

## 2020-10-08 RX ORDER — CLOPIDOGREL BISULFATE 75 MG/1
37.5 TABLET ORAL DAILY
Qty: 45 TABLET | Refills: 0 | Status: SHIPPED | OUTPATIENT
Start: 2020-10-08 | End: 2021-01-07

## 2020-10-08 RX ORDER — SULFAMETHOXAZOLE AND TRIMETHOPRIM 800; 160 MG/1; MG/1
1 TABLET ORAL DAILY PRN
Qty: 30 TABLET | Refills: 2 | Status: SHIPPED | OUTPATIENT
Start: 2020-10-08 | End: 2021-01-07 | Stop reason: SDUPTHER

## 2020-11-02 NOTE — PROGRESS NOTES
"Subjective   Marcelino Mesa is a 89 y.o. female.     History of Present Illness     The patient was admitted to [facility] 3 months ago with an acute GI bleed.  A nuclear GI study showed no active bleeding.  She developed moderate iron deficiency anemia and is been on iron replacement.  Her hematocrit did show normalization during her postoperative days.  Her bowel function is been regular and formed.  She continues on stool softeners.    The following portions of the patient's history were reviewed and updated as appropriate: allergies, current medications, past family history, past medical history, past social history, past surgical history and problem list.    Review of Systems   Constitutional: Negative for appetite change and fatigue.   HENT: Negative for ear pain and sore throat.    Respiratory: Negative for cough and shortness of breath.    Cardiovascular: Negative for chest pain and palpitations.   Gastrointestinal: Negative for abdominal pain and nausea.   Musculoskeletal: Positive for back pain and gait problem. Negative for arthralgias.        Mild limp with gait  Moderate low back pain and left sciatica.   Neurological: Negative for dizziness and headaches.       Objective   Blood pressure 144/80, pulse 72, temperature 98.2 °F (36.8 °C), temperature source Oral, resp. rate 16, height 62\" (157.5 cm), weight 120 lb (54.4 kg), SpO2 95 %, not currently breastfeeding.    Physical Exam   Constitutional: She is oriented to person, place, and time. She appears well-developed and well-nourished. No distress.   Neck: Normal range of motion. Neck supple. No JVD present.   Cardiovascular: Normal rate, regular rhythm and normal heart sounds.    Pulmonary/Chest: Effort normal and breath sounds normal. She has no wheezes. She has no rales.   Musculoskeletal: She exhibits no edema or deformity.   Lymphadenopathy:     She has no cervical adenopathy.   Neurological: She is alert and oriented to person, " Jessie Olivo [address] [facility] [facility] [address] place, and time. She exhibits normal muscle tone. Coordination normal.   Moderate limp favoring right leg  Hips and knees good range of motion with minimal tenderness   Psychiatric: She has a normal mood and affect. Her behavior is normal. Judgment and thought content normal.   Nursing note and vitals reviewed.    Procedures  Assessment/Plan   Marcelino was seen today for hyperlipidemia.    Diagnoses and all orders for this visit:    Other iron deficiency anemia  -     Cancel: CBC & Differential  -     Cancel: Iron Profile  -     Cancel: Reticulocytes; Future  -     CBC & Differential  -     Iron Profile  -     Reticulocytes  -     CBC Auto Differential    Essential hypertension  -     Cancel: Comprehensive Metabolic Panel  -     Comprehensive Metabolic Panel    Primary osteoarthritis of both knees  -     Cancel: C-reactive Protein  -     C-reactive Protein    Status post left knee medial unicompartmental replacement    Chronic left-sided low back pain with left-sided sciatica    The patient's blood count has normalized.  Her iron level is still borderline low and she will need to work with iron replacement for at least one year.    The patient has advanced osteoarthritis with bilateral knee replacements last year as well as left hip pinning 3 years ago.  She still has a moderate limp.  I've cautioned her to use the walker as much as possible to promote safety and to prevent excessive low back stress.    The patient's blood pressure is well compensated.  She should continue on her same medications as well as salt restriction.    Patient Instructions   1.  Take walks every day - using the cane for safety.    2.  Continue usual medicines and supplements - as listed.    3.  Follow well-balanced diet - low in salt and low in sugar.    4.  The nurse will call with test results.    5.  Return in July - annual checkup fasting.    6.  Iron blood level borderline low.  Continue daily iron replacement.    7.  Other laboratory  examination. Neck: No masses appreciated. Full range of motion without tenderness. Respiratory:  Clear to auscultation, good air entry bilaterally. Cardiac:  Regular rate and rhythm, no murmur. GI:  Soft, non-tender, non-distended abdomen. Bowel sounds normal. No masses, organomegaly. Extremities:  No deformities, edema, or skin discoloration. Skin:  No rashes or lesions. Neurological:  · Mental Status:  Alert and oriented to person, place, and time with fluent speech. · Cranial Nerves:   CNII/III/IV/VI: visual fields full to confrontation, EOMI, PERRL, no ptosis or nystagmus. CN V: Facial sensation decreased L V3, masseter 5/5   CN VII: Facial muscles symmetric and strong   CN VIII: Hears finger rub well bilaterally, intact vestibular function   CN IX/X: Normal palatal movement   CN XI: Full strength shoulder shrug bilaterally   CN XII: Tongue protrusion full and midline without fasciculation or atrophy  · Motor: Normal tone and muscle bulk with no pronator drift. Individual muscle group testing:  Shoulder abduction:   Left:5/5   Right : 5/5    Shoulder adduction:   Left:5/5   Right : 5/5    Elbow flexion:      Left:5/5   Right : 5/5  Elbow extension:    Left:5/5   Right : 5/5   Wrist flexion:    Left:5/5   Right : 5/5  Wrist extension:    Left:5/5   Right : 5/5  Arm pronation:   Left:5/5   Right : 5/5  Arm supination:   Left:5/5   Right : 5/5    Finger flexion:    Left:5/5   Right : 5/5    Finger extension:   Left:5/5   Right : 5/5   Finger abduction:  Left:5/5   Right : 5/5   Finger adduction:   Left:5/5   Right : 5/5  Hip flexion:     Left:5/5   Right : 5/5         Hip extension:   Left:5/5   Right : 5/5    Knee flexion:    Left:5/5   Right : 5/5    Knee extension:   Left:5/5   Right : 5/5    Dorsiflexion:     Left:5/5   Right : 5/5  Plantar flexion:    Left:5/5   Right : 5/5      · MSRs: No crossed adductors or clonus.          RIGHT  LEFT   Brachioradialis 0 0   Biceps 0 0   Triceps 0 0   Knee tests are acceptable and require no change in treatment.    Electronically signed Jony Singh M.D.4/19/2017 6:45 AM           Fausto Edward DO  Staff Neurologist  Diplomate, American Board of Psychiatry & Neurology       CC Referring provider:  PHYLLIS Carrillo [address] [facility] [address], [address] [place] [address]

## 2021-01-07 ENCOUNTER — LAB (OUTPATIENT)
Dept: LAB | Facility: HOSPITAL | Age: 86
End: 2021-01-07

## 2021-01-07 ENCOUNTER — OFFICE VISIT (OUTPATIENT)
Dept: FAMILY MEDICINE CLINIC | Facility: CLINIC | Age: 86
End: 2021-01-07

## 2021-01-07 VITALS
WEIGHT: 124.4 LBS | OXYGEN SATURATION: 94 % | HEART RATE: 76 BPM | DIASTOLIC BLOOD PRESSURE: 80 MMHG | BODY MASS INDEX: 23.49 KG/M2 | HEIGHT: 61 IN | RESPIRATION RATE: 16 BRPM | SYSTOLIC BLOOD PRESSURE: 138 MMHG

## 2021-01-07 DIAGNOSIS — I10 ESSENTIAL HYPERTENSION: Primary | ICD-10-CM

## 2021-01-07 DIAGNOSIS — E78.00 PURE HYPERCHOLESTEROLEMIA: ICD-10-CM

## 2021-01-07 DIAGNOSIS — D50.8 OTHER IRON DEFICIENCY ANEMIA: ICD-10-CM

## 2021-01-07 DIAGNOSIS — C44.90 MALIGNANT NEOPLASM OF SKIN: ICD-10-CM

## 2021-01-07 DIAGNOSIS — L82.1 SEBORRHEIC KERATOSES: ICD-10-CM

## 2021-01-07 DIAGNOSIS — R32 URINARY INCONTINENCE, UNSPECIFIED TYPE: ICD-10-CM

## 2021-01-07 DIAGNOSIS — I10 ESSENTIAL HYPERTENSION: ICD-10-CM

## 2021-01-07 LAB
BASOPHILS # BLD AUTO: 0.06 10*3/MM3 (ref 0–0.2)
BASOPHILS NFR BLD AUTO: 1 % (ref 0–1.5)
DEPRECATED RDW RBC AUTO: 42.6 FL (ref 37–54)
EOSINOPHIL # BLD AUTO: 0.05 10*3/MM3 (ref 0–0.4)
EOSINOPHIL NFR BLD AUTO: 0.8 % (ref 0.3–6.2)
ERYTHROCYTE [DISTWIDTH] IN BLOOD BY AUTOMATED COUNT: 13 % (ref 12.3–15.4)
HCT VFR BLD AUTO: 43.3 % (ref 34–46.6)
HGB BLD-MCNC: 14.9 G/DL (ref 12–15.9)
IMM GRANULOCYTES # BLD AUTO: 0.01 10*3/MM3 (ref 0–0.05)
IMM GRANULOCYTES NFR BLD AUTO: 0.2 % (ref 0–0.5)
LYMPHOCYTES # BLD AUTO: 1.66 10*3/MM3 (ref 0.7–3.1)
LYMPHOCYTES NFR BLD AUTO: 27.1 % (ref 19.6–45.3)
MCH RBC QN AUTO: 31 PG (ref 26.6–33)
MCHC RBC AUTO-ENTMCNC: 34.4 G/DL (ref 31.5–35.7)
MCV RBC AUTO: 90 FL (ref 79–97)
MONOCYTES # BLD AUTO: 0.62 10*3/MM3 (ref 0.1–0.9)
MONOCYTES NFR BLD AUTO: 10.1 % (ref 5–12)
NEUTROPHILS NFR BLD AUTO: 3.73 10*3/MM3 (ref 1.7–7)
NEUTROPHILS NFR BLD AUTO: 60.8 % (ref 42.7–76)
NRBC BLD AUTO-RTO: 0 /100 WBC (ref 0–0.2)
PLATELET # BLD AUTO: 262 10*3/MM3 (ref 140–450)
PMV BLD AUTO: 10.3 FL (ref 6–12)
RBC # BLD AUTO: 4.81 10*6/MM3 (ref 3.77–5.28)
WBC # BLD AUTO: 6.13 10*3/MM3 (ref 3.4–10.8)

## 2021-01-07 PROCEDURE — 84443 ASSAY THYROID STIM HORMONE: CPT

## 2021-01-07 PROCEDURE — 85025 COMPLETE CBC W/AUTO DIFF WBC: CPT

## 2021-01-07 PROCEDURE — 99214 OFFICE O/P EST MOD 30 MIN: CPT | Performed by: FAMILY MEDICINE

## 2021-01-07 PROCEDURE — 80053 COMPREHEN METABOLIC PANEL: CPT

## 2021-01-07 PROCEDURE — 82570 ASSAY OF URINE CREATININE: CPT

## 2021-01-07 PROCEDURE — 80061 LIPID PANEL: CPT

## 2021-01-07 PROCEDURE — 81001 URINALYSIS AUTO W/SCOPE: CPT

## 2021-01-07 PROCEDURE — 82043 UR ALBUMIN QUANTITATIVE: CPT

## 2021-01-07 RX ORDER — SULFAMETHOXAZOLE AND TRIMETHOPRIM 800; 160 MG/1; MG/1
1 TABLET ORAL DAILY PRN
Qty: 30 TABLET | Refills: 2 | Status: SHIPPED | OUTPATIENT
Start: 2021-01-07 | End: 2021-08-03

## 2021-01-07 NOTE — PROGRESS NOTES
"Subjective   Marcelino Mesa is a 89 y.o. female.     Chief Complaint   Patient presents with   • Hypertension   • Med Refill     plavix       History of Present Illness     Marcelino Mesa presents today for   Chief Complaint   Patient presents with   • Hypertension   • Med Refill     plavix     she is in need of chronic disease followup.  Her only complaints today are regarding a new skin lesion that has popped up over the past month or so on her superior scalp on the right side.  It occasionally bleeds but does not itch.  She also has a couple scattered seborrheic keratosis lesions, and one very large pedunculated lesion on the posterior of the right thigh.  She is on Plavix half tablet daily.  She reports she takes it for \"preventing blood clots\" however she has never had any blood clots and does not appear to have any increased risk for hypercoagulation.  She has taken it for DVT prophylaxis after surgeries as far as I can tell from the record, however does not appear to have been absolute indication for it.  She also does have a history of GI bleeding and iron deficiency anemia, and at 89 does not have an ongoing indication for Plavix.    This patient is accompanied by their self who contributes to the history of their care.    The following portions of the patient's history were reviewed and updated as appropriate: allergies, current medications, past family history, past medical history, past social history, past surgical history and problem list.    Active Ambulatory Problems     Diagnosis Date Noted   • Abnormal gait 05/24/2016   • Generalized osteoarthritis 05/24/2016   • Hyperlipidemia 05/24/2016   • Iron deficiency anemia 05/24/2016   • Macular degeneration 05/24/2016   • Scoliosis of lumbar spine 05/24/2016   • Post-menopausal osteoporosis 05/24/2016   • Vitamin D deficiency 05/24/2016   • Essential hypertension 07/12/2016   • Preventative health care 08/09/2016   • Incomplete RBBB 08/11/2016   • " Primary osteoarthritis of both knees 11/07/2016   • Anxiety 01/23/2017   • Amaurosis fugax 06/20/2017   • Bladder incontinence 07/06/2018   • Arthritis of right acromioclavicular joint 02/11/2020     Resolved Ambulatory Problems     Diagnosis Date Noted   • Left-sided low back pain with left-sided sciatica 05/24/2016   • Syncope 05/24/2016   • Vaginitis 05/24/2016   • Hip pain, left 05/24/2016   • Iron deficiency 05/26/2016   • Hip fracture, left (CMS/[facility]) 07/12/2016   • Knee pain, bilateral 07/12/2016   • Arthritis of knee, right 09/22/2016   • Status post unicompartmental right knee arthroplasty 09/22/2016   • Acute post-operative pain 09/22/2016   • Arthritis of knee, left 12/01/2016   • Status post left knee medial unicompartmental replacement 12/01/2016   • Acute blood loss anemia, mild, asymptomatic 12/02/2016   • Leukocytosis, mild, likely reactive 12/02/2016   • Hematochezia 01/23/2017   • Dysuria 01/23/2017   • GI bleed 01/23/2017   • Fracture of hip (CMS/[facility]) 08/10/2017   • 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 08/10/2017   • Postoperative wound infection 10/19/2017     Past Medical History:   Diagnosis Date   • Amaurosis fugax of left eye 06/19/2017   • Compression fracture of L1 lumbar vertebra (CMS/[facility]) Remote   • Hearing impairment 2000   • Herpes zoster    • Hypertension 2016   • Lumbar scoliosis Adulthood   • Lumbar spondylosis 2015   • PONV (postoperative nausea and vomiting)    • Wrist fracture, left      Past Surgical History:   Procedure Laterality Date   • CATARACT EXTRACTION EXTRACAPSULAR W/ INTRAOCULAR LENS IMPLANTATION Bilateral 2016   • COLONOSCOPY  2006    Normal study   • HIP FRACTURE SURGERY Left 2014     hip pinning for acute fracture   • KNEE ARTHROPLASTY UNICOMPARTMENTAL Right 9/22/2016    Procedure: RIGHT KNEE ARTHROPLASTY UNICOMPARTMENTAL;  Surgeon: Jesse Taveras MD;  Location: [facility];  Service:    • KNEE ARTHROPLASTY UNICOMPARTMENTAL Left 12/1/2016     Procedure: LEFT KNEE ARTHROPLASTY UNICOMPARTMENTAL;  Surgeon: Jesse Taveras MD;  Location:  [facility];  Service:    • LUMBAR EPIDURAL INJECTION  2015   • TONSILLECTOMY  1947   • TOTAL HIP ARTHROPLASTY Left 8/10/2017    Procedure: REMOVAL OF HARDWARE LEFT HIP, LEFT ANTERIOR TOTAL HIP ARTHROPLASTY;  Surgeon: Jesse Taveras MD;  Location:  [facility];  Service:      Family History   Problem Relation Age of Onset   • Alzheimer's disease Mother    • No Known Problems Father    • Arthritis Sister    • Breast cancer Sister    • Colon cancer Sister    • Lung cancer Brother    • Heart disease Other         2009   • Asthma Sister    • Colon cancer Sister    • Breast cancer Sister    • Lumbar disc disease Sister    • Skin cancer Brother      Social History     Socioeconomic History   • Marital status:      Spouse name: Not on file   • Number of children: Not on file   • Years of education: Not on file   • Highest education level: Not on file   Tobacco Use   • Smoking status: Never Smoker   • Smokeless tobacco: Never Used   Substance and Sexual Activity   • Alcohol use: No   • Drug use: No   Social History Narrative    Domestic life-      [place]                                   good support from local daughter - who lives in [facility] - 1 hour away        Mu-ism- [employer]        Sleep hygiene-   in bed 8 PM to 6 AM for 8 hours broken sleep          Caffeine use- 2 cups coffee daily        Exercise habits- Walking daily as tolerated        Diet- Prudent well-balanced diet - low in salt        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 rate 16, height 156 cm (61.42\"), weight 56.4 kg (124 lb 6.4 oz), SpO2 94 %, not currently breastfeeding.  Nursing note reviewed  Physical Exam  Const: NAD, A&Ox4, Pleasant, Cooperative  Eyes: EOMI, no conjunctivitis  ENT: No nasal discharge present, neck supple  Cardiac: Regular rate and rhythm, no cyanosis  Resp: Respiratory rate within normal limits, no increased work of breathing, no audible wheezing or retractions noted  GI: No distention or ascites  Skin: 8 mm papule of the right supra palpebral area with central ulceration no active bleeding  Procedures  Assessment/Plan     Problem List Items Addressed This Visit        Cardiac and Vasculature    Hyperlipidemia    Relevant Orders    Lipid Panel    Essential hypertension - Primary    Relevant Orders    Comprehensive Metabolic Panel    TSH Rfx On Abnormal To Free T4    Urinalysis With Microscopic If Indicated (No Culture) - Urine, Clean Catch    Microalbumin / Creatinine Urine Ratio - Urine, Clean Catch       Genitourinary and Reproductive     Bladder incontinence    Relevant Medications    sulfamethoxazole-trimethoprim (BACTRIM DS,SEPTRA DS) 800-160 MG per tablet       Hematology and Neoplasia    Iron deficiency anemia    Relevant Orders    CBC & Differential      Other Visit Diagnoses     Malignant neoplasm of skin        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  She reports she takes it for \"preventing blood clots\" however she has never had any blood clots and does not appear to have any increased risk for hypercoagulation.  She has taken it for DVT prophylaxis after surgeries as far as I can tell from the " record, however does not appear to have been absolute indication for it.  She also does have a history of GI bleeding and iron deficiency anemia, and at 89 does not have an ongoing indication for Plavix.      See patient diagnoses and orders along with patient instructions for assessment, plan, and changes to care for patient.    Patient Instructions   1.  See dermatologist, will be called to schedule or you may call yours.  The spot on your scalp is concerning for something called a basal cell carcinoma.    2.  Ask about spot on leg, they can remove    3.  Stop plavix    4.  Labs today    Basal Cell Carcinoma  Basal cell carcinoma is the most common form of skin cancer. It begins in the basal cells, which are at the bottom of the outer skin layer (epidermis). Basal cell carcinoma can almost always be cured. It rarely spreads to other areas of the body (metastasizes). It may come back at the same location (recur), but it can be treated again if this happens.  Basal cell carcinoma occurs most often on parts of the body that are frequently exposed to the sun, such as:  · Parts of the head, including the scalp or face.  · Ears.  · Neck.  · Arms or legs.  · Backs of the hands.  What are the causes?  This condition is usually caused by exposure to ultraviolet (UV) light. UV light may come from the sun or from tanning beds. Other causes include:  · Exposure to a highly poisonous metal (arsenic).  · Exposure to high-energy X-rays (radiation).  · Exposure to toxic tars and oils.  · Certain genetic conditions, such as a condition that makes a person sensitive to sunlight (xeroderma pigmentosum).  What increases the risk?  You are more likely to develop this condition if:  · You are older than 40 years of age.  · You have:  ? Fair skin (light complexion).  ? Blond or red hair.  ? Blue, green, or gray eyes.  ? Childhood freckling.  ? Had sun exposure over long periods of time, especially during childhood.  ? Had repeated  sunburns.  ? A weakened immune system.  ? Been exposed to certain chemicals, such as tar, soot, and arsenic.  ? Chronic inflammatory conditions.  ? Chronic infections.  · You use tanning beds.  What are the signs or symptoms?  The main symptom of this condition is a growth or lesion on the skin.  · The shape and color of the growth or lesion may vary. The main types include:  ? An open sore that may remain open for 3 weeks or longer. The sore may bleed or crust. This type of lesion can be an early sign of basal cell carcinoma. Basal cell carcinoma often shows up as a sore that does not heal.  ? A reddish area that may crust, itch, or cause discomfort. This may occur on areas that are exposed to the sun. These patches might be easier to feel than to see.  ? A shiny or clear bump that is red, white, or pink. In people who have dark hair, the bump is often tan, black, or brown. These bumps can look like moles.  ? A pink growth with a raised border. The growth will have a crusted and indented area in the center. Small blood vessels may appear on the surface of the growth as it gets bigger.  ? A scar-like area that looks like shiny, stretched skin. The area may be white, yellow, or waxy. It often has irregular borders. This may be a sign of more aggressive basal cell carcinoma.  How is this diagnosed?  This condition may be diagnosed with:  · A physical exam.  · Removal of a tissue sample to be examined under a microscope (biopsy).  How is this treated?  Treatment for this condition involves removing the cancerous tissue. The method that is used for this depends on the type, size, location, and number of tumors. Possible treatments include:  · Mohs surgery. In this procedure, the cancerous skin cells are removed layer by layer until all of the tumor has been removed.  · Surgical removal (excision) of the tumor. This involves removing the entire tumor and a small amount of normal skin that surrounds it.  · Cryosurgery.  This involves freezing the tumor with liquid nitrogen.  · Plastic surgery. The tumor is removed, and healthy skin from another part of the body is used to cover the wound. This may be done for large tumors that are in areas where it is not possible to stretch the nearby skin to sew the edges of the wound together.  · Radiation. This may be used for tumors on the face.  · Photodynamic therapy. A chemical cream is applied to the skin, and light exposure is used to activate the chemical.  · Electrodesiccation and curettage. This involves alternately scraping and burning the tumor while using an electric current to control bleeding.  · Chemical treatments, such as imiquimod cream and interferon injections. These may be used to remove superficial tumors with minimal scarring.  Follow these instructions at home:  · Avoid direct exposure to the sun.  · Do self-exams as told by your health care provider. Look for new spots or changes in your skin.  · Keep all follow-up visits as told by your health care provider. This is important.  How is this prevented?    · Avoid the sun when it is the strongest. This is usually between 10 a.m. and 4 p.m.  · When you are out in the sun, use a sunscreen that has a sun protection factor (SPF) of at least 30.  · Apply sunscreen at least 30 minutes before exposure to the sun.  · Reapply sunscreen every 2-4 hours while you are outside. Also reapply it after swimming and after excessive sweating.  · Always wear hats, protective clothing, and UV-blocking sunglasses when you are outdoors.  · Do not use tanning beds.  Contact a health care provider if you:  · Notice any new spots or any changes in your skin.  · Have had a basal cell carcinoma tumor removed, and you notice a new growth in the same location.  Get help right away if you have a spot that:  · Is sore and does not heal.  · Bleeds easily with minor injury.  Summary  · Basal cell carcinoma is the most common form of skin cancer. It  begins in the bottom of the outer skin layer (epidermis). Basal cell carcinoma can almost always be cured.  · This condition is usually caused by exposure to ultraviolet (UV) light. It mostly affects the face, scalp, neck, ears, arms, legs, or backs of the hands.  · The main symptom of this condition is a growth or lesion on the skin that can vary in shape and color.  · You can prevent this cancer by avoiding direct exposure to the sun, applying sunscreen of at least 30 SPF, and wearing protective clothing.  · Apply sunscreen 30 minutes before you go out into the sun, and reapply every 2-4 hours while you are outside.  This information is not intended to replace advice given to you by your health care provider. Make sure you discuss any questions you have with your health care provider.  Document Revised: 05/08/2019 Document Reviewed: 05/08/2019  [employer] Patient Education © 2020 [employer]        Return in about 6 months (around 7/7/2021) for Medicare Wellness.    Ambulatory progress note signed and attested to by Earnest Ingram D.O.

## 2021-01-07 NOTE — PATIENT INSTRUCTIONS
1.  See dermatologist, will be called to schedule or you may call yours.  The spot on your scalp is concerning for something called a basal cell carcinoma.    2.  Ask about spot on leg, they can remove    3.  Stop plavix    4.  Labs today    Basal Cell Carcinoma  Basal cell carcinoma is the most common form of skin cancer. It begins in the basal cells, which are at the bottom of the outer skin layer (epidermis). Basal cell carcinoma can almost always be cured. It rarely spreads to other areas of the body (metastasizes). It may come back at the same location (recur), but it can be treated again if this happens.  Basal cell carcinoma occurs most often on parts of the body that are frequently exposed to the sun, such as:  · Parts of the head, including the scalp or face.  · Ears.  · Neck.  · Arms or legs.  · Backs of the hands.  What are the causes?  This condition is usually caused by exposure to ultraviolet (UV) light. UV light may come from the sun or from tanning beds. Other causes include:  · Exposure to a highly poisonous metal (arsenic).  · Exposure to high-energy X-rays (radiation).  · Exposure to toxic tars and oils.  · Certain genetic conditions, such as a condition that makes a person sensitive to sunlight (xeroderma pigmentosum).  What increases the risk?  You are more likely to develop this condition if:  · You are older than 40 years of age.  · You have:  ? Fair skin (light complexion).  ? Blond or red hair.  ? Blue, green, or gray eyes.  ? Childhood freckling.  ? Had sun exposure over long periods of time, especially during childhood.  ? Had repeated sunburns.  ? A weakened immune system.  ? Been exposed to certain chemicals, such as tar, soot, and arsenic.  ? Chronic inflammatory conditions.  ? Chronic infections.  · You use tanning beds.  What are the signs or symptoms?  The main symptom of this condition is a growth or lesion on the skin.  · The shape and color of the growth or lesion may vary. The  main types include:  ? An open sore that may remain open for 3 weeks or longer. The sore may bleed or crust. This type of lesion can be an early sign of basal cell carcinoma. Basal cell carcinoma often shows up as a sore that does not heal.  ? A reddish area that may crust, itch, or cause discomfort. This may occur on areas that are exposed to the sun. These patches might be easier to feel than to see.  ? A shiny or clear bump that is red, white, or pink. In people who have dark hair, the bump is often tan, black, or brown. These bumps can look like moles.  ? A pink growth with a raised border. The growth will have a crusted and indented area in the center. Small blood vessels may appear on the surface of the growth as it gets bigger.  ? A scar-like area that looks like shiny, stretched skin. The area may be white, yellow, or waxy. It often has irregular borders. This may be a sign of more aggressive basal cell carcinoma.  How is this diagnosed?  This condition may be diagnosed with:  · A physical exam.  · Removal of a tissue sample to be examined under a microscope (biopsy).  How is this treated?  Treatment for this condition involves removing the cancerous tissue. The method that is used for this depends on the type, size, location, and number of tumors. Possible treatments include:  · Mohs surgery. In this procedure, the cancerous skin cells are removed layer by layer until all of the tumor has been removed.  · Surgical removal (excision) of the tumor. This involves removing the entire tumor and a small amount of normal skin that surrounds it.  · Cryosurgery. This involves freezing the tumor with liquid nitrogen.  · Plastic surgery. The tumor is removed, and healthy skin from another part of the body is used to cover the wound. This may be done for large tumors that are in areas where it is not possible to stretch the nearby skin to sew the edges of the wound together.  · Radiation. This may be used for tumors on  the face.  · Photodynamic therapy. A chemical cream is applied to the skin, and light exposure is used to activate the chemical.  · Electrodesiccation and curettage. This involves alternately scraping and burning the tumor while using an electric current to control bleeding.  · Chemical treatments, such as imiquimod cream and interferon injections. These may be used to remove superficial tumors with minimal scarring.  Follow these instructions at home:  · Avoid direct exposure to the sun.  · Do self-exams as told by your health care provider. Look for new spots or changes in your skin.  · Keep all follow-up visits as told by your health care provider. This is important.  How is this prevented?    · Avoid the sun when it is the strongest. This is usually between 10 a.m. and 4 p.m.  · When you are out in the sun, use a sunscreen that has a sun protection factor (SPF) of at least 30.  · Apply sunscreen at least 30 minutes before exposure to the sun.  · Reapply sunscreen every 2-4 hours while you are outside. Also reapply it after swimming and after excessive sweating.  · Always wear hats, protective clothing, and UV-blocking sunglasses when you are outdoors.  · Do not use tanning beds.  Contact a health care provider if you:  · Notice any new spots or any changes in your skin.  · Have had a basal cell carcinoma tumor removed, and you notice a new growth in the same location.  Get help right away if you have a spot that:  · Is sore and does not heal.  · Bleeds easily with minor injury.  Summary  · Basal cell carcinoma is the most common form of skin cancer. It begins in the bottom of the outer skin layer (epidermis). Basal cell carcinoma can almost always be cured.  · This condition is usually caused by exposure to ultraviolet (UV) light. It mostly affects the face, scalp, neck, ears, arms, legs, or backs of the hands.  · The main symptom of this condition is a growth or lesion on the skin that can vary in shape and  color.  · You can prevent this cancer by avoiding direct exposure to the sun, applying sunscreen of at least 30 SPF, and wearing protective clothing.  · Apply sunscreen 30 minutes before you go out into the sun, and reapply every 2-4 hours while you are outside.  This information is not intended to replace advice given to you by your health care provider. Make sure you discuss any questions you have with your health care provider.  Document Revised: 05/08/2019 Document Reviewed: 05/08/2019  [employer] Patient Education © 2020 [employer]

## 2021-01-08 LAB
ALBUMIN SERPL-MCNC: 4.1 G/DL (ref 3.5–5.2)
ALBUMIN UR-MCNC: 1.8 MG/DL
ALBUMIN/GLOB SERPL: 1.3 G/DL
ALP SERPL-CCNC: 98 U/L (ref 39–117)
ALT SERPL W P-5'-P-CCNC: 7 U/L (ref 1–33)
ANION GAP SERPL CALCULATED.3IONS-SCNC: 11.5 MMOL/L (ref 5–15)
AST SERPL-CCNC: 19 U/L (ref 1–32)
BACTERIA UR QL AUTO: ABNORMAL /HPF
BILIRUB SERPL-MCNC: 0.7 MG/DL (ref 0–1.2)
BILIRUB UR QL STRIP: NEGATIVE
BUN SERPL-MCNC: 12 MG/DL (ref 8–23)
BUN/CREAT SERPL: 17.1 (ref 7–25)
CALCIUM SPEC-SCNC: 9.6 MG/DL (ref 8.2–9.6)
CHLORIDE SERPL-SCNC: 104 MMOL/L (ref 98–107)
CHOLEST SERPL-MCNC: 221 MG/DL (ref 0–200)
CLARITY UR: CLEAR
CO2 SERPL-SCNC: 24.5 MMOL/L (ref 22–29)
COLOR UR: YELLOW
CREAT SERPL-MCNC: 0.7 MG/DL (ref 0.57–1)
CREAT UR-MCNC: 194.9 MG/DL
GFR SERPL CREATININE-BSD FRML MDRD: 78 ML/MIN/1.73
GLOBULIN UR ELPH-MCNC: 3.2 GM/DL
GLUCOSE SERPL-MCNC: 86 MG/DL (ref 65–99)
GLUCOSE UR STRIP-MCNC: NEGATIVE MG/DL
HDLC SERPL-MCNC: 68 MG/DL (ref 40–60)
HGB UR QL STRIP.AUTO: NEGATIVE
HYALINE CASTS UR QL AUTO: ABNORMAL /LPF
KETONES UR QL STRIP: ABNORMAL
LDLC SERPL CALC-MCNC: 136 MG/DL (ref 0–100)
LDLC/HDLC SERPL: 1.96 {RATIO}
LEUKOCYTE ESTERASE UR QL STRIP.AUTO: ABNORMAL
MICROALBUMIN/CREAT UR: 9.2 MG/G
MUCOUS THREADS URNS QL MICRO: ABNORMAL /HPF
NITRITE UR QL STRIP: NEGATIVE
PH UR STRIP.AUTO: 6 [PH] (ref 5–8)
POTASSIUM SERPL-SCNC: 4.3 MMOL/L (ref 3.5–5.2)
PROT SERPL-MCNC: 7.3 G/DL (ref 6–8.5)
PROT UR QL STRIP: ABNORMAL
RBC # UR: ABNORMAL /HPF
REF LAB TEST METHOD: ABNORMAL
SODIUM SERPL-SCNC: 140 MMOL/L (ref 136–145)
SP GR UR STRIP: 1.02 (ref 1–1.03)
SQUAMOUS #/AREA URNS HPF: ABNORMAL /HPF
TRIGL SERPL-MCNC: 99 MG/DL (ref 0–150)
TSH SERPL DL<=0.05 MIU/L-ACNC: 2.6 UIU/ML (ref 0.27–4.2)
UROBILINOGEN UR QL STRIP: ABNORMAL
VLDLC SERPL-MCNC: 17 MG/DL (ref 5–40)
WBC UR QL AUTO: ABNORMAL /HPF

## 2021-08-03 ENCOUNTER — LAB (OUTPATIENT)
Dept: LAB | Facility: HOSPITAL | Age: 86
End: 2021-08-03

## 2021-08-03 ENCOUNTER — OFFICE VISIT (OUTPATIENT)
Dept: FAMILY MEDICINE CLINIC | Facility: CLINIC | Age: 86
End: 2021-08-03

## 2021-08-03 VITALS
HEART RATE: 68 BPM | BODY MASS INDEX: 23.18 KG/M2 | HEIGHT: 61 IN | RESPIRATION RATE: 16 BRPM | WEIGHT: 122.8 LBS | SYSTOLIC BLOOD PRESSURE: 118 MMHG | DIASTOLIC BLOOD PRESSURE: 70 MMHG | OXYGEN SATURATION: 94 %

## 2021-08-03 DIAGNOSIS — E78.00 PURE HYPERCHOLESTEROLEMIA: ICD-10-CM

## 2021-08-03 DIAGNOSIS — I10 ESSENTIAL HYPERTENSION: ICD-10-CM

## 2021-08-03 DIAGNOSIS — Z00.00 MEDICARE ANNUAL WELLNESS VISIT, SUBSEQUENT: Primary | ICD-10-CM

## 2021-08-03 DIAGNOSIS — G31.84 MILD COGNITIVE IMPAIRMENT: ICD-10-CM

## 2021-08-03 DIAGNOSIS — R32 URINARY INCONTINENCE, UNSPECIFIED TYPE: ICD-10-CM

## 2021-08-03 DIAGNOSIS — E55.9 VITAMIN D DEFICIENCY: ICD-10-CM

## 2021-08-03 PROBLEM — H90.6 MIXED CONDUCTIVE AND SENSORINEURAL HEARING LOSS OF BOTH EARS: Status: ACTIVE | Noted: 2021-08-03

## 2021-08-03 PROCEDURE — G0439 PPPS, SUBSEQ VISIT: HCPCS | Performed by: FAMILY MEDICINE

## 2021-08-03 PROCEDURE — 82306 VITAMIN D 25 HYDROXY: CPT

## 2021-08-03 PROCEDURE — 81001 URINALYSIS AUTO W/SCOPE: CPT

## 2021-08-03 PROCEDURE — 80048 BASIC METABOLIC PNL TOTAL CA: CPT

## 2021-08-03 RX ORDER — SULFAMETHOXAZOLE AND TRIMETHOPRIM 800; 160 MG/1; MG/1
1 TABLET ORAL DAILY PRN
Qty: 30 TABLET | Refills: 2 | Status: SHIPPED | OUTPATIENT
Start: 2021-08-03 | End: 2021-11-03

## 2021-08-03 RX ORDER — DONEPEZIL HYDROCHLORIDE 5 MG/1
5 TABLET, FILM COATED ORAL NIGHTLY
Qty: 30 TABLET | Refills: 2 | Status: SHIPPED | OUTPATIENT
Start: 2021-08-03 | End: 2021-11-03

## 2021-08-03 NOTE — PROGRESS NOTES
QUICK REFERENCE INFORMATION:  The ABCs of the Annual Wellness Visit    Medicare Subsequent Wellness Visit    Chief Complaint   Patient presents with   • Medicare Wellness-subsequent   • Med Refill     antibiotic        HPI     Marcelino Mesa is a 89 y.o. female presenting for subsequent annual wellness visit.     The patient is pleasant was last seen in 01/2021 for an acute visit. She had a skin lesion of the right superior scalp. She was referred to dermatology and has been seeing Dr. Torres. Her last wellness visit was in 08/2019.    She reports Dr. Torres removed the spots on her scalp. She also had an area on the posterior aspect of her left thigh. She reports that she continues to have to utilize the restroom frequently. She has hearing aids that she wears and they need to be checked. She believes her health is doing well compared to last year.    She denies taking a vitamin D or calcium supplement.     She states that Dr. Soler recently froze off some seborrheic keratosis areas.     She denies having more trouble with her memory; however, she will forget why she is going to the basement, but then quickly remembers the reason. She reports she exercises daily.    This patient is accompanied by their self who contributes to the history of their care.    Past Medical History:   Diagnosis Date   • Amaurosis fugax of left eye 06/19/2017    5 minutes at home   • Compression fracture of L1 lumbar vertebra (HCC) Remote    Chronic lower back pain   • Generalized osteoarthritis Adulthood    Bilateral knee replacements   • Hearing impairment 2000    Wears hearing aids   • Herpes zoster    • Hip fracture, left (HCC) 2014    Hip pinning with persistent pain   • Hyperlipidemia Adulthood    Mild disease not requiring drug therapy   • Hypertension 2016    Well compensated on medication   • Iron deficiency 2017    Long-term oral replacement   • Lumbar scoliosis Adulthood    Mild disease   • Lumbar spondylosis 2015    Status  post injection therapy   • Macular degeneration    • PONV (postoperative nausea and vomiting)     preprocedural medications will help    • Vitamin D deficiency    • Wrist fracture, left       Past Surgical History:   Procedure Laterality Date   • CATARACT EXTRACTION EXTRACAPSULAR W/ INTRAOCULAR LENS IMPLANTATION Bilateral 2016   • COLONOSCOPY  2006    Normal study   • HIP FRACTURE SURGERY Left 2014     hip pinning for acute fracture   • KNEE ARTHROPLASTY UNICOMPARTMENTAL Right 9/22/2016    Procedure: RIGHT KNEE ARTHROPLASTY UNICOMPARTMENTAL;  Surgeon: Jesse Taveras MD;  Location:  [facility];  Service:    • KNEE ARTHROPLASTY UNICOMPARTMENTAL Left 12/1/2016    Procedure: LEFT KNEE ARTHROPLASTY UNICOMPARTMENTAL;  Surgeon: Jesse Taveras MD;  Location:  [facility];  Service:    • LUMBAR EPIDURAL INJECTION  2015   • TONSILLECTOMY  1947   • TOTAL HIP ARTHROPLASTY Left 8/10/2017    Procedure: REMOVAL OF HARDWARE LEFT HIP, LEFT ANTERIOR TOTAL HIP ARTHROPLASTY;  Surgeon: Jesse Taveras MD;  Location:  [facility];  Service:      Family History   Problem Relation Age of Onset   • Alzheimer's disease Mother    • No Known Problems Father    • Arthritis Sister    • Breast cancer Sister    • Colon cancer Sister    • Lung cancer Brother    • Heart disease Other         2009   • Asthma Sister    • Colon cancer Sister    • Breast cancer Sister    • Lumbar disc disease Sister    • Skin cancer Brother       Social History     Socioeconomic History   • Marital status:    Tobacco Use   • Smoking status: Never Smoker   • Smokeless tobacco: Never Used   Vaping Use   • Vaping Use: Never used   Substance and Sexual Activity   • Alcohol use: No   • Drug use: No   • Sexual activity: Defer      Allergies   Allergen Reactions   • Asa [Aspirin] GI Bleeding   • Lisinopril-Hydrochlorothiazide Rash   • Other Rash     STEROID INJECTION IN BACK   • Amoxicillin Swelling   • Zantac  [Ranitidine Hcl] Unknown (See Comments)      Outpatient Medications  "Prior to Visit   Medication Sig Dispense Refill   • acetaminophen (TYLENOL) 325 MG tablet Take 2 tablets by mouth Every 4 (Four) Hours As Needed for Mild Pain (1-3).  0   • CRANBERRY EXTRACT PO Take 2 capsules by mouth Daily.     • sulfamethoxazole-trimethoprim (BACTRIM DS,SEPTRA DS) 800-160 MG per tablet Take 1 tablet by mouth Daily As Needed (urinary tract infection- take at onset of symptoms for 5 days). 30 tablet 2   • calcium carbonate (OS-MASON) 600 MG tablet Take 600 mg by mouth Daily.       No facility-administered medications prior to visit.       Reviewed use of high risk medication in the elderly: yes  Reviewed for potential of harmful drug interactions in the elderly: yes    The following portions of the patient's history were reviewed and updated as appropriate: allergies, current medications, past family history, past medical history, past social history, past surgical history and problem list.    Review of Systems   Review of Systems -  See Annual Exam ROS scanned into chart    Vitals:    08/03/21 1310   BP: 118/70   Pulse: 68   Resp: 16   SpO2: 94%   Weight: 55.7 kg (122 lb 12.8 oz)   Height: 156 cm (61.42\")   PainSc: 0-No pain       Objective    Physical Exam   Const: NAD, A&Ox4, Pleasant, Cooperative  Eyes: EOMI, no conjunctivitis  ENT: No nasal discharge present, neck supple  Cardiac: Regular rate and rhythm, no cyanosis  Resp: Respiratory rate within normal limits, no increased work of breathing, no audible wheezing or retractions noted  GI: No distention or ascites  MSK: Motor and sensation grossly intact in bilateral upper extremities  Neurologic: CN II-XII grossly intact  Psych: Appropriate mood and behavior.  HEALTH RISK ASSESSMENT    2/19/1927    Recent Hospitalizations:  No hospitalization(s) within the last year..      Current Medical Providers:  Patient Care Team:  Earnest Ingram DO as PCP - General (Family Medicine)      Smoking Status:  Social History     Tobacco Use   Smoking " Status Never Smoker   Smokeless Tobacco Never Used       Alcohol Consumption:  Social History     Substance and Sexual Activity   Alcohol Use No       Depression Screen:   PHQ-2/PHQ-9 Depression Screening 8/3/2021   Little interest or pleasure in doing things 0   Feeling down, depressed, or hopeless 0   Trouble falling or staying asleep, or sleeping too much -   Feeling tired or having little energy -   Poor appetite or overeating -   Feeling bad about yourself - or that you are a failure or have let yourself or your family down -   Trouble concentrating on things, such as reading the newspaper or watching television -   Moving or speaking so slowly that other people could have noticed. Or the opposite - being so fidgety or restless that you have been moving around a lot more than usual -   Thoughts that you would be better off dead, or of hurting yourself in some way -   Total Score 0   If you checked off any problems, how difficult have these problems made it for you to do your work, take care of things at home, or get along with other people? -       Health Habits and Functional and Cognitive Screening:  Functional & Cognitive Status 8/3/2021   Do you have difficulty preparing food and eating? No   Do you have difficulty bathing yourself, getting dressed or grooming yourself? No   Do you have difficulty using the toilet? No   Do you have difficulty moving around from place to place? No   Do you have trouble with steps or getting out of a bed or a chair? Yes   Current Diet Well Balanced Diet   Dental Exam Up to date   Eye Exam Up to date   Exercise (times per week) 3 times per week   Current Exercises Include Aerobics;Walking   Current Exercise Activities Include -   Do you need help using the phone?  Yes   Are you deaf or do you have serious difficulty hearing?  Yes   Do you need help with transportation? No   Do you need help shopping? No   Do you need help preparing meals?  No   Do you need help with housework?   No   Do you need help with laundry? No   Do you need help taking your medications? No   Do you need help managing money? No   Do you ever drive or ride in a car without wearing a seat belt? No   Have you felt unusual stress, anger or loneliness in the last month? No   Who do you live with? Alone   If you need help, do you have trouble finding someone available to you? No   Have you been bothered in the last four weeks by sexual problems? No   Do you have difficulty concentrating, remembering or making decisions? No         Does the patient have evidence of cognitive impairment? Yes   ATTENTION  What is the year: correct  What is the month of the year: correct  What is the day of the week?: correct  What is the date?: correct  MEMORY  Repeat address three times, only score third attempt: [address], [place]: 7  HOW MANY ANIMALS DID THE PATIENT NAME  Verbal Fluency -- Animal Names (0-25): 9-10  CLOCK DRAWING  Clock Drawing: All Correct  MEMORY RECALL  Tell me what you remember about that name and address we were repeating at the beginnin  ACE TOTAL SCORE  Total ACE Score - <25/30 strongly suggests cognitive impairment; <21/30 almost certainly shows dementia: 19    Aspirin use counseling? Does not need ASA (and currently is not on it)      Recent Lab Results:  CMP:  Lab Results   Component Value Date    BUN 13 2021    CREATININE 0.85 2021    EGFRIFNONA 62 2021    BCR 15.3 2021     2021    K 4.9 2021    CO2 27.2 2021    CALCIUM 9.2 2021    ALBUMIN 4.10 2021    BILITOT 0.7 2021    ALKPHOS 98 2021    AST 19 2021    ALT 7 2021     Lipid Panel:  Lab Results   Component Value Date    CHOL 221 (H) 2021    TRIG 99 2021    HDL 68 (H) 2021    VLDL 17 2021    LDLHDL 1.96 2021     HbA1c:  Lab Results   Component Value Date    HGBA1C 5.50 2017       Visual Acuity:  No exam data  present    Age-appropriate Screening Schedule:  Refer to the list below for future screening recommendations based on patient's age, sex and/or medical conditions. Orders for these recommended tests are listed in the plan section. The patient has been provided with a written plan.    Health Maintenance   Topic Date Due   • DXA SCAN  Never done   • ZOSTER VACCINE (2 of 3) 06/29/2010   • MAMMOGRAM  Never done   • TDAP/TD VACCINES (2 - Td or Tdap) 04/27/2021   • INFLUENZA VACCINE  08/01/2021   • LIPID PANEL  01/07/2022          Advance Care Planning:  ACP discussion was held with the patient during this visit. Patient has an advance directive in EMR which is still valid.     Identification of Risk Factors:  Risk factors include: Dementia/Memory   Hearing Problem  Urinary Incontinence.    Compared to one year ago, the patient feels her physical health is the same.  Compared to one year ago, the patient feels her mental health is worse.      Diagnoses and all orders for this visit:    1. Medicare annual wellness visit, subsequent (Primary)    2. Urinary incontinence, unspecified type  -     Discontinue: sulfamethoxazole-trimethoprim (BACTRIM DS,SEPTRA DS) 800-160 MG per tablet; Take 1 tablet by mouth Daily As Needed (urinary tract infection- take at onset of symptoms for 5 days).  Dispense: 30 tablet; Refill: 2  -     Basic Metabolic Panel; Future  -     Urinalysis With Microscopic If Indicated (No Culture) - Urine, Clean Catch; Future    3. Essential hypertension  -     Basic Metabolic Panel; Future  -     Urinalysis With Microscopic If Indicated (No Culture) - Urine, Clean Catch; Future    4. Pure hypercholesterolemia    5. Vitamin D deficiency  -     Vitamin D 25 Hydroxy; Future    6. Mild cognitive impairment  Assessment & Plan:  ACE Mini- 19  Start donepezil 5mg  Recheck 3 months with ACE MINI    Orders:  -     donepezil (Aricept) 5 MG tablet; Take 1 tablet by mouth Every Night.  Dispense: 30 tablet; Refill:  2      Procedure   Procedures       Patient Self-Management and Personalized Health Advice  The patient has been provided with information about: exercise, supplements and mental health concerns and preventive services including:   · Annual Wellness Visit (AWV)  · Depression Screening (15 minutes face to face, Code )  · Diabetes Screening-Lab Order for either glucose quantitative blood (except reagent strip), glucose;post glucose dose(includes glucose), or glucose tolerance test-3 specimens(includes glucose).    Diagnoses and all orders for this visit:    1. Medicare annual wellness visit, subsequent (Primary)    2. Urinary incontinence, unspecified type  -     Discontinue: sulfamethoxazole-trimethoprim (BACTRIM DS,SEPTRA DS) 800-160 MG per tablet; Take 1 tablet by mouth Daily As Needed (urinary tract infection- take at onset of symptoms for 5 days).  Dispense: 30 tablet; Refill: 2  -     Basic Metabolic Panel; Future  -     Urinalysis With Microscopic If Indicated (No Culture) - Urine, Clean Catch; Future    3. Essential hypertension  -     Basic Metabolic Panel; Future  -     Urinalysis With Microscopic If Indicated (No Culture) - Urine, Clean Catch; Future    4. Pure hypercholesterolemia    5. Vitamin D deficiency  -     Vitamin D 25 Hydroxy; Future    6. Mild cognitive impairment  Assessment & Plan:  ACE Mini- 19  Start donepezil 5mg  Recheck 3 months with ACE MINI    Orders:  -     donepezil (Aricept) 5 MG tablet; Take 1 tablet by mouth Every Night.  Dispense: 30 tablet; Refill: 2      Problem List Items Addressed This Visit        Cardiac and Vasculature    Hyperlipidemia    Essential hypertension    Relevant Orders    Basic Metabolic Panel (Completed)    Urinalysis With Microscopic If Indicated (No Culture) - Urine, Clean Catch (Completed)       Endocrine and Metabolic    Vitamin D deficiency    Relevant Orders    Vitamin D 25 Hydroxy (Completed)       Genitourinary and Reproductive     Bladder  incontinence    Relevant Orders    Basic Metabolic Panel (Completed)    Urinalysis With Microscopic If Indicated (No Culture) - Urine, Clean Catch (Completed)       Neuro    Mild cognitive impairment    Current Assessment & Plan     ACE Mini- 19  Start donepezil 5mg  Recheck 3 months with ACE MINI         Relevant Medications    donepezil (Aricept) 5 MG tablet      Other Visit Diagnoses     Medicare annual wellness visit, subsequent    -  Primary        1. Memory loss  - Age-related cognitive decline. The patient has questions about supplements, which I do not recommend. I think she would benefit from donepezil. A prescription will be sent to her pharmacy. We will follow up on this in 3 months.    Patient Instructions   1.  Have hearing aids checked    2.  Start donepezil at night for memory      The wellness exam has been reviewed in detail.  The patient has been fully counseled on preventative guidelines for vaccines, cancer screenings, and other health maintenance needs.  Functional testing has been performed to assess capacity for independent living and need for other medical interventions.  Screening for depression was completed via a validated screening model as indicated above, 15 minutes was spent in total on depression screening.  The patient was counseled on maintaining a lifestyle to promote good health and to minimize chronic diseases, including 15 minutes spent screening for alcohol misuse and counseling on safe and appropriate alcohol intake and overall risk reduction as indicated above.  The patient has been assisted with scheduling healthcare procedures for the coming year and given a written document outlining these recommendations.    Follow Up:  Return in about 3 months (around 11/3/2021) for ACE MINI.     An After Visit Summary and PPPS with all of these plans were given to the patient.           Transcribed from ambient dictation for Earnest Ingram DO by Jose Chirinos.  08/03/21   14:14  EDT    I have personally performed the services described in this document as transcribed by the above individual, and it is both accurate and complete.  Earnest Ingram, DO

## 2021-08-04 LAB
25(OH)D3 SERPL-MCNC: 32.1 NG/ML
ANION GAP SERPL CALCULATED.3IONS-SCNC: 8.8 MMOL/L (ref 5–15)
BACTERIA UR QL AUTO: NORMAL /HPF
BILIRUB UR QL STRIP: NEGATIVE
BUN SERPL-MCNC: 13 MG/DL (ref 8–23)
BUN/CREAT SERPL: 15.3 (ref 7–25)
CALCIUM SPEC-SCNC: 9.2 MG/DL (ref 8.2–9.6)
CHLORIDE SERPL-SCNC: 106 MMOL/L (ref 98–107)
CLARITY UR: CLEAR
CO2 SERPL-SCNC: 27.2 MMOL/L (ref 22–29)
COLOR UR: YELLOW
CREAT SERPL-MCNC: 0.85 MG/DL (ref 0.57–1)
GFR SERPL CREATININE-BSD FRML MDRD: 62 ML/MIN/1.73
GLUCOSE SERPL-MCNC: 89 MG/DL (ref 65–99)
GLUCOSE UR STRIP-MCNC: NEGATIVE MG/DL
HGB UR QL STRIP.AUTO: NEGATIVE
HYALINE CASTS UR QL AUTO: NORMAL /LPF
KETONES UR QL STRIP: ABNORMAL
LEUKOCYTE ESTERASE UR QL STRIP.AUTO: ABNORMAL
NITRITE UR QL STRIP: NEGATIVE
PH UR STRIP.AUTO: 6.5 [PH] (ref 5–8)
POTASSIUM SERPL-SCNC: 4.9 MMOL/L (ref 3.5–5.2)
PROT UR QL STRIP: NEGATIVE
RBC # UR: NORMAL /HPF
REF LAB TEST METHOD: NORMAL
SODIUM SERPL-SCNC: 142 MMOL/L (ref 136–145)
SP GR UR STRIP: 1.02 (ref 1–1.03)
SQUAMOUS #/AREA URNS HPF: NORMAL /HPF
UROBILINOGEN UR QL STRIP: ABNORMAL
WBC UR QL AUTO: NORMAL /HPF

## 2021-09-03 ENCOUNTER — TELEPHONE (OUTPATIENT)
Dept: FAMILY MEDICINE CLINIC | Facility: CLINIC | Age: 86
End: 2021-09-03

## 2021-09-03 NOTE — TELEPHONE ENCOUNTER
Contacted patient's daughter and attempted to schedule upcoming appointment, she will call us back when she is more aware of her schedule

## 2021-09-03 NOTE — TELEPHONE ENCOUNTER
Patient called stating that the Aricept/Donepezil is not working. She is wanting to know if there is something else that can be sent in to her pharmacy. Patient states that she thinks the medication is too strong. Patient reports that her legs were sore from the knee down and she was having leg cramps every night so she stopped taking it.

## 2021-11-03 ENCOUNTER — OFFICE VISIT (OUTPATIENT)
Dept: FAMILY MEDICINE CLINIC | Facility: CLINIC | Age: 86
End: 2021-11-03

## 2021-11-03 VITALS
SYSTOLIC BLOOD PRESSURE: 150 MMHG | HEART RATE: 73 BPM | TEMPERATURE: 97.3 F | RESPIRATION RATE: 16 BRPM | OXYGEN SATURATION: 96 % | HEIGHT: 61 IN | WEIGHT: 123 LBS | DIASTOLIC BLOOD PRESSURE: 80 MMHG | BODY MASS INDEX: 23.22 KG/M2

## 2021-11-03 DIAGNOSIS — G31.84 MILD COGNITIVE IMPAIRMENT: Primary | ICD-10-CM

## 2021-11-03 DIAGNOSIS — H91.93 BILATERAL HEARING LOSS, UNSPECIFIED HEARING LOSS TYPE: ICD-10-CM

## 2021-11-03 DIAGNOSIS — L82.1 SEBORRHEIC KERATOSES: ICD-10-CM

## 2021-11-03 PROCEDURE — 99214 OFFICE O/P EST MOD 30 MIN: CPT | Performed by: FAMILY MEDICINE

## 2021-11-03 RX ORDER — DONEPEZIL HYDROCHLORIDE 5 MG/1
2.5 TABLET, FILM COATED ORAL NIGHTLY
Qty: 30 TABLET | Refills: 2 | Status: SHIPPED | OUTPATIENT
Start: 2021-11-03 | End: 2022-02-04 | Stop reason: SDUPTHER

## 2021-11-03 NOTE — PATIENT INSTRUCTIONS
1.  Start at 1/4 tablet for 2 weeks then go up to 1/2 tablet for 4 weeks    2.  Call me in 5 weeks with your status

## 2021-11-03 NOTE — ASSESSMENT & PLAN NOTE
Worsening, ACE Mini today 14 vs 19 in August 2021. We had started donepezil in August. She had some leg soreness and sleeplessness and stopped after 3 days of the 5mg dose.  1.  Start at 1/4 tablet for 2 weeks then go up to 1/2 tablet for 4 weeks  2.  Call me in 5 weeks with your status (she lives in [place] and has difficulty coming in for follow-up appointments)

## 2021-11-03 NOTE — PROGRESS NOTES
"Subjective   Marcelino Mesa is a 89 y.o. female.     Chief Complaint   Patient presents with   • Hypertension       History of Present Illness     Marcelino Mesa presents today for   Chief Complaint   Patient presents with   • Hypertension     Marcelino presents today for a follow-up on mild cognitive impairment. She had a wellness in 08/2021. At that time, she was having some cognitive impairment with an ACE score of 19. I recommended that she start donepezil after discussion with her and her family. I also recommended that she have her hearing aids checked since this was likely a contributing factor. She returns today for a repeat visit and ACE score.    The patient reports that the donepezil caused her to have soreness and cramping in her bilateral lower extremity, so she only took it for 3 days. She states that she was taking it at the time it said to.     She reports that she took the hearing aid test in [place], [place], and informed her that there was not anything they could do. She reports that she continues to wear hearing aids and is able to hear sounds, but not what people are saying.     She reports that she has areas on her upper extremities and \"all over.\" She reports that they look \"awful\" and she has been spraying the areas per Dr. Torres's recommendation.    This patient is accompanied by their self who contributes to the history of their care.    The following portions of the patient's history were reviewed and updated as appropriate: allergies, current medications, past family history, past medical history, past social history, past surgical history and problem list.    Active Ambulatory Problems     Diagnosis Date Noted   • Abnormal gait 05/24/2016   • Generalized osteoarthritis 05/24/2016   • Hyperlipidemia 05/24/2016   • Iron deficiency anemia 05/24/2016   • Macular degeneration 05/24/2016   • Scoliosis of lumbar spine 05/24/2016   • Post-menopausal osteoporosis 05/24/2016   • Vitamin D " deficiency 05/24/2016   • Essential hypertension 07/12/2016   • Preventative health care 08/09/2016   • Incomplete RBBB 08/11/2016   • Primary osteoarthritis of both knees 11/07/2016   • Anxiety 01/23/2017   • Amaurosis fugax 06/20/2017   • Bladder incontinence 07/06/2018   • Arthritis of right acromioclavicular joint 02/11/2020   • 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   • Mild cognitive impairment 08/03/2021     Resolved Ambulatory Problems     Diagnosis Date Noted   • Left-sided low back pain with left-sided sciatica 05/24/2016   • Syncope 05/24/2016   • Vaginitis 05/24/2016   • Hip pain, left 05/24/2016   • Iron deficiency 05/26/2016   • Hip fracture, left ([facility]) 07/12/2016   • Knee pain, bilateral 07/12/2016   • Arthritis of knee, right 09/22/2016   • Status post unicompartmental right knee arthroplasty 09/22/2016   • Acute post-operative pain 09/22/2016   • Arthritis of knee, left 12/01/2016   • Status post left knee medial unicompartmental replacement 12/01/2016   • Acute blood loss anemia, mild, asymptomatic 12/02/2016   • Leukocytosis, mild, likely reactive 12/02/2016   • Hematochezia 01/23/2017   • Dysuria 01/23/2017   • GI bleed 01/23/2017   • Fracture of hip ([facility]) 08/10/2017   • 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 08/10/2017   • Postoperative wound infection 10/19/2017     Past Medical History:   Diagnosis Date   • Amaurosis fugax of left eye 06/19/2017   • Compression fracture of L1 lumbar vertebra ([facility]) Remote   • Hearing impairment 2000   • Herpes zoster    • Hypertension 2016   • Lumbar scoliosis Adulthood   • Lumbar spondylosis 2015   • PONV (postoperative nausea and vomiting)    • Wrist fracture, left      Past Surgical History:   Procedure Laterality Date   • CATARACT EXTRACTION EXTRACAPSULAR W/ INTRAOCULAR LENS IMPLANTATION Bilateral 2016   • COLONOSCOPY  2006    Normal study   • HIP FRACTURE SURGERY Left 2014     hip pinning for acute  "fracture   • KNEE ARTHROPLASTY UNICOMPARTMENTAL Right 9/22/2016    Procedure: RIGHT KNEE ARTHROPLASTY UNICOMPARTMENTAL;  Surgeon: Jesse Taveras MD;  Location:  [facility];  Service:    • KNEE ARTHROPLASTY UNICOMPARTMENTAL Left 12/1/2016    Procedure: LEFT KNEE ARTHROPLASTY UNICOMPARTMENTAL;  Surgeon: Jesse Taveras MD;  Location:  [facility];  Service:    • LUMBAR EPIDURAL INJECTION  2015   • TONSILLECTOMY  1947   • TOTAL HIP ARTHROPLASTY Left 8/10/2017    Procedure: REMOVAL OF HARDWARE LEFT HIP, LEFT ANTERIOR TOTAL HIP ARTHROPLASTY;  Surgeon: Jesse Taveras MD;  Location:  [facility];  Service:      Family History   Problem Relation Age of Onset   • Alzheimer's disease Mother    • No Known Problems Father    • Arthritis Sister    • Breast cancer Sister    • Colon cancer Sister    • Lung cancer Brother    • Heart disease Other         2009   • Asthma Sister    • Colon cancer Sister    • Breast cancer Sister    • Lumbar disc disease Sister    • Skin cancer Brother      Social History     Socioeconomic History   • Marital status:    Tobacco Use   • Smoking status: Never Smoker   • Smokeless tobacco: Never Used   Vaping Use   • Vaping Use: Never used   Substance and Sexual Activity   • Alcohol use: No   • Drug use: No   • Sexual activity: Defer       Review of Systems  See HPI    Objective   Blood pressure 150/80, pulse 73, temperature 97.3 °F (36.3 °C), resp. rate 16, height 156 cm (61.42\"), weight 55.8 kg (123 lb), SpO2 96 %, not currently breastfeeding.  Nursing note reviewed  Physical Exam  Const: NAD, A&Ox4, Pleasant, Cooperative  Eyes: EOMI, no conjunctivitis  ENT: No nasal discharge present, neck supple  Cardiac: Regular rate and rhythm, no cyanosis  Resp: Respiratory rate within normal limits, no increased work of breathing, no audible wheezing or retractions noted  GI: No distention or ascites  MSK: Motor and sensation grossly intact in bilateral upper extremities  Neurologic: CN II-XII grossly " intact  Psych: Appropriate mood and behavior.  Skin: Warm, dry  Procedures  Assessment/Plan   Problem List Items Addressed This Visit        Neuro    Mild cognitive impairment - Primary    Current Assessment & Plan     Worsening, ACE Mini today 14 vs 19 in August 2021. We had started donepezil in August. She had some leg soreness and sleeplessness and stopped after 3 days of the 5mg dose.  1.  Start at 1/4 tablet for 2 weeks then go up to 1/2 tablet for 4 weeks  2.  Call me in 5 weeks with your status (she lives in [place] and has difficulty coming in for follow-up appointments)         Relevant Medications    donepezil (Aricept) 5 MG tablet      Other Visit Diagnoses     Bilateral hearing loss, unspecified hearing loss type        Wears hearing aids but still has severe difficulty    Seborrheic keratoses        Continue PRN follow up with Dr. Torres          1. Mild cognitive impairment  - Evidently, she stopped taking the donepezil after 3 days after it causing her bilateral lower extremities to have soreness and cramping. I counseled her that the cramping and side effects are symptoms, but should resolve. I would like her to take 0.25 of the donepezil tablet for 2 weeks and then increase to 0.5 tablet for 4 weeks. I advised her to call me in 5 weeks with her status.     2. Bilateral hearing loss  - She wears hearing aids, but continues to have severe difficulty hearing. Evidently, she had hearing tests done in [place], [place], but states that she was informed they could not help.    3. Seborrheic keratoses  - She will continue to follow up with Dr. Torres as needed. I advised her to keep spraying the areas per Dr. Torres's recommendation.    ATTENTION  What is the year: correct  What is the month of the year: correct  What is the day of the week?: correct  What is the date?: correct  MEMORY  Repeat address three times, only score third attempt: Todd Parikh [address] [place] [address]: 7  HOW MANY ANIMALS DID  THE PATIENT NAME  Verbal Fluency -- Animal Names (0-25): 7-8 (8 animals)  CLOCK DRAWING  Clock Drawing: [place]  MEMORY RECALL  Tell me what you remember about that name and address we were repeating at the beginnin  ACE TOTAL SCORE  Total ACE Score - <25/30 strongly suggests cognitive impairment; <21/30 almost certainly shows dementia: 14        Patient Instructions   1.  Start at 1/4 tablet for 2 weeks then go up to 1/2 tablet for 4 weeks    2.  Call me in 5 weeks with your status      Return in about 3 months (around 2/3/2022) for ACE-MINI.      MDM     Ambulatory progress note signed and attested to by Earnest Ingram D.O.       Transcribed from ambient dictation for Earnest Ingram DO by Jose Chirinos.  21   12:18 EDT    I have personally performed the services described in this document as transcribed by the above individual, and it is both accurate and complete.  Earnest Ingram DO  11/3/2021  13:59 EDT

## 2021-11-21 NOTE — ANESTHESIA PROCEDURE NOTES
Pt. Screaming out the room. I want X-ray. This is ridiculous. Writer told pt that he needs to be respectful of other patients and not to scream out of room. Pt continues to yell. .     Spinal Block    Patient location during procedure: OR  Start Time: 8/10/2017 10:35 AM  Stop Time: 8/10/2017 10:37 AM  Indication:at surgeon's request  Performed By  CRNA: PATIENCE TIJERINA  Preanesthetic Checklist  Completed: patient identified, site marked, surgical consent, pre-op evaluation, timeout performed, IV checked, risks and benefits discussed and monitors and equipment checked  Spinal Block Prep:  Patient Position:sitting  Sterile Tech:cap, gloves, sterile barriers and mask  Prep:Chloraprep  Patient Monitoring:blood pressure monitoring, continuous pulse oximetry and EKG  Spinal Block Procedure  Approach:midline  Guidance:landmark technique and palpation technique  Location:L4-L5  Needle Type:Fatimah  Needle Gauge:25 G  Placement of Spinal needle event:cerebrospinal fluid aspirated    Fluid Appearance:clear  Post Assessment  Patient Tolerance:patient tolerated the procedure well with no apparent complications  Complications no  Additional Notes  Procedure:  Pt assisted to sitting position, with legs in position of comfort over side of bed.  Pt. instructed in optimal spine presentation, the spine was prepped/ Draped and the skin at insertion site was anesthetized with 1% Lidocaine 2 ml.  The spinal needle was then advanced until CSF flow was obtained and LA was injected:      Marcaine 0.5% PSF injected 12 Mg.

## 2022-02-07 ENCOUNTER — OFFICE VISIT (OUTPATIENT)
Dept: FAMILY MEDICINE CLINIC | Facility: CLINIC | Age: 87
End: 2022-02-07

## 2022-02-07 VITALS
WEIGHT: 123 LBS | HEART RATE: 94 BPM | OXYGEN SATURATION: 91 % | HEIGHT: 61 IN | BODY MASS INDEX: 23.22 KG/M2 | DIASTOLIC BLOOD PRESSURE: 70 MMHG | RESPIRATION RATE: 16 BRPM | SYSTOLIC BLOOD PRESSURE: 110 MMHG

## 2022-02-07 DIAGNOSIS — G31.84 MILD COGNITIVE IMPAIRMENT: ICD-10-CM

## 2022-02-07 DIAGNOSIS — H91.93 BILATERAL HEARING LOSS, UNSPECIFIED HEARING LOSS TYPE: Primary | ICD-10-CM

## 2022-02-07 PROCEDURE — 99213 OFFICE O/P EST LOW 20 MIN: CPT | Performed by: FAMILY MEDICINE

## 2022-02-07 RX ORDER — DONEPEZIL HYDROCHLORIDE 5 MG/1
5 TABLET, FILM COATED ORAL NIGHTLY
Qty: 90 TABLET | Refills: 2 | Status: SHIPPED | OUTPATIENT
Start: 2022-02-07 | End: 2022-05-09

## 2022-02-07 NOTE — PROGRESS NOTES
Established Patient Office Visit      Patient Name: Marcelino Mesa  : 1927   MRN: [id number]   Care Team: Patient Care Team:  Earnest Ingram DO as PCP - General (Family Medicine)    Chief Complaint:    Chief Complaint   Patient presents with   • Mild cognitive impairment     3 month follow up   • Hypertension   • Hearing Loss       History of Present Illness: Marcelino Mesa is a 89 y.o. female who is here today for chief complaint.    HPI    She is here today to follow up on cognitive impairment. She was started on donepezil 5 mg quarter tablet for 2 weeks and then increased up to a half tablet daily after 4 weeks back in 2021. At that time, her mini-ACE score was 14.    The patient reports that she is doing well. She states that she is doing the same on half a tablet of donepezil 5 mg and states that she still has a refill of donepezil. She mentions that she takes a medication as needed and notes that she still has 2 refills of the medication. She mentions that she was not told to see the hearing specialist again. She adds that the hearing specialist informed her that he would not be able to do anything to improve her hearing ability, and she does not need to change her current hearing aid. She inquires if she can reduce the intake of donepezil to 3 quarters of a tablet from a full tablet when she would improve her memory.    ATTENTION  What is the year: correct  What is the month of the year: incorrect  What is the day of the week?: correct  What is the date?: correct  MEMORY  Repeat address three times, only score third attempt: Todd Parikh [address] [place] [address]: 7  HOW MANY ANIMALS DID THE PATIENT NAME  Verbal Fluency -- Animal Names (0-25): 7-8  CLOCK DRAWING  Clock Drawing: All Correct  MEMORY RECALL  Tell me what you remember about that name and address we were repeating at the beginnin  ACE TOTAL SCORE  Total ACE Score - <25/30 strongly suggests cognitive impairment;  <21/30 almost certainly shows dementia: 17      This patient is accompanied by their self who contributes to the history of their care.    The following portions of the patient's history were reviewed and updated as appropriate: allergies, current medications, past family history, past medical history, past social history, past surgical history and problem list.    Subjective      Review of Systems:   Review of Systems - See HPI    Past Medical History:   Past Medical History:   Diagnosis Date   • Amaurosis fugax of left eye 06/19/2017    5 minutes at home   • Compression fracture of L1 lumbar vertebra ([facility]) Remote    Chronic lower back pain   • Generalized osteoarthritis Adulthood    Bilateral knee replacements   • Hearing impairment 2000    Wears hearing aids   • Herpes zoster    • Hip fracture, left ([facility]) 2014    Hip pinning with persistent pain   • Hyperlipidemia Adulthood    Mild disease not requiring drug therapy   • Hypertension 2016    Well compensated on medication   • Iron deficiency 2017    Long-term oral replacement   • Lumbar scoliosis Adulthood    Mild disease   • Lumbar spondylosis 2015    Status post injection therapy   • Macular degeneration    • PONV (postoperative nausea and vomiting)     preprocedural medications will help    • Vitamin D deficiency    • Wrist fracture, left        Past Surgical History:   Past Surgical History:   Procedure Laterality Date   • CATARACT EXTRACTION EXTRACAPSULAR W/ INTRAOCULAR LENS IMPLANTATION Bilateral 2016   • COLONOSCOPY  2006    Normal study   • HIP FRACTURE SURGERY Left 2014     hip pinning for acute fracture   • KNEE ARTHROPLASTY UNICOMPARTMENTAL Right 9/22/2016    Procedure: RIGHT KNEE ARTHROPLASTY UNICOMPARTMENTAL;  Surgeon: Jesse Taveras MD;  Location:  [facility];  Service:    • KNEE ARTHROPLASTY UNICOMPARTMENTAL Left 12/1/2016    Procedure: LEFT KNEE ARTHROPLASTY UNICOMPARTMENTAL;  Surgeon: Jesse Taveras MD;  Location:  [facility];  Service:    • LUMBAR  "EPIDURAL INJECTION  2015   • TONSILLECTOMY  1947   • TOTAL HIP ARTHROPLASTY Left 8/10/2017    Procedure: REMOVAL OF HARDWARE LEFT HIP, LEFT ANTERIOR TOTAL HIP ARTHROPLASTY;  Surgeon: Jesse Taveras MD;  Location: [facility];  Service:        Family History:   Family History   Problem Relation Age of Onset   • Alzheimer's disease Mother    • No Known Problems Father    • Arthritis Sister    • Breast cancer Sister    • Colon cancer Sister    • Lung cancer Brother    • Heart disease Other         2009   • Asthma Sister    • Colon cancer Sister    • Breast cancer Sister    • Lumbar disc disease Sister    • Skin cancer Brother        Social History:   Social History     Socioeconomic History   • Marital status:    Tobacco Use   • Smoking status: Never Smoker   • Smokeless tobacco: Never Used   Vaping Use   • Vaping Use: Never used   Substance and Sexual Activity   • Alcohol use: No   • Drug use: No   • Sexual activity: Defer       Tobacco History:   Social History     Tobacco Use   Smoking Status Never Smoker   Smokeless Tobacco Never Used       Medications:     Current Outpatient Medications:   •  acetaminophen (TYLENOL) 325 MG tablet, Take 2 tablets by mouth Every 4 (Four) Hours As Needed for Mild Pain (1-3)., Disp: , Rfl: 0  •  CRANBERRY EXTRACT PO, Take 2 capsules by mouth Daily., Disp: , Rfl:   •  donepezil (Aricept) 5 MG tablet, Take 1 tablet by mouth Every Night., Disp: 90 tablet, Rfl: 2    Allergies:   Allergies   Allergen Reactions   • Asa [Aspirin] GI Bleeding   • Lisinopril-Hydrochlorothiazide Rash   • Other Rash     STEROID INJECTION IN BACK   • Amoxicillin Swelling   • Zantac [Ranitidine Hcl] Unknown - Low Severity       Objective   Objective     Physical Exam:  Vital Signs:   Vitals:    02/07/22 1333   BP: 110/70   Pulse: 94   Resp: 16   SpO2: 91%   Weight: 55.8 kg (123 lb)   Height: 156 cm (61.42\")     Body mass index is 22.93 kg/m².     Physical Exam  Nursing note reviewed  Const: NAD, A&Ox4, " Pleasant, Cooperative  Eyes: EOMI, no conjunctivitis  ENT: No nasal discharge present, neck supple  Cardiac: Regular rate and rhythm, no cyanosis  Resp: Respiratory rate within normal limits, no increased work of breathing, no audible wheezing or retractions noted  GI: No distention or ascites  MSK: Motor and sensation grossly intact in bilateral upper extremities  Neurologic: CN II-XII grossly intact  Psych: Appropriate mood and behavior.  Skin: Warm, dry  Procedures/Radiology     Procedures  No radiology results for the last 7 days     Assessment/Plan   Assessment / Plan      Assessment/Plan:   Problems Addressed This Visit  Diagnoses and all orders for this visit:    1. Bilateral hearing loss, unspecified hearing loss type (Primary)    2. Mild cognitive impairment        - She has done well with the donepezil 2.5 mg daily. I would like to increase her up to the full tablet and see how she does. I will see her back in about a few months.      Problem List Items Addressed This Visit        Neuro    Mild cognitive impairment  She has done well with the donepezil 2.5 mg daily. I would like to increase her up to the full tablet and see how she does. I will see her back in about a few months.      Other Visit Diagnoses     Bilateral hearing loss, unspecified hearing loss type    -  Primary            Patient Instructions   1. GO UP TO FULL TABLET    2.  YOU CAN USE THAT ANTIBIOTIC WHEN YOU NEED IT.    3.  DO YOU HAVE ANY QUESTIONS?    4.  ANYTHING ELSE I CAN DO FOR YOU TODAY? I SENT THAT REFILL IN FOR THE MEMORY MEDICATION FOR A FULL TABLET. IF IT BOTHERS YOU, YOU CAN GO DOWN TO 3/4 TABLET PER DAY      Follow Up:   Return in about 3 months (around 5/7/2022).      DO PAULINE Gordillo RD  [facility]  [address] [facility] [address]  Fax [phone]  Phone [phone]    Transcribed from ambient dictation for Earnest Ingram DO by LATASHA  .  02/08/22   09:33 EST    Patient verbalized consent to the visit recording.  I have personally performed the services described in this document as transcribed by the above individual, and it is both accurate and complete.  Earnest Ingram, DO  2/16/2022  12:49 EST

## 2022-02-07 NOTE — PATIENT INSTRUCTIONS
1. GO UP TO FULL TABLET    2.  YOU CAN USE THAT ANTIBIOTIC WHEN YOU NEED IT.    3.  DO YOU HAVE ANY QUESTIONS?    4.  ANYTHING ELSE I CAN DO FOR YOU TODAY? I SENT THAT REFILL IN FOR THE MEMORY MEDICATION FOR A FULL TABLET. IF IT BOTHERS YOU, YOU CAN GO DOWN TO 3/4 TABLET PER DAY

## 2022-03-31 DIAGNOSIS — J31.0 CHRONIC RHINITIS: Primary | ICD-10-CM

## 2022-03-31 RX ORDER — IPRATROPIUM BROMIDE 42 UG/1
2 SPRAY, METERED NASAL 2 TIMES DAILY PRN
Qty: 15 ML | Refills: 1 | Status: SHIPPED | OUTPATIENT
Start: 2022-03-31 | End: 2022-04-05

## 2022-04-08 DIAGNOSIS — R32 URINARY INCONTINENCE, UNSPECIFIED TYPE: ICD-10-CM

## 2022-04-08 NOTE — TELEPHONE ENCOUNTER
Rx Refill Note  Requested Prescriptions     Pending Prescriptions Disp Refills   • sulfamethoxazole-trimethoprim (BACTRIM DS,SEPTRA DS) 800-160 MG per tablet [Pharmacy Med Name: SULFAMETHOXAZOLE-TMP DS -160 Tablet] 30 tablet 2     Sig: TAKE 1 TABLET BY MOUTH DAILY AS NEEDED (URINARY TRACT INFECTION- TAKE AT ONSET OF SYMPTOMS FOR 5 DAYS).      Last office visit with prescribing clinician: 2/7/2022      Next office visit with prescribing clinician: 5/9/2022            Devante Hilton MA  04/08/22, 16:00 EDT     The original prescription was discontinued on 11/3/2021 by Devante Hilton MA for the following reason: *Therapy completed. Renewing this prescription may not be appropriate.    Called and left vm for patient to see what antibiotic is for     OK FOR HUB TO RELAY MESSAGE AND DOCUMENT

## 2022-04-09 RX ORDER — SULFAMETHOXAZOLE AND TRIMETHOPRIM 800; 160 MG/1; MG/1
1 TABLET ORAL DAILY PRN
Qty: 30 TABLET | Refills: 2 | OUTPATIENT
Start: 2022-04-09

## 2022-05-09 ENCOUNTER — LAB (OUTPATIENT)
Dept: LAB | Facility: HOSPITAL | Age: 87
End: 2022-05-09

## 2022-05-09 ENCOUNTER — OFFICE VISIT (OUTPATIENT)
Dept: FAMILY MEDICINE CLINIC | Facility: CLINIC | Age: 87
End: 2022-05-09

## 2022-05-09 VITALS
DIASTOLIC BLOOD PRESSURE: 90 MMHG | HEIGHT: 61 IN | OXYGEN SATURATION: 98 % | SYSTOLIC BLOOD PRESSURE: 140 MMHG | RESPIRATION RATE: 16 BRPM | WEIGHT: 124 LBS | BODY MASS INDEX: 23.41 KG/M2 | HEART RATE: 84 BPM

## 2022-05-09 DIAGNOSIS — R15.9 FECAL SOILING DUE TO FECAL INCONTINENCE: ICD-10-CM

## 2022-05-09 DIAGNOSIS — E55.9 VITAMIN D DEFICIENCY: ICD-10-CM

## 2022-05-09 DIAGNOSIS — H61.23 BILATERAL IMPACTED CERUMEN: ICD-10-CM

## 2022-05-09 DIAGNOSIS — E78.00 PURE HYPERCHOLESTEROLEMIA: ICD-10-CM

## 2022-05-09 DIAGNOSIS — Z13.29 SCREENING FOR ENDOCRINE DISORDER: ICD-10-CM

## 2022-05-09 DIAGNOSIS — Z13.0 SCREENING FOR DEFICIENCY ANEMIA: ICD-10-CM

## 2022-05-09 DIAGNOSIS — G31.84 MILD COGNITIVE IMPAIRMENT: ICD-10-CM

## 2022-05-09 DIAGNOSIS — I10 ESSENTIAL HYPERTENSION: Primary | ICD-10-CM

## 2022-05-09 LAB
ALBUMIN SERPL-MCNC: 4.3 G/DL (ref 3.5–5.2)
ALBUMIN/GLOB SERPL: 1.4 G/DL
ALP SERPL-CCNC: 96 U/L (ref 39–117)
ALT SERPL W P-5'-P-CCNC: 16 U/L (ref 1–33)
ANION GAP SERPL CALCULATED.3IONS-SCNC: 12.2 MMOL/L (ref 5–15)
AST SERPL-CCNC: 23 U/L (ref 1–32)
BILIRUB SERPL-MCNC: 0.5 MG/DL (ref 0–1.2)
BUN SERPL-MCNC: 14 MG/DL (ref 8–23)
BUN/CREAT SERPL: 15.7 (ref 7–25)
CALCIUM SPEC-SCNC: 9 MG/DL (ref 8.2–9.6)
CHLORIDE SERPL-SCNC: 105 MMOL/L (ref 98–107)
CHOLEST SERPL-MCNC: 219 MG/DL (ref 0–200)
CO2 SERPL-SCNC: 25.8 MMOL/L (ref 22–29)
CREAT SERPL-MCNC: 0.89 MG/DL (ref 0.57–1)
DEPRECATED RDW RBC AUTO: 45.1 FL (ref 37–54)
EGFRCR SERPLBLD CKD-EPI 2021: 59.8 ML/MIN/1.73
ERYTHROCYTE [DISTWIDTH] IN BLOOD BY AUTOMATED COUNT: 13.2 % (ref 12.3–15.4)
GLOBULIN UR ELPH-MCNC: 3 GM/DL
GLUCOSE SERPL-MCNC: 81 MG/DL (ref 65–99)
HCT VFR BLD AUTO: 44.6 % (ref 34–46.6)
HDLC SERPL-MCNC: 67 MG/DL (ref 40–60)
HGB BLD-MCNC: 14.4 G/DL (ref 12–15.9)
LDLC SERPL CALC-MCNC: 126 MG/DL (ref 0–100)
LDLC/HDLC SERPL: 1.82 {RATIO}
MCH RBC QN AUTO: 30.4 PG (ref 26.6–33)
MCHC RBC AUTO-ENTMCNC: 32.3 G/DL (ref 31.5–35.7)
MCV RBC AUTO: 94.1 FL (ref 79–97)
PLATELET # BLD AUTO: 227 10*3/MM3 (ref 140–450)
PMV BLD AUTO: 11 FL (ref 6–12)
POTASSIUM SERPL-SCNC: 4.1 MMOL/L (ref 3.5–5.2)
PROT SERPL-MCNC: 7.3 G/DL (ref 6–8.5)
RBC # BLD AUTO: 4.74 10*6/MM3 (ref 3.77–5.28)
SODIUM SERPL-SCNC: 143 MMOL/L (ref 136–145)
TRIGL SERPL-MCNC: 150 MG/DL (ref 0–150)
TSH SERPL DL<=0.05 MIU/L-ACNC: 3.71 UIU/ML (ref 0.27–4.2)
VLDLC SERPL-MCNC: 26 MG/DL (ref 5–40)
WBC NRBC COR # BLD: 7.53 10*3/MM3 (ref 3.4–10.8)

## 2022-05-09 PROCEDURE — 80061 LIPID PANEL: CPT

## 2022-05-09 PROCEDURE — 99214 OFFICE O/P EST MOD 30 MIN: CPT | Performed by: FAMILY MEDICINE

## 2022-05-09 PROCEDURE — 80053 COMPREHEN METABOLIC PANEL: CPT

## 2022-05-09 PROCEDURE — 85027 COMPLETE CBC AUTOMATED: CPT

## 2022-05-09 PROCEDURE — 82306 VITAMIN D 25 HYDROXY: CPT

## 2022-05-09 PROCEDURE — 84443 ASSAY THYROID STIM HORMONE: CPT

## 2022-05-09 PROCEDURE — 81003 URINALYSIS AUTO W/O SCOPE: CPT

## 2022-05-09 RX ORDER — PSYLLIUM HUSK 0.4 G
0.52 CAPSULE ORAL DAILY
Qty: 90 CAPSULE | Refills: 3 | Status: SHIPPED | OUTPATIENT
Start: 2022-05-09 | End: 2023-05-09

## 2022-05-09 RX ORDER — DONEPEZIL HYDROCHLORIDE 5 MG/1
2.5 TABLET, FILM COATED ORAL NIGHTLY
Qty: 90 TABLET | Refills: 2 | Status: SHIPPED | OUTPATIENT
Start: 2022-05-09

## 2022-05-09 NOTE — ASSESSMENT & PLAN NOTE
Decrease back to 2.5mg donepezil. She has bilateral hearing loss, which makes compliance a little difficult from time to time. She has cerumen build up in her ears that was cleaned out today.

## 2022-05-09 NOTE — PROGRESS NOTES
Established Patient Office Visit      Patient Name: Marcelino Mesa  : 1927   MRN: [id number]   Care Team: Patient Care Team:  Earnest Ingram DO as PCP - General (Family Medicine)    Chief Complaint:    Chief Complaint   Patient presents with   • Hearing Problem   • Hypertension       History of Present Illness: Marcelino Mesa is a 89 y.o. female who is here today for chief complaint.    HPI      The patient presents today for a regular 3-month follow-up on cognitive impairment and hearing loss. She has bilateral hearing loss, which makes compliance a little difficult from time to time. At her last visit in 2022, she was doing well on the donepezil 2.5 mg daily, we increased her up to a full tablet 5 mg daily.    The patient reports that she is doing well. She states that when she goes to the bathroom, her bowels move well, and approximately 30 minutes later, she will go just enough to mess her underwear up. The patient reports that it does not happen everyday. She stays at home and keeps herself clean. She states that she is having approximately 1 or 2 bowel movements during the day. The patient reports that she does not take a fiber supplement.    The patient reports that she cannot tell much difference in her memory with the donepezil. She states that she is having some terrible dreams, which started after she started the new dose. The patient reports that she does not take the donepezil every night. She states that she does not tell a difference in taking the higher dose.     The patient denies any falls. She states that she had her hearing aids at her last visit. The patient reports that she did have wax in her ears.    The patient will have lab work done.    This patient is accompanied by their self who contributes to the history of their care.    The following portions of the patient's history were reviewed and updated as appropriate: allergies, current medications, past family history,  past medical history, past social history, past surgical history and problem list.    Subjective      Review of Systems:   Review of Systems - See HPI    Past Medical History:   Past Medical History:   Diagnosis Date   • Amaurosis fugax of left eye 06/19/2017    5 minutes at home   • Compression fracture of L1 lumbar vertebra (HCC) Remote    Chronic lower back pain   • Generalized osteoarthritis Adulthood    Bilateral knee replacements   • Hearing impairment 2000    Wears hearing aids   • Herpes zoster    • Hip fracture, left (HCC) 2014    Hip pinning with persistent pain   • Hyperlipidemia Adulthood    Mild disease not requiring drug therapy   • Hypertension 2016    Well compensated on medication   • Iron deficiency 2017    Long-term oral replacement   • Lumbar scoliosis Adulthood    Mild disease   • Lumbar spondylosis 2015    Status post injection therapy   • Macular degeneration    • PONV (postoperative nausea and vomiting)     preprocedural medications will help    • Vitamin D deficiency    • Wrist fracture, left        Past Surgical History:   Past Surgical History:   Procedure Laterality Date   • CATARACT EXTRACTION EXTRACAPSULAR W/ INTRAOCULAR LENS IMPLANTATION Bilateral 2016   • COLONOSCOPY  2006    Normal study   • HIP FRACTURE SURGERY Left 2014     hip pinning for acute fracture   • KNEE ARTHROPLASTY UNICOMPARTMENTAL Right 9/22/2016    Procedure: RIGHT KNEE ARTHROPLASTY UNICOMPARTMENTAL;  Surgeon: Jesse Taveras MD;  Location:  [facility];  Service:    • KNEE ARTHROPLASTY UNICOMPARTMENTAL Left 12/1/2016    Procedure: LEFT KNEE ARTHROPLASTY UNICOMPARTMENTAL;  Surgeon: Jsese Taveras MD;  Location:  [facility];  Service:    • LUMBAR EPIDURAL INJECTION  2015   • TONSILLECTOMY  1947   • TOTAL HIP ARTHROPLASTY Left 8/10/2017    Procedure: REMOVAL OF HARDWARE LEFT HIP, LEFT ANTERIOR TOTAL HIP ARTHROPLASTY;  Surgeon: Jesse Taveras MD;  Location:  [facility];  Service:        Family History:   Family History   Problem  "Relation Age of Onset   • Alzheimer's disease Mother    • No Known Problems Father    • Arthritis Sister    • Breast cancer Sister    • Colon cancer Sister    • Lung cancer Brother    • Heart disease Other         2009   • Asthma Sister    • Colon cancer Sister    • Breast cancer Sister    • Lumbar disc disease Sister    • Skin cancer Brother        Social History:   Social History     Socioeconomic History   • Marital status:    Tobacco Use   • Smoking status: Never Smoker   • Smokeless tobacco: Never Used   Vaping Use   • Vaping Use: Never used   Substance and Sexual Activity   • Alcohol use: No   • Drug use: No   • Sexual activity: Defer       Tobacco History:   Social History     Tobacco Use   Smoking Status Never Smoker   Smokeless Tobacco Never Used       Medications:     Current Outpatient Medications:   •  donepezil (Aricept) 5 MG tablet, Take 0.5 tablets by mouth Every Night., Disp: 90 tablet, Rfl: 2  •  acetaminophen (TYLENOL) 325 MG tablet, Take 2 tablets by mouth Every 4 (Four) Hours As Needed for Mild Pain (1-3)., Disp: , Rfl: 0  •  CRANBERRY EXTRACT PO, Take 2 capsules by mouth Daily., Disp: , Rfl:   •  ipratropium (ATROVENT) 0.06 % nasal spray, 2 sprays into the nostril(s) as directed by provider 2 (Two) Times a Day As Needed for Rhinitis for up to 5 days., Disp: 15 mL, Rfl: 1  •  psyllium (Metamucil) 0.52 g capsule, Take 1 capsule by mouth Daily., Disp: 90 capsule, Rfl: 3    Allergies:   Allergies   Allergen Reactions   • Asa [Aspirin] GI Bleeding   • Lisinopril-Hydrochlorothiazide Rash   • Other Rash     STEROID INJECTION IN BACK   • Amoxicillin Swelling   • Zantac [Ranitidine Hcl] Unknown - Low Severity       Objective   Objective     Physical Exam:  Vital Signs:   Vitals:    05/09/22 1253   BP: 140/90   Pulse: 84   Resp: 16   SpO2: 98%   Weight: 56.2 kg (124 lb)   Height: 156 cm (61.42\")     Body mass index is 23.11 kg/m².     Physical Exam  Nursing note reviewed  Const: NAD, A&Ox4, " Pleasant, Cooperative  Eyes: EOMI, no conjunctivitis  ENT: No nasal discharge present, neck supple  Cardiac: Regular rate and rhythm, no cyanosis  Resp: Respiratory rate within normal limits, no increased work of breathing, no audible wheezing or retractions noted  GI: No distention or ascites  MSK: Motor and sensation grossly intact in bilateral upper extremities  Neurologic: CN II-XII grossly intact  Psych: Appropriate mood and behavior.  Skin: Warm, dry  Procedures/Radiology     Ear Cerumen Removal    Date/Time: 5/13/2022 8:15 AM  Performed by: Earnest Ingram DO  Authorized by: Earnest Ingram DO     Anesthesia:  Local Anesthetic: none  Location details: left ear and right ear  Patient tolerance: patient tolerated the procedure well with no immediate complications  Procedure type: instrumentation, irrigation   Sedation:  Patient sedated: no          No radiology results for the last 7 days     Assessment & Plan   Assessment / Plan      Assessment/Plan:   Problems Addressed This Visit  Diagnoses and all orders for this visit:    1. Essential hypertension (Primary)  Assessment & Plan:  - Stable today.       2. Fecal soiling due to fecal incontinence  -     psyllium (Metamucil) 0.52 g capsule; Take 1 capsule by mouth Daily.  Dispense: 90 capsule; Refill: 3    3. Mild cognitive impairment  Assessment & Plan:  Decrease back to 2.5mg donepezil. She has bilateral hearing loss, which makes compliance a little difficult from time to time. She has cerumen build up in her ears that was cleaned out today.    Orders:  -     donepezil (Aricept) 5 MG tablet; Take 0.5 tablets by mouth Every Night.  Dispense: 90 tablet; Refill: 2    4. Screening for deficiency anemia  -     CBC (No Diff); Future    5. Pure hypercholesterolemia  -     Lipid Panel; Future    6. Screening for endocrine disorder  -     Comprehensive Metabolic Panel; Future  -     Urinalysis With Microscopic If Indicated (No Culture) - Urine, Clean Catch;  Future  -     TSH; Future    7. Vitamin D deficiency  -     Vitamin D 25 Hydroxy; Future    8. Bilateral impacted cerumen  -     Ear Cerumen Removal    Problem List Items Addressed This Visit        Cardiac and Vasculature    Hyperlipidemia    Relevant Orders    Lipid Panel (Completed)    Essential hypertension - Primary    Current Assessment & Plan     - Stable today.               Endocrine and Metabolic    Vitamin D deficiency    Relevant Orders    Vitamin D 25 Hydroxy (Completed)       Neuro    Mild cognitive impairment    Current Assessment & Plan     Decrease back to 2.5mg donepezil. She has bilateral hearing loss, which makes compliance a little difficult from time to time. She has cerumen build up in her ears that was cleaned out today.           Relevant Medications    donepezil (Aricept) 5 MG tablet      Other Visit Diagnoses     Fecal soiling due to fecal incontinence        Relevant Medications    psyllium (Metamucil) 0.52 g capsule    Screening for deficiency anemia        Relevant Orders    CBC (No Diff) (Completed)    Screening for endocrine disorder        Relevant Orders    Comprehensive Metabolic Panel (Completed)    Urinalysis With Microscopic If Indicated (No Culture) - Urine, Clean Catch (Completed)    TSH (Completed)    Bilateral impacted cerumen        Relevant Orders    Ear Cerumen Removal              Patient Instructions   1. WE'RE GOING TO CHECK SOME LAB WORK TODAY--IT IS OK THAT YOU HAVE EATEN, JUST SO I KNOW.      Follow Up:   Return in about 6 months (around 11/9/2022).        MDM     [facility] [address]  [facility]  [address]  [facility] [address]  Fax [phone]  Phone [phone]      Transcribed from ambient dictation for Earnest Ingram,  by Archana Richmond.  05/09/22   15:53 EDT    Patient verbalized consent to the visit recording.  I have personally performed the services described in this document as transcribed by the above  individual, and it is both accurate and complete. Archana Richmond  5/13/2022  16:13 EDT

## 2022-05-10 LAB
25(OH)D3 SERPL-MCNC: 26.3 NG/ML (ref 30–100)
BILIRUB UR QL STRIP: NEGATIVE
CLARITY UR: CLEAR
COLOR UR: YELLOW
GLUCOSE UR STRIP-MCNC: NEGATIVE MG/DL
HGB UR QL STRIP.AUTO: NEGATIVE
KETONES UR QL STRIP: ABNORMAL
LEUKOCYTE ESTERASE UR QL STRIP.AUTO: NEGATIVE
NITRITE UR QL STRIP: NEGATIVE
PH UR STRIP.AUTO: 6.5 [PH] (ref 5–8)
PROT UR QL STRIP: NEGATIVE
SP GR UR STRIP: 1.02 (ref 1–1.03)
UROBILINOGEN UR QL STRIP: ABNORMAL

## 2022-05-13 PROCEDURE — 69210 REMOVE IMPACTED EAR WAX UNI: CPT | Performed by: FAMILY MEDICINE

## 2022-07-08 DIAGNOSIS — R32 URINARY INCONTINENCE, UNSPECIFIED TYPE: ICD-10-CM

## 2022-07-08 RX ORDER — SULFAMETHOXAZOLE AND TRIMETHOPRIM 800; 160 MG/1; MG/1
1 TABLET ORAL DAILY PRN
Qty: 30 TABLET | Refills: 2 | Status: SHIPPED | OUTPATIENT
Start: 2022-07-08

## 2022-07-08 NOTE — TELEPHONE ENCOUNTER
sulfamethoxazole-trimethoprim (BACTRIM DS,SEPTRA DS) 800-160 MG per tablet     The original prescription was discontinued on 11/3/2021 by Devante Hilton MA for the following reason: *Therapy completed. Renewing this prescription may not be appropriate.           Rx Refill Note  Requested Prescriptions     Pending Prescriptions Disp Refills   • sulfamethoxazole-trimethoprim (BACTRIM DS,SEPTRA DS) 800-160 MG per tablet [Pharmacy Med Name: SULFAMETHOXAZOLE-TMP DS -160 Tablet] 30 tablet 2     Sig: TAKE 1 TABLET BY MOUTH DAILY AS NEEDED (URINARY TRACT INFECTION- TAKE AT ONSET OF SYMPTOMS FOR 5 DAYS).      Last office visit with prescribing clinician: 5/9/2022      Next office visit with prescribing clinician: 11/15/2022            Devante Hilton MA  07/08/22, 09:52 EDT

## 2022-11-15 ENCOUNTER — OFFICE VISIT (OUTPATIENT)
Dept: FAMILY MEDICINE CLINIC | Facility: CLINIC | Age: 87
End: 2022-11-15

## 2022-11-15 ENCOUNTER — PATIENT MESSAGE (OUTPATIENT)
Dept: FAMILY MEDICINE CLINIC | Facility: CLINIC | Age: 87
End: 2022-11-15

## 2022-11-15 VITALS
BODY MASS INDEX: 23.22 KG/M2 | WEIGHT: 124.6 LBS | TEMPERATURE: 98 F | HEART RATE: 83 BPM | SYSTOLIC BLOOD PRESSURE: 130 MMHG | OXYGEN SATURATION: 96 % | DIASTOLIC BLOOD PRESSURE: 90 MMHG

## 2022-11-15 DIAGNOSIS — R21 RASH: ICD-10-CM

## 2022-11-15 DIAGNOSIS — I10 ESSENTIAL HYPERTENSION: Primary | ICD-10-CM

## 2022-11-15 DIAGNOSIS — H91.93 BILATERAL HEARING LOSS, UNSPECIFIED HEARING LOSS TYPE: ICD-10-CM

## 2022-11-15 DIAGNOSIS — G31.84 MILD COGNITIVE IMPAIRMENT: ICD-10-CM

## 2022-11-15 DIAGNOSIS — H61.23 BILATERAL IMPACTED CERUMEN: ICD-10-CM

## 2022-11-15 PROCEDURE — 99214 OFFICE O/P EST MOD 30 MIN: CPT | Performed by: FAMILY MEDICINE

## 2022-11-15 PROCEDURE — 69209 REMOVE IMPACTED EAR WAX UNI: CPT | Performed by: FAMILY MEDICINE

## 2023-08-04 ENCOUNTER — TELEPHONE (OUTPATIENT)
Dept: FAMILY MEDICINE CLINIC | Facility: CLINIC | Age: 88
End: 2023-08-04

## 2023-08-04 NOTE — TELEPHONE ENCOUNTER
Patient's daughter came in to ask for a Referral to [facility] so the patient can get hearing aids. She was referred to [facility] in November 2022. The patient was seen and was given over-the-ear hearing aids, that the patient just couldn't use. She has an appointment with [facility] on 08/09/23 and was told that she needed to get a Referral for this appointment. I explained to her that the Referral may not be completed by 8/9/23. She said she would call us back on 08/08/23 to see if the Referral had been sent.    Fax number to send the Referral to:    [phone], Fax  [phone], Phone  Attention: DEMETRI Price,CCC-A

## 2023-08-22 DIAGNOSIS — R32 URINARY INCONTINENCE, UNSPECIFIED TYPE: ICD-10-CM

## 2023-08-22 RX ORDER — SULFAMETHOXAZOLE AND TRIMETHOPRIM 800; 160 MG/1; MG/1
1 TABLET ORAL DAILY PRN
Qty: 30 TABLET | Refills: 2 | OUTPATIENT
Start: 2023-08-22

## 2023-08-28 ENCOUNTER — OFFICE VISIT (OUTPATIENT)
Dept: FAMILY MEDICINE CLINIC | Facility: CLINIC | Age: 88
End: 2023-08-28
Payer: MEDICARE

## 2023-08-28 ENCOUNTER — TELEPHONE (OUTPATIENT)
Dept: FAMILY MEDICINE CLINIC | Facility: CLINIC | Age: 88
End: 2023-08-28

## 2023-08-28 ENCOUNTER — LAB (OUTPATIENT)
Dept: LAB | Facility: HOSPITAL | Age: 88
End: 2023-08-28
Payer: MEDICARE

## 2023-08-28 VITALS
WEIGHT: 126.7 LBS | HEIGHT: 61 IN | DIASTOLIC BLOOD PRESSURE: 68 MMHG | SYSTOLIC BLOOD PRESSURE: 102 MMHG | BODY MASS INDEX: 23.92 KG/M2

## 2023-08-28 DIAGNOSIS — E78.00 PURE HYPERCHOLESTEROLEMIA: ICD-10-CM

## 2023-08-28 DIAGNOSIS — E55.9 VITAMIN D DEFICIENCY: ICD-10-CM

## 2023-08-28 DIAGNOSIS — R32 URINARY INCONTINENCE, UNSPECIFIED TYPE: ICD-10-CM

## 2023-08-28 DIAGNOSIS — I10 ESSENTIAL HYPERTENSION: Primary | ICD-10-CM

## 2023-08-28 DIAGNOSIS — M79.89 PALPABLE MASS OF SOFT TISSUE OF SHOULDER: ICD-10-CM

## 2023-08-28 LAB
ALBUMIN SERPL-MCNC: 4 G/DL (ref 3.5–5.2)
ALBUMIN/GLOB SERPL: 1.5 G/DL
ALP SERPL-CCNC: 66 U/L (ref 39–117)
ALT SERPL W P-5'-P-CCNC: 12 U/L (ref 1–33)
ANION GAP SERPL CALCULATED.3IONS-SCNC: 10.3 MMOL/L (ref 5–15)
AST SERPL-CCNC: 21 U/L (ref 1–32)
BILIRUB SERPL-MCNC: 0.5 MG/DL (ref 0–1.2)
BUN SERPL-MCNC: 14 MG/DL (ref 8–23)
BUN/CREAT SERPL: 15.4 (ref 7–25)
CALCIUM SPEC-SCNC: 8.9 MG/DL (ref 8.2–9.6)
CHLORIDE SERPL-SCNC: 108 MMOL/L (ref 98–107)
CHOLEST SERPL-MCNC: 215 MG/DL (ref 0–200)
CO2 SERPL-SCNC: 24.7 MMOL/L (ref 22–29)
CREAT SERPL-MCNC: 0.91 MG/DL (ref 0.57–1)
EGFRCR SERPLBLD CKD-EPI 2021: 57.9 ML/MIN/1.73
GLOBULIN UR ELPH-MCNC: 2.6 GM/DL
GLUCOSE SERPL-MCNC: 86 MG/DL (ref 65–99)
HDLC SERPL-MCNC: 57 MG/DL (ref 40–60)
LDLC SERPL CALC-MCNC: 131 MG/DL (ref 0–100)
LDLC/HDLC SERPL: 2.24 {RATIO}
POTASSIUM SERPL-SCNC: 4.4 MMOL/L (ref 3.5–5.2)
PROT SERPL-MCNC: 6.6 G/DL (ref 6–8.5)
SODIUM SERPL-SCNC: 143 MMOL/L (ref 136–145)
TRIGL SERPL-MCNC: 153 MG/DL (ref 0–150)
VLDLC SERPL-MCNC: 27 MG/DL (ref 5–40)

## 2023-08-28 PROCEDURE — 80061 LIPID PANEL: CPT

## 2023-08-28 PROCEDURE — 82306 VITAMIN D 25 HYDROXY: CPT

## 2023-08-28 PROCEDURE — 99214 OFFICE O/P EST MOD 30 MIN: CPT | Performed by: FAMILY MEDICINE

## 2023-08-28 PROCEDURE — 80053 COMPREHEN METABOLIC PANEL: CPT

## 2023-08-28 RX ORDER — SULFAMETHOXAZOLE AND TRIMETHOPRIM 800; 160 MG/1; MG/1
1 TABLET ORAL DAILY PRN
Qty: 30 TABLET | Refills: 2 | Status: SHIPPED | OUTPATIENT
Start: 2023-08-28

## 2023-08-28 RX ORDER — TRIAMCINOLONE ACETONIDE 40 MG/ML
40 INJECTION, SUSPENSION INTRA-ARTICULAR; INTRAMUSCULAR ONCE
Status: CANCELLED | OUTPATIENT
Start: 2023-08-28 | End: 2023-08-28

## 2023-08-28 NOTE — PROGRESS NOTES
Established Patient Office Visit      Patient Name: Marcelino Mesa  : 1927   MRN: [id number]   Care Team: Patient Care Team:  Earnest Ingram DO as PCP - General (Family Medicine)    Chief Complaint:    Chief Complaint   Patient presents with    Med Refill    Hypertension     Pt wants to discuss meds       History of Present Illness: Marcelino Mesa is a 89 y.o. female who is here today for chief complaint.    HPI    New mass of anterior right shoulder for the last 1 year or so. Painful with applied pressure, but otherwise does not bother her.    Needs refill on abx for PRN UTI and donepezil. She feels like the donepezil has helped, memory is not getting any worse at least.    This patient is accompanied by their self who contributes to the history of their care.    The following portions of the patient's history were reviewed and updated as appropriate: allergies, current medications, past family history, past medical history, past social history, past surgical history and problem list.    Subjective      Review of Systems:   Review of Systems - See HPI    Past Medical History:   Past Medical History:   Diagnosis Date    Amaurosis fugax of left eye 2017    5 minutes at home    Compression fracture of L1 lumbar vertebra Remote    Chronic lower back pain    Generalized osteoarthritis Adulthood    Bilateral knee replacements    Hearing impairment 2000    Wears hearing aids    Herpes zoster     Hip fracture, left 2014    Hip pinning with persistent pain    Hyperlipidemia Adulthood    Mild disease not requiring drug therapy    Hypertension 2016    Well compensated on medication    Iron deficiency 2017    Long-term oral replacement    Lumbar scoliosis Adulthood    Mild disease    Lumbar spondylosis 2015    Status post injection therapy    Macular degeneration     PONV (postoperative nausea and vomiting)     preprocedural medications will help     Vitamin D deficiency     Wrist fracture, left         Past Surgical History:   Past Surgical History:   Procedure Laterality Date    CATARACT EXTRACTION EXTRACAPSULAR W/ INTRAOCULAR LENS IMPLANTATION Bilateral 2016    COLONOSCOPY  2006    Normal study    HIP FRACTURE SURGERY Left 2014     hip pinning for acute fracture    KNEE ARTHROPLASTY UNICOMPARTMENTAL Right 9/22/2016    Procedure: RIGHT KNEE ARTHROPLASTY UNICOMPARTMENTAL;  Surgeon: Jesse Taveras MD;  Location:  [facility];  Service:     KNEE ARTHROPLASTY UNICOMPARTMENTAL Left 12/1/2016    Procedure: LEFT KNEE ARTHROPLASTY UNICOMPARTMENTAL;  Surgeon: Jesse Taveras MD;  Location:  [facility];  Service:     LUMBAR EPIDURAL INJECTION  2015    TONSILLECTOMY  1947    TOTAL HIP ARTHROPLASTY Left 8/10/2017    Procedure: REMOVAL OF HARDWARE LEFT HIP, LEFT ANTERIOR TOTAL HIP ARTHROPLASTY;  Surgeon: Jesse Taveras MD;  Location:  [facility];  Service:        Family History:   Family History   Problem Relation Age of Onset    Alzheimer's disease Mother     No Known Problems Father     Arthritis Sister     Breast cancer Sister     Colon cancer Sister     Lung cancer Brother     Heart disease Other         2009    Asthma Sister     Colon cancer Sister     Breast cancer Sister     Lumbar disc disease Sister     Skin cancer Brother        Social History:   Social History     Socioeconomic History    Marital status:    Tobacco Use    Smoking status: Never    Smokeless tobacco: Never   Vaping Use    Vaping Use: Never used   Substance and Sexual Activity    Alcohol use: No    Drug use: No    Sexual activity: Defer       Tobacco History:   Social History     Tobacco Use   Smoking Status Never   Smokeless Tobacco Never       Medications:     Current Outpatient Medications:     acetaminophen (TYLENOL) 325 MG tablet, Take 2 tablets by mouth Every 4 (Four) Hours As Needed for Mild Pain (1-3)., Disp: , Rfl: 0    CRANBERRY EXTRACT PO, Take 2 capsules by mouth Daily., Disp: , Rfl:     donepezil (Aricept) 5 MG tablet, Take 0.5  "tablets by mouth Every Night., Disp: 90 tablet, Rfl: 2    mupirocin (BACTROBAN) 2 % ointment, Apply 1 g topically daily to wound for 14 days or until healed, Disp: 14 g, Rfl: 1    sulfamethoxazole-trimethoprim (BACTRIM DS,SEPTRA DS) 800-160 MG per tablet, Take 1 tablet by mouth Daily As Needed (urinary tract infection- take at onset of symptoms for 5 days)., Disp: 30 tablet, Rfl: 2    Allergies:   Allergies   Allergen Reactions    Asa [Aspirin] GI Bleeding    Lisinopril-Hydrochlorothiazide Rash    Other Rash     STEROID INJECTION IN BACK    Amoxicillin Swelling    Zantac [Ranitidine Hcl] Unknown - Low Severity       Objective   Objective     Physical Exam:  Vital Signs:   Vitals:    08/28/23 1256   BP: 102/68   BP Location: Left arm   Patient Position: Sitting   Cuff Size: Adult   Weight: 57.5 kg (126 lb 11.2 oz)   Height: 156 cm (61.42\")     Body mass index is 23.61 kg/mý.     Physical Exam  Nursing note reviewed  Const: NAD, A&Ox4, Pleasant, Cooperative  MSK: Large 5cm soft tissue mass right anterior shoulder. Non-mobile  Procedures/Radiology     Procedures  No radiology results for the last 7 days     Assessment & Plan   Assessment / Plan      Assessment/Plan:   Problems Addressed This Visit  Diagnoses and all orders for this visit:    1. Essential hypertension (Primary)    2. Urinary incontinence, unspecified type  -     sulfamethoxazole-trimethoprim (BACTRIM DS,SEPTRA DS) 800-160 MG per tablet; Take 1 tablet by mouth Daily As Needed (urinary tract infection- take at onset of symptoms for 5 days).  Dispense: 30 tablet; Refill: 2    3. Palpable mass of soft tissue of shoulder  -     MRI Shoulder Right With Contrast; Future    4. Pure hypercholesterolemia  -     Comprehensive Metabolic Panel; Future  -     Lipid Panel; Future    5. Vitamin D deficiency  -     Vitamin D,25-Hydroxy; Future      Problem List Items Addressed This Visit          Cardiac and Vasculature    Hyperlipidemia    Relevant Orders    " Comprehensive Metabolic Panel    Lipid Panel    Essential hypertension - Primary       Endocrine and Metabolic    Vitamin D deficiency    Relevant Orders    Vitamin D,25-Hydroxy       Genitourinary and Reproductive     Bladder incontinence    Relevant Medications    sulfamethoxazole-trimethoprim (BACTRIM DS,SEPTRA DS) 800-160 MG per tablet     Other Visit Diagnoses       Palpable mass of soft tissue of shoulder        Relevant Orders    MRI Shoulder Right With Contrast          Concern for soft tissue malignancy such as sarcoma. Would be an odd location for a bursal swelling. Recommend MRI ASAP to evaluate.    Patient Instructions       Follow Up:   Return in about 6 months (around 2/28/2024) for Medicare Wellness.    DO [address]  [facility]  [address]  [facility] [address]  Fax [phone]  Phone [phone]

## 2023-08-28 NOTE — TELEPHONE ENCOUNTER
[facility] CALLED STATING PATIENT CANNOT TOLERATE ANYMORE OF THE MRI & IS HUNGRY & WANTS TO KNOW IF THE CONTRAST IS NCESSARY.    Per Dr. Ingram:        Relayd to Maximiliano Ingram Message

## 2023-08-29 LAB — 25(OH)D3 SERPL-MCNC: 44.2 NG/ML (ref 30–100)

## 2023-09-06 ENCOUNTER — OFFICE VISIT (OUTPATIENT)
Dept: FAMILY MEDICINE CLINIC | Facility: CLINIC | Age: 88
End: 2023-09-06
Payer: MEDICARE

## 2023-09-06 VITALS — SYSTOLIC BLOOD PRESSURE: 102 MMHG | DIASTOLIC BLOOD PRESSURE: 70 MMHG | WEIGHT: 126 LBS | BODY MASS INDEX: 23.48 KG/M2

## 2023-09-06 DIAGNOSIS — M75.51 SUBACROMIAL BURSITIS OF RIGHT SHOULDER JOINT: ICD-10-CM

## 2023-09-06 DIAGNOSIS — M12.811 ROTATOR CUFF ARTHROPATHY OF RIGHT SHOULDER: Primary | ICD-10-CM

## 2023-09-06 DIAGNOSIS — M19.011 GLENOHUMERAL ARTHRITIS, RIGHT: ICD-10-CM

## 2023-09-06 RX ORDER — TRIAMCINOLONE ACETONIDE 40 MG/ML
40 INJECTION, SUSPENSION INTRA-ARTICULAR; INTRAMUSCULAR ONCE
Status: COMPLETED | OUTPATIENT
Start: 2023-09-06 | End: 2023-09-06

## 2023-09-06 RX ADMIN — TRIAMCINOLONE ACETONIDE 40 MG: 40 INJECTION, SUSPENSION INTRA-ARTICULAR; INTRAMUSCULAR at 12:12

## 2023-09-06 NOTE — PROGRESS NOTES
Subjective   Marcelino Mesa is a 89 y.o. female.     Chief Complaint   Patient presents with    Procedure       History of Present Illness     Marcelino Mesa presents today for   Chief Complaint   Patient presents with    Procedure       Ms. Mesa presents today for right shoulder injection.  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    This patient is accompanied by their self who contributes to the history of their care.    The following portions of the patient's history were reviewed and updated as appropriate: allergies, current medications, past family history, past medical history, past social history, past surgical history and problem list.    Active Ambulatory Problems     Diagnosis Date Noted    Abnormal gait 05/24/2016    Generalized osteoarthritis 05/24/2016    Hyperlipidemia 05/24/2016    Iron deficiency anemia 05/24/2016    Macular degeneration 05/24/2016    Scoliosis of lumbar spine 05/24/2016    Post-menopausal osteoporosis 05/24/2016    Vitamin D deficiency 05/24/2016    Essential hypertension 07/12/2016    Preventative health care 08/09/2016    Incomplete RBBB 08/11/2016    Primary osteoarthritis of both knees 11/07/2016    Anxiety 01/23/2017    Amaurosis fugax 06/20/2017    Bladder incontinence 07/06/2018    Arthritis of right acromioclavicular joint 02/11/2020    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    Mild cognitive impairment 08/03/2021     Resolved Ambulatory Problems     Diagnosis Date Noted    Left-sided low back pain with left-sided sciatica 05/24/2016    Syncope 05/24/2016    Vaginitis 05/24/2016     Hip pain, left 05/24/2016    Iron deficiency 05/26/2016    Hip fracture, left 07/12/2016    Knee pain, bilateral 07/12/2016    Arthritis of knee, right 09/22/2016    Status post unicompartmental right knee arthroplasty 09/22/2016    Acute post-operative pain 09/22/2016    Arthritis of knee, left 12/01/2016    Status post left knee medial unicompartmental replacement 12/01/2016    Acute blood loss anemia, mild, asymptomatic 12/02/2016    Leukocytosis, mild, likely reactive 12/02/2016    Hematochezia 01/23/2017    Dysuria 01/23/2017    GI bleed 01/23/2017    Fracture of hip 08/10/2017    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 08/10/2017    Postoperative wound infection 10/19/2017     Past Medical History:   Diagnosis Date    Amaurosis fugax of left eye 06/19/2017    Compression fracture of L1 lumbar vertebra Remote    Hearing impairment 2000    Herpes zoster     Hypertension 2016    Lipoma of right shoulder     Lumbar scoliosis Adulthood    Lumbar spondylosis 2015    PONV (postoperative nausea and vomiting)     Wrist fracture, left      Past Surgical History:   Procedure Laterality Date    CATARACT EXTRACTION EXTRACAPSULAR W/ INTRAOCULAR LENS IMPLANTATION Bilateral 2016    COLONOSCOPY  2006    Normal study    HIP FRACTURE SURGERY Left 2014     hip pinning for acute fracture    KNEE ARTHROPLASTY UNICOMPARTMENTAL Right 9/22/2016    Procedure: RIGHT KNEE ARTHROPLASTY UNICOMPARTMENTAL;  Surgeon: Jesse Taveras MD;  Location:  [facility];  Service:     KNEE ARTHROPLASTY UNICOMPARTMENTAL Left 12/1/2016    Procedure: LEFT KNEE ARTHROPLASTY UNICOMPARTMENTAL;  Surgeon: Jesse Taveras MD;  Location:  [facility];  Service:     LUMBAR EPIDURAL INJECTION  2015    TONSILLECTOMY  1947    TOTAL HIP ARTHROPLASTY Left 8/10/2017    Procedure: REMOVAL OF HARDWARE LEFT HIP, LEFT ANTERIOR TOTAL HIP ARTHROPLASTY;  Surgeon: Jesse Taveras MD;  Location:  [facility];  Service:      Family History   Problem Relation Age of  Onset    Alzheimer's disease Mother     No Known Problems Father     Arthritis Sister     Breast cancer Sister     Colon cancer Sister     Lung cancer Brother     Heart disease Other         2009    Asthma Sister     Colon cancer Sister     Breast cancer Sister     Lumbar disc disease Sister     Skin cancer Brother      Social History     Socioeconomic History    Marital status:    Tobacco Use    Smoking status: Never    Smokeless tobacco: Never   Vaping Use    Vaping Use: Never used   Substance and Sexual Activity    Alcohol use: No    Drug use: No    Sexual activity: Defer       Review of Systems  Review of Systems -  General ROS: negative for - chills, fever or night sweats  MSK: see HPI  Skin: negative for recent or new-onset erythema or rash    Objective   Blood pressure 102/70, weight 57.2 kg (126 lb), not currently breastfeeding.  Nursing note reviewed  Physical Exam  Const: NAD, A&Ox4, Pleasant, Cooperative  Skin: No overlying erythema  MSK:  Procedures  Injection of right glenohumeral joint  Prior to performance of the shoulder injection, a discussion of this procedure and alternative treatments was conducted with the patient.  Possible complications were discussed, and all questions were answered. An informed consent was reviewed with the patient.     Posterior Approach  After informed consent was obtained and signed, the patient was placed in a seated position.  The right arm was slightly flexed and the right arm was placed in slight internal rotation with the forearm resting on the patient's lap.  The area for injection was located 2 cm inferior to the posterior and lateral corner of the acromion. This site was marked with the retracted tip of a ballpoint pen.  The injection site was aseptically prepped with alcohol pads.  Ethyl chloride topical vapocoolant spray was used to achieve good local anesthesia.     Following the no touch technique, a 1.5-inch 25-gauge needle was inserted and advanced  into the glenohumeral space in the plane and direction of the coracoid process.  The needle was advanced until contact with the humeral head was encountered, and retracted 1 mm.  After confirmation that there was no vascular infiltration of the needle tip, a mixture of 2 mL of 2% lidocaine and 1 mL (40 mg) of triamcinolone acetonide 40 mg/mL was injected easily into the glenohumeral space.  No resistance was encountered.     The needle was withdrawn.  No bleeding was encountered.  The injection site was cleaned and a dry sterile dressing was applied.   Assessment & Plan   Problem List Items Addressed This Visit    None  Visit Diagnoses       Rotator cuff arthropathy of right shoulder    -  Primary    Relevant Medications    triamcinolone acetonide (KENALOG-40) injection 40 mg (Completed)    Subacromial bursitis of right shoulder joint        Relevant Medications    triamcinolone acetonide (KENALOG-40) injection 40 mg (Completed)    Glenohumeral arthritis, right        Relevant Medications    triamcinolone acetonide (KENALOG-40) injection 40 mg (Completed)            Injection today to right glenohumeral joint and right subacromial bursa, tolerated well. Imaging reviewed today at point of care together with patient. Post-injection instructions reviewed with patient.  Exercises provided, referral to physical therapy placed previously.  I will see her back in 3 months for regular wellness visit, she is encouraged to call in 2 to 6 weeks with her status.    See patient diagnoses and orders along with patient instructions for assessment, plan, and changes to care for patient.    Patient Instructions   INSTRUCTIONS TO FOLLOW AFTER A CORTISONE INJECTION    1. The pain relieving medicine in the shot will wear off in the next 12-18 hours. After this, the pain at the site may return for a short time until the steroid takes effect 24-48 hours later.  2. Use ice tonight 30-60 minutes or longer to minimize swelling and  discomfort that might follow the injection. (Either crushed ice in a plastic bag or crushed ice in an ice cap).  3. Begin heat tomorrow for at least one hour every day for one week. Place hot moist towels over the injection area, cover towel with plastic then apply a heating pad over the entire area. You may use contrast therapy, with ice for 20 minutes immediately following the heat.  4. Relative rest should be undertaken. You may resume normal non-painful activities.   Return in about 3 months (around 12/6/2023) for Medicare Wellness.    Ambulatory progress note signed and attested to by Earnest Ingram D.O.

## 2023-11-17 ENCOUNTER — LAB (OUTPATIENT)
Dept: LAB | Facility: HOSPITAL | Age: 88
End: 2023-11-17
Payer: MEDICARE

## 2023-11-17 ENCOUNTER — OFFICE VISIT (OUTPATIENT)
Dept: FAMILY MEDICINE CLINIC | Facility: CLINIC | Age: 88
End: 2023-11-17
Payer: MEDICARE

## 2023-11-17 VITALS
BODY MASS INDEX: 24.28 KG/M2 | HEART RATE: 94 BPM | HEIGHT: 61 IN | SYSTOLIC BLOOD PRESSURE: 192 MMHG | WEIGHT: 128.6 LBS | OXYGEN SATURATION: 92 % | DIASTOLIC BLOOD PRESSURE: 98 MMHG

## 2023-11-17 DIAGNOSIS — I10 ESSENTIAL HYPERTENSION: ICD-10-CM

## 2023-11-17 DIAGNOSIS — I16.0 ASYMPTOMATIC HYPERTENSIVE URGENCY: Primary | ICD-10-CM

## 2023-11-17 LAB
BILIRUB UR QL STRIP: NEGATIVE
CLARITY UR: CLEAR
COLOR UR: YELLOW
DEPRECATED RDW RBC AUTO: 43.7 FL (ref 37–54)
ERYTHROCYTE [DISTWIDTH] IN BLOOD BY AUTOMATED COUNT: 13.1 % (ref 12.3–15.4)
GLUCOSE UR STRIP-MCNC: NEGATIVE MG/DL
HCT VFR BLD AUTO: 39.2 % (ref 34–46.6)
HGB BLD-MCNC: 13.2 G/DL (ref 12–15.9)
HGB UR QL STRIP.AUTO: NEGATIVE
KETONES UR QL STRIP: NEGATIVE
LEUKOCYTE ESTERASE UR QL STRIP.AUTO: NEGATIVE
MCH RBC QN AUTO: 30.5 PG (ref 26.6–33)
MCHC RBC AUTO-ENTMCNC: 33.7 G/DL (ref 31.5–35.7)
MCV RBC AUTO: 90.5 FL (ref 79–97)
NITRITE UR QL STRIP: NEGATIVE
PH UR STRIP.AUTO: 6.5 [PH] (ref 5–8)
PLATELET # BLD AUTO: 215 10*3/MM3 (ref 140–450)
PMV BLD AUTO: 11.3 FL (ref 6–12)
PROT UR QL STRIP: NEGATIVE
RBC # BLD AUTO: 4.33 10*6/MM3 (ref 3.77–5.28)
SP GR UR STRIP: 1.02 (ref 1–1.03)
UROBILINOGEN UR QL STRIP: NORMAL
WBC NRBC COR # BLD AUTO: 6.16 10*3/MM3 (ref 3.4–10.8)

## 2023-11-17 PROCEDURE — 81003 URINALYSIS AUTO W/O SCOPE: CPT | Performed by: STUDENT IN AN ORGANIZED HEALTH CARE EDUCATION/TRAINING PROGRAM

## 2023-11-17 PROCEDURE — 80053 COMPREHEN METABOLIC PANEL: CPT | Performed by: STUDENT IN AN ORGANIZED HEALTH CARE EDUCATION/TRAINING PROGRAM

## 2023-11-17 PROCEDURE — 84443 ASSAY THYROID STIM HORMONE: CPT | Performed by: STUDENT IN AN ORGANIZED HEALTH CARE EDUCATION/TRAINING PROGRAM

## 2023-11-17 PROCEDURE — 85027 COMPLETE CBC AUTOMATED: CPT | Performed by: STUDENT IN AN ORGANIZED HEALTH CARE EDUCATION/TRAINING PROGRAM

## 2023-11-17 RX ORDER — AMLODIPINE BESYLATE 5 MG/1
5 TABLET ORAL DAILY
Qty: 30 TABLET | Refills: 1 | Status: SHIPPED | OUTPATIENT
Start: 2023-11-17

## 2023-11-17 NOTE — PROGRESS NOTES
Chief Complaint   Patient presents with    Blood Pressure Check       HPI:  Marcelino Mesa is a 89 y.o. female who presents today for elevated BP.    Patient reports with daughter today for elevated blood pressure readings.  She was at the audiologist office on Wednesday (two days ago) being fitted for hearing aids and was told that her blood pressure was 200s/100.  Provided felt that the machine was malfunctioning so it was not rechecked.  She then presented to her dentist office today for planned tooth extraction and her blood pressure was again in the 200s over 100s range.  This was repeated a few minutes later and had dropped to 180/90s but her dental procedure was canceled and she was instructed to follow-up with PCP ASAP.  Reading in our office today is 192/98 and consistent on repeat check.  Patient denies any symptoms of significant headache, chest pain, shortness of breath, visual disturbances, hearing changes, abdominal pain.  She denies any new prescription or OTC medication.  She has been urinating normally.  She does report having a history of high blood pressure diagnosis in the past.  States that she was taken off her blood pressure medications over the last few years as her BPs were well controlled in office.  Last recorded BP in our office from September was 102/70.  She has not been checking blood pressures at home.  She denies any recent increase salt or caffeine intake.  Diet has not significantly changed.      PE:  Vitals:    11/17/23 1407   BP: (!) 192/98   Pulse: 94   SpO2: 92%      Body mass index is 23.97 kg/m².    Gen Appearance: NAD  HEENT: Normocephalic, PERRL, no thyromegaly, trachea midline  Heart: RRR, normal S1 and S2, no murmur  Lungs: CTA b/l, no wheezing, no crackles  MSK: Moves all extremities well, normal gait, no peripheral edema  Pulses: Palpable and equal b/l  Neuro: No focal deficits    Current Outpatient Medications   Medication Sig Dispense Refill    acetaminophen  (TYLENOL) 325 MG tablet Take 2 tablets by mouth Every 4 (Four) Hours As Needed for Mild Pain (1-3).  0    CRANBERRY EXTRACT PO Take 2 capsules by mouth Daily.      donepezil (Aricept) 5 MG tablet Take 0.5 tablets by mouth Every Night. 90 tablet 2    sulfamethoxazole-trimethoprim (BACTRIM DS,SEPTRA DS) 800-160 MG per tablet Take 1 tablet by mouth Daily As Needed (urinary tract infection- take at onset of symptoms for 5 days). (Patient taking differently: Take 1 tablet by mouth Daily As Needed (urinary tract infection- take at onset of symptoms for 5 days). As needed) 30 tablet 2    amLODIPine (NORVASC) 5 MG tablet Take 1 tablet by mouth Daily. 30 tablet 1     No current facility-administered medications for this visit.        A/P:  Diagnoses and all orders for this visit:    1. Asymptomatic hypertensive urgency (Primary)  -     ECG 12 Lead  -     CBC (No Diff); Future  -     Comprehensive Metabolic Panel; Future  -     TSH; Future  -     Urinalysis With Microscopic If Indicated (No Culture) - Urine, Clean Catch; Future    2. Essential hypertension  -     amLODIPine (NORVASC) 5 MG tablet; Take 1 tablet by mouth Daily.  Dispense: 30 tablet; Refill: 1       No s/s to suggest HTN emergency or end organ damage. EKG obtained in office today shows NSR w known RBBB and she is not experiencing any chest pain or SOB. Check labs as above to r/o secondary cause for BP elevation, otherwise suspect gradual recurrence of previously known HTN. Restart amlodipine at higher dose of 5mg and FU w PCP in 3-4 days. ER precautions were reviewed in detail with patient and daughter who was present for appt.    Return in about 3 days (around 11/20/2023) for FU w PCP Monday or Tuesday for BP/med check.     Dictated Utilizing Dragon Dictation    Please note that portions of this note were completed with a voice recognition program.    Part of this note may be an electronic transcription/translation of spoken language to printed text using the  [facility] Dictation System.

## 2023-11-18 LAB
ALBUMIN SERPL-MCNC: 4.4 G/DL (ref 3.5–5.2)
ALBUMIN/GLOB SERPL: 2 G/DL
ALP SERPL-CCNC: 71 U/L (ref 39–117)
ALT SERPL W P-5'-P-CCNC: 13 U/L (ref 1–33)
ANION GAP SERPL CALCULATED.3IONS-SCNC: 8 MMOL/L (ref 5–15)
AST SERPL-CCNC: 21 U/L (ref 1–32)
BILIRUB SERPL-MCNC: 0.6 MG/DL (ref 0–1.2)
BUN SERPL-MCNC: 14 MG/DL (ref 8–23)
BUN/CREAT SERPL: 16.7 (ref 7–25)
CALCIUM SPEC-SCNC: 9.2 MG/DL (ref 8.2–9.6)
CHLORIDE SERPL-SCNC: 108 MMOL/L (ref 98–107)
CO2 SERPL-SCNC: 27 MMOL/L (ref 22–29)
CREAT SERPL-MCNC: 0.84 MG/DL (ref 0.57–1)
EGFRCR SERPLBLD CKD-EPI 2021: 63.7 ML/MIN/1.73
GLOBULIN UR ELPH-MCNC: 2.2 GM/DL
GLUCOSE SERPL-MCNC: 89 MG/DL (ref 65–99)
POTASSIUM SERPL-SCNC: 3.7 MMOL/L (ref 3.5–5.2)
PROT SERPL-MCNC: 6.6 G/DL (ref 6–8.5)
SODIUM SERPL-SCNC: 143 MMOL/L (ref 136–145)
TSH SERPL DL<=0.05 MIU/L-ACNC: 3 UIU/ML (ref 0.27–4.2)

## 2023-11-22 ENCOUNTER — OFFICE VISIT (OUTPATIENT)
Dept: FAMILY MEDICINE CLINIC | Facility: CLINIC | Age: 88
End: 2023-11-22
Payer: MEDICARE

## 2023-11-22 ENCOUNTER — HOSPITAL ENCOUNTER (OUTPATIENT)
Dept: CARDIOLOGY | Facility: HOSPITAL | Age: 88
Discharge: HOME | End: 2023-11-22
Admitting: FAMILY MEDICINE
Payer: MEDICARE

## 2023-11-22 VITALS
WEIGHT: 128.8 LBS | DIASTOLIC BLOOD PRESSURE: 78 MMHG | HEART RATE: 70 BPM | OXYGEN SATURATION: 97 % | TEMPERATURE: 98.4 F | BODY MASS INDEX: 21.46 KG/M2 | SYSTOLIC BLOOD PRESSURE: 136 MMHG | HEIGHT: 65 IN

## 2023-11-22 DIAGNOSIS — I10 ESSENTIAL HYPERTENSION: ICD-10-CM

## 2023-11-22 DIAGNOSIS — I16.0 ASYMPTOMATIC HYPERTENSIVE URGENCY: ICD-10-CM

## 2023-11-22 DIAGNOSIS — R01.1 SYSTOLIC MURMUR: ICD-10-CM

## 2023-11-22 DIAGNOSIS — I16.0 ASYMPTOMATIC HYPERTENSIVE URGENCY: Primary | ICD-10-CM

## 2023-11-22 LAB
ASCENDING AORTA: 4.2 CM
BH CV ECHO MEAS - AI P1/2T: 283.2 MSEC
BH CV ECHO MEAS - AO MAX PG: 14.5 MMHG
BH CV ECHO MEAS - AO MEAN PG: 8 MMHG
BH CV ECHO MEAS - AO ROOT DIAM: 3.6 CM
BH CV ECHO MEAS - AO V2 MAX: 189.6 CM/SEC
BH CV ECHO MEAS - AO V2 VTI: 36.2 CM
BH CV ECHO MEAS - AVA(I,D): 1.79 CM2
BH CV ECHO MEAS - EDV(CUBED): 85.2 ML
BH CV ECHO MEAS - EDV(MOD-SP2): 70.9 ML
BH CV ECHO MEAS - EDV(MOD-SP4): 78.3 ML
BH CV ECHO MEAS - EF(MOD-BP): 67.3 %
BH CV ECHO MEAS - EF(MOD-SP2): 67.7 %
BH CV ECHO MEAS - EF(MOD-SP4): 67.9 %
BH CV ECHO MEAS - ESV(CUBED): 17.6 ML
BH CV ECHO MEAS - ESV(MOD-SP2): 22.9 ML
BH CV ECHO MEAS - ESV(MOD-SP4): 25.1 ML
BH CV ECHO MEAS - FS: 40.9 %
BH CV ECHO MEAS - IVS/LVPW: 1 CM
BH CV ECHO MEAS - IVSD: 1.1 CM
BH CV ECHO MEAS - LA DIMENSION: 4.5 CM
BH CV ECHO MEAS - LAT PEAK E' VEL: 6.1 CM/SEC
BH CV ECHO MEAS - LV DIASTOLIC VOL/BSA (35-75): 47.8 CM2
BH CV ECHO MEAS - LV MASS(C)D: 168.9 GRAMS
BH CV ECHO MEAS - LV MAX PG: 5.4 MMHG
BH CV ECHO MEAS - LV MEAN PG: 3 MMHG
BH CV ECHO MEAS - LV SYSTOLIC VOL/BSA (12-30): 15.3 CM2
BH CV ECHO MEAS - LV V1 MAX: 116.5 CM/SEC
BH CV ECHO MEAS - LV V1 VTI: 22.9 CM
BH CV ECHO MEAS - LVIDD: 4.4 CM
BH CV ECHO MEAS - LVIDS: 2.6 CM
BH CV ECHO MEAS - LVOT AREA: 2.8 CM2
BH CV ECHO MEAS - LVOT DIAM: 1.9 CM
BH CV ECHO MEAS - LVPWD: 1.1 CM
BH CV ECHO MEAS - MED PEAK E' VEL: 5.8 CM/SEC
BH CV ECHO MEAS - MV A MAX VEL: 126 CM/SEC
BH CV ECHO MEAS - MV DEC SLOPE: 381 CM/SEC2
BH CV ECHO MEAS - MV DEC TIME: 0.22 SEC
BH CV ECHO MEAS - MV E MAX VEL: 80.1 CM/SEC
BH CV ECHO MEAS - MV E/A: 0.64
BH CV ECHO MEAS - MV P1/2T: 75 MSEC
BH CV ECHO MEAS - MVA(P1/2T): 2.9 CM2
BH CV ECHO MEAS - PA ACC TIME: 0.1 SEC
BH CV ECHO MEAS - PA V2 MAX: 126.5 CM/SEC
BH CV ECHO MEAS - PAPD(PI EDV): 3 MMHG
BH CV ECHO MEAS - PI END-D VEL: 88.6 CM/SEC
BH CV ECHO MEAS - RAP SYSTOLE: 3 MMHG
BH CV ECHO MEAS - RVSP: 36 MMHG
BH CV ECHO MEAS - SI(MOD-SP2): 29.3 ML/M2
BH CV ECHO MEAS - SI(MOD-SP4): 32.5 ML/M2
BH CV ECHO MEAS - SV(LVOT): 64.8 ML
BH CV ECHO MEAS - SV(MOD-SP2): 48 ML
BH CV ECHO MEAS - SV(MOD-SP4): 53.2 ML
BH CV ECHO MEAS - TAPSE (>1.6): 3.2 CM
BH CV ECHO MEAS - TR MAX PG: 32.9 MMHG
BH CV ECHO MEAS - TR MAX VEL: 287 CM/SEC
BH CV ECHO MEASUREMENTS AVERAGE E/E' RATIO: 13.46
BH CV VAS BP LEFT ARM: NORMAL MMHG
BH CV XLRA - RV BASE: 4.5 CM
BH CV XLRA - RV LENGTH: 6.1 CM
BH CV XLRA - RV MID: 3.6 CM
BH CV XLRA - TDI S': 15.8 CM/SEC
IVRT: 86 MS
LEFT ATRIUM VOLUME INDEX: 36.8 ML/M2
LV EF 2D ECHO EST: 67 %

## 2023-11-22 PROCEDURE — 99214 OFFICE O/P EST MOD 30 MIN: CPT | Performed by: FAMILY MEDICINE

## 2023-11-22 PROCEDURE — 93306 TTE W/DOPPLER COMPLETE: CPT

## 2023-11-22 RX ORDER — AMLODIPINE BESYLATE 5 MG/1
10 TABLET ORAL DAILY
Qty: 60 TABLET | Refills: 1 | Status: SHIPPED | OUTPATIENT
Start: 2023-11-22

## 2023-11-22 NOTE — PROGRESS NOTES
Established Patient Office Visit      Patient Name: Marcelino Mesa  : 1927   MRN: [id number]   Care Team: Patient Care Team:  Earnest Ingram DO as PCP - General (Family Medicine)    Chief Complaint:    Chief Complaint   Patient presents with    Hypertension       History of Present Illness: Marcelino Mesa is a 89 y.o. female who is here today for chief complaint.    Hypertension  This is a new problem. The current episode started in the past 7 days. The problem has been gradually improving since onset. Associated symptoms include headaches. Pertinent negatives include no anxiety, blurred vision, chest pain, malaise/fatigue, orthopnea, palpitations, peripheral edema or shortness of breath. There are no compliance problems.        89-year-old female with history of hypertension but off medication over the last few years presents today for follow-up on what has been a recent development of asymptomatic hypertensive urgency.  Last week she was at the dentist (to have tooth pulled) and was found incidentally to have a blood pressure of 200/110, was seen on the  here in clinic by my partner Dr. Arroyo.  At that time her blood pressure was 192/98.  Her blood pressure in our clinic has been very well controlled in the last couple of years, she is even been able to come off of her extremely low-dose amlodipine 2.5 daily.     Has been taking 5mg BID.    New systolic murmur--asymptomatic. Concern for valve disease yany with dental procedure planned and new HTN as severe as it is.    This patient is accompanied by their daughter who contributes to the history of their care.    The following portions of the patient's history were reviewed and updated as appropriate: allergies, current medications, past family history, past medical history, past social history, past surgical history and problem list.    Subjective      Review of Systems:   Review of Systems   Constitutional:  Negative for malaise/fatigue.    Eyes:  Negative for blurred vision.   Respiratory:  Negative for shortness of breath.    Cardiovascular:  Negative for chest pain, palpitations and orthopnea.    - See HPI    Past Medical History:   Past Medical History:   Diagnosis Date    Amaurosis fugax of left eye 06/19/2017    5 minutes at home    Compression fracture of L1 lumbar vertebra Remote    Chronic lower back pain    Generalized osteoarthritis Adulthood    Bilateral knee replacements    Hearing impairment 2000    Wears hearing aids    Herpes zoster     Hip fracture, left 2014    Hip pinning with persistent pain    Hyperlipidemia Adulthood    Mild disease not requiring drug therapy    Hypertension 2016    Well compensated on medication    Iron deficiency 2017    Long-term oral replacement    Lipoma of right shoulder     MRI to confirm 8/2023    Lumbar scoliosis Adulthood    Mild disease    Lumbar spondylosis 2015    Status post injection therapy    Macular degeneration     PONV (postoperative nausea and vomiting)     preprocedural medications will help     Vitamin D deficiency     Wrist fracture, left        Past Surgical History:   Past Surgical History:   Procedure Laterality Date    CATARACT EXTRACTION EXTRACAPSULAR W/ INTRAOCULAR LENS IMPLANTATION Bilateral 2016    COLONOSCOPY  2006    Normal study    HIP FRACTURE SURGERY Left 2014     hip pinning for acute fracture    KNEE ARTHROPLASTY UNICOMPARTMENTAL Right 9/22/2016    Procedure: RIGHT KNEE ARTHROPLASTY UNICOMPARTMENTAL;  Surgeon: Jesse Taveras MD;  Location:  [facility];  Service:     KNEE ARTHROPLASTY UNICOMPARTMENTAL Left 12/1/2016    Procedure: LEFT KNEE ARTHROPLASTY UNICOMPARTMENTAL;  Surgeon: Jesse Taveras MD;  Location:  [facility];  Service:     LUMBAR EPIDURAL INJECTION  2015    TONSILLECTOMY  1947    TOTAL HIP ARTHROPLASTY Left 8/10/2017    Procedure: REMOVAL OF HARDWARE LEFT HIP, LEFT ANTERIOR TOTAL HIP ARTHROPLASTY;  Surgeon: Jesse Taveras MD;  Location:  [facility];  Service:         Family History:   Family History   Problem Relation Age of Onset    Alzheimer's disease Mother     No Known Problems Father     Arthritis Sister     Breast cancer Sister     Colon cancer Sister     Lung cancer Brother     Heart disease Other         2009    Asthma Sister     Colon cancer Sister     Breast cancer Sister     Lumbar disc disease Sister     Skin cancer Brother        Social History:   Social History     Socioeconomic History    Marital status:    Tobacco Use    Smoking status: Never     Passive exposure: Never    Smokeless tobacco: Never   Vaping Use    Vaping Use: Never used   Substance and Sexual Activity    Alcohol use: No    Drug use: No    Sexual activity: Defer       Tobacco History:   Social History     Tobacco Use   Smoking Status Never    Passive exposure: Never   Smokeless Tobacco Never       Medications:     Current Outpatient Medications:     acetaminophen (TYLENOL) 325 MG tablet, Take 2 tablets by mouth Every 4 (Four) Hours As Needed for Mild Pain (1-3)., Disp: , Rfl: 0    amLODIPine (NORVASC) 5 MG tablet, Take 2 tablets by mouth Daily., Disp: 60 tablet, Rfl: 1    CRANBERRY EXTRACT PO, Take 2 capsules by mouth Daily., Disp: , Rfl:     donepezil (Aricept) 5 MG tablet, Take 0.5 tablets by mouth Every Night., Disp: 90 tablet, Rfl: 2    sulfamethoxazole-trimethoprim (BACTRIM DS,SEPTRA DS) 800-160 MG per tablet, Take 1 tablet by mouth Daily As Needed (urinary tract infection- take at onset of symptoms for 5 days). (Patient taking differently: Take 1 tablet by mouth Daily As Needed (urinary tract infection- take at onset of symptoms for 5 days). As needed), Disp: 30 tablet, Rfl: 2    Allergies:   Allergies   Allergen Reactions    Asa [Aspirin] GI Bleeding    Lisinopril-Hydrochlorothiazide Rash    Other Rash     STEROID INJECTION IN BACK    Amoxicillin Swelling    Zantac [Ranitidine Hcl] Unknown - Low Severity       Objective   Objective     Physical Exam:  Vital Signs:   Vitals:     "11/22/23 1155   BP: 136/78   BP Location: Left arm   Patient Position: Sitting   Cuff Size: Adult   Pulse: 70   Temp: 98.4 °F (36.9 °C)   TempSrc: Oral   SpO2: 97%   Weight: 58.4 kg (128 lb 12.8 oz)   Height: 165 cm (64.96\")   PainSc: 0-No pain     Body mass index is 21.46 kg/m².     Physical Exam  Nursing note reviewed  Const: NAD, A&Ox4, Pleasant, Cooperative  Eyes: EOMI, no conjunctivitis  ENT: No nasal discharge present, neck supple  Cardiac: Regular rate and rhythm, no cyanosis. Grade II systolic murmur  Procedures/Radiology     Procedures  No radiology results for the last 7 days     Assessment & Plan   Assessment / Plan      Assessment/Plan:   Problems Addressed This Visit  Diagnoses and all orders for this visit:    1. Asymptomatic hypertensive urgency (Primary)  -     Adult Transthoracic Echo Complete W/ Cont if Necessary Per Protocol; Future    2. Systolic murmur  -     Adult Transthoracic Echo Complete W/ Cont if Necessary Per Protocol; Future    3. Essential hypertension  -     amLODIPine (NORVASC) 5 MG tablet; Take 2 tablets by mouth Daily.  Dispense: 60 tablet; Refill: 1      Problem List Items Addressed This Visit          Cardiac and Vasculature    Essential hypertension    Relevant Medications    amLODIPine (NORVASC) 5 MG tablet     Other Visit Diagnoses       Asymptomatic hypertensive urgency    -  Primary    Relevant Medications    amLODIPine (NORVASC) 5 MG tablet    Other Relevant Orders    Adult Transthoracic Echo Complete W/ Cont if Necessary Per Protocol    Systolic murmur        Relevant Orders    Adult Transthoracic Echo Complete W/ Cont if Necessary Per Protocol                Patient Instructions   If still not improving,  magnesium suplement from pharmacy and take nightly    Follow Up:   Return in about 2 weeks (around 12/6/2023) for BP recheck.        DO PAULINE Gordillo RD  [facility]  [address] [place] [address] " [place]  [facility] [id number]  Fax [phone]  Phone [phone]

## 2023-11-29 ENCOUNTER — TELEPHONE (OUTPATIENT)
Dept: FAMILY MEDICINE CLINIC | Facility: CLINIC | Age: 88
End: 2023-11-29
Payer: MEDICARE

## 2023-11-29 NOTE — TELEPHONE ENCOUNTER
Patient daughter called to ask if the patient should stop the Amlodipine & only take Losartan; patient' daughter would like a call back

## 2023-11-29 NOTE — TELEPHONE ENCOUNTER
[facility] informed of change in medications. Per Dr. Ingram and the MyChart encounter he switched her to losartan due to the swelling on the amlodipine.

## 2023-12-06 ENCOUNTER — OFFICE VISIT (OUTPATIENT)
Dept: FAMILY MEDICINE CLINIC | Facility: CLINIC | Age: 88
End: 2023-12-06
Payer: MEDICARE

## 2023-12-06 ENCOUNTER — LAB (OUTPATIENT)
Dept: LAB | Facility: HOSPITAL | Age: 88
End: 2023-12-06
Payer: MEDICARE

## 2023-12-06 VITALS
BODY MASS INDEX: 21.66 KG/M2 | DIASTOLIC BLOOD PRESSURE: 100 MMHG | SYSTOLIC BLOOD PRESSURE: 140 MMHG | HEART RATE: 69 BPM | WEIGHT: 130 LBS | HEIGHT: 65 IN | OXYGEN SATURATION: 97 %

## 2023-12-06 DIAGNOSIS — I10 ESSENTIAL HYPERTENSION: ICD-10-CM

## 2023-12-06 DIAGNOSIS — I16.0 ASYMPTOMATIC HYPERTENSIVE URGENCY: Primary | ICD-10-CM

## 2023-12-06 LAB
ANION GAP SERPL CALCULATED.3IONS-SCNC: 9.9 MMOL/L (ref 5–15)
BUN SERPL-MCNC: 12 MG/DL (ref 8–23)
BUN/CREAT SERPL: 13.3 (ref 7–25)
CALCIUM SPEC-SCNC: 9.1 MG/DL (ref 8.2–9.6)
CHLORIDE SERPL-SCNC: 106 MMOL/L (ref 98–107)
CO2 SERPL-SCNC: 26.1 MMOL/L (ref 22–29)
CREAT SERPL-MCNC: 0.9 MG/DL (ref 0.57–1)
EGFRCR SERPLBLD CKD-EPI 2021: 58.6 ML/MIN/1.73
GLUCOSE SERPL-MCNC: 84 MG/DL (ref 65–99)
POTASSIUM SERPL-SCNC: 4.2 MMOL/L (ref 3.5–5.2)
SODIUM SERPL-SCNC: 142 MMOL/L (ref 136–145)

## 2023-12-06 PROCEDURE — 99213 OFFICE O/P EST LOW 20 MIN: CPT | Performed by: FAMILY MEDICINE

## 2023-12-06 PROCEDURE — 80048 BASIC METABOLIC PNL TOTAL CA: CPT | Performed by: FAMILY MEDICINE

## 2023-12-06 RX ORDER — LOSARTAN POTASSIUM 50 MG/1
50 TABLET ORAL DAILY
Qty: 90 TABLET | Refills: 1 | Status: ON HOLD | OUTPATIENT
Start: 2023-12-06

## 2023-12-06 NOTE — PATIENT INSTRUCTIONS
Goal blood pressure less than 140/90  Start new losartan dose 50mg daily  Ok for dental procedure  If your blood pressure looks ok in 2 weeks, it is ok to call and cancel    Take it in the afternoon or early evening, seated with your feet flat on the floor for 5 minutes.

## 2023-12-18 ENCOUNTER — TELEPHONE (OUTPATIENT)
Dept: FAMILY MEDICINE CLINIC | Facility: CLINIC | Age: 88
End: 2023-12-18
Payer: MEDICARE

## 2023-12-18 NOTE — TELEPHONE ENCOUNTER
Caller: Marcelino Mesa    Relationship: Self    Best call back number: [phone]     What is the best time to reach you: AS SOON AS POSSIBLE    Who are you requesting to speak with (clinical staff, provider,  specific staff member): CLINICAL    Do you know the name of the person who called: NA    What was the call regarding: PATIENT WOULD LIKE TO KNOW IF DOCTOR MYAH WANTS HER TO KEEP HER APPOINTMENT ON 12/22/23.    STATED THE MEDICATION MYAH PUT HER ON SEEMS TO BE WORKING.     STATED HER BLOOD PRESSURE SEEMS TO BE STABLE.  BP:  145/72.    Is it okay if the provider responds through MyChart: NA

## 2023-12-21 NOTE — PROGRESS NOTES
Established Patient Office Visit      Patient Name: Marcelino Mesa  : 1927   MRN: [id number]   Care Team: Patient Care Team:  Earnest Ingram DO as PCP - General (Family Medicine)    Chief Complaint:    Chief Complaint   Patient presents with    Hypertension     2 week recheck        History of Present Illness: Marcelino Mesa is a 89 y.o. female who is here today for chief complaint.    Hypertension  This is a new problem. The current episode started 1 to 4 weeks ago. The problem has been waxing and waning since onset. Pertinent negatives include no anxiety, blurred vision, chest pain, headaches, malaise/fatigue, orthopnea, palpitations, peripheral edema or shortness of breath. There are no compliance problems.        This patient is accompanied by their self who contributes to the history of their care.    The following portions of the patient's history were reviewed and updated as appropriate: allergies, current medications, past family history, past medical history, past social history, past surgical history and problem list.    Subjective      Review of Systems:   Review of Systems   Constitutional:  Negative for malaise/fatigue.   Eyes:  Negative for blurred vision.   Respiratory:  Negative for shortness of breath.    Cardiovascular:  Negative for chest pain, palpitations and orthopnea.    - See HPI    Past Medical History:   Past Medical History:   Diagnosis Date    Amaurosis fugax of left eye 2017    5 minutes at home    Compression fracture of L1 lumbar vertebra Remote    Chronic lower back pain    Generalized osteoarthritis Adulthood    Bilateral knee replacements    Hearing impairment 2000    Wears hearing aids    Herpes zoster     Hip fracture, left     Hip pinning with persistent pain    Hyperlipidemia Adulthood    Mild disease not requiring drug therapy    Hypertension 2016    Well compensated on medication    Iron deficiency 2017    Long-term oral replacement    Lipoma of  right shoulder     MRI to confirm 8/2023    Lumbar scoliosis Adulthood    Mild disease    Lumbar spondylosis 2015    Status post injection therapy    Macular degeneration     PONV (postoperative nausea and vomiting)     preprocedural medications will help     Vitamin D deficiency     Wrist fracture, left        Past Surgical History:   Past Surgical History:   Procedure Laterality Date    CATARACT EXTRACTION EXTRACAPSULAR W/ INTRAOCULAR LENS IMPLANTATION Bilateral 2016    COLONOSCOPY  2006    Normal study    HIP FRACTURE SURGERY Left 2014     hip pinning for acute fracture    KNEE ARTHROPLASTY UNICOMPARTMENTAL Right 9/22/2016    Procedure: RIGHT KNEE ARTHROPLASTY UNICOMPARTMENTAL;  Surgeon: Jesse Taveras MD;  Location:  [facility];  Service:     KNEE ARTHROPLASTY UNICOMPARTMENTAL Left 12/1/2016    Procedure: LEFT KNEE ARTHROPLASTY UNICOMPARTMENTAL;  Surgeon: Jesse Taveras MD;  Location:  [facility];  Service:     LUMBAR EPIDURAL INJECTION  2015    TONSILLECTOMY  1947    TOTAL HIP ARTHROPLASTY Left 8/10/2017    Procedure: REMOVAL OF HARDWARE LEFT HIP, LEFT ANTERIOR TOTAL HIP ARTHROPLASTY;  Surgeon: Jesse Taveras MD;  Location:  [facility];  Service:        Family History:   Family History   Problem Relation Age of Onset    Alzheimer's disease Mother     No Known Problems Father     Arthritis Sister     Breast cancer Sister     Colon cancer Sister     Lung cancer Brother     Heart disease Other         2009    Asthma Sister     Colon cancer Sister     Breast cancer Sister     Lumbar disc disease Sister     Skin cancer Brother        Social History:   Social History     Socioeconomic History    Marital status:    Tobacco Use    Smoking status: Never     Passive exposure: Never    Smokeless tobacco: Never   Vaping Use    Vaping Use: Never used   Substance and Sexual Activity    Alcohol use: No    Drug use: No    Sexual activity: Defer       Tobacco History:   Social History     Tobacco Use   Smoking Status Never     "Passive exposure: Never   Smokeless Tobacco Never       Medications:     Current Outpatient Medications:     acetaminophen (TYLENOL) 325 MG tablet, Take 2 tablets by mouth Every 4 (Four) Hours As Needed for Mild Pain (1-3)., Disp: , Rfl: 0    CRANBERRY EXTRACT PO, Take 2 capsules by mouth Daily., Disp: , Rfl:     donepezil (Aricept) 5 MG tablet, Take 0.5 tablets by mouth Every Night., Disp: 90 tablet, Rfl: 2    losartan (COZAAR) 50 MG tablet, Take 1 tablet by mouth Daily., Disp: 90 tablet, Rfl: 1    sulfamethoxazole-trimethoprim (BACTRIM DS,SEPTRA DS) 800-160 MG per tablet, Take 1 tablet by mouth Daily As Needed (urinary tract infection- take at onset of symptoms for 5 days). (Patient taking differently: Take 1 tablet by mouth Daily As Needed (urinary tract infection- take at onset of symptoms for 5 days). As needed), Disp: 30 tablet, Rfl: 2    Allergies:   Allergies   Allergen Reactions    Asa [Aspirin] GI Bleeding    Lisinopril-Hydrochlorothiazide Rash    Other Rash     STEROID INJECTION IN BACK    Amoxicillin Swelling    Zantac [Ranitidine Hcl] Unknown - Low Severity       Objective   Objective     Physical Exam:  Vital Signs:   Vitals:    12/06/23 1217   BP: 140/100   Pulse: 69   SpO2: 97%   Weight: 59 kg (130 lb)   Height: 165 cm (64.96\")     Body mass index is 21.66 kg/m².     Physical Exam  Nursing note reviewed  Const: NAD, A&Ox4, Pleasant, Cooperative  Eyes: EOMI, no conjunctivitis  ENT: No nasal discharge present, neck supple  Cardiac: Regular rate and rhythm, no cyanosis  Resp: Respiratory rate within normal limits, no increased work of breathing, no audible wheezing or retractions noted  GI: No distention or ascites  MSK: Motor and sensation grossly intact in bilateral upper extremities  Neurologic: CN II-XII grossly intact  Psych: Appropriate mood and behavior.  Skin: Warm, dry  Procedures/Radiology     Procedures  No radiology results for the last 7 days     Assessment & Plan   Assessment / Plan  "     Assessment/Plan:   Problems Addressed This Visit  Diagnoses and all orders for this visit:    1. Asymptomatic hypertensive urgency (Primary)    2. Essential hypertension  Assessment & Plan:  Increase losartan to 50mg daily. Goal diastolic <90. Check BMP today    Orders:  -     losartan (COZAAR) 50 MG tablet; Take 1 tablet by mouth Daily.  Dispense: 90 tablet; Refill: 1  -     Basic Metabolic Panel; Future  -     Basic Metabolic Panel      Problem List Items Addressed This Visit          Cardiac and Vasculature    Essential hypertension    Current Assessment & Plan     Increase losartan to 50mg daily. Goal diastolic <90. Check BMP today         Relevant Medications    losartan (COZAAR) 50 MG tablet    Other Relevant Orders    Basic Metabolic Panel (Completed)     Other Visit Diagnoses       Asymptomatic hypertensive urgency    -  Primary    Relevant Medications    losartan (COZAAR) 50 MG tablet            Patient Instructions   Goal blood pressure less than 140/90  Start new losartan dose 50mg daily  Ok for dental procedure  If your blood pressure looks ok in 2 weeks, it is ok to call and cancel    Take it in the afternoon or early evening, seated with your feet flat on the floor for 5 minutes.    Follow Up:   Return in about 2 weeks (around 12/20/2023) for f/u blood pressure.        DO PAULINE Gordillo RD  [facility]  [address]  [facility] [phone]  Fax [phone]  Phone [phone]

## 2023-12-24 ENCOUNTER — APPOINTMENT (OUTPATIENT)
Dept: GENERAL RADIOLOGY | Facility: HOSPITAL | Age: 88
End: 2023-12-24
Payer: MEDICARE

## 2023-12-24 ENCOUNTER — HOSPITAL ENCOUNTER (INPATIENT)
Facility: HOSPITAL | Age: 88
LOS: 3 days | Discharge: NURSING, REHAB OR LONG-TERM CARE | End: 2023-12-27
Attending: EMERGENCY MEDICINE | Admitting: INTERNAL MEDICINE
Payer: MEDICARE

## 2023-12-24 ENCOUNTER — ANESTHESIA EVENT (OUTPATIENT)
Dept: PERIOP | Facility: HOSPITAL | Age: 88
End: 2023-12-24
Payer: MEDICARE

## 2023-12-24 DIAGNOSIS — S72.044A: Primary | ICD-10-CM

## 2023-12-24 PROBLEM — S72.009A HIP FRACTURE: Status: ACTIVE | Noted: 2023-12-24

## 2023-12-24 LAB
ABO GROUP BLD: NORMAL
ALBUMIN SERPL-MCNC: 4 G/DL (ref 3.5–5.2)
ALBUMIN/GLOB SERPL: 1.7 G/DL
ALP SERPL-CCNC: 81 U/L (ref 39–117)
ALT SERPL W P-5'-P-CCNC: 11 U/L (ref 1–33)
ANION GAP SERPL CALCULATED.3IONS-SCNC: 13 MMOL/L (ref 5–15)
AST SERPL-CCNC: 18 U/L (ref 1–32)
BASOPHILS # BLD AUTO: 0.07 10*3/MM3 (ref 0–0.2)
BASOPHILS NFR BLD AUTO: 0.9 % (ref 0–1.5)
BILIRUB SERPL-MCNC: 0.6 MG/DL (ref 0–1.2)
BILIRUB UR QL STRIP: NEGATIVE
BLD GP AB SCN SERPL QL: NEGATIVE
BUN SERPL-MCNC: 15 MG/DL (ref 8–23)
BUN/CREAT SERPL: 17.9 (ref 7–25)
CALCIUM SPEC-SCNC: 8.9 MG/DL (ref 8.2–9.6)
CHLORIDE SERPL-SCNC: 106 MMOL/L (ref 98–107)
CLARITY UR: CLEAR
CO2 SERPL-SCNC: 23 MMOL/L (ref 22–29)
COLOR UR: YELLOW
CREAT SERPL-MCNC: 0.84 MG/DL (ref 0.57–1)
DEPRECATED RDW RBC AUTO: 48.6 FL (ref 37–54)
EGFRCR SERPLBLD CKD-EPI 2021: 63.7 ML/MIN/1.73
EOSINOPHIL # BLD AUTO: 0.04 10*3/MM3 (ref 0–0.4)
EOSINOPHIL NFR BLD AUTO: 0.5 % (ref 0.3–6.2)
ERYTHROCYTE [DISTWIDTH] IN BLOOD BY AUTOMATED COUNT: 13.8 % (ref 12.3–15.4)
GLOBULIN UR ELPH-MCNC: 2.3 GM/DL
GLUCOSE SERPL-MCNC: 127 MG/DL (ref 65–99)
GLUCOSE UR STRIP-MCNC: NEGATIVE MG/DL
HCT VFR BLD AUTO: 41.1 % (ref 34–46.6)
HGB BLD-MCNC: 13.3 G/DL (ref 12–15.9)
HGB UR QL STRIP.AUTO: NEGATIVE
HOLD SPECIMEN: NORMAL
IMM GRANULOCYTES # BLD AUTO: 0.04 10*3/MM3 (ref 0–0.05)
IMM GRANULOCYTES NFR BLD AUTO: 0.5 % (ref 0–0.5)
INR PPP: 1.05 (ref 0.89–1.12)
KETONES UR QL STRIP: ABNORMAL
LEUKOCYTE ESTERASE UR QL STRIP.AUTO: NEGATIVE
LYMPHOCYTES # BLD AUTO: 1.5 10*3/MM3 (ref 0.7–3.1)
LYMPHOCYTES NFR BLD AUTO: 19.9 % (ref 19.6–45.3)
MCH RBC QN AUTO: 30.8 PG (ref 26.6–33)
MCHC RBC AUTO-ENTMCNC: 32.4 G/DL (ref 31.5–35.7)
MCV RBC AUTO: 95.1 FL (ref 79–97)
MONOCYTES # BLD AUTO: 0.49 10*3/MM3 (ref 0.1–0.9)
MONOCYTES NFR BLD AUTO: 6.5 % (ref 5–12)
NEUTROPHILS NFR BLD AUTO: 5.41 10*3/MM3 (ref 1.7–7)
NEUTROPHILS NFR BLD AUTO: 71.7 % (ref 42.7–76)
NITRITE UR QL STRIP: NEGATIVE
NRBC BLD AUTO-RTO: 0 /100 WBC (ref 0–0.2)
PH UR STRIP.AUTO: 7.5 [PH] (ref 5–8)
PLATELET # BLD AUTO: 234 10*3/MM3 (ref 140–450)
PMV BLD AUTO: 10.5 FL (ref 6–12)
POTASSIUM SERPL-SCNC: 3.9 MMOL/L (ref 3.5–5.2)
PROT SERPL-MCNC: 6.3 G/DL (ref 6–8.5)
PROT UR QL STRIP: NEGATIVE
PROTHROMBIN TIME: 13.8 SECONDS (ref 12.2–14.5)
QT INTERVAL: 456 MS
QTC INTERVAL: 495 MS
RBC # BLD AUTO: 4.32 10*6/MM3 (ref 3.77–5.28)
RH BLD: POSITIVE
SODIUM SERPL-SCNC: 142 MMOL/L (ref 136–145)
SP GR UR STRIP: 1.02 (ref 1–1.03)
T&S EXPIRATION DATE: NORMAL
UROBILINOGEN UR QL STRIP: ABNORMAL
WBC NRBC COR # BLD AUTO: 7.55 10*3/MM3 (ref 3.4–10.8)
WHOLE BLOOD HOLD COAG: NORMAL
WHOLE BLOOD HOLD SPECIMEN: NORMAL

## 2023-12-24 PROCEDURE — 99222 1ST HOSP IP/OBS MODERATE 55: CPT | Performed by: INTERNAL MEDICINE

## 2023-12-24 PROCEDURE — 25010000002 HEPARIN (PORCINE) PER 1000 UNITS: Performed by: INTERNAL MEDICINE

## 2023-12-24 PROCEDURE — 81003 URINALYSIS AUTO W/O SCOPE: CPT | Performed by: EMERGENCY MEDICINE

## 2023-12-24 PROCEDURE — 25010000002 BUPIVACAINE (PF) 0.25 % SOLUTION: Performed by: EMERGENCY MEDICINE

## 2023-12-24 PROCEDURE — 86900 BLOOD TYPING SEROLOGIC ABO: CPT | Performed by: EMERGENCY MEDICINE

## 2023-12-24 PROCEDURE — 73552 X-RAY EXAM OF FEMUR 2/>: CPT

## 2023-12-24 PROCEDURE — 86901 BLOOD TYPING SEROLOGIC RH(D): CPT | Performed by: EMERGENCY MEDICINE

## 2023-12-24 PROCEDURE — 85610 PROTHROMBIN TIME: CPT | Performed by: EMERGENCY MEDICINE

## 2023-12-24 PROCEDURE — 25010000002 MORPHINE PER 10 MG: Performed by: EMERGENCY MEDICINE

## 2023-12-24 PROCEDURE — 73502 X-RAY EXAM HIP UNI 2-3 VIEWS: CPT

## 2023-12-24 PROCEDURE — 25810000003 SODIUM CHLORIDE 0.9 % SOLUTION: Performed by: EMERGENCY MEDICINE

## 2023-12-24 PROCEDURE — 86850 RBC ANTIBODY SCREEN: CPT | Performed by: EMERGENCY MEDICINE

## 2023-12-24 PROCEDURE — 93005 ELECTROCARDIOGRAM TRACING: CPT | Performed by: EMERGENCY MEDICINE

## 2023-12-24 PROCEDURE — 85025 COMPLETE CBC W/AUTO DIFF WBC: CPT | Performed by: EMERGENCY MEDICINE

## 2023-12-24 PROCEDURE — 80053 COMPREHEN METABOLIC PANEL: CPT | Performed by: EMERGENCY MEDICINE

## 2023-12-24 PROCEDURE — 71045 X-RAY EXAM CHEST 1 VIEW: CPT

## 2023-12-24 PROCEDURE — 99285 EMERGENCY DEPT VISIT HI MDM: CPT

## 2023-12-24 PROCEDURE — 25010000002 MORPHINE PER 10 MG: Performed by: INTERNAL MEDICINE

## 2023-12-24 RX ORDER — BISACODYL 10 MG
10 SUPPOSITORY, RECTAL RECTAL DAILY PRN
Status: DISCONTINUED | OUTPATIENT
Start: 2023-12-24 | End: 2023-12-27 | Stop reason: HOSPADM

## 2023-12-24 RX ORDER — BUPIVACAINE HYDROCHLORIDE 2.5 MG/ML
30 INJECTION, SOLUTION EPIDURAL; INFILTRATION; INTRACAUDAL ONCE
Status: COMPLETED | OUTPATIENT
Start: 2023-12-24 | End: 2023-12-24

## 2023-12-24 RX ORDER — DONEPEZIL HYDROCHLORIDE 5 MG/1
2.5 TABLET, FILM COATED ORAL NIGHTLY
Status: DISCONTINUED | OUTPATIENT
Start: 2023-12-24 | End: 2023-12-27 | Stop reason: HOSPADM

## 2023-12-24 RX ORDER — LOSARTAN POTASSIUM 50 MG/1
50 TABLET ORAL DAILY
Status: DISCONTINUED | OUTPATIENT
Start: 2023-12-25 | End: 2023-12-27 | Stop reason: HOSPADM

## 2023-12-24 RX ORDER — SODIUM CHLORIDE 0.9 % (FLUSH) 0.9 %
10 SYRINGE (ML) INJECTION EVERY 12 HOURS SCHEDULED
Status: DISCONTINUED | OUTPATIENT
Start: 2023-12-24 | End: 2023-12-27 | Stop reason: HOSPADM

## 2023-12-24 RX ORDER — AMOXICILLIN 250 MG
2 CAPSULE ORAL 2 TIMES DAILY
Status: DISCONTINUED | OUTPATIENT
Start: 2023-12-24 | End: 2023-12-27 | Stop reason: HOSPADM

## 2023-12-24 RX ORDER — MORPHINE SULFATE 4 MG/ML
4 INJECTION, SOLUTION INTRAMUSCULAR; INTRAVENOUS ONCE
Status: COMPLETED | OUTPATIENT
Start: 2023-12-24 | End: 2023-12-24

## 2023-12-24 RX ORDER — HYDROCODONE BITARTRATE AND ACETAMINOPHEN 5; 325 MG/1; MG/1
1 TABLET ORAL EVERY 6 HOURS PRN
Status: DISCONTINUED | OUTPATIENT
Start: 2023-12-24 | End: 2023-12-27 | Stop reason: HOSPADM

## 2023-12-24 RX ORDER — ACETAMINOPHEN 160 MG/5ML
650 SOLUTION ORAL EVERY 4 HOURS PRN
Status: DISCONTINUED | OUTPATIENT
Start: 2023-12-24 | End: 2023-12-27 | Stop reason: HOSPADM

## 2023-12-24 RX ORDER — ACETAMINOPHEN 160 MG/5ML
500 SUSPENSION, ORAL (FINAL DOSE FORM) ORAL DAILY
COMMUNITY

## 2023-12-24 RX ORDER — LIDOCAINE HYDROCHLORIDE 10 MG/ML
10 INJECTION, SOLUTION EPIDURAL; INFILTRATION; INTRACAUDAL; PERINEURAL ONCE
Status: COMPLETED | OUTPATIENT
Start: 2023-12-24 | End: 2023-12-24

## 2023-12-24 RX ORDER — SODIUM CHLORIDE 0.9 % (FLUSH) 0.9 %
10 SYRINGE (ML) INJECTION AS NEEDED
Status: DISCONTINUED | OUTPATIENT
Start: 2023-12-24 | End: 2023-12-27 | Stop reason: HOSPADM

## 2023-12-24 RX ORDER — ACETAMINOPHEN 650 MG/1
650 SUPPOSITORY RECTAL EVERY 4 HOURS PRN
Status: DISCONTINUED | OUTPATIENT
Start: 2023-12-24 | End: 2023-12-27 | Stop reason: HOSPADM

## 2023-12-24 RX ORDER — SODIUM CHLORIDE 9 MG/ML
40 INJECTION, SOLUTION INTRAVENOUS AS NEEDED
Status: DISCONTINUED | OUTPATIENT
Start: 2023-12-24 | End: 2023-12-27 | Stop reason: HOSPADM

## 2023-12-24 RX ORDER — MORPHINE SULFATE 2 MG/ML
2 INJECTION, SOLUTION INTRAMUSCULAR; INTRAVENOUS
Status: DISCONTINUED | OUTPATIENT
Start: 2023-12-24 | End: 2023-12-24

## 2023-12-24 RX ORDER — MORPHINE SULFATE 2 MG/ML
2 INJECTION, SOLUTION INTRAMUSCULAR; INTRAVENOUS
Status: DISCONTINUED | OUTPATIENT
Start: 2023-12-24 | End: 2023-12-27 | Stop reason: HOSPADM

## 2023-12-24 RX ORDER — ACETAMINOPHEN 325 MG/1
650 TABLET ORAL EVERY 4 HOURS PRN
Status: DISCONTINUED | OUTPATIENT
Start: 2023-12-24 | End: 2023-12-27 | Stop reason: HOSPADM

## 2023-12-24 RX ORDER — ACETAMINOPHEN 500 MG
1000 TABLET ORAL ONCE
Status: COMPLETED | OUTPATIENT
Start: 2023-12-24 | End: 2023-12-24

## 2023-12-24 RX ORDER — BISACODYL 5 MG/1
5 TABLET, DELAYED RELEASE ORAL DAILY PRN
Status: DISCONTINUED | OUTPATIENT
Start: 2023-12-24 | End: 2023-12-27 | Stop reason: HOSPADM

## 2023-12-24 RX ORDER — POLYETHYLENE GLYCOL 3350 17 G/17G
17 POWDER, FOR SOLUTION ORAL DAILY PRN
Status: DISCONTINUED | OUTPATIENT
Start: 2023-12-24 | End: 2023-12-27 | Stop reason: HOSPADM

## 2023-12-24 RX ORDER — HEPARIN SODIUM 5000 [USP'U]/ML
5000 INJECTION, SOLUTION INTRAVENOUS; SUBCUTANEOUS EVERY 8 HOURS SCHEDULED
Status: DISCONTINUED | OUTPATIENT
Start: 2023-12-24 | End: 2023-12-27 | Stop reason: HOSPADM

## 2023-12-24 RX ADMIN — HYDROCODONE BITARTRATE AND ACETAMINOPHEN 1 TABLET: 5; 325 TABLET ORAL at 18:30

## 2023-12-24 RX ADMIN — HEPARIN SODIUM 5000 UNITS: 5000 INJECTION INTRAVENOUS; SUBCUTANEOUS at 18:32

## 2023-12-24 RX ADMIN — SODIUM CHLORIDE 500 ML: 9 INJECTION, SOLUTION INTRAVENOUS at 13:56

## 2023-12-24 RX ADMIN — ACETAMINOPHEN 1000 MG: 500 TABLET ORAL at 13:57

## 2023-12-24 RX ADMIN — MORPHINE SULFATE 4 MG: 4 INJECTION, SOLUTION INTRAMUSCULAR; INTRAVENOUS at 13:57

## 2023-12-24 RX ADMIN — SENNOSIDES AND DOCUSATE SODIUM 2 TABLET: 8.6; 5 TABLET ORAL at 19:24

## 2023-12-24 RX ADMIN — Medication 10 ML: at 19:25

## 2023-12-24 RX ADMIN — LIDOCAINE HYDROCHLORIDE 10 ML: 10 INJECTION, SOLUTION EPIDURAL; INFILTRATION; INTRACAUDAL; PERINEURAL at 14:41

## 2023-12-24 RX ADMIN — DONEPEZIL HYDROCHLORIDE 2.5 MG: 5 TABLET, FILM COATED ORAL at 19:25

## 2023-12-24 RX ADMIN — MORPHINE SULFATE 2 MG: 2 INJECTION, SOLUTION INTRAMUSCULAR; INTRAVENOUS at 16:15

## 2023-12-24 RX ADMIN — BUPIVACAINE HYDROCHLORIDE 30 ML: 2.5 INJECTION, SOLUTION EPIDURAL; INFILTRATION; INTRACAUDAL; PERINEURAL at 14:42

## 2023-12-24 NOTE — ED NOTES
Marcelino Mesa    Nursing Report ED to Floor:  Mental status: A&O x 4  Ambulatory status: Bed Bound  Oxygen Therapy:  2 lpm NC  Cardiac Rhythm: NSR  Admitted from: ER  Safety Concerns:  None  Social Issues: None  ED Room #:  12    ED Nurse Phone Extension - [phone] or may call [phone].      HPI:   Chief Complaint   Patient presents with    Fall       Past Medical History:  Past Medical History:   Diagnosis Date    Amaurosis fugax of left eye 06/19/2017    5 minutes at home    Compression fracture of L1 lumbar vertebra Remote    Chronic lower back pain    Generalized osteoarthritis Adulthood    Bilateral knee replacements    Hearing impairment 2000    Wears hearing aids    Herpes zoster     Hip fracture, left 2014    Hip pinning with persistent pain    Hyperlipidemia Adulthood    Mild disease not requiring drug therapy    Hypertension 2016    Well compensated on medication    Iron deficiency 2017    Long-term oral replacement    Lipoma of right shoulder     MRI to confirm 8/2023    Lumbar scoliosis Adulthood    Mild disease    Lumbar spondylosis 2015    Status post injection therapy    Macular degeneration     PONV (postoperative nausea and vomiting)     preprocedural medications will help     Vitamin D deficiency     Wrist fracture, left         Past Surgical History:  Past Surgical History:   Procedure Laterality Date    CATARACT EXTRACTION EXTRACAPSULAR W/ INTRAOCULAR LENS IMPLANTATION Bilateral 2016    COLONOSCOPY  2006    Normal study    HIP FRACTURE SURGERY Left 2014     hip pinning for acute fracture    KNEE ARTHROPLASTY UNICOMPARTMENTAL Right 9/22/2016    Procedure: RIGHT KNEE ARTHROPLASTY UNICOMPARTMENTAL;  Surgeon: Jesse Taveras MD;  Location:  [facility];  Service:     KNEE ARTHROPLASTY UNICOMPARTMENTAL Left 12/1/2016    Procedure: LEFT KNEE ARTHROPLASTY UNICOMPARTMENTAL;  Surgeon: Jesse Taveras MD;  Location:  [facility];  Service:     LUMBAR EPIDURAL INJECTION  2015    TONSILLECTOMY  1947    TOTAL HIP  "ARTHROPLASTY Left 8/10/2017    Procedure: REMOVAL OF HARDWARE LEFT HIP, LEFT ANTERIOR TOTAL HIP ARTHROPLASTY;  Surgeon: Jesse Taveras MD;  [facility];  Service:         Admitting Doctor:   Zackary Flores DO    Consulting Provider(s):  Consults       Date and Time Order Name Status Description    12/24/2023  2:28 PM Inpatient Orthopedic Surgery Consult               Admitting Diagnosis:   The encounter diagnosis was Closed nondisplaced basicervical fracture of right femur, initial encounter.    Most Recent Vitals:   Vitals:    12/24/23 1328 12/24/23 1430 12/24/23 1500   BP: (!) 195/101 169/92 178/81   BP Location: Left arm     Patient Position: Sitting     Pulse: 65 86 85   Resp: 18     Temp: 98 °F (36.7 °C)     TempSrc: Oral     SpO2: 92% 92% 90%   Weight: 54.4 kg (120 lb)     Height: 157.5 cm (62\")         Active LDAs/IV Access:   Lines, Drains & Airways       Active LDAs       Name Placement date Placement time Site Days    Peripheral IV 12/24/23 1355 Posterior;Right Forearm 12/24/23  1355  Forearm  less than 1                    Labs (abnormal labs have a star):   Labs Reviewed   COMPREHENSIVE METABOLIC PANEL - Abnormal; Notable for the following components:       Result Value    Glucose 127 (*)     All other components within normal limits    Narrative:     GFR Normal >60  Chronic Kidney Disease <60  Kidney Failure <15    The GFR formula is only valid for adults with stable renal function between ages 18 and 70.   PROTIME-INR - Normal   CBC WITH AUTO DIFFERENTIAL - Normal   RAINBOW DRAW    Narrative:     The following orders were created for panel order [place] Draw.  Procedure                               Abnormality         Status                     ---------                               -----------         ------                     Green Top (Gel)[[id number]]                                  Final result               Lavender Top[[id number]]                                     Final result  "              Gold Top - [facility][[id number]]                                   Final result               Ernst Top[[id number]]                                         In process                 Light Blue Top[[id number]]                                   Final result                 Please view results for these tests on the individual orders.   URINALYSIS W/ MICROSCOPIC IF INDICATED (NO CULTURE)   TYPE AND SCREEN   CBC AND DIFFERENTIAL    Narrative:     The following orders were created for panel order CBC & Differential.  Procedure                               Abnormality         Status                     ---------                               -----------         ------                     CBC Auto Differential[[id number]]        Normal              Final result                 Please view results for these tests on the individual orders.   GREEN TOP   LAVENDER TOP   GOLD TOP - SST   LIGHT BLUE TOP   GRAY TOP       Meds Given in ED:   Medications   sodium chloride 0.9 % flush 10 mL (has no administration in time range)   sodium chloride 0.9 % bolus 500 mL (0 mL Intravenous Stopped 12/24/23 1511)   acetaminophen (TYLENOL) tablet 1,000 mg (1,000 mg Oral Given 12/24/23 1357)   Morphine sulfate (PF) injection 4 mg (4 mg Intravenous Given 12/24/23 1357)   bupivacaine (PF) (MARCAINE) 0.25 % injection 30 mL (30 mL Injection Given by Other 12/24/23 1442)   lidocaine PF 1% (XYLOCAINE) injection 10 mL (10 mL Injection Given by Other 12/24/23 1441)

## 2023-12-24 NOTE — H&P
[facility]  HISTORY AND PHYSICAL    Patient Name: Marcelino Mesa  : 1927  MRN: [id number]  Primary Care Physician: Earnest Ingram DO  Date of admission: 2023      Subjective   Subjective     Chief Complaint:  Fall, hip pain    HPI:  Marcelino Mesa is a 89 y.o. female w/ HTN, memory deficit, prior LT hip fracture, who presents for eval of hip pain. She was visiting family, trying to carry a box up a step and through a door, misjudged the step and fell. She had immediate severe RT hip pain. Workup in the ED showed RT femoral neck fracture, they spoke with ortho who rec admission and surgical intervention likely tomorrow      Personal History     Past Medical History:   Diagnosis Date    Amaurosis fugax of left eye 2017    5 minutes at home    Compression fracture of L1 lumbar vertebra Remote    Chronic lower back pain    Generalized osteoarthritis Adulthood    Bilateral knee replacements    Hearing impairment 2000    Wears hearing aids    Herpes zoster     Hip fracture, left     Hip pinning with persistent pain    Hyperlipidemia Adulthood    Mild disease not requiring drug therapy    Hypertension 2016    Well compensated on medication    Iron deficiency 2017    Long-term oral replacement    Lipoma of right shoulder     MRI to confirm 2023    Lumbar scoliosis Adulthood    Mild disease    Lumbar spondylosis 2015    Status post injection therapy    Macular degeneration     PONV (postoperative nausea and vomiting)     preprocedural medications will help     Vitamin D deficiency     Wrist fracture, left              Past Surgical History:   Procedure Laterality Date    CATARACT EXTRACTION EXTRACAPSULAR W/ INTRAOCULAR LENS IMPLANTATION Bilateral     COLONOSCOPY  2006    Normal study    HIP FRACTURE SURGERY Left 2014     hip pinning for acute fracture    KNEE ARTHROPLASTY UNICOMPARTMENTAL Right 2016    Procedure: RIGHT KNEE ARTHROPLASTY  UNICOMPARTMENTAL;  Surgeon: Jesse Taveras MD;  Location:  [facility];  Service:     KNEE ARTHROPLASTY UNICOMPARTMENTAL Left 12/1/2016    Procedure: LEFT KNEE ARTHROPLASTY UNICOMPARTMENTAL;  Surgeon: Jesse Taveras MD;  Location:  [facility];  Service:     LUMBAR EPIDURAL INJECTION  2015    TONSILLECTOMY  1947    TOTAL HIP ARTHROPLASTY Left 8/10/2017    Procedure: REMOVAL OF HARDWARE LEFT HIP, LEFT ANTERIOR TOTAL HIP ARTHROPLASTY;  Surgeon: Jesse Taveras MD;  Location:  [facility];  Service:        Family History: family history includes Alzheimer's disease in her mother; Arthritis in her sister; Asthma in her sister; Breast cancer in her sister and sister; Colon cancer in her sister and sister; Heart disease in an other family member; Lumbar disc disease in her sister; Lung cancer in her brother; No Known Problems in her father; Skin cancer in her brother.     Social History:  reports that she has never smoked. She has never been exposed to tobacco smoke. She has never used smokeless tobacco. She reports that she does not drink alcohol and does not use drugs.  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  (Not in a hospital admission)      Allergies   Allergen Reactions    Asa [Aspirin] GI Bleeding     Lisinopril-Hydrochlorothiazide Rash    Other Rash     STEROID INJECTION IN BACK    Amoxicillin Swelling    Zantac [Ranitidine Hcl] Unknown - Low Severity       Objective   Objective     Vital Signs:   Temp:  [98 °F (36.7 °C)] 98 °F (36.7 °C)  Heart Rate:  [65-86] 86  Resp:  [18] 18  BP: (169-195)/() 169/92       Physical Exam   Constitutional: Awake, alert, laying flat on ED stretcher  HENT: NCAT, mucous membranes moist  Respiratory: Clear to auscultation bilaterally, respiratory effort normal   Cardiovascular: RRR, palpable pedals  Gastrointestinal: Positive bowel sounds, soft, nontender, nondistended  Musculoskeletal: Slight external rotation of the RT LE  Psychiatric: Appropriate affect, cooperative  Neurologic: Hard of hearing    Result Review:  I have personally reviewed the results from the time of this admission to 12/24/2023 14:50 EST and agree with these findings:  []  Laboratory list / accordion  []  Microbiology  [x]  Radiology  []  EKG/Telemetry   []  Cardiology/Vascular   []  Pathology  []  Old records  []  Other:  Most notable findings include: RT hip fx        LAB RESULTS:      Lab 12/24/23  1356   WBC 7.55   HEMOGLOBIN 13.3   HEMATOCRIT 41.1   PLATELETS 234   NEUTROS ABS 5.41   IMMATURE GRANS (ABS) 0.04   LYMPHS ABS 1.50   MONOS ABS 0.49   EOS ABS 0.04   MCV 95.1   PROTIME 13.8   INR 1.05         Lab 12/24/23  1356   SODIUM 142   POTASSIUM 3.9   CHLORIDE 106   CO2 23.0   ANION GAP 13.0   BUN 15   CREATININE 0.84   EGFR 63.7   GLUCOSE 127*   CALCIUM 8.9         Lab 12/24/23  1356   TOTAL PROTEIN 6.3   ALBUMIN 4.0   GLOBULIN 2.3   ALT (SGPT) 11   AST (SGOT) 18   BILIRUBIN 0.6   ALK PHOS 81                     UA          11/17/2023    15:05   Urinalysis   Specific [place], UA 1.018    Ketones, UA Negative    Blood, UA Negative    Leukocytes, UA Negative    Nitrite, UA Negative        Microbiology Results (last 10 days)       ** No results found for the last 240 hours. **            XR Hip With or  Without Pelvis 2 - 3 View Right    Result Date: 12/24/2023  XR HIP W OR WO PELVIS 2-3 VIEW RIGHT Date of Exam: 12/24/2023 1:38 PM EST Indication: fall w/ right hip pain Comparison: 8/10/2017 Findings: Non-comminuted subcapital right femoral neck fracture is seen. Left hip prosthesis remains in anatomic alignment. Bony structures also appear markedly osteopenic but intact.     Impression: Impression: Acute non-comminuted right femoral neck fracture. Electronically Signed: Joshua Richmond MD  12/24/2023 1:58 PM EST  Workstation ID: [id number]    XR Chest 1 View    Result Date: 12/24/2023  XR CHEST 1 VW Date of Exam: 12/24/2023 1:38 PM EST Indication: pre-op Comparison: PA and lateral chest 8/4/2017 Findings: Low volume inspiration. Mild cardiac enlargement appears new or increased since the 2017 comparison. Generalized interstitial prominence, thought to be chronic. No acute airspace disease or significant pleural fluid collection is identified. Severe degenerative changes are suggested in both shoulders. No acute osseous abnormalities.     Impression: 1. Mild cardiomegaly, which appears new since 2017. 2. No acute chest findings. 3. Advanced degenerative changes of both shoulders. Electronically Signed: Shanique Martinez MD  12/24/2023 1:57 PM EST  Workstation ID: [id number]     Results for orders placed during the hospital encounter of 11/22/23    Adult Transthoracic Echo Complete W/ Cont if Necessary Per Protocol    Interpretation Summary    Left ventricular ejection fraction appears to be 66 - 70%.    Left ventricular wall thickness is consistent with mild concentric hypertrophy.    Left ventricular diastolic function is consistent with (grade I) impaired relaxation.    The right ventricular cavity is mildly dilated.    The left atrial cavity is mildly dilated.    Left atrial volume is mildly increased.    The right atrial cavity is mildly  dilated.    Moderate aortic valve regurgitation is present.    Estimated right  ventricular systolic pressure from tricuspid regurgitation is mildly elevated (35-45 mmHg).    Mild dilation of the ascending aorta is present.      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 Cerna who recommended admission and possible surgical intervention tomorrow    HTN  -home losartan    Memory impairment  -home donepezil      DVT prophylaxis:  heparin      CODE STATUS:    Code Status and Medical Interventions:   Ordered at: 12/24/23 1448     Code Status (Patient has no pulse and is not breathing):    CPR (Attempt to Resuscitate)     Medical Interventions (Patient has pulse or is breathing):    Full Support       Expected Discharge   Expected Discharge Date: 12/26/2023; Expected Discharge Time:      Zackary Flores DO  12/24/23

## 2023-12-24 NOTE — ED PROVIDER NOTES
Subjective   History of Present Illness  Pleasant patient who fell coming into her daughter's house while carrying a package.  Reportedly tripped.  Injury to her right hip.  She has a history of a previous left hip replacement thought to be done by Dr. Taveras.  She denies any other injury.  She did not hit her head.  There is no neck or back pain.  She has no pain into her knees ankles or upper extremities.  She denies any chest pain or difficulty breathing.  She is accompanied by her daughter.  Occurred around 1 to 2 hours prior to arrival.    History provided by:  Patient      Review of Systems    Past Medical History:   Diagnosis Date    Amaurosis fugax of left eye 06/19/2017    5 minutes at home    Compression fracture of L1 lumbar vertebra Remote    Chronic lower back pain    Generalized osteoarthritis Adulthood    Bilateral knee replacements    Hearing impairment 2000    Wears hearing aids    Herpes zoster     Hip fracture, left 2014    Hip pinning with persistent pain    Hyperlipidemia Adulthood    Mild disease not requiring drug therapy    Hypertension 2016    Well compensated on medication    Iron deficiency 2017    Long-term oral replacement    Lipoma of right shoulder     MRI to confirm 8/2023    Lumbar scoliosis Adulthood    Mild disease    Lumbar spondylosis 2015    Status post injection therapy    Macular degeneration     PONV (postoperative nausea and vomiting)     preprocedural medications will help     Vitamin D deficiency     Wrist fracture, left        Allergies   Allergen Reactions    Asa [Aspirin] GI Bleeding    Lisinopril-Hydrochlorothiazide Rash    Other Rash     STEROID INJECTION IN BACK    Amoxicillin Swelling    Zantac [Ranitidine Hcl] Unknown - Low Severity       Past Surgical History:   Procedure Laterality Date    CATARACT EXTRACTION EXTRACAPSULAR W/ INTRAOCULAR LENS IMPLANTATION Bilateral 2016    COLONOSCOPY  2006    Normal study    HIP FRACTURE SURGERY Left 2014     hip pinning for acute  fracture    KNEE ARTHROPLASTY UNICOMPARTMENTAL Right 9/22/2016    Procedure: RIGHT KNEE ARTHROPLASTY UNICOMPARTMENTAL;  Surgeon: Jesse Taveras MD;  Location:  [facility];  Service:     KNEE ARTHROPLASTY UNICOMPARTMENTAL Left 12/1/2016    Procedure: LEFT KNEE ARTHROPLASTY UNICOMPARTMENTAL;  Surgeon: Jesse Taveras MD;  Location:  [facility];  Service:     LUMBAR EPIDURAL INJECTION  2015    TONSILLECTOMY  1947    TOTAL HIP ARTHROPLASTY Left 8/10/2017    Procedure: REMOVAL OF HARDWARE LEFT HIP, LEFT ANTERIOR TOTAL HIP ARTHROPLASTY;  Surgeon: Jesse Taveras MD;  Location:  [facility];  Service:        Family History   Problem Relation Age of Onset    Alzheimer's disease Mother     No Known Problems Father     Arthritis Sister     Breast cancer Sister     Colon cancer Sister     Lung cancer Brother     Heart disease Other         2009    Asthma Sister     Colon cancer Sister     Breast cancer Sister     Lumbar disc disease Sister     Skin cancer Brother        Social History     Socioeconomic History    Marital status:    Tobacco Use    Smoking status: Never     Passive exposure: Never    Smokeless tobacco: Never   Vaping Use    Vaping Use: Never used   Substance and Sexual Activity    Alcohol use: No    Drug use: No    Sexual activity: Defer           Objective   Physical Exam  Constitutional:       Appearance: She is well-developed.   HENT:      Head: Normocephalic and atraumatic.      Right Ear: External ear normal.      Left Ear: External ear normal.      Nose: Nose normal.   Eyes:      Conjunctiva/sclera: Conjunctivae normal.      Pupils: Pupils are equal, round, and reactive to light.   Cardiovascular:      Rate and Rhythm: Normal rate and regular rhythm.      Heart sounds: Normal heart sounds.   Pulmonary:      Effort: Pulmonary effort is normal.      Breath sounds: Normal breath sounds.   Abdominal:      General: Bowel sounds are normal.      Palpations: Abdomen is soft.   Musculoskeletal:         General:  Normal range of motion.      Cervical back: Normal range of motion and neck supple.      Comments: Significant pain into the right hip.  No range of motion of the hip without a large amount of pain.  Pulses and sensation intact.  She has no pain throughout the rest of her extremities or other extremities including her left hip or cervical spine.   Skin:     General: Skin is warm and dry.   Neurological:      Mental Status: She is alert and oriented to person, place, and time.   Psychiatric:         Behavior: Behavior normal.         Thought Content: Thought content normal.         Judgment: Judgment normal.         Nerve Block    Date/Time: 12/24/2023 1:59 PM    Performed by: Jesse Smith DO  Authorized by: Jesse Smith DO    Consent:     Consent obtained:  Verbal and written    Consent given by:  Patient    Risks, benefits, and alternatives were discussed: yes      Risks discussed:  Allergic reaction, infection, nerve damage, swelling, bleeding, intravenous injection, pain and unsuccessful block    Alternatives discussed:  No treatment and delayed treatment  Universal protocol:     Procedure explained and questions answered to patient or proxy's satisfaction: yes      Patient identity confirmed:  Verbally with patient  Indications:     Indications:  Pain relief  Location:     Body area:  Lower extremity    Lower extremity nerve:  Femoral    Laterality:  Right  Pre-procedure details:     Skin preparation:  2% chlorhexidine    Preparation: Patient was prepped and draped in usual sterile fashion    Skin anesthesia:     Skin anesthesia method:  Local infiltration    Local anesthetic:  Lidocaine 1% w/o epi  Procedure details:     Block needle gauge:  20 G    Guidance: ultrasound      Anesthetic injected:  Bupivacaine 0.25% w/o epi    Steroid injected:  None    Additive injected:  None    Injection procedure:  Anatomic landmarks identified, incremental injection and negative aspiration for blood     Paresthesia:  None  Post-procedure details:     Dressing:  None    Outcome:  Pain relieved    Procedure completion:  Tolerated well, no immediate complications  Comments:      The echogenic needle was introduced in plane, in a lateral to medial direction at the level of the inguinal crease.  Under ultrasound guidance, the femoral artery and vein where located.  The needle was then directed below Fascia Iliacus towards the Femoral nerve.  NS was utilized to hydro dissect and sylvester needle advancement towards the target structure.   LA was injected incrementally in 3-5 ml aliquots with negative aspirate.  LA spread was visualized around the nerve, negative intraneural injection, low injection pressures.                ED Course  ED Course as of 12/24/23 1427   Sun Dec 24, 2023   1345 Pleasant patient with a broad differential.  Most likely hip or pelvic fracture.  We will get pain control lab work x-rays.  Plan to do a hip block as well and consult orthopedics once we get more information. [JM]   0809 I have paged Dr. Cedillo to discuss. Pt has seen Dr. Taveras in the past for her left hip replacement and would like to see him again. Dr. Cedillo is on call for Dr. Taveras. Dr. Smith at  performing a hip block. [JM]   3740 I spoke with Dr. Cedillo advised Dr. Taveras has retired. This pt will go to the on call ortho Dr. Dunn. I spoke with Dr. Cerna and he will most likely take to the OR tomorrow. [JM]      ED Course User Index  [JM] Valeriy Kimball APRN                                        XR Hip With or Without Pelvis 2 - 3 View Right   Final Result   Impression:   Acute non-comminuted right femoral neck fracture.         Electronically Signed: Joshua Richmond MD     12/24/2023 1:58 PM EST     Workstation ID: [id number]      XR Chest 1 View   Final Result   1. Mild cardiomegaly, which appears new since 2017.   2. No acute chest findings.   3. Advanced degenerative changes of both shoulders.      Electronically Signed:  Shanique Martinez MD     12/24/2023 1:57 PM EST     Workstation ID: [id number]            Medical Decision Making  Problems Addressed:  Closed nondisplaced basicervical fracture of right femur, initial encounter: complicated acute illness or injury    Amount and/or Complexity of Data Reviewed  External Data Reviewed: labs, radiology, ECG and notes.     Details: EKG interpreted by myself no acute.  Labs: ordered. Decision-making details documented in ED Course.  Radiology: ordered. Decision-making details documented in ED Course.  ECG/medicine tests: ordered. Decision-making details documented in ED Course.    Risk  OTC drugs.  Prescription drug management.  Decision regarding hospitalization.        Final diagnoses:   Closed nondisplaced basicervical fracture of right femur, initial encounter       ED Disposition  ED Disposition       ED Disposition   Decision to Admit    Condition   --    Comment   --               No follow-up provider specified.       Medication List      No changes were made to your prescriptions during this visit.            Valeriy Kimball, APRN  12/24/23 1427       Jesse Smith,   12/24/23 1434

## 2023-12-24 NOTE — Clinical Note
Level of Care: Telemetry [5]   Diagnosis: Hip fracture [197979]   Certification: I Certify That Inpatient Hospital Services Are Medically Necessary For Greater Than 2 Midnights

## 2023-12-24 NOTE — LETTER
EMS Transport Request  For use at [facility], [place], [place], [place], and [place] only   Patient Name: Marcelino Mesa : 1927   Weight:54.4 kg (120 lb) Pick-up Location: [facility] BLS/ALS: BLS/ALS: BLS   Insurance: MEDICARE Auth End Date:    Pre-Cert #: D/C Summary complete:    Destination: Other [facility]   Contact Precautions: None   Equipment (O2, Fluids, etc.): O2, settings 2L per NC   Arrive By Date/Time: Wednesday, 23 Stretcher/WC: Stretcher   CM Requesting: Kirti Torres RN Ext: [phone]   Notes/Medical Necessity:      ______________________________________________________________________    *Only 2 patient bags OR 1 carry-on size bag are permitted.  Wheelchairs and walkers CANNOT transported with the patient. Acknowledge: Yes

## 2023-12-24 NOTE — CONSULTS
Orthopedic Consult    Patient: Marcelino Mesa                                           YOB: 1927     Date of Admission: 12/24/2023  1:29 PM            Medical Record Number: [id number]    Attending Physician: Zackayr Flores DO    Consulting Physician: Edda Edmond MD    Reason for Consult: right hip fracture    History of Present Illness: 89 y.o. female admitted to [facility] with Hip fracture [S72.009A]  Hip fracture [S72.009A]  Hip fracture [S72.009A].     The patient was evaluated in the emergency room and was diagnosed with a  hip fracture.   Secondary to the age and multiple medical co morbidities the patient was admitted to the hospitalist.   As I was on call for the [facility] emergency room I was consulted for further evaluation and treatment.     The patient was in the usual state of health and fell from standing height resulting in sudden onset hip pain and inability to ambulate.   Denies any history of loss of consciousness, headache, vomiting, or seizures.   Denies any other injuries.        The patient denies any prior  right pre-existing pain in the hip.  Patient  Walks with a cane or a walker at baseline.      Patient denies any history of: DVT/PE, MRSA, COPD, CHF, CAD, Diabetes mellitus, Dementia or A-Fib.     Past medical history, past surgical history, social history, family history, ALLERGIES, current medications have been reviewed by me.    Past Medical History:   Diagnosis Date    Amaurosis fugax of left eye 06/19/2017    5 minutes at home    Compression fracture of L1 lumbar vertebra Remote    Chronic lower back pain    Generalized osteoarthritis Adulthood    Bilateral knee replacements    Hearing impairment 2000    Wears hearing aids    Herpes zoster     Hip fracture, left 2014    Hip pinning with persistent pain    Hyperlipidemia Adulthood    Mild disease not requiring drug therapy    Hypertension 2016    Well compensated on medication     Iron deficiency 2017    Long-term oral replacement    Lipoma of right shoulder     MRI to confirm 8/2023    Lumbar scoliosis Adulthood    Mild disease    Lumbar spondylosis 2015    Status post injection therapy    Macular degeneration     PONV (postoperative nausea and vomiting)     preprocedural medications will help     Vitamin D deficiency     Wrist fracture, left      Past Surgical History:   Procedure Laterality Date    CATARACT EXTRACTION EXTRACAPSULAR W/ INTRAOCULAR LENS IMPLANTATION Bilateral 2016    COLONOSCOPY  2006    Normal study    HIP FRACTURE SURGERY Left 2014     hip pinning for acute fracture    KNEE ARTHROPLASTY UNICOMPARTMENTAL Right 9/22/2016    Procedure: RIGHT KNEE ARTHROPLASTY UNICOMPARTMENTAL;  Surgeon: Jesse Taveras MD;  Location:  [facility];  Service:     KNEE ARTHROPLASTY UNICOMPARTMENTAL Left 12/1/2016    Procedure: LEFT KNEE ARTHROPLASTY UNICOMPARTMENTAL;  Surgeon: Jesse Taveras MD;  Location:  [facility];  Service:     LUMBAR EPIDURAL INJECTION  2015    TONSILLECTOMY  1947    TOTAL HIP ARTHROPLASTY Left 8/10/2017    Procedure: REMOVAL OF HARDWARE LEFT HIP, LEFT ANTERIOR TOTAL HIP ARTHROPLASTY;  Surgeon: Jesse Taveras MD;  Location:  [facility];  Service:      Social History     Occupational History    Not on file   Tobacco Use    Smoking status: Never     Passive exposure: Never    Smokeless tobacco: Never   Vaping Use    Vaping Use: Never used   Substance and Sexual Activity    Alcohol use: No    Drug use: No    Sexual activity: Defer      Social History     Social History Narrative    Domestic life-      [place]                                   good support from local daughter - who lives in [facility] [place] 1 hour away        [employer]- [employer]        Sleep hygiene-   in bed 8 PM to 6 AM for 8 hours broken sleep          Caffeine use- 2 cups coffee daily        Exercise habits- Walking daily as tolerated        Diet- Prudent well-balanced diet -  low in salt        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        Hearing : Corrects with bilateral hearing aids        Vision : Corrects with bifocal glasses        Driving :  Daytime only - familiar traffic - good weather     Family History   Problem Relation Age of Onset    Alzheimer's disease Mother     No Known Problems Father     Arthritis Sister     Breast cancer Sister     Colon cancer Sister     Lung cancer Brother     Heart disease Other         2009    Asthma Sister     Colon cancer Sister     Breast cancer Sister     Lumbar disc disease Sister     Skin cancer Brother         Allergies   Allergen Reactions    Asa [Aspirin] GI Bleeding    Lisinopril-Hydrochlorothiazide Rash    Other Rash     STEROID INJECTION IN BACK    Amoxicillin Swelling    Zantac [Ranitidine Hcl] Unknown - Low Severity       Home Medications:  Medications Prior to Admission   Medication Sig Dispense Refill Last Dose    acetaminophen (TYLENOL) 325 MG tablet Take 2 tablets by mouth Every 4 (Four) Hours As Needed for Mild Pain (1-3). (Patient taking differently: Take 2 tablets by mouth Every 4 (Four) Hours As Needed for Mild Pain, Fever or Headache. OTC)  0 12/24/2023    Cranberry Extract 250 MG tablet Take 500 mg by mouth Daily. OTC   12/24/2023    donepezil (Aricept) 5 MG tablet Take 0.5 tablets by mouth Every Night. 90 tablet 2 12/23/2023    losartan (COZAAR) 50 MG tablet Take 1 tablet by mouth Daily. 90 tablet 1 12/24/2023    sulfamethoxazole-trimethoprim (BACTRIM DS,SEPTRA DS) 800-160 MG per tablet Take 1 tablet by mouth Daily As Needed (urinary tract infection- take at onset of symptoms for 5 days). (Patient taking differently: Take 1 tablet by mouth Daily As Needed (urinary tract infection- take at onset of symptoms for 5 days). As needed) 30 tablet 2 Past Week       Current Medications:  Scheduled Meds:donepezil, 2.5 mg, Oral, Nightly  heparin (porcine), 5,000 Units, Subcutaneous, Q8H  [START ON  "12/25/2023] losartan, 50 mg, Oral, Daily  senna-docusate sodium, 2 tablet, Oral, BID  sodium chloride, 10 mL, Intravenous, Q12H      Continuous Infusions:   PRN Meds:.  acetaminophen **OR** acetaminophen **OR** acetaminophen    senna-docusate sodium **AND** polyethylene glycol **AND** bisacodyl **AND** bisacodyl    Calcium Replacement - Follow Nurse / BPA Driven Protocol    HYDROcodone-acetaminophen    Magnesium Low Dose Replacement - Follow Nurse / BPA Driven Protocol    Morphine    Phosphorus Replacement - Follow Nurse / BPA Driven Protocol    Potassium Replacement - Follow Nurse / BPA Driven Protocol    [COMPLETED] Insert Peripheral IV **AND** sodium chloride    sodium chloride    sodium chloride    Review of Systems:   A 12 point system review was reviewed with the patient and from the chart  and is negative except as in history of present illness.      Physical Exam: 89 y.o. female                    Vitals:    12/24/23 1328 12/24/23 1430 12/24/23 1500   BP: (!) 195/101 169/92 178/81   BP Location: Left arm     Patient Position: Sitting     Pulse: 65 86 85   Resp: 18     Temp: 98 °F (36.7 °C)     TempSrc: Oral     SpO2: 92% 92% 90%   Weight: 54.4 kg (120 lb)     Height: 157.5 cm (62\")          Gait: Could not be tested , patient is nonambulatory.    Mental/HEENT/cardio/skin: The patient's general appearance was well-nourished, well-hydrated, no acute distress.  Orientation was alert and oriented ×3.  The patient's mood was normal.   Pulmonary exam shows normal late exchange, no labored breathing, or shortness of breath.    The skin exam showed normal temperature and color in the area of examination.    Extremities: right   lower extremity positive shortening, positive external rotation, attempted movements of the  hip are painful and restricted. The patient is able to do gentle active range of motion of her toes. Gross sensation is intact over the toes.    Pulses: Pulses palpable and equal " "bilaterally    Diagnostic Tests:    Results from last 7 days   Lab Units 12/24/23  1356   WBC 10*3/mm3 7.55   HEMOGLOBIN g/dL 13.3   HEMATOCRIT % 41.1   PLATELETS 10*3/mm3 234     Results from last 7 days   Lab Units 12/24/23  1356   SODIUM mmol/L 142   POTASSIUM mmol/L 3.9   CHLORIDE mmol/L 106   CO2 mmol/L 23.0   BUN mg/dL 15   CREATININE mg/dL 0.84   GLUCOSE mg/dL 127*   CALCIUM mg/dL 8.9     No results found for: \"URICACID\"  No results found for: \"CRYSTAL\"  Microbiology Results (last 10 days)       ** No results found for the last 240 hours. **          Results from last 7 days   Lab Units 12/24/23  1356   INR  1.05     XR Hip With or Without Pelvis 2 - 3 View Right    Result Date: 12/24/2023  Impression: Acute non-comminuted right femoral neck fracture. Electronically Signed: Joshua Richmond MD  12/24/2023 1:58 PM EST  Workstation ID: [id number]    XR Chest 1 View    Result Date: 12/24/2023  1. Mild cardiomegaly, which appears new since 2017. 2. No acute chest findings. 3. Advanced degenerative changes of both shoulders. Electronically Signed: Shanique Mratinez MD  12/24/2023 1:57 PM EST  Workstation ID: [id number]     The labs, and x-rays for preoperative evaluation have been reviewed by me.     Assessment: right femoral neck fracture      Hip fracture    Essential hypertension    Mixed conductive and sensorineural hearing loss of both ears    Mild cognitive impairment      Plan:    Options and alternatives have been discussed in detail with the patient   The patient is indicated for a right hip hemiarthroplasty  .   The patient/family voiced understanding of the risks, benefits, and alternative forms of treatment that were discussed including but not limited to infection, DVT, pulmonary embolism, malunion, nonunion, leg length discrepancy, possibility of injury to nerves and vessels, periprosthetic fractures have been discussed in detail. Despite the above risks the patient/family consents to proceed with  hip surgical " intervention.   The patient is scheduled for the above surgery tentatively for December 25, 2023.   Patient will be made NPO.  Obtain informed consent.       Date: 12/24/2023    Edda Edmond MD      CC: Earnest Ingram DO; MD Mark Butler Daniel Alan, DO

## 2023-12-25 ENCOUNTER — ANESTHESIA (OUTPATIENT)
Dept: PERIOP | Facility: HOSPITAL | Age: 88
End: 2023-12-25
Payer: MEDICARE

## 2023-12-25 ENCOUNTER — APPOINTMENT (OUTPATIENT)
Dept: GENERAL RADIOLOGY | Facility: HOSPITAL | Age: 88
End: 2023-12-25
Payer: MEDICARE

## 2023-12-25 PROCEDURE — 99231 SBSQ HOSP IP/OBS SF/LOW 25: CPT | Performed by: INTERNAL MEDICINE

## 2023-12-25 PROCEDURE — 97530 THERAPEUTIC ACTIVITIES: CPT

## 2023-12-25 PROCEDURE — 25810000003 LACTATED RINGERS PER 1000 ML: Performed by: ANESTHESIOLOGY

## 2023-12-25 PROCEDURE — C1776 JOINT DEVICE (IMPLANTABLE): HCPCS | Performed by: ORTHOPAEDIC SURGERY

## 2023-12-25 PROCEDURE — 25010000002 PROPOFOL 10 MG/ML EMULSION: Performed by: ANESTHESIOLOGY

## 2023-12-25 PROCEDURE — 25010000002 HEPARIN (PORCINE) PER 1000 UNITS: Performed by: ORTHOPAEDIC SURGERY

## 2023-12-25 PROCEDURE — 25010000002 DEXAMETHASONE PER 1 MG: Performed by: ANESTHESIOLOGY

## 2023-12-25 PROCEDURE — 25010000002 CEFAZOLIN PER 500 MG: Performed by: ORTHOPAEDIC SURGERY

## 2023-12-25 PROCEDURE — 27236 TREAT THIGH FRACTURE: CPT | Performed by: PHYSICIAN ASSISTANT

## 2023-12-25 PROCEDURE — 0SRR0J9 REPLACEMENT OF RIGHT HIP JOINT, FEMORAL SURFACE WITH SYNTHETIC SUBSTITUTE, CEMENTED, OPEN APPROACH: ICD-10-PCS | Performed by: ORTHOPAEDIC SURGERY

## 2023-12-25 PROCEDURE — 25010000002 SUGAMMADEX 200 MG/2ML SOLUTION: Performed by: ANESTHESIOLOGY

## 2023-12-25 PROCEDURE — 25010000002 FENTANYL CITRATE (PF) 100 MCG/2ML SOLUTION: Performed by: ANESTHESIOLOGY

## 2023-12-25 PROCEDURE — 25010000002 FENTANYL CITRATE (PF) 50 MCG/ML SOLUTION

## 2023-12-25 PROCEDURE — 72170 X-RAY EXAM OF PELVIS: CPT

## 2023-12-25 PROCEDURE — 25010000002 FENTANYL CITRATE (PF) 50 MCG/ML SOLUTION: Performed by: ANESTHESIOLOGY

## 2023-12-25 PROCEDURE — C1713 ANCHOR/SCREW BN/BN,TIS/BN: HCPCS | Performed by: ORTHOPAEDIC SURGERY

## 2023-12-25 PROCEDURE — 97162 PT EVAL MOD COMPLEX 30 MIN: CPT

## 2023-12-25 PROCEDURE — 25010000002 ONDANSETRON PER 1 MG: Performed by: ANESTHESIOLOGY

## 2023-12-25 DEVICE — TOBRA FULL DOSE ANTIBIOTIC BONE CEMENT, 10 PACK CATALOG NUMBER IS 6197-9-010
Type: IMPLANTABLE DEVICE | Site: ACETABULUM | Status: FUNCTIONAL
Brand: SIMPLEX

## 2023-12-25 DEVICE — SUMMIT FEMORAL STEM 12/14 TAPER CEMENTED SIZE 5 STD 120MM
Type: IMPLANTABLE DEVICE | Site: ACETABULUM | Status: FUNCTIONAL
Brand: SUMMIT

## 2023-12-25 DEVICE — PRT HIP BIPOL PRIM DEPUY 9525109/9525107: Type: IMPLANTABLE DEVICE | Site: ACETABULUM | Status: FUNCTIONAL

## 2023-12-25 DEVICE — BONE PREPARATION KIT
Type: IMPLANTABLE DEVICE | Site: ACETABULUM | Status: FUNCTIONAL
Brand: BIOPREP

## 2023-12-25 DEVICE — SELF CENTERING BI-POLAR HEAD 28MM ID 47MM OD
Type: IMPLANTABLE DEVICE | Site: ACETABULUM | Status: FUNCTIONAL
Brand: SELF CENTERING

## 2023-12-25 DEVICE — CEMENTRALIZER STEM CENTRALIZER 10.0MM CEMENTED
Type: IMPLANTABLE DEVICE | Site: ACETABULUM | Status: FUNCTIONAL
Brand: CEMENTRALIZER

## 2023-12-25 DEVICE — ARTICUL/EZE FEMORAL HEAD DIAMETER 28MM +5 12/14 TAPER
Type: IMPLANTABLE DEVICE | Site: ACETABULUM | Status: FUNCTIONAL
Brand: ARTICUL/EZE

## 2023-12-25 RX ORDER — MAGNESIUM HYDROXIDE 1200 MG/15ML
LIQUID ORAL AS NEEDED
Status: DISCONTINUED | OUTPATIENT
Start: 2023-12-25 | End: 2023-12-25 | Stop reason: HOSPADM

## 2023-12-25 RX ORDER — TRANEXAMIC ACID 100 MG/ML
INJECTION, SOLUTION INTRAVENOUS AS NEEDED
Status: DISCONTINUED | OUTPATIENT
Start: 2023-12-25 | End: 2023-12-25 | Stop reason: SURG

## 2023-12-25 RX ORDER — ONDANSETRON 2 MG/ML
INJECTION INTRAMUSCULAR; INTRAVENOUS AS NEEDED
Status: DISCONTINUED | OUTPATIENT
Start: 2023-12-25 | End: 2023-12-25 | Stop reason: SURG

## 2023-12-25 RX ORDER — ROCURONIUM BROMIDE 10 MG/ML
INJECTION, SOLUTION INTRAVENOUS AS NEEDED
Status: DISCONTINUED | OUTPATIENT
Start: 2023-12-25 | End: 2023-12-25 | Stop reason: SURG

## 2023-12-25 RX ORDER — DEXAMETHASONE SODIUM PHOSPHATE 4 MG/ML
INJECTION, SOLUTION INTRA-ARTICULAR; INTRALESIONAL; INTRAMUSCULAR; INTRAVENOUS; SOFT TISSUE AS NEEDED
Status: DISCONTINUED | OUTPATIENT
Start: 2023-12-25 | End: 2023-12-25 | Stop reason: SURG

## 2023-12-25 RX ORDER — FAMOTIDINE 10 MG/ML
INJECTION, SOLUTION INTRAVENOUS
Status: COMPLETED
Start: 2023-12-25 | End: 2023-12-25

## 2023-12-25 RX ORDER — HYDROMORPHONE HYDROCHLORIDE 1 MG/ML
0.25 INJECTION, SOLUTION INTRAMUSCULAR; INTRAVENOUS; SUBCUTANEOUS
Status: DISCONTINUED | OUTPATIENT
Start: 2023-12-25 | End: 2023-12-25 | Stop reason: HOSPADM

## 2023-12-25 RX ORDER — SODIUM CHLORIDE 0.9 % (FLUSH) 0.9 %
10 SYRINGE (ML) INJECTION AS NEEDED
Status: DISCONTINUED | OUTPATIENT
Start: 2023-12-25 | End: 2023-12-25 | Stop reason: HOSPADM

## 2023-12-25 RX ORDER — SODIUM CHLORIDE 0.9 % (FLUSH) 0.9 %
10 SYRINGE (ML) INJECTION EVERY 12 HOURS SCHEDULED
Status: DISCONTINUED | OUTPATIENT
Start: 2023-12-25 | End: 2023-12-25 | Stop reason: HOSPADM

## 2023-12-25 RX ORDER — BUPIVACAINE HYDROCHLORIDE 2.5 MG/ML
30 INJECTION, SOLUTION EPIDURAL; INFILTRATION; INTRACAUDAL ONCE
Status: DISCONTINUED | OUTPATIENT
Start: 2023-12-25 | End: 2023-12-27 | Stop reason: HOSPADM

## 2023-12-25 RX ORDER — FENTANYL CITRATE 50 UG/ML
INJECTION, SOLUTION INTRAMUSCULAR; INTRAVENOUS
Status: COMPLETED
Start: 2023-12-25 | End: 2023-12-25

## 2023-12-25 RX ORDER — SODIUM CHLORIDE, SODIUM LACTATE, POTASSIUM CHLORIDE, CALCIUM CHLORIDE 600; 310; 30; 20 MG/100ML; MG/100ML; MG/100ML; MG/100ML
9 INJECTION, SOLUTION INTRAVENOUS CONTINUOUS PRN
Status: DISCONTINUED | OUTPATIENT
Start: 2023-12-25 | End: 2023-12-27 | Stop reason: HOSPADM

## 2023-12-25 RX ORDER — ONDANSETRON 2 MG/ML
4 INJECTION INTRAMUSCULAR; INTRAVENOUS ONCE AS NEEDED
Status: DISCONTINUED | OUTPATIENT
Start: 2023-12-25 | End: 2023-12-25 | Stop reason: HOSPADM

## 2023-12-25 RX ORDER — ACETAMINOPHEN 160 MG
TABLET,DISINTEGRATING ORAL AS NEEDED
Status: DISCONTINUED | OUTPATIENT
Start: 2023-12-25 | End: 2023-12-25 | Stop reason: HOSPADM

## 2023-12-25 RX ORDER — ACETAMINOPHEN 500 MG
1000 TABLET ORAL ONCE
Status: DISCONTINUED | OUTPATIENT
Start: 2023-12-25 | End: 2023-12-27 | Stop reason: HOSPADM

## 2023-12-25 RX ORDER — FAMOTIDINE 10 MG/ML
20 INJECTION, SOLUTION INTRAVENOUS
Status: COMPLETED | OUTPATIENT
Start: 2023-12-25 | End: 2023-12-25

## 2023-12-25 RX ORDER — TRANEXAMIC ACID 10 MG/ML
1000 INJECTION, SOLUTION INTRAVENOUS ONCE
Status: DISCONTINUED | OUTPATIENT
Start: 2023-12-25 | End: 2023-12-25 | Stop reason: HOSPADM

## 2023-12-25 RX ORDER — PROPOFOL 10 MG/ML
VIAL (ML) INTRAVENOUS AS NEEDED
Status: DISCONTINUED | OUTPATIENT
Start: 2023-12-25 | End: 2023-12-25 | Stop reason: SURG

## 2023-12-25 RX ORDER — DROPERIDOL 2.5 MG/ML
0.62 INJECTION, SOLUTION INTRAMUSCULAR; INTRAVENOUS ONCE AS NEEDED
Status: DISCONTINUED | OUTPATIENT
Start: 2023-12-25 | End: 2023-12-25 | Stop reason: HOSPADM

## 2023-12-25 RX ORDER — FENTANYL CITRATE 50 UG/ML
25 INJECTION, SOLUTION INTRAMUSCULAR; INTRAVENOUS
Status: DISCONTINUED | OUTPATIENT
Start: 2023-12-25 | End: 2023-12-25 | Stop reason: HOSPADM

## 2023-12-25 RX ORDER — FENTANYL CITRATE 50 UG/ML
INJECTION, SOLUTION INTRAMUSCULAR; INTRAVENOUS AS NEEDED
Status: DISCONTINUED | OUTPATIENT
Start: 2023-12-25 | End: 2023-12-25 | Stop reason: SURG

## 2023-12-25 RX ORDER — SODIUM CHLORIDE, SODIUM LACTATE, POTASSIUM CHLORIDE, CALCIUM CHLORIDE 600; 310; 30; 20 MG/100ML; MG/100ML; MG/100ML; MG/100ML
INJECTION, SOLUTION INTRAVENOUS CONTINUOUS PRN
Status: DISCONTINUED | OUTPATIENT
Start: 2023-12-25 | End: 2023-12-25 | Stop reason: SURG

## 2023-12-25 RX ORDER — PHENYLEPHRINE HCL IN 0.9% NACL 1 MG/10 ML
SYRINGE (ML) INTRAVENOUS AS NEEDED
Status: DISCONTINUED | OUTPATIENT
Start: 2023-12-25 | End: 2023-12-25 | Stop reason: SURG

## 2023-12-25 RX ADMIN — SODIUM CHLORIDE, POTASSIUM CHLORIDE, SODIUM LACTATE AND CALCIUM CHLORIDE: 600; 310; 30; 20 INJECTION, SOLUTION INTRAVENOUS at 10:40

## 2023-12-25 RX ADMIN — HYDROCODONE BITARTRATE AND ACETAMINOPHEN 1 TABLET: 5; 325 TABLET ORAL at 14:36

## 2023-12-25 RX ADMIN — Medication 10 ML: at 08:50

## 2023-12-25 RX ADMIN — FENTANYL CITRATE 25 MCG: 50 INJECTION, SOLUTION INTRAMUSCULAR; INTRAVENOUS at 13:25

## 2023-12-25 RX ADMIN — SODIUM CHLORIDE 2000 MG: 900 INJECTION INTRAVENOUS at 10:59

## 2023-12-25 RX ADMIN — Medication 10 ML: at 19:22

## 2023-12-25 RX ADMIN — TRANEXAMIC ACID 1000 MG: 100 INJECTION, SOLUTION INTRAVENOUS at 12:10

## 2023-12-25 RX ADMIN — HEPARIN SODIUM 5000 UNITS: 5000 INJECTION INTRAVENOUS; SUBCUTANEOUS at 14:36

## 2023-12-25 RX ADMIN — SODIUM CHLORIDE 2000 MG: 900 INJECTION INTRAVENOUS at 19:19

## 2023-12-25 RX ADMIN — FENTANYL CITRATE 25 MCG: 50 INJECTION, SOLUTION INTRAMUSCULAR; INTRAVENOUS at 13:14

## 2023-12-25 RX ADMIN — TRANEXAMIC ACID 1000 MG: 100 INJECTION, SOLUTION INTRAVENOUS at 11:00

## 2023-12-25 RX ADMIN — SUGAMMADEX 200 MG: 100 INJECTION, SOLUTION INTRAVENOUS at 12:21

## 2023-12-25 RX ADMIN — SODIUM CHLORIDE, POTASSIUM CHLORIDE, SODIUM LACTATE AND CALCIUM CHLORIDE 9 ML/HR: 600; 310; 30; 20 INJECTION, SOLUTION INTRAVENOUS at 08:50

## 2023-12-25 RX ADMIN — Medication 200 MCG: at 10:59

## 2023-12-25 RX ADMIN — FENTANYL CITRATE 100 MCG: 0.05 INJECTION, SOLUTION INTRAMUSCULAR; INTRAVENOUS at 10:46

## 2023-12-25 RX ADMIN — SENNOSIDES AND DOCUSATE SODIUM 2 TABLET: 8.6; 5 TABLET ORAL at 19:20

## 2023-12-25 RX ADMIN — FAMOTIDINE 20 MG: 10 INJECTION, SOLUTION INTRAVENOUS at 08:50

## 2023-12-25 RX ADMIN — HEPARIN SODIUM 5000 UNITS: 5000 INJECTION INTRAVENOUS; SUBCUTANEOUS at 19:20

## 2023-12-25 RX ADMIN — ROCURONIUM BROMIDE 50 MG: 10 SOLUTION INTRAVENOUS at 10:47

## 2023-12-25 RX ADMIN — FAMOTIDINE 20 MG: 10 INJECTION INTRAVENOUS at 08:50

## 2023-12-25 RX ADMIN — DEXAMETHASONE SODIUM PHOSPHATE 4 MG: 4 INJECTION, SOLUTION INTRAMUSCULAR; INTRAVENOUS at 10:46

## 2023-12-25 RX ADMIN — Medication 200 MCG: at 11:03

## 2023-12-25 RX ADMIN — DONEPEZIL HYDROCHLORIDE 2.5 MG: 5 TABLET, FILM COATED ORAL at 19:20

## 2023-12-25 RX ADMIN — ONDANSETRON 4 MG: 2 INJECTION INTRAMUSCULAR; INTRAVENOUS at 12:21

## 2023-12-25 RX ADMIN — PROPOFOL 100 MG: 10 INJECTION, EMULSION INTRAVENOUS at 10:46

## 2023-12-25 NOTE — ANESTHESIA PREPROCEDURE EVALUATION
Anesthesia Evaluation     Patient summary reviewed and Nursing notes reviewed   history of anesthetic complications:  PONV  NPO Solid Status: > 8 hours  NPO Liquid Status: > 8 hours           Airway   Mallampati: I  TM distance: >3 FB  Neck ROM: full  No difficulty expected  Dental      Pulmonary     breath sounds clear to auscultation  Cardiovascular     ECG reviewed  Rhythm: regular  Rate: normal    (+) hypertension, hyperlipidemia      Neuro/Psych  GI/Hepatic/Renal/Endo      Musculoskeletal     Abdominal    Substance History      OB/GYN          Other   arthritis,     ROS/Med Hx Other: EF normal; moderate AI                Anesthesia Plan    ASA 2     general     intravenous induction     Anesthetic plan, risks, benefits, and alternatives have been provided, discussed and informed consent has been obtained with: patient and child.    CODE STATUS:    Code Status (Patient has no pulse and is not breathing): CPR (Attempt to Resuscitate)  Medical Interventions (Patient has pulse or is breathing): Full Support

## 2023-12-25 NOTE — PLAN OF CARE
Goal Outcome Evaluation:  Plan of Care Reviewed With: patient, daughter           Outcome Evaluation: Pt presents with deficits in functional mobility d/t current medical status. Pt would benefit from skilled PT services to improve independence. Based on eval today, therapist would recommend inpt rehab; however, if pt progresses well, may be able to go home with family and HHPT. Dtr said that pt is very indep and may not want family to stay with her.      Anticipated Discharge Disposition (PT): inpatient rehabilitation facility (pending progress)

## 2023-12-25 NOTE — OP NOTE
Date of procedure:    Preop diagnosis: Displaced right femoral neck fracture    Postop diagnosis: Same    Procedure performed: right Hip hemiarthroplasty    Surgeon: Edda Edmond MD    Assistants: GRACIELA Otero..  Of note PA assistance was necessary for retraction, suctioning, leg holding/positioning, and suturing.    Anesthesia:    EBL:    Medications: 2 g of Ancef was given greater than 15 minutes prior to incision.  1 g of TXA was given at incision and closure.    Complications:    Indications for procedure:    89 year old female Who presented to the ER with a displaced femoral neck fracture after a ground level fall.  We discussed the indications, risks, benefits, alternatives and recovery in detail of a hip hemiarthroplasty.  Specific risks discussed include but not limited to bleeding, infection, pain, scarring, need for further surgery, nerve/blood vessel/tendon injury, fracture, dislocation, leg length discrepancy, blood clots, and the medical and anesthetic risks of surgery.  They gave informed consent and wished to proceed.    Description of procedure:    The patient was met in the preoperative holding area where the correct patient, side, site and procedure were confirmed.  The right hip was marked with an indelible marker.  With their permission they were taken back to the OR where anesthesia was induced.  They were positioned in the lateral decubitus position taking care to pad all bony prominences.  The hip was prepped and draped in the usual sterile fashion.  A formal timeout was performed with all team members to confirm the correct patient, side, site and procedure.    We began with a lateral based incision centered over the greater trochanter.  The gluteus ken fascia was split and the muscle was split proximally in line with the fibers.  A Charnley retractor was placed.  The underlying bursa was excised.  We identified the anterior border of the gluteus medius.  We then carefully took down  the anterior third of the medius/vastus lateralis.  This was tagged and retracted anteriorly.  We were careful not to split more than 5 cm proximal to the greater trochanter.  The leg was externally rotated.  The reflected head of the rectus was elevated off the capsule.  The capsule was split in line with the femoral neck taking care to protect the labrum.  The limbs of the capsule were tagged.  We then made a new neck cut at our templated height.  The fractured femoral head was removed and sized on the back table.  We then exposed the femur and began with a box osteotome followed by canal entry all followed by broaching.  We broached up to a size 5 which had enough stability for trialing and left enough cancellous bone for later cement interdigitation.  A trial head and neck was placed and the hip was reduced.  The hip was stable to greater than 90 degrees of flexion and greater than 70 degrees of internal rotation.  It did not impinge posteriorly.  When we are satisfied with this the hip was carefully dislocated.  The trials were removed.  A cement restrictor was placed distally in the femur.  The femoral canal was thoroughly irrigated.  Cement was then placed distally to proximally.  It was pressurized in place.  We then placed our final implant in our trial version.  Excess cement was removed.  It was held in place as the cement hardened.  We then placed the final 46 head over a cleaned and dried trunnion.  The hip was carefully reduced.  An x-ray was taken intraoperatively to confirm appropriate implant position.    The wound was again thoroughly irrigated.  The capsule was closed side to side with Ethibonds.  The anterior sleeve was repaired back to the greater trochanter with Ethibonds.  The gluteus ken fascia was closed with a #1 Quill.  Vicryl was used in the subcutaneous layer followed by a 3-0 Monocryl in the skin.  A sterile dressing was placed.    The patient was awoken from anesthesia and  transferred to recovery in good condition.  At the end of the procedure all sponge needle and instrument counts were correct.    I was present and scrubbed for all key aspects of the procedure.    Postoperative plan:    The patient will be weightbearing as tolerated on a walker at all times.  No extremes of motion.  We will consult PT and OT.  DVT prophylaxis will consist of SCDs at all times while in bed as well as ASA 81mg twice daily for 6 weeks  Antibiotics for 23 hours

## 2023-12-25 NOTE — PROGRESS NOTES
[facility] Medicine Services  PROGRESS NOTE    Patient Name: Marcelino Mesa  : 1927  MRN: [id number]    Date of Admission: 2023  Primary Care Physician: Earnest Ingram DO    Subjective   Subjective     CC:  F/u hip fracture    HPI:  Pain is okay as long as she lays still, nausea better. No other complaints, NPO for surgery      Objective   Objective     Vital Signs:   Temp:  [97.4 °F (36.3 °C)-99 °F (37.2 °C)] 97.6 °F (36.4 °C)  Heart Rate:  [65-91] 71  Resp:  [14-18] 17  BP: (121-184)/() 155/74  Flow (L/min):  [2-4] 3     Physical Exam:  Constitutional: Awake, alert, laying quietly in bed in NAD  HENT: NCAT, mucous membranes moist  Respiratory: Clear to auscultation bilaterally, respiratory effort normal   Cardiovascular: RRR, palpable pedal pulses  Gastrointestinal: Positive bowel sounds, soft, nontender, nondistended  Musculoskeletal: No LE edema  Psychiatric: Appropriate affect, cooperative  Neurologic: Hard of hearing    Results Reviewed:  LAB RESULTS:      Lab 23  1356   WBC 7.55   HEMOGLOBIN 13.3   HEMATOCRIT 41.1   PLATELETS 234   NEUTROS ABS 5.41   IMMATURE GRANS (ABS) 0.04   LYMPHS ABS 1.50   MONOS ABS 0.49   EOS ABS 0.04   MCV 95.1   PROTIME 13.8         Lab 23  1356   SODIUM 142   POTASSIUM 3.9   CHLORIDE 106   CO2 23.0   ANION GAP 13.0   BUN 15   CREATININE 0.84   EGFR 63.7   GLUCOSE 127*   CALCIUM 8.9         Lab 23  1356   TOTAL PROTEIN 6.3   ALBUMIN 4.0   GLOBULIN 2.3   ALT (SGPT) 11   AST (SGOT) 18   BILIRUBIN 0.6   ALK PHOS 81         Lab 23  1356   PROTIME 13.8   INR 1.05             Lab 23  1356   ABO TYPING A   RH TYPING Positive   ANTIBODY SCREEN Negative         Brief Urine Lab Results  (Last result in the past 365 days)        Color   Clarity   Blood   Leuk Est   Nitrite   Protein   CREAT   Urine HCG        23 1843 Yellow   Clear   Negative   Negative   Negative   Negative                   Microbiology  Results Abnormal       None            XR Pelvis 1 or 2 View    Result Date: 12/25/2023  XR PELVIS 1 OR 2 VW Date of Exam: 12/25/2023 11:30 AM EST Indication: michael hip Comparison: Pelvis right hip December 24, 2023 Findings: Exam is limited by positioning and includes the lower pelvic hip areas. Patient has had total left hip arthroplasty. There are changes from right hip hemiarthroplasty intraoperatively. The hip area is obscured by external hardware limiting assessment of this area. More dedicated follow-up radiographs suggested to better assess the right hip area following procedure.     Impression: Impression: 1.There are changes from hemiarthroplasty procedure intraoperatively. Extraneous hardware makes assessment of the hip limited. Electronically Signed: Darron Ugalde MD  12/25/2023 12:17 PM EST  Workstation ID: [id number]    XR Femur 2 View Right    Result Date: 12/24/2023  XR FEMUR 2 VW RIGHT Date of Exam: 12/24/2023 3:50 PM EST Indication: Right femoral neck fracture, evaluate remaining right femur for additional fractures. Comparison: Right knee x-ray performed on 09/22/2016 and right hip x-ray performed on 12/24/2023. Findings: There is again a nondisplaced transverse fracture through the right femoral neck. There are no additional acute bony abnormalities. There is moderately advanced degenerative arthritis of the right hip joint. The patient has had a previous right knee hemiarthroplasty. There are degenerative changes involving the lateral and patellofemoral joint space compartments. The bony structures are demineralized. There is scattered atherosclerotic vascular calcifications.     Impression: Impression: 1. Nondisplaced transverse fracture of the right femoral neck similar to the previous right hip x-ray. 2. No additional acute bony abnormalities. 3. Intact right knee hemiarthroplasty. 4. Bony demineralization and degenerative changes. Electronically Signed: Norman Lozano MD  12/24/2023 4:30 PM EST   Workstation ID: [id number]    XR Hip With or Without Pelvis 2 - 3 View Right    Result Date: 12/24/2023  XR HIP W OR WO PELVIS 2-3 VIEW RIGHT Date of Exam: 12/24/2023 1:38 PM EST Indication: fall w/ right hip pain Comparison: 8/10/2017 Findings: Non-comminuted subcapital right femoral neck fracture is seen. Left hip prosthesis remains in anatomic alignment. Bony structures also appear markedly osteopenic but intact.     Impression: Impression: Acute non-comminuted right femoral neck fracture. Electronically Signed: Joshua Richmond MD  12/24/2023 1:58 PM EST  Workstation ID: [id number]    XR Chest 1 View    Result Date: 12/24/2023  XR CHEST 1 VW Date of Exam: 12/24/2023 1:38 PM EST Indication: pre-op Comparison: PA and lateral chest 8/4/2017 Findings: Low volume inspiration. Mild cardiac enlargement appears new or increased since the 2017 comparison. Generalized interstitial prominence, thought to be chronic. No acute airspace disease or significant pleural fluid collection is identified. Severe degenerative changes are suggested in both shoulders. No acute osseous abnormalities.     Impression: 1. Mild cardiomegaly, which appears new since 2017. 2. No acute chest findings. 3. Advanced degenerative changes of both shoulders. Electronically Signed: Shanique Martinez MD  12/24/2023 1:57 PM EST  Workstation ID: [id number]     Results for orders placed during the hospital encounter of 11/22/23    Adult Transthoracic Echo Complete W/ Cont if Necessary Per Protocol    Interpretation Summary    Left ventricular ejection fraction appears to be 66 - 70%.    Left ventricular wall thickness is consistent with mild concentric hypertrophy.    Left ventricular diastolic function is consistent with (grade I) impaired relaxation.    The right ventricular cavity is mildly dilated.    The left atrial cavity is mildly dilated.    Left atrial volume is mildly increased.    The right atrial cavity is mildly  dilated.    Moderate aortic valve  regurgitation is present.    Estimated right ventricular systolic pressure from tricuspid regurgitation is mildly elevated (35-45 mmHg).    Mild dilation of the ascending aorta is present.      Current medications:  Scheduled Meds:acetaminophen, 1,000 mg, Oral, Once  bupivacaine (PF), 30 mL, Injection, Once  ceFAZolin, 2,000 mg, Intravenous, Q8H  donepezil, 2.5 mg, Oral, Nightly  heparin (porcine), 5,000 Units, Subcutaneous, Q8H  losartan, 50 mg, Oral, Daily  senna-docusate sodium, 2 tablet, Oral, BID  sodium chloride, 10 mL, Intravenous, Q12H      Continuous Infusions:lactated ringers, 9 mL/hr, Last Rate: 9 mL/hr (12/25/23 0850)      PRN Meds:.  acetaminophen **OR** acetaminophen **OR** acetaminophen    senna-docusate sodium **AND** polyethylene glycol **AND** bisacodyl **AND** bisacodyl    Calcium Replacement - Follow Nurse / BPA Driven Protocol    HYDROcodone-acetaminophen    lactated ringers    Magnesium Low Dose Replacement - Follow Nurse / BPA Driven Protocol    Morphine    Phosphorus Replacement - Follow Nurse / BPA Driven Protocol    Potassium Replacement - Follow Nurse / BPA Driven Protocol    [COMPLETED] Insert Peripheral IV **AND** sodium chloride    sodium chloride    sodium chloride    Assessment & Plan   Assessment & Plan     Active Hospital Problems    Diagnosis  POA    **Hip fracture [S72.009A]  Yes    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        Brief Hospital Course to date:  Marcelino Mesa is a 89 y.o. female w/ HTN, memory impairment, prior LT hip Fx, who sustained a mechanical fall w/ RT hip fracture     Assessment/Plan     Acute RT non-commuted femoral neck Fx following mechanical fall  -oral/IV pain control  -plan for operative management w/ Dr. Cerna this AM  -PT/OT post-op     HTN  -home losartan     Memory  impairment  -home donepezil    Expected Discharge Location and Transportation: pending post-op course  Expected Discharge   Expected Discharge Date: 12/26/2023; Expected Discharge Time:      DVT prophylaxis:  Medical DVT prophylaxis orders are present.     AM-PAC 6 Clicks Score (PT): 8 (12/24/23 2000)    CODE STATUS:   Code Status and Medical Interventions:   Ordered at: 12/24/23 1448     Code Status (Patient has no pulse and is not breathing):    CPR (Attempt to Resuscitate)     Medical Interventions (Patient has pulse or is breathing):    Full Support       Zackary Flores, DO  12/25/23

## 2023-12-25 NOTE — ANESTHESIA PROCEDURE NOTES
Airway  Urgency: elective    Date/Time: 12/25/2023 10:48 AM  Airway not difficult    General Information and Staff    Patient location during procedure: OR    Indications and Patient Condition  Indications for airway management: airway protection    Preoxygenated: yes  MILS not maintained throughout  Mask difficulty assessment: 1 - vent by mask    Final Airway Details  Final airway type: endotracheal airway      Successful airway: ETT  Cuffed: yes   Successful intubation technique: direct laryngoscopy  Endotracheal tube insertion site: oral  Blade: Yumiko  Blade size: 3  ETT size (mm): 7.0  Cormack-Lehane Classification: grade I - full view of glottis  Placement verified by: chest auscultation and capnometry   Measured from: lips  ETT/EBT  to lips (cm): 20  Number of attempts at approach: 1  Assessment: lips, teeth, and gum same as pre-op and atraumatic intubation    Additional Comments  Negative epigastric sounds, Breath sound equal bilaterally with symmetric chest rise and fall

## 2023-12-25 NOTE — THERAPY EVALUATION
Patient Name: Marcelino Mesa  : 1927    MRN: [id number]                              Today's Date: 2023       Admit Date: 2023    Visit Dx:     ICD-10-CM ICD-9-CM   1. Closed nondisplaced basicervical fracture of right femur, initial encounter  S72.044A 820.03     Patient Active Problem List   Diagnosis    Abnormal gait    Generalized osteoarthritis    Hyperlipidemia    Iron deficiency anemia    Macular degeneration    Scoliosis of lumbar spine    Post-menopausal osteoporosis    Vitamin D deficiency    Essential hypertension    Preventative health care    Incomplete RBBB    Primary osteoarthritis of both knees    Anxiety    Amaurosis fugax    Bladder incontinence    Arthritis of right acromioclavicular joint    Mixed conductive and sensorineural hearing loss of both ears    Mild cognitive impairment    Hip fracture     Past Medical History:   Diagnosis Date    Amaurosis fugax of left eye 2017    5 minutes at home    Compression fracture of L1 lumbar vertebra Remote    Chronic lower back pain    Generalized osteoarthritis Adulthood    Bilateral knee replacements    Hearing impairment 2000    Wears hearing aids    Herpes zoster     Hip fracture, left 2014    Hip pinning with persistent pain    Hyperlipidemia Adulthood    Mild disease not requiring drug therapy    Hypertension 2016    Well compensated on medication    Iron deficiency 2017    Long-term oral replacement    Lipoma of right shoulder     MRI to confirm 2023    Lumbar scoliosis Adulthood    Mild disease    Lumbar spondylosis 2015    Status post injection therapy    Macular degeneration     PONV (postoperative nausea and vomiting)     preprocedural medications will help     Vitamin D deficiency     Wrist fracture, left      Past Surgical History:   Procedure Laterality Date    CATARACT EXTRACTION EXTRACAPSULAR W/ INTRAOCULAR LENS IMPLANTATION Bilateral 2016    COLONOSCOPY  2006    Normal study    HIP FRACTURE SURGERY Left       hip pinning for acute fracture    KNEE ARTHROPLASTY UNICOMPARTMENTAL Right 9/22/2016    Procedure: RIGHT KNEE ARTHROPLASTY UNICOMPARTMENTAL;  Surgeon: Jesse Taveras MD;  Location:  [facility];  Service:     KNEE ARTHROPLASTY UNICOMPARTMENTAL Left 12/1/2016    Procedure: LEFT KNEE ARTHROPLASTY UNICOMPARTMENTAL;  Surgeon: Jesse Taveras MD;  Location:  [facility];  Service:     LUMBAR EPIDURAL INJECTION  2015    TONSILLECTOMY  1947    TOTAL HIP ARTHROPLASTY Left 8/10/2017    Procedure: REMOVAL OF HARDWARE LEFT HIP, LEFT ANTERIOR TOTAL HIP ARTHROPLASTY;  Surgeon: Jesse Taveras MD;  Location:  [facility];  Service:       General Information       Row Name 12/25/23 1450          Physical Therapy Time and Intention    Document Type evaluation  -     Mode of Treatment physical therapy  -       Row Name 12/25/23 1450          General Information    Patient Profile Reviewed yes  -LS     Prior Level of Function independent:;gait;transfer;bed mobility;driving  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 indep without an a.d. at baseline. owns rollator and non-rolling walker.  -LS     Existing Precautions/Restrictions hip;right;hip, posterior;oxygen therapy device and L/min;other (see comments)  Per op note, the patient will be weightbearing as tolerated on a walker at all times; No extremes of motion. 3L O2/NC.  [place]  -       Row Name 12/25/23 1450          Living Environment    People in Home alone  -       Row Name 12/25/23 1450          Home Main Entrance    Number of Stairs, Main Entrance other (see comments)  ramp  -       Row Name 12/25/23 1450          Stairs Within Home, Primary    Stairs, Within Home, Primary has a chair lift to the basement  -       Row Name 12/25/23 1450          Cognition    Orientation Status (Cognition) oriented x 4  -       Row Name 12/25/23 1450          Safety Issues, Functional Mobility    Impairments Affecting Function (Mobility) balance;endurance/activity tolerance;shortness of  breath;strength  -LS               User Key  (r) = Recorded By, (t) = Taken By, (c) = Cosigned By      Initials Name Provider Type    Viktoriya Palencia PT Physical Therapist                   Mobility       Row Name 12/25/23 1450          Bed Mobility    Bed Mobility supine-sit;sit-supine  -LS     Supine-Sit [place] (Bed Mobility) verbal cues;moderate assist (50% patient effort)  -LS     Assistive Device (Bed Mobility) bed rails;head of bed elevated  -LS     Comment, (Bed Mobility) Pt needed assist to bring RLE off EOB and to bring trunk upright. Leg  issued prior to therapist leaving room; dtr said pt had one before but has lost it.  -LS       Row Name 12/25/23 1450          Bed-Chair Transfer    Bed-Chair [place] (Transfers) verbal cues;minimum assist (75% patient effort)  -LS     Assistive Device (Bed-Chair Transfers) walker, front-wheeled  -LS     Comment, (Bed-Chair Transfer) bed to chair; chair to bedside commode; bedside commode to bed.  -LS       Row Name 12/25/23 1450          Sit-Stand Transfer    Sit-Stand [place] (Transfers) verbal cues;minimum assist (75% patient effort)  -LS     Assistive Device (Sit-Stand Transfers) walker, michael  -LS       Row Name 12/25/23 1450          Gait/Stairs (Locomotion)    [place] Level (Gait) verbal cues;minimum assist (75% patient effort)  -LS     Assistive Device (Gait) walker, front-wheeled  -LS     Patient was able to Ambulate yes  -LS     Distance in Feet (Gait) 5  -LS     Deviations/Abnormal Patterns (Gait) gait speed decreased;bilateral deviations;stride length decreased  -LS     Right Sided Gait Deviations weight shift ability decreased  -LS     Comment, (Gait/Stairs) vcs for sequencing of gait with RW  -LS               User Key  (r) = Recorded By, (t) = Taken By, (c) = Cosigned By      Initials Name Provider Type    Viktoriya Palencia PT Physical Therapist                   Obj/Interventions       Row Name 12/25/23 9595           Range of Motion Comprehensive    Comment, General Range of Motion LLE AROM WFL. Did not flex R hip past 90 degrees. R knee and ankle AROM WFL.  -       Row Name 12/25/23 1450          Strength Comprehensive (MMT)    Comment, General Manual Muscle Testing (MMT) Assessment Deferred RLE MMT. Pt is able to extend R knee against gravity and do a R SLR with min A. L hip flexors, knee extensors, and ankle dorsiflexors 4/5.  -       Row Name 12/25/23 1450          Balance    Balance Assessment standing static balance;standing dynamic balance  -LS     Static Standing Balance contact guard  -LS     Dynamic Standing Balance minimal assist  -LS     Balance Interventions standing;sit to stand;supported;weight shifting activity  -LS               User Key  (r) = Recorded By, (t) = Taken By, (c) = Cosigned By      Initials Name Provider Type    Viktoriya Palencia, PT Physical Therapist                   Goals/Plan       Row Name 12/25/23 1450          Bed Mobility Goal 1 (PT)    Activity/Assistive Device (Bed Mobility Goal 1, PT) sit to supine/supine to sit  -LS     [place] Level/Cues Needed (Bed Mobility Goal 1, PT) independent  -LS     Time Frame (Bed Mobility Goal 1, PT) 10 days  -LS     Progress/Outcomes (Bed Mobility Goal 1, PT) goal ongoing  -       Row Name 12/25/23 1450          Transfer Goal 1 (PT)    Activity/Assistive Device (Transfer Goal 1, PT) sit-to-stand/stand-to-sit;walker, rolling  -LS     [place] Level/Cues Needed (Transfer Goal 1, PT) modified independence  -LS     Time Frame (Transfer Goal 1, PT) 10 days  -LS     Progress/Outcome (Transfer Goal 1, PT) goal ongoing  -       Row Name 12/25/23 1450          Gait Training Goal 1 (PT)    Activity/Assistive Device (Gait Training Goal 1, PT) gait (walking locomotion);assistive device use;walker, rolling  -LS     [place] Level (Gait Training Goal 1, PT) modified independence  -LS     Distance (Gait Training Goal 1, PT) 100  -LS     Time  Frame (Gait Training Goal 1, PT) 10 days  -LS     Progress/Outcome (Gait Training Goal 1, PT) goal ongoing  -       Row Name 12/25/23 1450          Therapy Assessment/Plan (PT)    Planned Therapy Interventions (PT) balance training;bed mobility training;gait training;home exercise program;patient/family education;strengthening;transfer training  -               User Key  (r) = Recorded By, (t) = Taken By, (c) = Cosigned By      Initials Name Provider Type    Viktoriya Palencia, PT Physical Therapist                   Clinical Impression       Row Name 12/25/23 1450          Pain    Pretreatment Pain Rating 0/10 - no pain  -LS     Posttreatment Pain Rating 0/10 - no pain  -LS       Row Name 12/25/23 1450          Plan of Care Review    Plan of Care Reviewed With patient;daughter  -     Outcome Evaluation Pt presents with deficits in functional mobility d/t current medical status. Pt would benefit from skilled PT services to improve independence. Based on eval today, therapist would recommend inpt rehab; however, if pt progresses well, may be able to go home with family and HHPT. Dtr said that pt is very indep and may not want family to stay with her.  -       Row Name 12/25/23 1450          Therapy Assessment/Plan (PT)    Rehab Potential (PT) good, to achieve stated therapy goals  -LS     Criteria for Skilled Interventions Met (PT) yes;meets criteria  -     Therapy Frequency (PT) 2 times/day  -       Row Name 12/25/23 1450          Positioning and Restraints    Pre-Treatment Position in bed  -LS     Post Treatment Position chair  -LS     In Chair sitting;call light within reach;encouraged to call for assist;notified nsg;exit alarm on;with family/caregiver;waffle cushion;legs elevated;heels elevated  -               User Key  (r) = Recorded By, (t) = Taken By, (c) = Cosigned By      Initials Name Provider Type    Viktoriya Palencia, PT Physical Therapist                   Outcome Measures        Row Name 12/25/23 1550          How much help from another person do you currently need...    Turning from your back to your side while in flat bed without using bedrails? 3  -LS     Moving from lying on back to sitting on the side of a flat bed without bedrails? 2  -LS     Moving to and from a bed to a chair (including a wheelchair)? 3  -LS     Standing up from a chair using your arms (e.g., wheelchair, bedside chair)? 3  -LS     Climbing 3-5 steps with a railing? 2  -LS     To walk in hospital room? 2  -LS     AM-PAC 6 Clicks Score (PT) 15  -LS     Highest Level of Mobility Goal 4 --> Transfer to chair/commode  -       Row Name 12/25/23 2890          Functional Assessment    Outcome Measure Options AM-PAC 6 Clicks Basic Mobility (PT)  -               User Key  (r) = Recorded By, (t) = Taken By, (c) = Cosigned By      Initials Name Provider Type    Viktoriya Palencia, PT Physical Therapist                                 Physical Therapy Education       Title: PT OT SLP Therapies (In Progress)       Topic: Physical Therapy (In Progress)       Point: Mobility training (Done)       Learning Progress Summary             Patient Acceptance, E, VU,NR by  at 12/25/2023 1450                         Point: Home exercise program (Not Started)       Learner Progress:  Not documented in this visit.              Point: Body mechanics (Not Started)       Learner Progress:  Not documented in this visit.              Point: Precautions (Not Started)       Learner Progress:  Not documented in this visit.                              User Key       Initials Effective Dates Name Provider Type Discipline     07/11/23 -  Viktoriya Ramirez, PT Physical Therapist PT                  PT Recommendation and Plan  Planned Therapy Interventions (PT): balance training, bed mobility training, gait training, home exercise program, patient/family education, strengthening, transfer training  Plan of Care Reviewed With: patient,  daughter  Outcome Evaluation: Pt presents with deficits in functional mobility d/t current medical status. Pt would benefit from skilled PT services to improve independence. Based on eval today, therapist would recommend inpt rehab; however, if pt progresses well, may be able to go home with family and HHPT. Dtr said that pt is very indep and may not want family to stay with her.     Time Calculation:   PT Evaluation Complexity  History, PT Evaluation Complexity: 3 or more personal factors and/or comorbidities  Examination of Body Systems (PT Eval Complexity): total of 4 or more elements  Clinical Presentation (PT Evaluation Complexity): evolving  Clinical Decision Making (PT Evaluation Complexity): moderate complexity  Overall Complexity (PT Evaluation Complexity): moderate complexity     PT Charges       Row Name 12/25/23 1450             Time Calculation    Start Time 1450  -LS      PT Received On 12/25/23  -LS      PT Goal Re-Cert Due Date 01/04/24  -LS         Timed Charges    54459 - PT Therapeutic Activity Minutes 25  -LS         Untimed Charges    PT Eval/Re-eval Minutes 55  -LS         Total Minutes    Timed Charges Total Minutes 25  -LS      Untimed Charges Total Minutes 55  -LS       Total Minutes 80  -LS                User Key  (r) = Recorded By, (t) = Taken By, (c) = Cosigned By      Initials Name Provider Type     Viktoriya Ramirez, PT Physical Therapist                  Therapy Charges for Today       Code Description Service Date Service Provider Modifiers Qty    62022184945 HC PT EVAL MOD COMPLEXITY 4 12/25/2023 Viktoriya Ramirez, PT GP 1    93931477323 HC PT THERAPEUTIC ACT EA 15 MIN 12/25/2023 Viktoriya Ramirez, PT GP 2            PT G-Codes  Outcome Measure Options: AM-PAC 6 Clicks Basic Mobility (PT)  AM-PAC 6 Clicks Score (PT): 15  PT Discharge Summary  Anticipated Discharge Disposition (PT): inpatient rehabilitation facility (pending progress)    Viktoriya Ramirez  PT  12/25/2023

## 2023-12-25 NOTE — PLAN OF CARE
SBP down to 125 from 170. Patient rests in bed without sx/si of pain/acute distress at this time. A&O x 4 with forgetfulness. Speech clear/spontaneous with delayed response, possibly due to hearing impairment. VSS on 2L. SR with first degree AV block on cardiac mx. Call light in reach.

## 2023-12-25 NOTE — ANESTHESIA POSTPROCEDURE EVALUATION
Patient: Marcelino Mesa    Procedure Summary       Date: 12/25/23 [facility]    Anesthesia Start: 1040 Anesthesia Stop: 1300    Procedure: HIP HEMIARTHROPLASTY (Right: Wrist) Diagnosis:     Surgeons: Edda Edmond MD Provider: Hernán Cazares MD    Anesthesia Type: general ASA Status: 2            Anesthesia Type: general    Vitals  No vitals data found for the desired time range.          Post Anesthesia Care and Evaluation    Patient location during evaluation: PACU  Patient participation: complete - patient participated  Level of consciousness: awake and alert  Pain score: 0  Pain management: adequate    Airway patency: patent  Anesthetic complications: No anesthetic complications  PONV Status: none  Cardiovascular status: hemodynamically stable and acceptable  Respiratory status: nonlabored ventilation, acceptable and nasal cannula  Hydration status: acceptable

## 2023-12-26 LAB
ANION GAP SERPL CALCULATED.3IONS-SCNC: 12 MMOL/L (ref 5–15)
BUN SERPL-MCNC: 17 MG/DL (ref 8–23)
BUN/CREAT SERPL: 19.1 (ref 7–25)
CALCIUM SPEC-SCNC: 8.3 MG/DL (ref 8.2–9.6)
CHLORIDE SERPL-SCNC: 104 MMOL/L (ref 98–107)
CO2 SERPL-SCNC: 20 MMOL/L (ref 22–29)
CREAT SERPL-MCNC: 0.89 MG/DL (ref 0.57–1)
DEPRECATED RDW RBC AUTO: 48.7 FL (ref 37–54)
EGFRCR SERPLBLD CKD-EPI 2021: 59.4 ML/MIN/1.73
ERYTHROCYTE [DISTWIDTH] IN BLOOD BY AUTOMATED COUNT: 13.6 % (ref 12.3–15.4)
GLUCOSE SERPL-MCNC: 86 MG/DL (ref 65–99)
HCT VFR BLD AUTO: 36.1 % (ref 34–46.6)
HGB BLD-MCNC: 11.5 G/DL (ref 12–15.9)
MCH RBC QN AUTO: 31 PG (ref 26.6–33)
MCHC RBC AUTO-ENTMCNC: 31.9 G/DL (ref 31.5–35.7)
MCV RBC AUTO: 97.3 FL (ref 79–97)
PLATELET # BLD AUTO: 156 10*3/MM3 (ref 140–450)
PMV BLD AUTO: 10.9 FL (ref 6–12)
POTASSIUM SERPL-SCNC: 4.5 MMOL/L (ref 3.5–5.2)
RBC # BLD AUTO: 3.71 10*6/MM3 (ref 3.77–5.28)
SODIUM SERPL-SCNC: 136 MMOL/L (ref 136–145)
WBC NRBC COR # BLD AUTO: 9.46 10*3/MM3 (ref 3.4–10.8)

## 2023-12-26 PROCEDURE — 85027 COMPLETE CBC AUTOMATED: CPT | Performed by: INTERNAL MEDICINE

## 2023-12-26 PROCEDURE — 25010000002 CEFAZOLIN PER 500 MG: Performed by: ORTHOPAEDIC SURGERY

## 2023-12-26 PROCEDURE — 97116 GAIT TRAINING THERAPY: CPT

## 2023-12-26 PROCEDURE — 99232 SBSQ HOSP IP/OBS MODERATE 35: CPT | Performed by: INTERNAL MEDICINE

## 2023-12-26 PROCEDURE — 80048 BASIC METABOLIC PNL TOTAL CA: CPT | Performed by: INTERNAL MEDICINE

## 2023-12-26 PROCEDURE — 97110 THERAPEUTIC EXERCISES: CPT

## 2023-12-26 PROCEDURE — 25010000002 HEPARIN (PORCINE) PER 1000 UNITS: Performed by: ORTHOPAEDIC SURGERY

## 2023-12-26 RX ADMIN — SODIUM CHLORIDE 2000 MG: 900 INJECTION INTRAVENOUS at 03:49

## 2023-12-26 RX ADMIN — LOSARTAN POTASSIUM 50 MG: 50 TABLET, FILM COATED ORAL at 08:55

## 2023-12-26 RX ADMIN — HYDROCODONE BITARTRATE AND ACETAMINOPHEN 1 TABLET: 5; 325 TABLET ORAL at 08:56

## 2023-12-26 RX ADMIN — HEPARIN SODIUM 5000 UNITS: 5000 INJECTION INTRAVENOUS; SUBCUTANEOUS at 15:12

## 2023-12-26 RX ADMIN — HEPARIN SODIUM 5000 UNITS: 5000 INJECTION INTRAVENOUS; SUBCUTANEOUS at 05:04

## 2023-12-26 RX ADMIN — HEPARIN SODIUM 5000 UNITS: 5000 INJECTION INTRAVENOUS; SUBCUTANEOUS at 20:06

## 2023-12-26 RX ADMIN — DONEPEZIL HYDROCHLORIDE 2.5 MG: 5 TABLET, FILM COATED ORAL at 20:06

## 2023-12-26 RX ADMIN — SENNOSIDES AND DOCUSATE SODIUM 2 TABLET: 8.6; 5 TABLET ORAL at 20:06

## 2023-12-26 RX ADMIN — HYDROCODONE BITARTRATE AND ACETAMINOPHEN 1 TABLET: 5; 325 TABLET ORAL at 01:18

## 2023-12-26 RX ADMIN — HYDROCODONE BITARTRATE AND ACETAMINOPHEN 1 TABLET: 5; 325 TABLET ORAL at 22:52

## 2023-12-26 NOTE — PROGRESS NOTES
[facility]  PROGRESS NOTE    Patient Name: Marcelino Mesa  : 1927  MRN: [id number]    Date of Admission: 2023  Primary Care Physician: Earnest Ingram DO    Subjective   Subjective     CC:  F/u hip fracture    HPI:  Patient resting in bedside chair, just worked w/PT. Very hard of hearing, denies pain.      Objective   Objective     Vital Signs:   Temp:  [97.6 °F (36.4 °C)-98.5 °F (36.9 °C)] 98.2 °F (36.8 °C)  Heart Rate:  [58-75] 66  Resp:  [14-17] 16  BP: (115-163)/() 130/70  Flow (L/min):  [2-4] 2     Physical Exam:  Constitutional: No acute distress, awake, alert, sitting up in bed. Very hard of hearing  HENT: NCAT, mucous membranes moist  Respiratory: Clear to auscultation bilaterally, respiratory effort normal   Cardiovascular: RRR, no murmurs, rubs, or gallops  Gastrointestinal: soft, nontender, nondistended  Musculoskeletal: No bilateral ankle edema  Psychiatric: Appropriate affect, cooperative  Neurologic: Oriented x 3, speech clear, no focal deficits  Skin: No rashes      Results Reviewed:  LAB RESULTS:      Lab 23  0612 23  1356   WBC 9.46 7.55   HEMOGLOBIN 11.5* 13.3   HEMATOCRIT 36.1 41.1   PLATELETS 156 234   NEUTROS ABS  --  5.41   IMMATURE GRANS (ABS)  --  0.04   LYMPHS ABS  --  1.50   MONOS ABS  --  0.49   EOS ABS  --  0.04   MCV 97.3* 95.1   PROTIME  --  13.8         Lab 23  0611 23  1356   SODIUM 136 142   POTASSIUM 4.5 3.9   CHLORIDE 104 106   CO2 20.0* 23.0   ANION GAP 12.0 13.0   BUN 17 15   CREATININE 0.89 0.84   EGFR 59.4* 63.7   GLUCOSE 86 127*   CALCIUM 8.3 8.9         Lab 23  1356   TOTAL PROTEIN 6.3   ALBUMIN 4.0   GLOBULIN 2.3   ALT (SGPT) 11   AST (SGOT) 18   BILIRUBIN 0.6   ALK PHOS 81         Lab 12/24/23  1356   PROTIME 13.8   INR 1.05             Lab 23  1356   ABO TYPING A   RH TYPING Positive   ANTIBODY SCREEN Negative         Brief Urine Lab Results  (Last result in the past  365 days)        Color   Clarity   Blood   Leuk Est   Nitrite   Protein   CREAT   Urine HCG        12/24/23 1843 Yellow   Clear   Negative   Negative   Negative   Negative                   Microbiology Results Abnormal       None            XR Pelvis 1 or 2 View    Result Date: 12/25/2023  XR PELVIS 1 OR 2 VW Date of Exam: 12/25/2023 11:30 AM EST Indication: michael hip Comparison: Pelvis right hip December 24, 2023 Findings: Exam is limited by positioning and includes the lower pelvic hip areas. Patient has had total left hip arthroplasty. There are changes from right hip hemiarthroplasty intraoperatively. The hip area is obscured by external hardware limiting assessment of this area. More dedicated follow-up radiographs suggested to better assess the right hip area following procedure.     Impression: Impression: 1.There are changes from hemiarthroplasty procedure intraoperatively. Extraneous hardware makes assessment of the hip limited. Electronically Signed: Darron Ugalde MD  12/25/2023 12:17 PM EST  Workstation ID: [id number]    XR Femur 2 View Right    Result Date: 12/24/2023  XR FEMUR 2 VW RIGHT Date of Exam: 12/24/2023 3:50 PM EST Indication: Right femoral neck fracture, evaluate remaining right femur for additional fractures. Comparison: Right knee x-ray performed on 09/22/2016 and right hip x-ray performed on 12/24/2023. Findings: There is again a nondisplaced transverse fracture through the right femoral neck. There are no additional acute bony abnormalities. There is moderately advanced degenerative arthritis of the right hip joint. The patient has had a previous right knee hemiarthroplasty. There are degenerative changes involving the lateral and patellofemoral joint space compartments. The bony structures are demineralized. There is scattered atherosclerotic vascular calcifications.     Impression: Impression: 1. Nondisplaced transverse fracture of the right femoral neck similar to the previous right hip  x-ray. 2. No additional acute bony abnormalities. 3. Intact right knee hemiarthroplasty. 4. Bony demineralization and degenerative changes. Electronically Signed: Norman Lozano MD  12/24/2023 4:30 PM EST  Workstation ID: [id number]    XR Hip With or Without Pelvis 2 - 3 View Right    Result Date: 12/24/2023  XR HIP W OR WO PELVIS 2-3 VIEW RIGHT Date of Exam: 12/24/2023 1:38 PM EST Indication: fall w/ right hip pain Comparison: 8/10/2017 Findings: Non-comminuted subcapital right femoral neck fracture is seen. Left hip prosthesis remains in anatomic alignment. Bony structures also appear markedly osteopenic but intact.     Impression: Impression: Acute non-comminuted right femoral neck fracture. Electronically Signed: Joshua Richmond MD  12/24/2023 1:58 PM EST  Workstation ID: [id number]    XR Chest 1 View    Result Date: 12/24/2023  XR CHEST 1 VW Date of Exam: 12/24/2023 1:38 PM EST Indication: pre-op Comparison: PA and lateral chest 8/4/2017 Findings: Low volume inspiration. Mild cardiac enlargement appears new or increased since the 2017 comparison. Generalized interstitial prominence, thought to be chronic. No acute airspace disease or significant pleural fluid collection is identified. Severe degenerative changes are suggested in both shoulders. No acute osseous abnormalities.     Impression: 1. Mild cardiomegaly, which appears new since 2017. 2. No acute chest findings. 3. Advanced degenerative changes of both shoulders. Electronically Signed: Shanique Martinez MD  12/24/2023 1:57 PM EST  Workstation ID: [id number]     Results for orders placed during the hospital encounter of 11/22/23    Adult Transthoracic Echo Complete W/ Cont if Necessary Per Protocol    Interpretation Summary    Left ventricular ejection fraction appears to be 66 - 70%.    Left ventricular wall thickness is consistent with mild concentric hypertrophy.    Left ventricular diastolic function is consistent with (grade I) impaired relaxation.    The right  ventricular cavity is mildly dilated.    The left atrial cavity is mildly dilated.    Left atrial volume is mildly increased.    The right atrial cavity is mildly  dilated.    Moderate aortic valve regurgitation is present.    Estimated right ventricular systolic pressure from tricuspid regurgitation is mildly elevated (35-45 mmHg).    Mild dilation of the ascending aorta is present.      Current medications:  Scheduled Meds:acetaminophen, 1,000 mg, Oral, Once  bupivacaine (PF), 30 mL, Injection, Once  donepezil, 2.5 mg, Oral, Nightly  heparin (porcine), 5,000 Units, Subcutaneous, Q8H  losartan, 50 mg, Oral, Daily  senna-docusate sodium, 2 tablet, Oral, BID  sodium chloride, 10 mL, Intravenous, Q12H      Continuous Infusions:lactated ringers, 9 mL/hr, Last Rate: 9 mL/hr (12/25/23 0850)      PRN Meds:.  acetaminophen **OR** acetaminophen **OR** acetaminophen    senna-docusate sodium **AND** polyethylene glycol **AND** bisacodyl **AND** bisacodyl    Calcium Replacement - Follow Nurse / BPA Driven Protocol    HYDROcodone-acetaminophen    lactated ringers    Magnesium Low Dose Replacement - Follow Nurse / BPA Driven Protocol    Morphine    Phosphorus Replacement - Follow Nurse / BPA Driven Protocol    Potassium Replacement - Follow Nurse / BPA Driven Protocol    [COMPLETED] Insert Peripheral IV **AND** sodium chloride    sodium chloride    sodium chloride    Assessment & Plan   Assessment & Plan     Active Hospital Problems    Diagnosis  POA    **Hip fracture [S72.009A]  Yes    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        Brief Hospital Course to date:  Marcelino Mesa is a 89 y.o. female w/ HTN, memory impairment, prior LT hip Fx, who sustained a mechanical fall w/ RT hip fracture     Assessment/Plan     Acute RT non-commuted femoral neck  Fx following mechanical fall  -oral/IV pain control  -s/p R hemiarthroplasty w/Dr. Dunn on 12/25  - DVT ppx with ASA 81mg twice daily for 6 weeks + SCDs while in bed  - PT/OT, will need rehab    HTN  -home losartan     Memory impairment  -home donepezil    Expected Discharge Location and Transportation: rehab  Expected Discharge   Expected Discharge Date: 12/27/2023; Expected Discharge Time:      DVT prophylaxis:  Medical DVT prophylaxis orders are present.     AM-PAC 6 Clicks Score (PT): 11 (12/26/23 1114)    CODE STATUS:   Code Status and Medical Interventions:   Ordered at: 12/24/23 8191     Code Status (Patient has no pulse and is not breathing):    CPR (Attempt to Resuscitate)     Medical Interventions (Patient has pulse or is breathing):    Full Support       Dilcia Ureña, DO  12/26/23

## 2023-12-26 NOTE — PROGRESS NOTES
Clinical Nutrition     Reason for Visit: Identified at risk by screening criteria      Patient Name: Marcelino Mesa  YOB: 1927  MRN: [id number]  Date of Encounter: 12/26/23 17:41 EST  Admission date: 12/24/2023        Nutrition Assessment     Problem List:    Hip fracture    Essential hypertension    Mixed conductive and sensorineural hearing loss of both ears    Mild cognitive impairment        PMH:   She  has a past medical history of Amaurosis fugax of left eye (06/19/2017), Compression fracture of L1 lumbar vertebra (Remote), Generalized osteoarthritis (Adulthood), Hearing impairment (2000), Herpes zoster, Hip fracture, left (2014), Hyperlipidemia (Adulthood), Hypertension (2016), Iron deficiency (2017), Lipoma of right shoulder, Lumbar scoliosis (Adulthood), Lumbar spondylosis (2015), Macular degeneration, PONV (postoperative nausea and vomiting), Vitamin D deficiency, and Wrist fracture, left.    PSH:  She  has a past surgical history that includes Hip fracture surgery (Left, 2014); Colonoscopy (2006); knee arthroplasty unicompartmental (Right, 9/22/2016); knee arthroplasty unicompartmental (Left, 12/1/2016); Tonsillectomy (1947); Total hip arthroplasty (Left, 8/10/2017); Cataract extraction, extracapsular w/ intraocular lens implant (Bilateral, 2016); Lumbar epidural injection (2015); and Hip hemiArthroplasty (Right, 12/25/2023).    Applicable Nutrition Concerns:   Skin:surgical site, R hand tear    GI:last bm 12-25      Reported/Observed/Food/Nutrition Related History:     Pt resting in bed, on nasal cannula, in good spirits, very [place], reports good appetite, just ate supper, has several questions about her transfer to [facility] tomorrow, wants to know when she will go, and if she will have a private room, advised her to ask  about this in the morning    Per RN: no dysphagia issues     Anthropometrics     Flowsheet Rows      Flowsheet Row First Filed Value  "  Admission Height 157.5 cm (62\") Documented at 12/24/2023 1328   Admission Weight 54.4 kg (120 lb) Documented at 12/24/2023 1328              Height: Height: 157.5 cm (62\")  Last Filed Weight: Weight: 54.4 kg (120 lb) (12/24/23 1328)  Method: Weight Method: Stated  BMI: BMI (Calculated): 21.9  BMI classification: Normal: 18.5-24.9kg/m2    UBW: 125lb   Weight change: ?8lb loss per EMR         Weight     Weight (lbs) Weight Method   7/11/2017 120 lb      120 lb     8/4/2017 119 lb 14.9 oz  Standing scale    8/10/2017 119 lb     8/23/2017 119 lb  Stated    10/19/2017 117 lb     3/23/2018 116 lb     7/6/2018 120 lb     10/15/2018 121 lb     1/15/2019 124 lb     4/23/2019 121 lb     8/20/2019 120 lb     11/20/2019 123 lb     2/11/2020 124 lb     1/7/2021 124 lb 6.4 oz     8/3/2021 122 lb 12.8 oz     11/3/2021 123 lb     2/7/2022 123 lb     5/9/2022 124 lb     11/15/2022 124 lb 9.6 oz     8/28/2023 126 lb 11.2 oz     9/6/2023 126 lb     11/17/2023 128 lb 9.6 oz     11/22/2023 128 lb 12.8 oz     12/6/2023 130 lb     12/24/2023 120 lb  Stated          Nutrition Focused Physical Exam     Date:         Unable to perform exam due to: Potential for patient discomfort    Pt trying to sleep at present    Current Nutrition Prescription   PO: Diet: Regular/House Diet; Texture: Regular Texture (IDDSI 7); Fluid Consistency: Thin (IDDSI 0)  Oral Nutrition Supplement:   Intake: Insufficient data 50% of 2 meals      Nutrition Diagnosis   Date:  12-26-23            Updated:    Problem Predicted suboptimal energy intake   Etiology Per Clinical Status   Signs/Symptoms Po intake 50%     Goal:   General: Nutrition support treatment  PO: Establish PO  EN/PN: N/A    Nutrition Intervention      Follow treatment progress, Care plan reviewed, Advised available snacks, Supplement provided    Boost+ 2x/day      Monitoring/Evaluation:   Per protocol      Dulce Romero RD  Time Spent: 30min  "

## 2023-12-26 NOTE — THERAPY TREATMENT NOTE
Patient Name: Marcelino Mesa  : 1927    MRN: [id number]                              Today's Date: 2023       Admit Date: 2023    Visit Dx:     ICD-10-CM ICD-9-CM   1. Closed nondisplaced basicervical fracture of right femur, initial encounter  S72.044A 820.03     Patient Active Problem List   Diagnosis    Abnormal gait    Generalized osteoarthritis    Hyperlipidemia    Iron deficiency anemia    Macular degeneration    Scoliosis of lumbar spine    Post-menopausal osteoporosis    Vitamin D deficiency    Essential hypertension    Preventative health care    Incomplete RBBB    Primary osteoarthritis of both knees    Anxiety    Amaurosis fugax    Bladder incontinence    Arthritis of right acromioclavicular joint    Mixed conductive and sensorineural hearing loss of both ears    Mild cognitive impairment    Hip fracture     Past Medical History:   Diagnosis Date    Amaurosis fugax of left eye 2017    5 minutes at home    Compression fracture of L1 lumbar vertebra Remote    Chronic lower back pain    Generalized osteoarthritis Adulthood    Bilateral knee replacements    Hearing impairment 2000    Wears hearing aids    Herpes zoster     Hip fracture, left 2014    Hip pinning with persistent pain    Hyperlipidemia Adulthood    Mild disease not requiring drug therapy    Hypertension 2016    Well compensated on medication    Iron deficiency 2017    Long-term oral replacement    Lipoma of right shoulder     MRI to confirm 2023    Lumbar scoliosis Adulthood    Mild disease    Lumbar spondylosis 2015    Status post injection therapy    Macular degeneration     PONV (postoperative nausea and vomiting)     preprocedural medications will help     Vitamin D deficiency     Wrist fracture, left      Past Surgical History:   Procedure Laterality Date    CATARACT EXTRACTION EXTRACAPSULAR W/ INTRAOCULAR LENS IMPLANTATION Bilateral 2016    COLONOSCOPY  2006    Normal study    HIP FRACTURE SURGERY Left       hip pinning for acute fracture    KNEE ARTHROPLASTY UNICOMPARTMENTAL Right 9/22/2016    Procedure: RIGHT KNEE ARTHROPLASTY UNICOMPARTMENTAL;  Surgeon: Jesse Taveras MD;  Location:  [facility];  Service:     KNEE ARTHROPLASTY UNICOMPARTMENTAL Left 12/1/2016    Procedure: LEFT KNEE ARTHROPLASTY UNICOMPARTMENTAL;  Surgeon: Jesse Tvaeras MD;  Location:  [facility];  Service:     LUMBAR EPIDURAL INJECTION  2015    TONSILLECTOMY  1947    TOTAL HIP ARTHROPLASTY Left 8/10/2017    Procedure: REMOVAL OF HARDWARE LEFT HIP, LEFT ANTERIOR TOTAL HIP ARTHROPLASTY;  Surgeon: Jesse Taveras MD;  Location:  [facility];  Service:       General Information       Row Name 12/26/23 0834          Physical Therapy Time and Intention    Document Type therapy note (daily note)  -AS     Mode of Treatment physical therapy  -AS       Row Name 12/26/23 0834          General Information    Patient Profile Reviewed yes  -AS     Existing Precautions/Restrictions hip;right;hip, posterior;oxygen therapy device and L/min;other (see comments)  per OP note: Patient will be weightbearing as tolerated on a walker at all times. No extremes of motion, 3L O2/NC. [place].  -AS       Row Name 12/26/23 0837          Cognition    Orientation Status (Cognition) oriented x 4  -AS       Row Name 12/26/23 0837          Safety Issues, Functional Mobility    Impairments Affecting Function (Mobility) balance;endurance/activity tolerance;shortness of breath;strength  -AS     Comment, Safety Issues/Impairments (Mobility) [place], cues for PLB  -AS               User Key  (r) = Recorded By, (t) = Taken By, (c) = Cosigned By      Initials Name Provider Type    AS Hilda Benitez PTA Physical Therapist Assistant                   Mobility       Row Name 12/26/23 0838          Bed Mobility    Supine-Sit [place] (Bed Mobility) verbal cues;moderate assist (50% patient effort);1 person assist  -AS     Assistive Device (Bed Mobility) head of bed elevated;bed rails  -AS      Comment, (Bed Mobility) Assist to move R LE to EOB and at trunk to complete transfer. Increased time and effort to reach EOB  -AS       Row Name 12/26/23 0838          Transfers    Comment, (Transfers) Multiple verbal cues for hand and feet placement  -AS       Row Name 12/26/23 0838          Bed-Chair Transfer    Bed-Chair [place] (Transfers) verbal cues;minimum assist (75% patient effort);1 person assist  -AS     Assistive Device (Bed-Chair Transfers) walker, front-wheeled  -AS       Row Name 12/26/23 0838          Sit-Stand Transfer    Sit-Stand [place] (Transfers) verbal cues;minimum assist (75% patient effort);1 person assist  -AS     Assistive Device (Sit-Stand Transfers) walker, front-wheeled  -AS     Comment, (Sit-Stand Transfer) cues for hand placement, and to slide R LE out prior to standing/sitting  -AS       Row Name 12/26/23 0838          Gait/Stairs (Locomotion)    [place] Level (Gait) verbal cues;minimum assist (75% patient effort);1 person assist;1 person to manage equipment  -AS     Assistive Device (Gait) walker, front-wheeled  -AS     Distance in Feet (Gait) 12 + 12  -AS     Deviations/Abnormal Patterns (Gait) gait speed decreased;bilateral deviations;stride length decreased;weight shifting decreased  -AS     Bilateral Gait Deviations heel strike decreased;weight shift ability decreased  -AS     Right Sided Gait Deviations weight shift ability decreased  -AS     Comment, (Gait/Stairs) patient ambulated  12' + 12' with Min assist x1 and tech support for IV pole. Verbal cues for step sequencing, upright posture, PLB and assist to advance walker. Distance limited by weakness, fatigue  and pain(6/10). Nursing notified and aware.  -AS               User Key  (r) = Recorded By, (t) = Taken By, (c) = Cosigned By      Initials Name Provider Type    AS Hilda Benitez PTA Physical Therapist Assistant                   Obj/Interventions       Row Name 12/26/23 0848          Motor Skills     Therapeutic Exercise knee;ankle  -AS       Row Name 12/26/23 0848          Knee (Therapeutic Exercise)    Knee (Therapeutic Exercise) isometric exercises  -AS     Knee Isometrics (Therapeutic Exercise) bilateral;gluteal sets;quad sets;supine;10 repetitions;5 second hold  -AS       Row Name 12/26/23 0848          Ankle (Therapeutic Exercise)    Ankle (Therapeutic Exercise) AROM (active range of motion)  -AS     Ankle AROM (Therapeutic Exercise) bilateral;dorsiflexion;plantarflexion;supine;10 repetitions  -AS       Row Name 12/26/23 0848          Balance    Dynamic Standing Balance verbal cues;minimal assist;1-person assist;1 person to manage equipment  -AS     Position/Device Used, Standing Balance supported;walker, front-wheeled  -AS     Comment, Balance min assist for safety  -AS               User Key  (r) = Recorded By, (t) = Taken By, (c) = Cosigned By      Initials Name Provider Type    AS Hilda Benitez PTA Physical Therapist Assistant                   Goals/Plan    No documentation.                  Clinical Impression       Row Name 12/26/23 0848          Pain    Pretreatment Pain Rating 0/10 - no pain  -AS     Posttreatment Pain Rating 6/10  -AS     Pain Location - Side/Orientation Right  -AS     Pain Intervention(s) Repositioned;Ambulation/increased activity  -AS       Row Name 12/26/23 0848          Plan of Care Review    Plan of Care Reviewed With patient  -AS     Progress improving  -AS     Outcome Evaluation patient ambulated 12' + 12' with Min assist x1 and tech support for IV pole. Verbal cues for step sequencing, upright posture, PLB and assist to advance walker. Decreased WB RLE with mobility, WBAT R LE. Distance limited by weakness, fatigue and pain(6/10). Nursing notified and aware. Isometric exercises completed with multiple verbal cues for technique. [place]. Posterior hip precaution reviewed. Recommend IRF at D/C for best functional outcome.  -AS       Row Name 12/26/23 0848           Positioning and Restraints    Pre-Treatment Position in bed  -AS     Post Treatment Position chair  -AS     In Chair reclined;notified nsg;call light within reach;encouraged to call for assist;exit alarm on;waffle cushion;legs elevated;on mechanical lift sling;compression device  -AS               User Key  (r) = Recorded By, (t) = Taken By, (c) = Cosigned By      Initials Name Provider Type    AS Hilda Benitez PTA Physical Therapist Assistant                   Outcome Measures       Row Name 12/26/23 0851          How much help from another person do you currently need...    Turning from your back to your side while in flat bed without using bedrails? 2  -AS     Moving from lying on back to sitting on the side of a flat bed without bedrails? 2  -AS     Moving to and from a bed to a chair (including a wheelchair)? 2  -AS     Standing up from a chair using your arms (e.g., wheelchair, bedside chair)? 2  -AS     Climbing 3-5 steps with a railing? 1  -AS     To walk in hospital room? 2  -AS     AM-PAC 6 Clicks Score (PT) 11  -AS     Highest Level of Mobility Goal 4 --> Transfer to chair/commode  -AS       Row Name 12/26/23 0851          Functional Assessment    Outcome Measure Options AM-PAC 6 Clicks Basic Mobility (PT)  -AS               User Key  (r) = Recorded By, (t) = Taken By, (c) = Cosigned By      Initials Name Provider Type    AS Hilda Benitez PTA Physical Therapist Assistant                                 Physical Therapy Education       Title: PT OT SLP Therapies (In Progress)       Topic: Physical Therapy (In Progress)       Point: Mobility training (In Progress)       Learning Progress Summary             Patient Acceptance, E, NR by AS at 12/26/2023 0851    Acceptance, E, VU,NR by ISIAH at 12/25/2023 1450                         Point: Home exercise program (In Progress)       Learning Progress Summary             Patient Acceptance, E, NR by AS at 12/26/2023 0851                          Point: Body mechanics (In Progress)       Learning Progress Summary             Patient Acceptance, E, NR by AS at 12/26/2023 0851                         Point: Precautions (In Progress)       Learning Progress Summary             Patient Acceptance, E, NR by AS at 12/26/2023 0851                                         User Key       Initials Effective Dates Name Provider Type Discipline    AS 04/28/23 -  Hilda Benitez PTA Physical Therapist Assistant PT    LS 07/11/23 -  Viktoriya Ramirez PT Physical Therapist PT                  PT Recommendation and Plan     Plan of Care Reviewed With: patient  Progress: improving  Outcome Evaluation: patient ambulated 12' + 12' with Min assist x1 and tech support for IV pole. Verbal cues for step sequencing, upright posture, PLB and assist to advance walker. Decreased WB RLE with mobility, WBAT R LE. Distance limited by weakness, fatigue and pain(6/10). Nursing notified and aware. Isometric exercises completed with multiple verbal cues for technique. [place]. Posterior hip precaution reviewed. Recommend IRF at D/C for best functional outcome.     Time Calculation:         PT Charges       Row Name 12/26/23 0851             Time Calculation    Start Time 0746  -AS      PT Received On 12/26/23  -AS      PT Goal Re-Cert Due Date 01/04/24  -AS         Timed Charges    67628 - PT Therapeutic Exercise Minutes 10  -AS      24096 - Gait Training Minutes  14  -AS         Total Minutes    Timed Charges Total Minutes 24  -AS       Total Minutes 24  -AS                User Key  (r) = Recorded By, (t) = Taken By, (c) = Cosigned By      Initials Name Provider Type    AS Hilda Benitez PTA Physical Therapist Assistant                  Therapy Charges for Today       Code Description Service Date Service Provider Modifiers Qty    49908737396 HC PT THER PROC EA 15 MIN 12/26/2023 Hilda Benitez PTA GP 1    64659184228 HC GAIT TRAINING EA 15 MIN 12/26/2023 Hilda Benitez  PTA GP 1    [id number]  PT THER SUPP EA 15 MIN 12/26/2023 Hilda Benitez, PTA GP 2            PT G-Codes  Outcome Measure Options: AM-PAC 6 Clicks Basic Mobility (PT)  AM-PAC 6 Clicks Score (PT): 11       Hilda Benitez, KAITLYN  12/26/2023

## 2023-12-26 NOTE — CASE MANAGEMENT/SOCIAL WORK
"Discharge Planning Assessment  [facility]     Patient Name: Marcelino Mesa  MRN: [id number]  Today's Date: 12/26/2023    Admit Date: 12/24/2023    Plan: [facility]                   Discharge Plan       Row Name 12/26/23 1225       Plan    Plan [facility]    Patient/Family in Agreement with Plan yes    Plan Comments Met with Ms. Mesa and her daughter, Vita, at the bedside, to initiate discharge planning.    Ms. Mesa lives alone in [facility].    She states that prior to admission, she ambulated independently with no AD.  She has been evaluated by PT and per notes, \" patient ambulated 60' with min assist x1, rolling walker for support and tech follow with chair for safety. Verbal cues for walker placement, decreased step length on R (as patient stepping past front of walker), upright posture and PLB throughout session. Further mobility limited by weakness, fatigue and pain. Posterior hip precautions reviewed with patient as she was only able to state 1/3 prior to review. Recommend IRF at D/C for best functional outcome.\"    Discussed IPR and Ms. Mesa and her daughter requested [facility].  Referral given to April with [facility].  No prior auth needed for acute rehab at [facility] with Medicare.    PCP is Earnest Ingram.  Insurance is Medicare and AARP with no interruption in coverage.  ACP documents filed to [facility].    CM requested a [facility] ambulance for transport for tomorrow.    CM will follow up.    Final Discharge Disposition Code 62 - inpatient rehab facility                  Continued Care and Services - Admitted Since 12/24/2023       Destination       Service Provider Request Status Selected Services Address Phone Fax Patient Preferred    [facility] Pending - No Request Sent N/A [address] [facility] [phone] --                  Expected Discharge Date and Time       Expected Discharge Date Expected Discharge Time    Dec 27, 2023       "                  Kirti Torres, RN

## 2023-12-26 NOTE — CASE MANAGEMENT/SOCIAL WORK
Case Management Discharge Note      Final Note:     Ms. Mesa has an inpatient rehab bed at [facility] tomorrow, Wednesday, 12/27/23, if medically ready.  Confirmed bed with April from [facility].      Contacted Ms. Mesa's daughter and son in law, by phone, regarding transfer, and they are agreeable to DC plan.      [facility] ambulance is scheduled for transport tomorrow morning at 9am.  No PCS form is needed as  electronically filled out form and given to [facility].      Call report to [facility] GRU at ph [phone].  If [facility] RN unable to take report, call [facility] House Supervisor at ph [phone].  [facility] liaison will take off DC summary.    Thank you.               Destination Coordination complete.      Service Provider Selected Services Address Phone Fax Patient Preferred    [facility] [address] [facility] [address] [phone] --                      Transportation Services  Ambulance: [facility]    Final Discharge Disposition Code: 62 - inpatient rehab facility

## 2023-12-26 NOTE — PLAN OF CARE
Goal Outcome Evaluation:  Plan of Care Reviewed With: patient        Progress: improving  Outcome Evaluation: patient ambulated 60' with min assist x1, rolling walker for support and tech follow with chair for safety. Verbal cues for walker placement, decreased step length on R (as patient stepping past front of walker), upright posture and PLB throughout session. Further mobility limited by weakness, fatigue and pain. Posterior hip precautions reviewed with patient as she was only able to state 1/3 prior to review. Recommend IRF at D/C for best functional outcome.

## 2023-12-26 NOTE — PLAN OF CARE
Goal Outcome Evaluation:  Plan of Care Reviewed With: patient        Progress: improving  Outcome Evaluation: patient ambulated 12' + 12' with Min assist x1 and tech support for IV pole. Verbal cues for step sequencing, upright posture, PLB and assist to advance walker. Decreased WB RLE with mobility, WBAT R LE. Distance limited by weakness, fatigue and pain(6/10). Nursing notified and aware. Isometric exercises completed with multiple verbal cues for technique. [place]. Posterior hip precaution reviewed. Recommend IRF at D/C for best functional outcome.

## 2023-12-26 NOTE — THERAPY TREATMENT NOTE
Patient Name: Marcelino Mesa  : 1927    MRN: [id number]                              Today's Date: 2023       Admit Date: 2023    Visit Dx:     ICD-10-CM ICD-9-CM   1. Closed nondisplaced basicervical fracture of right femur, initial encounter  S72.044A 820.03     Patient Active Problem List   Diagnosis    Abnormal gait    Generalized osteoarthritis    Hyperlipidemia    Iron deficiency anemia    Macular degeneration    Scoliosis of lumbar spine    Post-menopausal osteoporosis    Vitamin D deficiency    Essential hypertension    Preventative health care    Incomplete RBBB    Primary osteoarthritis of both knees    Anxiety    Amaurosis fugax    Bladder incontinence    Arthritis of right acromioclavicular joint    Mixed conductive and sensorineural hearing loss of both ears    Mild cognitive impairment    Hip fracture     Past Medical History:   Diagnosis Date    Amaurosis fugax of left eye 2017    5 minutes at home    Compression fracture of L1 lumbar vertebra Remote    Chronic lower back pain    Generalized osteoarthritis Adulthood    Bilateral knee replacements    Hearing impairment 2000    Wears hearing aids    Herpes zoster     Hip fracture, left 2014    Hip pinning with persistent pain    Hyperlipidemia Adulthood    Mild disease not requiring drug therapy    Hypertension 2016    Well compensated on medication    Iron deficiency 2017    Long-term oral replacement    Lipoma of right shoulder     MRI to confirm 2023    Lumbar scoliosis Adulthood    Mild disease    Lumbar spondylosis 2015    Status post injection therapy    Macular degeneration     PONV (postoperative nausea and vomiting)     preprocedural medications will help     Vitamin D deficiency     Wrist fracture, left      Past Surgical History:   Procedure Laterality Date    CATARACT EXTRACTION EXTRACAPSULAR W/ INTRAOCULAR LENS IMPLANTATION Bilateral 2016    COLONOSCOPY  2006    Normal study    HIP FRACTURE SURGERY Left       hip pinning for acute fracture    KNEE ARTHROPLASTY UNICOMPARTMENTAL Right 9/22/2016    Procedure: RIGHT KNEE ARTHROPLASTY UNICOMPARTMENTAL;  Surgeon: Jesse Taveras MD;  Location:  [facility];  Service:     KNEE ARTHROPLASTY UNICOMPARTMENTAL Left 12/1/2016    Procedure: LEFT KNEE ARTHROPLASTY UNICOMPARTMENTAL;  Surgeon: Jesse Taveras MD;  Location:  [facility];  Service:     LUMBAR EPIDURAL INJECTION  2015    TONSILLECTOMY  1947    TOTAL HIP ARTHROPLASTY Left 8/10/2017    Procedure: REMOVAL OF HARDWARE LEFT HIP, LEFT ANTERIOR TOTAL HIP ARTHROPLASTY;  Surgeon: Jesse Taveras MD;  Location:  [facility];  Service:       General Information       Row Name 12/26/23 1105 12/26/23 0834       Physical Therapy Time and Intention    Document Type therapy note (daily note)  -AS therapy note (daily note)  -AS    Mode of Treatment physical therapy  -AS physical therapy  -AS      Row Name 12/26/23 1105 12/26/23 0834       General Information    Patient Profile Reviewed yes  -AS yes  -AS    Existing Precautions/Restrictions hip;right;hip, posterior;oxygen therapy device and L/min;other (see comments)  per OP note: patient will be weightbearing as tolerated on walker at all timees. No extremes of motion. 3L O2/NC, [place].  -AS hip;right;hip, posterior;oxygen therapy device and L/min;other (see comments)  per OP note: Patient will be weightbearing as tolerated on a walker at all times. No extremes of motion, 3L O2/NC. [place].  -AS    Barriers to Rehab medically complex;hearing deficit  -AS --      Row Name 12/26/23 1105 12/26/23 0837       Cognition    Orientation Status (Cognition) oriented x 4  -AS oriented x 4  -AS      Row Name 12/26/23 1105 12/26/23 0837       Safety Issues, Functional Mobility    Safety Issues Affecting Function (Mobility) awareness of need for assistance;safety precaution awareness;sequencing abilities;positioning of assistive device  -AS --    Impairments Affecting Function (Mobility) balance;endurance/activity  tolerance;shortness of breath;strength  -AS balance;endurance/activity tolerance;shortness of breath;strength  -AS    Comment, Safety Issues/Impairments (Mobility) [place], PLB throughout session, alert and following commands  -AS [place], cues for PLB  -AS              User Key  (r) = Recorded By, (t) = Taken By, (c) = Cosigned By      Initials Name Provider Type    AS Hilda Benitez PTA Physical Therapist Assistant                   Mobility       Row Name 12/26/23 1107 12/26/23 0838       Bed Mobility    Supine-Sit [place] (Bed Mobility) -- verbal cues;moderate assist (50% patient effort);1 person assist  -AS    Assistive Device (Bed Mobility) -- head of bed elevated;bed rails  -AS    Comment, (Bed Mobility) [place] pre/post tx  -AS Assist to move R LE to EOB and at trunk to complete transfer. Increased time and effort to reach EOB  -AS      Row Name 12/26/23 1107 12/26/23 0838       Transfers    Comment, (Transfers) cues for hand placement and to slide R LE out prior to standing/sitting  -AS Multiple verbal cues for hand and feet placement  -AS      Row Name 12/26/23 0838          Bed-Chair Transfer    Bed-Chair [place] (Transfers) verbal cues;minimum assist (75% patient effort);1 person assist  -AS     Assistive Device (Bed-Chair Transfers) walker, front-wheeled  -AS       Row Name 12/26/23 1107 12/26/23 0838       Sit-Stand Transfer    Sit-Stand [place] (Transfers) verbal cues;minimum assist (75% patient effort);2 person assist  -AS verbal cues;minimum assist (75% patient effort);1 person assist  -AS    Assistive Device (Sit-Stand Transfers) walker, front-wheeled  -AS walker, front-wheeled  -AS    Comment, (Sit-Stand Transfer) increased assist needed from recliner, cues to slide R LE out prior to standing/sitting  -AS cues for hand placement, and to slide R LE out prior to standing/sitting  -AS      Row Name 12/26/23 1107 12/26/23 0838       Gait/Stairs (Locomotion)    [place] Level (Gait) verbal  cues;minimum assist (75% patient effort);1 person assist;1 person to manage equipment  -AS verbal cues;minimum assist (75% patient effort);1 person assist;1 person to manage equipment  -AS    Assistive Device (Gait) walker, front-wheeled  -AS walker, front-wheeled  -AS    Distance in Feet (Gait) 60  -AS 12 + 12  -AS    Deviations/Abnormal Patterns (Gait) gait speed decreased;bilateral deviations;stride length decreased;weight shifting decreased  -AS gait speed decreased;bilateral deviations;stride length decreased;weight shifting decreased  -AS    Bilateral Gait Deviations heel strike decreased;weight shift ability decreased  -AS heel strike decreased;weight shift ability decreased  -AS    Right Sided Gait Deviations weight shift ability decreased  -AS weight shift ability decreased  -AS    Comment, (Gait/Stairs) patient ambulated 60'  with min assist x1, rolling walker for support and tech follow with chair for safety. Verbal cues for walker placement, decreased step length on R (as patient stepping past front of walker), upright posture and PLB throughout session. Further mobility limited by weakness, fatigue and pain.  -AS patient ambulated  12' + 12' with Min assist x1 and tech support for IV pole. Verbal cues for step sequencing, upright posture, PLB and assist to advance walker. Distance limited by weakness, fatigue  and pain(6/10). Nursing notified and aware.  -AS              User Key  (r) = Recorded By, (t) = Taken By, (c) = Cosigned By      Initials Name Provider Type    AS Hilda Benitez PTA Physical Therapist Assistant                   Obj/Interventions       Row Name 12/26/23 1111 12/26/23 0848       Motor Skills    Therapeutic Exercise --  deferred due to weakness/fatigue after ambulation in hallway  -AS knee;ankle  -AS      Row Name 12/26/23 0848          Knee (Therapeutic Exercise)    Knee (Therapeutic Exercise) isometric exercises  -AS     Knee Isometrics (Therapeutic Exercise)  bilateral;gluteal sets;quad sets;supine;10 repetitions;5 second hold  -AS       Row Name 12/26/23 0848          Ankle (Therapeutic Exercise)    Ankle (Therapeutic Exercise) AROM (active range of motion)  -AS     Ankle AROM (Therapeutic Exercise) bilateral;dorsiflexion;plantarflexion;supine;10 repetitions  -AS       Row Name 12/26/23 1111 12/26/23 0848       Balance    Dynamic Standing Balance verbal cues;minimal assist;1-person assist;1 person to manage equipment  -AS verbal cues;minimal assist;1-person assist;1 person to manage equipment  -AS    Position/Device Used, Standing Balance supported;walker, front-wheeled  -AS supported;walker, front-wheeled  -AS    Comment, Balance min assist and follow with chair for safety  -AS min assist for safety  -AS              User Key  (r) = Recorded By, (t) = Taken By, (c) = Cosigned By      Initials Name Provider Type    AS Hilda Benitez PTA Physical Therapist Assistant                   Goals/Plan    No documentation.                  Clinical Impression       Row Name 12/26/23 1112 12/26/23 0848       Pain    Pretreatment Pain Rating 6/10  -AS 0/10 - no pain  -AS    Posttreatment Pain Rating 6/10  -AS 6/10  -AS    Pain Location - Side/Orientation Right  -AS Right  -AS    Pain Location - hip  -AS --    Pain Intervention(s) Repositioned;Ambulation/increased activity  -AS Repositioned;Ambulation/increased activity  -AS      Row Name 12/26/23 1112 12/26/23 0848       Plan of Care Review    Plan of Care Reviewed With patient  -AS patient  -AS    Progress improving  -AS improving  -AS    Outcome Evaluation patient ambulated 60' with min assist x1, rolling walker for support and tech follow with chair for safety. Verbal cues for walker placement, decreased step length on R (as patient stepping past front of walker), upright posture and PLB throughout session. Further mobility limited by weakness, fatigue and pain. Posterior hip precautions reviewed with patient as she was  only able to state 1/3 prior to review. Recommend IRF at D/C for best functional outcome.  -AS patient ambulated 12' + 12' with Min assist x1 and tech support for IV pole. Verbal cues for step sequencing, upright posture, PLB and assist to advance walker. Decreased WB RLE with mobility, WBAT R LE. Distance limited by weakness, fatigue and pain(6/10). Nursing notified and aware. Isometric exercises completed with multiple verbal cues for technique. [place]. Posterior hip precaution reviewed. Recommend IRF at D/C for best functional outcome.  -AS      Row Name 12/26/23 1112 12/26/23 0848       Positioning and Restraints    Pre-Treatment Position sitting in chair/recliner  -AS in bed  -AS    Post Treatment Position chair  -AS chair  -AS    In Chair reclined;call light within reach;encouraged to call for assist;exit alarm on;with family/caregiver;waffle cushion;on mechanical lift sling;legs elevated;compression device  -AS reclined;notified nsg;call light within reach;encouraged to call for assist;exit alarm on;waffle cushion;legs elevated;on mechanical lift sling;compression device  -AS              User Key  (r) = Recorded By, (t) = Taken By, (c) = Cosigned By      Initials Name Provider Type    AS Hilda Benitez PTA Physical Therapist Assistant                   Outcome Measures       Row Name 12/26/23 1114 12/26/23 0851       How much help from another person do you currently need...    Turning from your back to your side while in flat bed without using bedrails? 2  -AS 2  -AS    Moving from lying on back to sitting on the side of a flat bed without bedrails? 2  -AS 2  -AS    Moving to and from a bed to a chair (including a wheelchair)? 2  -AS 2  -AS    Standing up from a chair using your arms (e.g., wheelchair, bedside chair)? 2  -AS 2  -AS    Climbing 3-5 steps with a railing? 1  -AS 1  -AS    To walk in hospital room? 2  -AS 2  -AS    AM-PAC 6 Clicks Score (PT) 11  -AS 11  -AS    Highest Level of Mobility Goal  4 --> Transfer to chair/commode  -AS 4 --> Transfer to chair/commode  -AS      Row Name 12/26/23 0800          How much help from another person do you currently need...    Turning from your back to your side while in flat bed without using bedrails? 2  -BC     Moving from lying on back to sitting on the side of a flat bed without bedrails? 2  -BC     Moving to and from a bed to a chair (including a wheelchair)? 2  -BC     Standing up from a chair using your arms (e.g., wheelchair, bedside chair)? 3  -BC     Climbing 3-5 steps with a railing? 1  -BC     To walk in hospital room? 2  -BC     AM-PAC 6 Clicks Score (PT) 12  -BC     Highest Level of Mobility Goal 4 --> Transfer to chair/commode  -BC       Row Name 12/26/23 1114 12/26/23 0851       Functional Assessment    Outcome Measure Options AM-PAC 6 Clicks Basic Mobility (PT)  -AS AM-PAC 6 Clicks Basic Mobility (PT)  -AS              User Key  (r) = Recorded By, (t) = Taken By, (c) = Cosigned By      Initials Name Provider Type    AS Hilda Benitez PTA Physical Therapist Assistant    BC Kinza Chapman RN Registered Nurse                                 Physical Therapy Education       Title: PT OT SLP Therapies (In Progress)       Topic: Physical Therapy (In Progress)       Point: Mobility training (In Progress)       Learning Progress Summary             Patient Acceptance, E, NR by AS at 12/26/2023 1114    Acceptance, E, NR by AS at 12/26/2023 0851    Acceptance, E, VU,NR by LS at 12/25/2023 1450                         Point: Home exercise program (In Progress)       Learning Progress Summary             Patient Acceptance, E, NR by AS at 12/26/2023 1114    Acceptance, E, NR by AS at 12/26/2023 0851                         Point: Body mechanics (In Progress)       Learning Progress Summary             Patient Acceptance, E, NR by AS at 12/26/2023 1114    Acceptance, E, NR by AS at 12/26/2023 0851                         Point: Precautions (In Progress)        Learning Progress Summary             Patient Acceptance, E, NR by AS at 12/26/2023 1114    Acceptance, E, NR by AS at 12/26/2023 0851                                         User Key       Initials Effective Dates Name Provider Type Discipline    AS 04/28/23 -  Hilda Benitez PTA Physical Therapist Assistant PT    LS 07/11/23 -  Viktoriya Ramirez, PT Physical Therapist PT                  PT Recommendation and Plan     Plan of Care Reviewed With: patient  Progress: improving  Outcome Evaluation: patient ambulated 60' with min assist x1, rolling walker for support and tech follow with chair for safety. Verbal cues for walker placement, decreased step length on R (as patient stepping past front of walker), upright posture and PLB throughout session. Further mobility limited by weakness, fatigue and pain. Posterior hip precautions reviewed with patient as she was only able to state 1/3 prior to review. Recommend IRF at D/C for best functional outcome.     Time Calculation:         PT Charges       Row Name 12/26/23 1115 12/26/23 0851          Time Calculation    Start Time 1049  -AS 0746  -AS     PT Received On 12/26/23  -AS 12/26/23  -AS     PT Goal Re-Cert Due Date 01/04/24  -AS 01/04/24  -AS        Timed Charges    64737 - PT Therapeutic Exercise Minutes -- 10  -AS     57547 - Gait Training Minutes  15  -AS 14  -AS     27987 - PT Therapeutic Activity Minutes 9  -AS --        Total Minutes    Timed Charges Total Minutes 24  -AS 24  -AS      Total Minutes 24  -AS 24  -AS               User Key  (r) = Recorded By, (t) = Taken By, (c) = Cosigned By      Initials Name Provider Type    AS Hilda Benitez PTA Physical Therapist Assistant                  Therapy Charges for Today       Code Description Service Date Service Provider Modifiers Qty    63908056277 HC PT THER PROC EA 15 MIN 12/26/2023 Hilda Benitez PTA GP 1    98485363627 HC GAIT TRAINING EA 15 MIN 12/26/2023 Hilda Benitez PTA  GP 1    41055538278 HC PT THER SUPP EA 15 MIN 12/26/2023 Hilda Benitez, PTA GP 2    69303147716 HC PT THER PROC EA 15 MIN 12/26/2023 Hilda Benitez, PTA GP 1    18346293717 HC GAIT TRAINING EA 15 MIN 12/26/2023 Hilda Benitez, PTA GP 2    49714695610 HC PT THER SUPP EA 15 MIN 12/26/2023 Hilda Benitez, PTA GP 2            PT G-Codes  Outcome Measure Options: AM-PAC 6 Clicks Basic Mobility (PT)  AM-PAC 6 Clicks Score (PT): 11       Hilda Benitez, KAITLYN  12/26/2023

## 2023-12-26 NOTE — PLAN OF CARE
A&Ox4 with forgetfulness.Dorsil/planter flexion equally moderate. Hydrofiber dressing CDI. Patient denies pain/discomfort or numbness/tingling at this time. VSS on 3L via NC. SR with first degree AV block on cardiac mx. Bladder volume periodically assessed. Call light in reach.

## 2023-12-26 NOTE — PROGRESS NOTES
"Orthopedic Daily Progress Note        One day s/p R hip hemiarthroplasty. Reports tolerable pain this afternoon.     No other complaints    Physical Exam:  I have reviewed the vital signs.  Temp:  [97.5 °F (36.4 °C)-98.5 °F (36.9 °C)] 97.8 °F (36.6 °C)  Heart Rate:  [58-66] 66  Resp:  [16-17] 16  BP: (115-136)/(53-88) 129/56    Objective:  Vital signs: (most recent): Blood pressure 129/56, pulse 66, temperature 97.8 °F (36.6 °C), temperature source Oral, resp. rate 16, height 157.5 cm (62\"), weight 54.4 kg (120 lb), SpO2 97%, not currently breastfeeding.              General Appearance:    Alert, cooperative, no distress  Extremities: No clubbing, cyanosis, or edema to lower extremities  Pulses:  2+ in distal surgical extremity  Skin: Dressing Clean/dry/intact      Results Review:    I have reviewed the labs, radiology results and diagnostic studies:    Results from last 7 days   Lab Units 12/26/23  0612   WBC 10*3/mm3 9.46   HEMOGLOBIN g/dL 11.5*   PLATELETS 10*3/mm3 156     Results from last 7 days   Lab Units 12/26/23  0611   SODIUM mmol/L 136   POTASSIUM mmol/L 4.5   CO2 mmol/L 20.0*   CREATININE mg/dL 0.89   GLUCOSE mg/dL 86       I have reviewed the medications.    Assessment/Problem List  POD# 1 Day Post-Op   S/p R Hip Hemiarthroplasty    Plan  -pain control  -post op abx as ordered  -DVT ppx - ASA 81 mg BID for 6 weeks  -pt/ot - agree with DC to rehab  -stable for DC to rehab from orthopaedic perspective  -follow up with me 2 weeks following discharge    Edda Edmond MD  12/26/23  17:24 EST              "

## 2023-12-27 VITALS
BODY MASS INDEX: 22.08 KG/M2 | WEIGHT: 120 LBS | RESPIRATION RATE: 16 BRPM | OXYGEN SATURATION: 94 % | HEART RATE: 60 BPM | DIASTOLIC BLOOD PRESSURE: 50 MMHG | SYSTOLIC BLOOD PRESSURE: 110 MMHG | HEIGHT: 62 IN | TEMPERATURE: 98.2 F

## 2023-12-27 LAB
B PARAPERT DNA SPEC QL NAA+PROBE: NOT DETECTED
B PERT DNA SPEC QL NAA+PROBE: NOT DETECTED
BASOPHILS # BLD AUTO: 0.05 10*3/MM3 (ref 0–0.2)
BASOPHILS NFR BLD AUTO: 0.6 % (ref 0–1.5)
C PNEUM DNA NPH QL NAA+NON-PROBE: NOT DETECTED
DEPRECATED RDW RBC AUTO: 48.6 FL (ref 37–54)
EOSINOPHIL # BLD AUTO: 0.15 10*3/MM3 (ref 0–0.4)
EOSINOPHIL NFR BLD AUTO: 1.7 % (ref 0.3–6.2)
ERYTHROCYTE [DISTWIDTH] IN BLOOD BY AUTOMATED COUNT: 13.7 % (ref 12.3–15.4)
FLUAV SUBTYP SPEC NAA+PROBE: NOT DETECTED
FLUBV RNA ISLT QL NAA+PROBE: NOT DETECTED
HADV DNA SPEC NAA+PROBE: NOT DETECTED
HCOV 229E RNA SPEC QL NAA+PROBE: NOT DETECTED
HCOV HKU1 RNA SPEC QL NAA+PROBE: NOT DETECTED
HCOV NL63 RNA SPEC QL NAA+PROBE: NOT DETECTED
HCOV OC43 RNA SPEC QL NAA+PROBE: NOT DETECTED
HCT VFR BLD AUTO: 32.6 % (ref 34–46.6)
HGB BLD-MCNC: 10.4 G/DL (ref 12–15.9)
HMPV RNA NPH QL NAA+NON-PROBE: NOT DETECTED
HPIV1 RNA ISLT QL NAA+PROBE: NOT DETECTED
HPIV2 RNA SPEC QL NAA+PROBE: NOT DETECTED
HPIV3 RNA NPH QL NAA+PROBE: NOT DETECTED
HPIV4 P GENE NPH QL NAA+PROBE: NOT DETECTED
IMM GRANULOCYTES # BLD AUTO: 0.05 10*3/MM3 (ref 0–0.05)
IMM GRANULOCYTES NFR BLD AUTO: 0.6 % (ref 0–0.5)
LYMPHOCYTES # BLD AUTO: 1.48 10*3/MM3 (ref 0.7–3.1)
LYMPHOCYTES NFR BLD AUTO: 17.2 % (ref 19.6–45.3)
M PNEUMO IGG SER IA-ACNC: NOT DETECTED
MCH RBC QN AUTO: 30.6 PG (ref 26.6–33)
MCHC RBC AUTO-ENTMCNC: 31.9 G/DL (ref 31.5–35.7)
MCV RBC AUTO: 95.9 FL (ref 79–97)
MONOCYTES # BLD AUTO: 0.99 10*3/MM3 (ref 0.1–0.9)
MONOCYTES NFR BLD AUTO: 11.5 % (ref 5–12)
NEUTROPHILS NFR BLD AUTO: 5.9 10*3/MM3 (ref 1.7–7)
NEUTROPHILS NFR BLD AUTO: 68.4 % (ref 42.7–76)
NRBC BLD AUTO-RTO: 0 /100 WBC (ref 0–0.2)
PLATELET # BLD AUTO: 155 10*3/MM3 (ref 140–450)
PMV BLD AUTO: 10.7 FL (ref 6–12)
RBC # BLD AUTO: 3.4 10*6/MM3 (ref 3.77–5.28)
RHINOVIRUS RNA SPEC NAA+PROBE: NOT DETECTED
RSV RNA NPH QL NAA+NON-PROBE: NOT DETECTED
SARS-COV-2 RNA NPH QL NAA+NON-PROBE: NOT DETECTED
WBC NRBC COR # BLD AUTO: 8.62 10*3/MM3 (ref 3.4–10.8)

## 2023-12-27 PROCEDURE — 97116 GAIT TRAINING THERAPY: CPT

## 2023-12-27 PROCEDURE — 99239 HOSP IP/OBS DSCHRG MGMT >30: CPT | Performed by: INTERNAL MEDICINE

## 2023-12-27 PROCEDURE — 97110 THERAPEUTIC EXERCISES: CPT

## 2023-12-27 PROCEDURE — 85025 COMPLETE CBC W/AUTO DIFF WBC: CPT | Performed by: INTERNAL MEDICINE

## 2023-12-27 PROCEDURE — 25010000002 HEPARIN (PORCINE) PER 1000 UNITS: Performed by: ORTHOPAEDIC SURGERY

## 2023-12-27 PROCEDURE — 0202U NFCT DS 22 TRGT SARS-COV-2: CPT | Performed by: INTERNAL MEDICINE

## 2023-12-27 RX ORDER — ASPIRIN 81 MG/1
81 TABLET ORAL 2 TIMES DAILY
Start: 2023-12-27 | End: 2024-02-07

## 2023-12-27 RX ADMIN — LOSARTAN POTASSIUM 50 MG: 50 TABLET, FILM COATED ORAL at 08:05

## 2023-12-27 RX ADMIN — SENNOSIDES AND DOCUSATE SODIUM 2 TABLET: 8.6; 5 TABLET ORAL at 08:05

## 2023-12-27 RX ADMIN — HYDROCODONE BITARTRATE AND ACETAMINOPHEN 1 TABLET: 5; 325 TABLET ORAL at 13:39

## 2023-12-27 RX ADMIN — HYDROCODONE BITARTRATE AND ACETAMINOPHEN 1 TABLET: 5; 325 TABLET ORAL at 08:05

## 2023-12-27 RX ADMIN — HEPARIN SODIUM 5000 UNITS: 5000 INJECTION INTRAVENOUS; SUBCUTANEOUS at 05:24

## 2023-12-27 RX ADMIN — HEPARIN SODIUM 5000 UNITS: 5000 INJECTION INTRAVENOUS; SUBCUTANEOUS at 13:39

## 2023-12-27 NOTE — PLAN OF CARE
Patient c/o intermittent aches ,exacerbated on movement. Hydrofiber dressing CDI.  VSS on 2L. SR with first degree AV block. Call light in reach.

## 2023-12-27 NOTE — DISCHARGE SUMMARY
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          Hospital Course:  Marcelino Mesa is a 89 y.o. female w/ HTN, memory impairment, prior LT hip Fx, who sustained a mechanical fall w/ RT hip fracture     Assessment/Plan:     Acute RT non-commuted femoral neck Fx following mechanical fall  - s/p R hemiarthroplasty w/Dr. Dunn on , f/u in 2 weeks  - DVT ppx with ASA 81mg twice daily for 6 weeks + SCDs while in bed  - WBAT, PRN pain control   - PT/OT, planning [facility]     HTN  -home losartan     Memory impairment  -home donepezil    Low Grade Isolated Fever:  - Resp PCR is negative, suspect post-op atelectasis  - monitor for further fevers at rehab     Discharge Follow Up Recommendations for outpatient labs/diagnostics:  - f/u with PCP 1-2 weeks after discharge from rehab  - f/u with Dr. Dunn in 2 weeks  - DVT ppx - ASA 81mg BID for 6 weeks    Day of Discharge     HPI:   Patient sitting up in bed. She has no complaints    Review of Systems  Gen- No fevers, chills  CV- No chest pain, palpitations  Resp- No cough, dyspnea  GI- No N/V/D, abd pain    Vital Signs:   Temp:  [97.5 °F (36.4 °C)-100.9 °F (38.3 °C)] 98.6 °F (37 °C)  Heart Rate:  [59-77] 63  Resp:  [16] 16  BP:  (115-134)/(53-83) 121/62  Flow (L/min):  [2] 2      Physical Exam:  Constitutional: No acute distress, awake, alert  HENT: NCAT, mucous membranes moist  Respiratory: Clear to auscultation bilaterally, respiratory effort normal   Cardiovascular: RRR, no murmurs, rubs, or gallops  Gastrointestinal: soft, nontender, nondistended  Musculoskeletal: No bilateral ankle edema  Psychiatric: Appropriate affect, cooperative  Neurologic: Oriented x 3, speech clear, no focal deficits  Skin: No rashes      Pertinent  and/or Most Recent Results     LAB RESULTS:      Lab 12/27/23  0647 12/26/23  0612 12/24/23  1356   WBC 8.62 9.46 7.55   HEMOGLOBIN 10.4* 11.5* 13.3   HEMATOCRIT 32.6* 36.1 41.1   PLATELETS 155 156 234   NEUTROS ABS 5.90  --  5.41   IMMATURE GRANS (ABS) 0.05  --  0.04   LYMPHS ABS 1.48  --  1.50   MONOS ABS 0.99*  --  0.49   EOS ABS 0.15  --  0.04   MCV 95.9 97.3* 95.1   PROTIME  --   --  13.8         Lab 12/26/23  0611 12/24/23  1356   SODIUM 136 142   POTASSIUM 4.5 3.9   CHLORIDE 104 106   CO2 20.0* 23.0   ANION GAP 12.0 13.0   BUN 17 15   CREATININE 0.89 0.84   EGFR 59.4* 63.7   GLUCOSE 86 127*   CALCIUM 8.3 8.9         Lab 12/24/23  1356   TOTAL PROTEIN 6.3   ALBUMIN 4.0   GLOBULIN 2.3   ALT (SGPT) 11   AST (SGOT) 18   BILIRUBIN 0.6   ALK PHOS 81         Lab 12/24/23  1356   PROTIME 13.8   INR 1.05             Lab 12/24/23  1356   ABO TYPING A   RH TYPING Positive   ANTIBODY SCREEN Negative         Brief Urine Lab Results  (Last result in the past 365 days)        Color   Clarity   Blood   Leuk Est   Nitrite   Protein   CREAT   Urine HCG        12/24/23 1843 Yellow   Clear   Negative   Negative   Negative   Negative                 Microbiology Results (last 10 days)       ** No results found for the last 240 hours. **            XR Pelvis 1 or 2 View    Result Date: 12/25/2023  XR PELVIS 1 OR 2 VW Date of Exam: 12/25/2023 11:30 AM EST Indication: michael hip Comparison: Pelvis right hip December 24, 2023 Findings:  Exam is limited by positioning and includes the lower pelvic hip areas. Patient has had total left hip arthroplasty. There are changes from right hip hemiarthroplasty intraoperatively. The hip area is obscured by external hardware limiting assessment of this area. More dedicated follow-up radiographs suggested to better assess the right hip area following procedure.     Impression: 1.There are changes from hemiarthroplasty procedure intraoperatively. Extraneous hardware makes assessment of the hip limited. Electronically Signed: Darron Ugalde MD  12/25/2023 12:17 PM EST  Workstation ID: [id number]    XR Femur 2 View Right    Result Date: 12/24/2023  XR FEMUR 2 VW RIGHT Date of Exam: 12/24/2023 3:50 PM EST Indication: Right femoral neck fracture, evaluate remaining right femur for additional fractures. Comparison: Right knee x-ray performed on 09/22/2016 and right hip x-ray performed on 12/24/2023. Findings: There is again a nondisplaced transverse fracture through the right femoral neck. There are no additional acute bony abnormalities. There is moderately advanced degenerative arthritis of the right hip joint. The patient has had a previous right knee hemiarthroplasty. There are degenerative changes involving the lateral and patellofemoral joint space compartments. The bony structures are demineralized. There is scattered atherosclerotic vascular calcifications.     Impression: 1. Nondisplaced transverse fracture of the right femoral neck similar to the previous right hip x-ray. 2. No additional acute bony abnormalities. 3. Intact right knee hemiarthroplasty. 4. Bony demineralization and degenerative changes. Electronically Signed: Norman Lozano MD  12/24/2023 4:30 PM EST  Workstation ID: [id number]    XR Hip With or Without Pelvis 2 - 3 View Right    Result Date: 12/24/2023  XR HIP W OR WO PELVIS 2-3 VIEW RIGHT Date of Exam: 12/24/2023 1:38 PM EST Indication: fall w/ right hip pain Comparison: 8/10/2017 Findings:  Non-comminuted subcapital right femoral neck fracture is seen. Left hip prosthesis remains in anatomic alignment. Bony structures also appear markedly osteopenic but intact.     Impression: Acute non-comminuted right femoral neck fracture. Electronically Signed: Joshua Richmond MD  12/24/2023 1:58 PM EST  Workstation ID: [id number]    XR Chest 1 View    Result Date: 12/24/2023  XR CHEST 1 VW Date of Exam: 12/24/2023 1:38 PM EST Indication: pre-op Comparison: PA and lateral chest 8/4/2017 Findings: Low volume inspiration. Mild cardiac enlargement appears new or increased since the 2017 comparison. Generalized interstitial prominence, thought to be chronic. No acute airspace disease or significant pleural fluid collection is identified. Severe degenerative changes are suggested in both shoulders. No acute osseous abnormalities.     1. Mild cardiomegaly, which appears new since 2017. 2. No acute chest findings. 3. Advanced degenerative changes of both shoulders. Electronically Signed: Shanique Martinez MD  12/24/2023 1:57 PM EST  Workstation ID: [id number]     Results for orders placed during the hospital encounter of 07/11/17    Duplex Carotid Ultrasound CAR    Interpretation Summary  · Proximal right internal carotid artery is normal.  · Proximal left internal carotid artery plaque without significant stenosis.  · Left internal carotid artery stenosis of 0-49%.      Results for orders placed during the hospital encounter of 07/11/17    Duplex Carotid Ultrasound CAR    Interpretation Summary  · Proximal right internal carotid artery is normal.  · Proximal left internal carotid artery plaque without significant stenosis.  · Left internal carotid artery stenosis of 0-49%.      Results for orders placed during the hospital encounter of 11/22/23    Adult Transthoracic Echo Complete W/ Cont if Necessary Per Protocol    Interpretation Summary    Left ventricular ejection fraction appears to be 66 - 70%.    Left ventricular wall  thickness is consistent with mild concentric hypertrophy.    Left ventricular diastolic function is consistent with (grade I) impaired relaxation.    The right ventricular cavity is mildly dilated.    The left atrial cavity is mildly dilated.    Left atrial volume is mildly increased.    The right atrial cavity is mildly  dilated.    Moderate aortic valve regurgitation is present.    Estimated right ventricular systolic pressure from tricuspid regurgitation is mildly elevated (35-45 mmHg).    Mild dilation of the ascending aorta is present.      Plan for Follow-up of Pending Labs/Results:     Discharge Details        Discharge Medications        New Medications        Instructions Start Date   aspirin 81 MG EC tablet   81 mg, Oral, 2 Times Daily             Changes to Medications        Instructions Start Date   acetaminophen 325 MG tablet  Commonly known as: TYLENOL  What changed:   reasons to take this  additional instructions   650 mg, Oral, Every 4 Hours PRN             Continue These Medications        Instructions Start Date   Cranberry Extract 250 MG tablet   500 mg, Oral, Daily, OTC      donepezil 5 MG tablet  Commonly known as: Aricept   2.5 mg, Oral, Nightly      losartan 50 MG tablet  Commonly known as: COZAAR   50 mg, Oral, Daily      sulfamethoxazole-trimethoprim 800-160 MG per tablet  Commonly known as: BACTRIM DS,SEPTRA DS   1 tablet, Oral, Daily PRN               Allergies   Allergen Reactions    Asa [Aspirin] GI Bleeding    Lisinopril-Hydrochlorothiazide Rash    Other Rash     STEROID INJECTION IN BACK    Amoxicillin Swelling    Zantac [Ranitidine Hcl] Unknown - Low Severity         Discharge Disposition:  Rehab Facility or Unit (DC - External)    Diet:  Hospital:  Diet Order   Procedures    Diet: Regular/House Diet; Texture: Regular Texture (IDDSI 7); Fluid Consistency: Thin (IDDSI 0)            Activity:  - as tolerated    Restrictions or Other Recommendations:  - none       CODE STATUS:    Code  Status and Medical Interventions:   Ordered at: 12/24/23 1448     Code Status (Patient has no pulse and is not breathing):    CPR (Attempt to Resuscitate)     Medical Interventions (Patient has pulse or is breathing):    Full Support       Future Appointments   Date Time Provider Department Center   12/27/2023  9:00 AM MED 7 [facility] [place],   12/27/23      Time Spent on Discharge:  I spent  35  minutes on this discharge activity which included: face-to-face encounter with the patient, reviewing the data in the system, coordination of the care with the nursing staff as well as consultants, documentation, and entering orders.

## 2023-12-27 NOTE — CASE MANAGEMENT/SOCIAL WORK
Continued Stay Note  [facility]     Patient Name: Marcelino Mesa  MRN: [id number]  Today's Date: 12/27/2023    Admit Date: 12/24/2023    Plan: [facility]   Discharge Plan       Row Name 12/27/23 0816       Plan    Plan [facility]    Plan Comments Rescheduled [facility] to [facility] for 2:00pm today - pending results of covid swab. CM following.    Final Discharge Disposition Code 62 - inpatient rehab facility                   Discharge Codes    No documentation.                 Expected Discharge Date and Time       Expected Discharge Date Expected Discharge Time    Dec 27, 2023               Archana Joshua RN

## 2023-12-27 NOTE — THERAPY TREATMENT NOTE
Patient Name: Marcelino Mesa  : 1927    MRN: [id number]                              Today's Date: 2023       Admit Date: 2023    Visit Dx:     ICD-10-CM ICD-9-CM   1. Closed nondisplaced basicervical fracture of right femur, initial encounter  S72.044A 820.03     Patient Active Problem List   Diagnosis    Abnormal gait    Generalized osteoarthritis    Hyperlipidemia    Iron deficiency anemia    Macular degeneration    Scoliosis of lumbar spine    Post-menopausal osteoporosis    Vitamin D deficiency    Essential hypertension    Preventative health care    Incomplete RBBB    Primary osteoarthritis of both knees    Anxiety    Amaurosis fugax    Bladder incontinence    Arthritis of right acromioclavicular joint    Mixed conductive and sensorineural hearing loss of both ears    Mild cognitive impairment    Hip fracture     Past Medical History:   Diagnosis Date    Amaurosis fugax of left eye 2017    5 minutes at home    Compression fracture of L1 lumbar vertebra Remote    Chronic lower back pain    Generalized osteoarthritis Adulthood    Bilateral knee replacements    Hearing impairment 2000    Wears hearing aids    Herpes zoster     Hip fracture, left 2014    Hip pinning with persistent pain    Hyperlipidemia Adulthood    Mild disease not requiring drug therapy    Hypertension 2016    Well compensated on medication    Iron deficiency 2017    Long-term oral replacement    Lipoma of right shoulder     MRI to confirm 2023    Lumbar scoliosis Adulthood    Mild disease    Lumbar spondylosis 2015    Status post injection therapy    Macular degeneration     PONV (postoperative nausea and vomiting)     preprocedural medications will help     Vitamin D deficiency     Wrist fracture, left      Past Surgical History:   Procedure Laterality Date    CATARACT EXTRACTION EXTRACAPSULAR W/ INTRAOCULAR LENS IMPLANTATION Bilateral 2016    COLONOSCOPY  2006    Normal study    HIP FRACTURE SURGERY Left       hip pinning for acute fracture    HIP HEMIARTHROPLASTY Right 12/25/2023    Procedure: HIP HEMIARTHROPLASTY RIGHT;  Surgeon: Edda Edmond MD;  Location:  [facility];  Service: Orthopedics;  Laterality: Right;    KNEE ARTHROPLASTY UNICOMPARTMENTAL Right 9/22/2016    Procedure: RIGHT KNEE ARTHROPLASTY UNICOMPARTMENTAL;  Surgeon: Jesse Taveras MD;  Location:  [facility];  Service:     KNEE ARTHROPLASTY UNICOMPARTMENTAL Left 12/1/2016    Procedure: LEFT KNEE ARTHROPLASTY UNICOMPARTMENTAL;  Surgeon: Jesse Taveras MD;  Location:  [facility];  Service:     LUMBAR EPIDURAL INJECTION  2015    TONSILLECTOMY  1947    TOTAL HIP ARTHROPLASTY Left 8/10/2017    Procedure: REMOVAL OF HARDWARE LEFT HIP, LEFT ANTERIOR TOTAL HIP ARTHROPLASTY;  Surgeon: Jesse Taveras MD;  Location:  [facility];  Service:       General Information       Row Name 12/27/23 0925          Physical Therapy Time and Intention    Document Type therapy note (daily note)  -     Mode of Treatment physical therapy  -       Row Name 12/27/23 0925          General Information    Patient Profile Reviewed yes  -     Existing Precautions/Restrictions hip;right;hip, posterior;oxygen therapy device and L/min;other (see comments)  per OP note: patient will be weightbearing as tolerated on walker at all times. No extremes of motion. 3L O2/NC, [place].  -     Barriers to Rehab medically complex;hearing deficit  -       Row Name 12/27/23 0925          Cognition    Orientation Status (Cognition) oriented x 4  -       Row Name 12/27/23 0925          Safety Issues, Functional Mobility    Safety Issues Affecting Function (Mobility) awareness of need for assistance;insight into deficits/self-awareness;safety precaution awareness;safety precautions follow-through/compliance;sequencing abilities;positioning of assistive device  -     Impairments Affecting Function (Mobility) balance;endurance/activity tolerance;shortness of breath;strength;pain  -                User Key  (r) = Recorded By, (t) = Taken By, (c) = Cosigned By      Initials Name Provider Type     Lisbeth Lara, PT Physical Therapist                   Mobility       Row Name 12/27/23 0927          Bed Mobility    Bed Mobility supine-sit  -     Supine-Sit Bowman (Bed Mobility) moderate assist (50% patient effort);1 person assist;verbal cues  -     Assistive Device (Bed Mobility) head of bed elevated;bed rails  -     Comment, (Bed Mobility) VCs for hand placement and sequencing. Required assist at R LE and to scoot to EOB  -       Row Name 12/27/23 0927          Transfers    Comment, (Transfers) VCs for hand placement and sequencing. Cues to step out R LE  -       Row Name 12/27/23 0927          Bed-Chair Transfer    Bed-Chair Bowman (Transfers) minimum assist (75% patient effort);1 person assist;verbal cues  -     Assistive Device (Bed-Chair Transfers) walker, front-wheeled  -       Row Name 12/27/23 0927          Sit-Stand Transfer    Sit-Stand Bowman (Transfers) minimum assist (75% patient effort);1 person assist;verbal cues  -     Assistive Device (Sit-Stand Transfers) walker, front-wheeled  -     Comment, (Sit-Stand Transfer) STS x1 from EOB. Cues to push up from bed instead of pulling on walker  -       Row Name 12/27/23 0927          Gait/Stairs (Locomotion)    Bowman Level (Gait) minimum assist (75% patient effort);1 person assist;verbal cues  -     Assistive Device (Gait) walker, front-wheeled  -     Distance in Feet (Gait) 10  -LH     Deviations/Abnormal Patterns (Gait) gait speed decreased;bilateral deviations;stride length decreased;weight shifting decreased  -     Bilateral Gait Deviations heel strike decreased;weight shift ability decreased  -     Right Sided Gait Deviations weight shift ability decreased  -     Comment, (Gait/Stairs) Pt demo step to gait pattern w/ short, shuffled steps. Pt had increased difficulty advancing R LE. VCs  for sequencing, upright posture, and increased WB through R LE. No LOB or buckling noted. Distance limited by weakness and pain.  -               User Key  (r) = Recorded By, (t) = Taken By, (c) = Cosigned By      Initials Name Provider Type    Lisbeth Craft PT Physical Therapist                   Obj/Interventions       Row Name 12/27/23 0932          Motor Skills    Therapeutic Exercise hip;knee;ankle  -       Row Name 12/27/23 0932          Hip (Therapeutic Exercise)    Hip (Therapeutic Exercise) isometric exercises  -     Hip Isometrics (Therapeutic Exercise) bilateral;gluteal sets;10 repetitions  -       Row Name 12/27/23 0932          Knee (Therapeutic Exercise)    Knee (Therapeutic Exercise) isometric exercises  -     Knee Isometrics (Therapeutic Exercise) bilateral;quad sets;10 repetitions  -       Row Name 12/27/23 0932          Ankle (Therapeutic Exercise)    Ankle (Therapeutic Exercise) AROM (active range of motion)  -     Ankle AROM (Therapeutic Exercise) bilateral;dorsiflexion;plantarflexion;10 repetitions  -       Row Name 12/27/23 0932          Balance    Balance Assessment sitting static balance;sitting dynamic balance;sit to stand dynamic balance;standing static balance;standing dynamic balance  -     Static Sitting Balance standby assist  -     Dynamic Sitting Balance minimal assist  -     Position, Sitting Balance unsupported;sitting edge of bed  -     Sit to Stand Dynamic Balance minimal assist;verbal cues  -     Static Standing Balance minimal assist;verbal cues  -     Dynamic Standing Balance minimal assist;verbal cues  -     Position/Device Used, Standing Balance supported;walker, front-wheeled  -     Balance Interventions sitting;standing;sit to stand;supported;static;dynamic  -               User Key  (r) = Recorded By, (t) = Taken By, (c) = Cosigned By      Initials Name Provider Type    Lisbeth Craft PT Physical Therapist                    Goals/Plan    No documentation.                  Clinical Impression       Row Name 12/27/23 0933          Pain    Pretreatment Pain Rating 4/10  -     Posttreatment Pain Rating 6/10  -     Pain Location - Side/Orientation Right  -     Pain Location incisional  -     Pain Location - hip  -     Pain Intervention(s) Repositioned;Ambulation/increased activity;Cold applied;Elevated  -       Row Name 12/27/23 0933          Plan of Care Review    Plan of Care Reviewed With patient  -     Progress no change  -     Outcome Evaluation Pt continues to present below baseline function d/t generalized weakness and deficits in R LE strength, balance, and activity tolerance. Pt required modA x1 for bed mobility and Srikanth x1 for STS and to ambulate 10 ft w/ FWW. Ambulation distance most limited by weakness and pain this date. No LOB or buckling noted. She would continue to benefit from skilled IP PT. Recommend IRF at d/c for best functional outcome.  -[facility] Name 12/27/23 0933          Therapy Assessment/Plan (PT)    Rehab Potential (PT) good, to achieve stated therapy goals  -     Criteria for Skilled Interventions Met (PT) yes;meets criteria  -     Therapy Frequency (PT) 2 times/day  -       Row Name 12/27/23 0933          Vital Signs    Pre Systolic BP Rehab 135  -     Pre Treatment Diastolic BP 67  -     Pretreatment Heart Rate (beats/min) 66  -LH     Posttreatment Heart Rate (beats/min) 67  -     Pre SpO2 (%) 92  -LH     O2 Delivery Pre Treatment supplemental O2  -     O2 Delivery Intra Treatment supplemental O2  -LH     Post SpO2 (%) 92  -LH     O2 Delivery Post Treatment supplemental O2  -     Pre Patient Position Supine  -     Intra Patient Position Standing  -     Post Patient Position Sitting  -       Row Name 12/27/23 0933          Positioning and Restraints    Pre-Treatment Position in bed  -     Post Treatment Position chair  -     In Chair reclined;sitting;call light  within reach;encouraged to call for assist;exit alarm on;compression device;waffle cushion;on mechanical lift sling;legs elevated;notified [facility]  -               User Key  (r) = Recorded By, (t) = Taken By, (c) = Cosigned By      Initials Name Provider Type     Lisbeth Lara PT Physical Therapist                   Outcome Measures       Row Name 12/27/23 0936 12/27/23 0805       How much help from another person do you currently need...    Turning from your back to your side while in flat bed without using bedrails? 3  - 3  -BC    Moving from lying on back to sitting on the side of a flat bed without bedrails? 2  - 3  -BC    Moving to and from a bed to a chair (including a wheelchair)? 3  - 3  -BC    Standing up from a chair using your arms (e.g., wheelchair, bedside chair)? 3  - 3  -BC    Climbing 3-5 steps with a railing? 2  - 2  -BC    To walk in hospital room? 2  - 2  -BC    AM-PAC 6 Clicks Score (PT) 15  - 16  -BC    Highest Level of Mobility Goal 4 --> Transfer to chair/commode  - 5 --> Static standing  -BC      Row Name 12/27/23 0936          Functional Assessment    Outcome Measure Options AM-PAC 6 Clicks Basic Mobility (PT)  -               User Key  (r) = Recorded By, (t) = Taken By, (c) = Cosigned By      Initials Name Provider Type    BC Kinza Chapman RN Registered Nurse     Lisbeth Lara, [facility] Physical Therapist                                 Physical Therapy Education       Title: PT OT SLP Therapies (In Progress)       Topic: Physical Therapy (In Progress)       Point: Mobility training (In Progress)       Learning Progress Summary             Patient Acceptance, E, NR by  at 12/27/2023 0936    Acceptance, E, NR by AS at 12/26/2023 1114    Acceptance, E, NR by AS at 12/26/2023 0851    Acceptance, E, VU,NR by  at 12/25/2023 1450                         Point: Home exercise program (In Progress)       Learning Progress Summary             Patient Acceptance, E, NR by  at  12/27/2023 0936    Acceptance, E, NR by AS at 12/26/2023 1114    Acceptance, E, NR by AS at 12/26/2023 0851                         Point: Body mechanics (In Progress)       Learning Progress Summary             Patient Acceptance, E, NR by LH at 12/27/2023 0936    Acceptance, E, NR by AS at 12/26/2023 1114    Acceptance, E, NR by AS at 12/26/2023 0851                         Point: Precautions (In Progress)       Learning Progress Summary             Patient Acceptance, E, NR by LH at 12/27/2023 0936    Acceptance, E, NR by AS at 12/26/2023 1114    Acceptance, E, NR by AS at 12/26/2023 0851                                         User Key       Initials Effective Dates Name Provider Type Discipline    AS 04/28/23 -  Hilda Benitez, PTA Physical Therapist Assistant PT     07/11/23 -  Viktoriya Ramirez, PT Physical Therapist PT     09/21/23 -  Lisbeth Lara, PT Physical Therapist PT                  PT Recommendation and Plan     Plan of Care Reviewed With: patient  Progress: no change  Outcome Evaluation: Pt continues to present below baseline function d/t generalized weakness and deficits in R LE strength, balance, and activity tolerance. Pt required modA x1 for bed mobility and Srikanth x1 for STS and to ambulate 10 ft w/ FWW. Ambulation distance most limited by weakness and pain this date. No LOB or buckling noted. She would continue to benefit from skilled IP PT. Recommend IRF at d/c for best functional outcome.     Time Calculation:         PT Charges       Row Name 12/27/23 0937             Time Calculation    Start Time 0834  -      PT Received On 12/27/23  -      PT Goal Re-Cert Due Date 01/04/24  -         Timed Charges    30431 - PT Therapeutic Exercise Minutes 10  -      88507 - Gait Training Minutes  8  -      71442 - PT Therapeutic Activity Minutes 5  -         Total Minutes    Timed Charges Total Minutes 23  -       Total Minutes 23  -                User Key  (r) = Recorded  By, (t) = Taken By, (c) = Cosigned By      Initials Name Provider Type     Lisbeth Lara, PT Physical Therapist                  Therapy Charges for Today       Code Description Service Date Service Provider Modifiers Qty    60984584953 HC PT THER PROC EA 15 MIN 12/27/2023 Lisbeth Lara, PT GP 1    66428749128 HC GAIT TRAINING EA 15 MIN 12/27/2023 Lisbeth Lara, PT GP 1            PT G-Codes  Outcome Measure Options: AM-PAC 6 Clicks Basic Mobility (PT)  AM-PAC 6 Clicks Score (PT): 15  PT Discharge Summary  Anticipated Discharge Disposition (PT): inpatient rehabilitation facility    Lisbeth Lara PT  12/27/2023

## 2023-12-27 NOTE — PLAN OF CARE
Goal Outcome Evaluation:  Plan of Care Reviewed With: patient        Progress: no change  Outcome Evaluation: Pt continues to present below baseline function d/t generalized weakness and deficits in R LE strength, balance, and activity tolerance. Pt required modA x1 for bed mobility and Srikanth x1 for STS and to ambulate 10 ft w/ FWW. Ambulation distance most limited by weakness and pain this date. No LOB or buckling noted. She would continue to benefit from skilled IP PT. Recommend IRF at d/c for best functional outcome.      Anticipated Discharge Disposition (PT): inpatient rehabilitation facility

## 2024-01-05 ENCOUNTER — READMISSION MANAGEMENT (OUTPATIENT)
Dept: CALL CENTER | Facility: HOSPITAL | Age: 89
End: 2024-01-05
Payer: MEDICARE

## 2024-01-05 NOTE — OUTREACH NOTE
Prep Survey      Flowsheet Row Responses   [facility] facility patient discharged from? Non-BH   Is LACE score < 7 ? Non-BH Discharge   Eligibility [facility]   Date of Discharge 01/05/24   Discharge Disposition Home-Health Care [facility]   Discharge diagnosis Fracture of unspecified part of neck of right femur, subsequent encounter for closed fracture with routine healing   Does the patient have one of the following disease processes/diagnoses(primary or secondary)? Other   Prep survey completed? Yes            Ramona LESTER - Registered Nurse

## 2024-01-08 ENCOUNTER — TRANSITIONAL CARE MANAGEMENT TELEPHONE ENCOUNTER (OUTPATIENT)
Dept: CALL CENTER | Facility: HOSPITAL | Age: 89
End: 2024-01-08
Payer: MEDICARE

## 2024-01-08 NOTE — OUTREACH NOTE
Call Center TCM Note      Flowsheet Row Responses   [facility] facility patient discharged from? Non-  [[facility]]   Does the patient have one of the following disease processes/diagnoses(primary or secondary)? Other   TCM attempt successful? No   Unsuccessful attempts Attempt 1            Jayda Tejada RN    1/8/2024, 10:48 EST

## 2024-01-08 NOTE — OUTREACH NOTE
Call Center TCM Note      Flowsheet Row Responses   [facility] patient discharged from? Non-  [[facility]]   Does the patient have one of the following disease processes/diagnoses(primary or secondary)? Other   TCM attempt successful? Yes   Call start time 1227   Call end time 1234   Discharge diagnosis Fracture of unspecified part of neck of right femur, subsequent encounter for closed fracture with routine healing   Is patient permission given to speak with other caregiver? Yes   Person spoke with today (if not patient) and relationship daughterVita Risk   Meds reviewed with patient/caregiver? Yes   Does the patient have all medications ordered at discharge? Yes   Is the patient taking all medications as directed (includes completed medication regime)? Yes   Comments PCP Dr Ingram. Patient has an office visit in place with PCP for hospital follow up on 1/16/24  1pm. Discussed with daughter rescheduling to a longer 30min HFU appt slot, but they would like to keep current appt as it is same day as the surgeon. They also do not want to get out for early appts showing available. Message routed to office.   Does the patient have an appointment with their PCP within 7-14 days of discharge? Other  [Patient has office visit type appt in place]   Nursing Interventions Confirmed date/time of appointment, Routed TCM call to PCP office   What is the Home health agency?  [facility]    Has home health visited the patient within 72 hours of discharge? Yes   Psychosocial issues? No   Did the patient receive a copy of their discharge instructions? Yes   Nursing interventions Reviewed instructions with patient  [[facility]]   Is the patient/caregiver able to teach back signs and symptoms related to disease process for when to call PCP? Yes   If the patient is a current smoker, are they able to teach back resources for cessation? Not a smoker   TCM call completed? Yes   Call end time 1234   Would  this patient benefit from a Referral to [facility] Social Work? No   Is the patient interested in additional calls from an ambulatory ? No            Jayda Tejada RN    1/8/2024, 12:36 EST

## 2024-01-24 ENCOUNTER — OFFICE VISIT (OUTPATIENT)
Dept: FAMILY MEDICINE CLINIC | Facility: CLINIC | Age: 89
End: 2024-01-24
Payer: MEDICARE

## 2024-01-24 ENCOUNTER — TELEPHONE (OUTPATIENT)
Dept: FAMILY MEDICINE CLINIC | Facility: CLINIC | Age: 89
End: 2024-01-24

## 2024-01-24 VITALS
WEIGHT: 127 LBS | BODY MASS INDEX: 23.37 KG/M2 | DIASTOLIC BLOOD PRESSURE: 78 MMHG | HEIGHT: 62 IN | SYSTOLIC BLOOD PRESSURE: 126 MMHG

## 2024-01-24 DIAGNOSIS — I10 ESSENTIAL HYPERTENSION: ICD-10-CM

## 2024-01-24 DIAGNOSIS — G31.84 MILD COGNITIVE IMPAIRMENT: ICD-10-CM

## 2024-01-24 DIAGNOSIS — Z96.641 S/P TOTAL RIGHT HIP ARTHROPLASTY: Primary | ICD-10-CM

## 2024-01-24 RX ORDER — ASPIRIN 81 MG/1
81 TABLET ORAL 2 TIMES DAILY
Qty: 28 TABLET | Refills: 0 | Status: SHIPPED | OUTPATIENT
Start: 2024-01-24 | End: 2024-02-07

## 2024-01-24 RX ORDER — LOSARTAN POTASSIUM 50 MG/1
50 TABLET ORAL DAILY
Qty: 90 TABLET | Refills: 1 | Status: SHIPPED | OUTPATIENT
Start: 2024-01-24

## 2024-01-24 RX ORDER — DONEPEZIL HYDROCHLORIDE 5 MG/1
2.5 TABLET, FILM COATED ORAL NIGHTLY
Qty: 90 TABLET | Refills: 0 | Status: SHIPPED | OUTPATIENT
Start: 2024-01-24

## 2024-01-24 NOTE — TELEPHONE ENCOUNTER
[facility]  Return in about 3 months (around 4/24/2024) for w PCP for HTN/chronic care.

## 2024-01-24 NOTE — PROGRESS NOTES
"Chief Complaint   Patient presents with    Hip Injury       HPI:  Marcelino Mesa is a 89 y.o. female who presents today for FU after right hip hemiarthroplasty.     Pt suffered a fall on 12/24 at home and sustained right hip fracture. She was brought to ER and had surgical repair on 12/25. Post operatively she did very well. She was discharged to inpatient rehab at [facility] on 12/27 and remained there until 1/5/24. She was asked to come in for follow up appt but unfortunately had to reschedule several times due to weather.  Today she presents w her daughter. She tells me the hip is doing extremely well. Denies any pain. She has graduated to using mostly her cane. Only uses the walker occasionally at night when she gets up to use the restroom. Daughter states that in inpatient rehab they called her \"wonder woman.\" She has home health coming in for rehab a couple times per week and continues to do well with this. She followed up with her orthopedic surgeon on 1/11 and was told that she could not drive for another 4 weeks, but as long as she was doing well at that time she could begin driving and no further follow ups were needed.    Did have some issue w BP elevation during her hospital stay but it is well controlled today. Taking Losartan 50mg and tolerating this well.      PE:  Vitals:    01/24/24 1251   BP: 126/78      Body mass index is 23.23 kg/m².    Gen Appearance: NAD  HEENT: Normocephalic, EOMI, hard of hearing  Heart: RRR, normal S1 and S2, no murmur  Lungs: CTA b/l, no wheezing, no crackles  MSK: No swelling or bony abnormality of the hip. Ambulates well w use of cane.  Skim: Incision across right hip is well healed without any surrounding redness.  Neuro: No focal deficits    Current Outpatient Medications   Medication Sig Dispense Refill    acetaminophen (TYLENOL) 325 MG tablet Take 2 tablets by mouth Every 4 (Four) Hours As Needed for Mild Pain (1-3).  0    aspirin 81 MG EC tablet Take 1 tablet by " mouth 2 (Two) Times a Day for 14 days. 28 tablet 0    Cranberry Extract 250 MG tablet Take 500 mg by mouth Daily. OTC      donepezil (Aricept) 5 MG tablet Take 0.5 tablets by mouth Every Night. 90 tablet 0    losartan (COZAAR) 50 MG tablet Take 1 tablet by mouth Daily. 90 tablet 1    sulfamethoxazole-trimethoprim (BACTRIM DS,SEPTRA DS) 800-160 MG per tablet Take 1 tablet by mouth Daily As Needed (urinary tract infection- take at onset of symptoms for 5 days). (Patient taking differently: Take 1 tablet by mouth Daily As Needed (urinary tract infection- take at onset of symptoms for 5 days). As needed) 30 tablet 2     No current facility-administered medications for this visit.        A/P:  Diagnoses and all orders for this visit:    1. S/P total right hip arthroplasty (Primary)    2. Mild cognitive impairment  -     donepezil (Aricept) 5 MG tablet; Take 0.5 tablets by mouth Every Night.  Dispense: 90 tablet; Refill: 0    3. Essential hypertension  -     losartan (COZAAR) 50 MG tablet; Take 1 tablet by mouth Daily.  Dispense: 90 tablet; Refill: 1    Other orders  -     aspirin 81 MG EC tablet; Take 1 tablet by mouth 2 (Two) Times a Day for 14 days.  Dispense: 28 tablet; Refill: 0       Right Hip Fx s/p Replacement  -Right hip appears to be healing well. Mobility continues to improve.   -Driving restrictions per ortho for additional 2 weeks    HTN  -Controlled. Cont Losartan 50mg      Return in about 3 months (around 4/24/2024) for w PCP for HTN/chronic care.     Dictated Utilizing Dragon Dictation    Please note that portions of this note were completed with a voice recognition program.    Part of this note may be an electronic transcription/translation of spoken language to printed text using the Dragon Dictation System.

## 2024-02-16 RX ORDER — ASPIRIN 81 MG/1
81 TABLET ORAL DAILY
Qty: 30 TABLET | Refills: 11 | Status: SHIPPED | OUTPATIENT
Start: 2024-02-16

## 2024-05-16 DIAGNOSIS — I10 ESSENTIAL HYPERTENSION: ICD-10-CM

## 2024-05-16 RX ORDER — LOSARTAN POTASSIUM 50 MG/1
50 TABLET ORAL DAILY
Qty: 90 TABLET | Refills: 1 | Status: SHIPPED | OUTPATIENT
Start: 2024-05-16

## 2024-05-16 NOTE — TELEPHONE ENCOUNTER
In remission please see Dr. Roa note  On prednisone 7 mg daily   Rx Refill Note  Requested Prescriptions     Pending Prescriptions Disp Refills    losartan (COZAAR) 50 MG tablet 90 tablet 1     Sig: Take 1 tablet by mouth Daily.      Last office visit with prescribing clinician: 12/6/2023   Last telemedicine visit with prescribing clinician: Visit date not found   Next office visit with prescribing clinician: Visit date not found                         Would you like a call back once the refill request has been completed: [] Yes [] No    If the office needs to give you a call back, can they leave a voicemail: [] Yes [] No    Kaye Perera MA  05/16/24, 13:48 EDT

## 2024-05-16 NOTE — TELEPHONE ENCOUNTER
Caller: Marcelino Mesa    Relationship: Self    Best call back number: [phone]    Requested Prescriptions:   Requested Prescriptions     Pending Prescriptions Disp Refills    losartan (COZAAR) 50 MG tablet 90 tablet 1     Sig: Take 1 tablet by mouth Daily.        Pharmacy where request should be sent: [id number] [address] [phone] [facility] [phone]      Last office visit with prescribing clinician: 12/6/2023   Last telemedicine visit with prescribing clinician: Visit date not found   Next office visit with prescribing clinician: Visit date not found     Additional details provided by patient:       Mindy PalafoxCarine valladares Rep   05/16/24 13:47 EDT

## 2024-05-28 ENCOUNTER — OFFICE VISIT (OUTPATIENT)
Dept: FAMILY MEDICINE CLINIC | Facility: CLINIC | Age: 89
End: 2024-05-28
Payer: MEDICARE

## 2024-05-28 VITALS
HEART RATE: 80 BPM | OXYGEN SATURATION: 96 % | DIASTOLIC BLOOD PRESSURE: 68 MMHG | WEIGHT: 123 LBS | SYSTOLIC BLOOD PRESSURE: 112 MMHG | BODY MASS INDEX: 22.5 KG/M2 | TEMPERATURE: 98.2 F | RESPIRATION RATE: 18 BRPM

## 2024-05-28 DIAGNOSIS — L03.115 CELLULITIS OF RIGHT LOWER EXTREMITY: ICD-10-CM

## 2024-05-28 DIAGNOSIS — L85.3 DRY SKIN: ICD-10-CM

## 2024-05-28 DIAGNOSIS — S80.811A ABRASION OF ANTERIOR RIGHT LOWER LEG, INITIAL ENCOUNTER: Primary | ICD-10-CM

## 2024-05-28 DIAGNOSIS — R21 RASH: ICD-10-CM

## 2024-05-28 PROCEDURE — 99214 OFFICE O/P EST MOD 30 MIN: CPT | Performed by: FAMILY MEDICINE

## 2024-05-28 PROCEDURE — 1125F AMNT PAIN NOTED PAIN PRSNT: CPT | Performed by: FAMILY MEDICINE

## 2024-05-28 RX ORDER — CEPHALEXIN 500 MG/1
500 CAPSULE ORAL 2 TIMES DAILY
Qty: 14 CAPSULE | Refills: 0 | Status: SHIPPED | OUTPATIENT
Start: 2024-05-28 | End: 2024-06-04

## 2024-05-28 RX ORDER — TRIAMCINOLONE ACETONIDE 1 MG/G
1 OINTMENT TOPICAL 2 TIMES DAILY
COMMUNITY
End: 2024-06-04 | Stop reason: SDUPTHER

## 2024-05-28 RX ORDER — MINERAL OIL/HYDROPHIL PETROLAT
1 OINTMENT (GRAM) TOPICAL AS NEEDED
Qty: 50 G | Refills: 11 | Status: SHIPPED | OUTPATIENT
Start: 2024-05-28

## 2024-05-28 RX ORDER — CETIRIZINE HYDROCHLORIDE 5 MG/1
5 TABLET ORAL DAILY
Qty: 30 TABLET | Refills: 0 | Status: SHIPPED | OUTPATIENT
Start: 2024-05-28 | End: 2024-06-14 | Stop reason: SDUPTHER

## 2024-05-28 NOTE — PROGRESS NOTES
Established Patient Office Visit      Patient Name: Marcelino Mesa  : 1927   MRN: [id number]   Care Team: Patient Care Team:  Earnest Ingram DO as PCP - General (Family Medicine)    Chief Complaint:    Chief Complaint   Patient presents with    Leg Pain     Fell x1 wk    Rash     On neck x1 week  Itchy, applied triamcinolone, not helping       History of Present Illness: Marcelino Mesa is a 89 y.o. female who is here today for chief complaint.    HPI    Answers submitted by the patient for this visit:  Other (Submitted on 2024)  Please describe your symptoms.: Fell and scraped my right leg and had a rash on nevk  Have you had these symptoms before?: No  How long have you been having these symptoms?: 5-7 days  Please list any medications you are currently taking for this condition.: N/A  Primary Reason for Visit (Submitted on 2024)  What is the primary reason for your visit?: Other    She has a history of fall in  resulting in disp[laced right femoral neck fracture    This patient is accompanied by their self who contributes to the history of their care.    The following portions of the patient's history were reviewed and updated as appropriate: allergies, current medications, past family history, past medical history, past social history, past surgical history and problem list.    Subjective      Review of Systems:   Review of Systems - See HPI    Past Medical History:   Past Medical History:   Diagnosis Date    Amaurosis fugax of left eye 2017    5 minutes at home    Cataract     Compression fracture of L1 lumbar vertebra Remote    Chronic lower back pain    Generalized osteoarthritis Adulthood    Bilateral knee replacements    Glaucoma     Hearing impairment 2000    Wears hearing aids    Herpes zoster     Hip fracture, left 2014    Hip pinning with persistent pain    Hyperlipidemia Adulthood    Mild disease not requiring drug therapy    Hypertension 2016    Well compensated on  medication    Iron deficiency 2017    Long-term oral replacement    Lipoma of right shoulder     MRI to confirm 8/2023    Low back pain     Lumbar scoliosis Adulthood    Mild disease    Lumbar spondylosis 2015    Status post injection therapy    Macular degeneration     Pneumonia     PONV (postoperative nausea and vomiting)     preprocedural medications will help     Urinary tract infection     Vitamin D deficiency     Wrist fracture, left        Past Surgical History:   Past Surgical History:   Procedure Laterality Date    CATARACT EXTRACTION EXTRACAPSULAR W/ INTRAOCULAR LENS IMPLANTATION Bilateral 2016    COLONOSCOPY  2006    Normal study    EYE SURGERY      FRACTURE SURGERY      HIP FRACTURE SURGERY Left 2014     hip pinning for acute fracture    HIP HEMIARTHROPLASTY Right 12/25/2023    Procedure: HIP HEMIARTHROPLASTY RIGHT;  Surgeon: Edda Edmond MD;  Location:  [facility];  Service: Orthopedics;  Laterality: Right;    JOINT REPLACEMENT      KNEE ARTHROPLASTY UNICOMPARTMENTAL Right 09/22/2016    Procedure: RIGHT KNEE ARTHROPLASTY UNICOMPARTMENTAL;  Surgeon: Jesse Taveras MD;  Location:  [facility];  Service:     KNEE ARTHROPLASTY UNICOMPARTMENTAL Left 12/01/2016    Procedure: LEFT KNEE ARTHROPLASTY UNICOMPARTMENTAL;  Surgeon: Jesse Taveras MD;  Location:  [facility];  Service:     LUMBAR EPIDURAL INJECTION  2015    TONSILLECTOMY  1947    TOTAL HIP ARTHROPLASTY Left 08/10/2017    Procedure: REMOVAL OF HARDWARE LEFT HIP, LEFT ANTERIOR TOTAL HIP ARTHROPLASTY;  Surgeon: Jesse Taveras MD;  Location:  [facility];  Service:        Family History:   Family History   Problem Relation Age of Onset    Alzheimer's disease Mother     No Known Problems Father     Arthritis Sister     Breast cancer Sister     Colon cancer Sister     Hearing loss Sister     Lung cancer Brother     Heart disease Other         2009    Asthma Sister     Colon cancer Sister     Breast cancer Sister     Lumbar disc disease Sister      Skin cancer Brother     Asthma Sister        Social History:   Social History     Socioeconomic History    Marital status:    Tobacco Use    Smoking status: Never     Passive exposure: Never    Smokeless tobacco: Never   Vaping Use    Vaping status: Never Used   Substance and Sexual Activity    Alcohol use: No    Drug use: No    Sexual activity: Never       Tobacco History:   Social History     Tobacco Use   Smoking Status Never    Passive exposure: Never   Smokeless Tobacco Never       Medications:     Current Outpatient Medications:     Cranberry Extract 250 MG tablet, Take 500 mg by mouth Daily. OTC, Disp: , Rfl:     losartan (COZAAR) 50 MG tablet, Take 1 tablet by mouth Daily., Disp: 90 tablet, Rfl: 1    sulfamethoxazole-trimethoprim (BACTRIM DS,SEPTRA DS) 800-160 MG per tablet, Take 1 tablet by mouth Daily As Needed (urinary tract infection- take at onset of symptoms for 5 days). (Patient taking differently: Take 1 tablet by mouth Daily As Needed (urinary tract infection- take at onset of symptoms for 5 days). As needed), Disp: 30 tablet, Rfl: 2    acetaminophen (TYLENOL) 325 MG tablet, Take 2 tablets by mouth Every 4 (Four) Hours As Needed for Mild Pain (1-3)., Disp: , Rfl: 0    amLODIPine (NORVASC) 2.5 MG tablet, Take 1 tablet by mouth Daily., Disp: , Rfl:     aspirin (Aspirin Low Dose) 81 MG EC tablet, Take 1 tablet by mouth Daily., Disp: 30 tablet, Rfl: 11    cetirizine (zyrTEC) 5 MG tablet, Take 1 tablet by mouth Daily for 30 days. For itching, Disp: 30 tablet, Rfl: 0    donepezil (ARICEPT) 10 MG tablet, Take 1 tablet by mouth Every Night., Disp: , Rfl:     doxycycline (ADOXA) 100 MG tablet, Take 1 tablet by mouth 2 (Two) Times a Day for 10 days., Disp: 20 tablet, Rfl: 0    famotidine (PEPCID) 20 MG tablet, Take 1 tablet by mouth 2 (Two) Times a Day As Needed for Indigestion or Heartburn., Disp: , Rfl:     mineral oil-hydrophilic petrolatum (AQUAPHOR) ointment, Apply 1 Application topically to the  appropriate area as directed As Needed for Dry Skin., Disp: 50 g, Rfl: 11    triamcinolone (KENALOG) 0.1 % ointment, Apply 1 Application topically to the appropriate area as directed 2 (Two) Times a Day., Disp: 30 g, Rfl: 0    Allergies:   Allergies   Allergen Reactions    Asa [Aspirin] GI Bleeding    Lisinopril-Hydrochlorothiazide Rash    Other Rash     STEROID INJECTION IN BACK    Amoxicillin Swelling    Zantac [Ranitidine Hcl] Unknown - Low Severity       Objective   Objective     Physical Exam:  Vital Signs:   Vitals:    05/28/24 1245   BP: 112/68   BP Location: Left arm   Patient Position: Sitting   Cuff Size: Adult   Pulse: 80   Resp: 18   Temp: 98.2 °F (36.8 °C)   TempSrc: Infrared   SpO2: 96%   Weight: 55.8 kg (123 lb)   PainSc:   7   PainLoc: Leg     Body mass index is 22.5 kg/m².     Physical Exam  Nursing note reviewed  Const: NAD, A&Ox4, Pleasant, Cooperative  Eyes: EOMI, no conjunctivitis  ENT: No nasal discharge present, neck supple  Cardiac: Regular rate and rhythm, no cyanosis  Resp: Respiratory rate within normal limits, no increased work of breathing, no audible wheezing or retractions noted  GI: No distention or ascites  MSK: Motor and sensation grossly intact in bilateral upper extremities  Neurologic: CN II-XII grossly intact  Psych: Appropriate mood and behavior.  Skin: Warm, dry  Procedures/Radiology     Procedures  No radiology results for the last 7 days     Assessment & Plan   Assessment / Plan      Assessment/Plan:   Problems Addressed This Visit  Diagnoses and all orders for this visit:    1. Abrasion of anterior right lower leg, initial encounter (Primary)  -     Discontinue: doxycycline (ADOXA) 100 MG tablet; Take 1 tablet by mouth 2 (Two) Times a Day for 10 days.  Dispense: 20 tablet; Refill: 0    2. Cellulitis of right lower extremity  -     Discontinue: cephalexin (Keflex) 500 MG capsule; Take 1 capsule by mouth 2 (Two) Times a Day for 7 days.  Dispense: 14 capsule; Refill: 0  -      Discontinue: doxycycline (ADOXA) 100 MG tablet; Take 1 tablet by mouth 2 (Two) Times a Day for 10 days.  Dispense: 20 tablet; Refill: 0    3. Rash  -     Discontinue: cephalexin (Keflex) 500 MG capsule; Take 1 capsule by mouth 2 (Two) Times a Day for 7 days.  Dispense: 14 capsule; Refill: 0  -     cetirizine (zyrTEC) 5 MG tablet; Take 1 tablet by mouth Daily for 30 days. For itching  Dispense: 30 tablet; Refill: 0    4. Dry skin  -     mineral oil-hydrophilic petrolatum (AQUAPHOR) ointment; Apply 1 Application topically to the appropriate area as directed As Needed for Dry Skin.  Dispense: 50 g; Refill: 11      Problem List Items Addressed This Visit    None  Visit Diagnoses       Abrasion of anterior right lower leg, initial encounter    -  Primary    Relevant Medications    mineral oil-hydrophilic petrolatum (AQUAPHOR) ointment    Cellulitis of right lower extremity        Rash        Relevant Medications    mineral oil-hydrophilic petrolatum (AQUAPHOR) ointment    cetirizine (zyrTEC) 5 MG tablet    Dry skin        Relevant Medications    mineral oil-hydrophilic petrolatum (AQUAPHOR) ointment              There are no Patient Instructions on file for this visit.    Follow Up:   Return in about 2 weeks (around 6/11/2024) for video visit.        DO PAULINE Gordillo RD  [facility]  [address]  [facility] [address]  Fax [phone]  Phone [phone]

## 2024-06-04 ENCOUNTER — OFFICE VISIT (OUTPATIENT)
Dept: FAMILY MEDICINE CLINIC | Facility: CLINIC | Age: 89
End: 2024-06-04
Payer: MEDICARE

## 2024-06-04 ENCOUNTER — HOSPITAL ENCOUNTER (OUTPATIENT)
Facility: HOSPITAL | Age: 89
Discharge: HOME | End: 2024-06-04
Admitting: FAMILY MEDICINE
Payer: MEDICARE

## 2024-06-04 VITALS
HEART RATE: 80 BPM | RESPIRATION RATE: 18 BRPM | HEIGHT: 62 IN | SYSTOLIC BLOOD PRESSURE: 144 MMHG | WEIGHT: 124 LBS | OXYGEN SATURATION: 92 % | DIASTOLIC BLOOD PRESSURE: 86 MMHG | BODY MASS INDEX: 22.82 KG/M2

## 2024-06-04 DIAGNOSIS — R22.41 LOCALIZED SWELLING OF RIGHT LOWER LEG: ICD-10-CM

## 2024-06-04 DIAGNOSIS — S80.811A ABRASION OF ANTERIOR RIGHT LOWER LEG, INITIAL ENCOUNTER: Primary | ICD-10-CM

## 2024-06-04 DIAGNOSIS — R21 RASH: ICD-10-CM

## 2024-06-04 DIAGNOSIS — L03.115 CELLULITIS OF RIGHT LOWER EXTREMITY: ICD-10-CM

## 2024-06-04 LAB
BH CV LOWER VASCULAR LEFT COMMON FEMORAL PHASIC: NORMAL
BH CV LOWER VASCULAR LEFT COMMON FEMORAL SPONT: NORMAL
BH CV LOWER VASCULAR RIGHT COMMON FEMORAL COMPRESS: NORMAL
BH CV LOWER VASCULAR RIGHT COMMON FEMORAL PHASIC: NORMAL
BH CV LOWER VASCULAR RIGHT COMMON FEMORAL SPONT: NORMAL
BH CV LOWER VASCULAR RIGHT DISTAL FEMORAL COMPRESS: NORMAL
BH CV LOWER VASCULAR RIGHT DISTAL FEMORAL PHASIC: NORMAL
BH CV LOWER VASCULAR RIGHT DISTAL FEMORAL SPONT: NORMAL
BH CV LOWER VASCULAR RIGHT GASTRONEMIUS COMPRESS: NORMAL
BH CV LOWER VASCULAR RIGHT GREATER SAPH AK COMPRESS: NORMAL
BH CV LOWER VASCULAR RIGHT GREATER SAPH BK COMPRESS: NORMAL
BH CV LOWER VASCULAR RIGHT LESSER SAPH COMPRESS: NORMAL
BH CV LOWER VASCULAR RIGHT MID FEMORAL COMPRESS: NORMAL
BH CV LOWER VASCULAR RIGHT MID FEMORAL PHASIC: NORMAL
BH CV LOWER VASCULAR RIGHT MID FEMORAL SPONT: NORMAL
BH CV LOWER VASCULAR RIGHT PERONEAL AUGMENT: NORMAL
BH CV LOWER VASCULAR RIGHT PERONEAL COMPRESS: NORMAL
BH CV LOWER VASCULAR RIGHT POPLITEAL COMPRESS: NORMAL
BH CV LOWER VASCULAR RIGHT POPLITEAL PHASIC: NORMAL
BH CV LOWER VASCULAR RIGHT POPLITEAL SPONT: NORMAL
BH CV LOWER VASCULAR RIGHT POSTERIOR TIBIAL AUGMENT: NORMAL
BH CV LOWER VASCULAR RIGHT POSTERIOR TIBIAL COMPRESS: NORMAL
BH CV LOWER VASCULAR RIGHT PROFUNDA FEMORAL PHASIC: NORMAL
BH CV LOWER VASCULAR RIGHT PROFUNDA FEMORAL SPONT: NORMAL
BH CV LOWER VASCULAR RIGHT PROXIMAL FEMORAL COMPRESS: NORMAL
BH CV LOWER VASCULAR RIGHT PROXIMAL FEMORAL PHASIC: NORMAL
BH CV LOWER VASCULAR RIGHT PROXIMAL FEMORAL SPONT: NORMAL
BH CV LOWER VASCULAR RIGHT SAPHENOFEMORAL JUNCTION COMPRESS: NORMAL
BH CV LOWER VASCULAR RIGHT SAPHENOFEMORAL JUNCTION PHASIC: NORMAL
BH CV LOWER VASCULAR RIGHT SAPHENOFEMORAL JUNCTION SPONT: NORMAL

## 2024-06-04 PROCEDURE — 93971 EXTREMITY STUDY: CPT | Performed by: INTERNAL MEDICINE

## 2024-06-04 PROCEDURE — 1125F AMNT PAIN NOTED PAIN PRSNT: CPT | Performed by: FAMILY MEDICINE

## 2024-06-04 PROCEDURE — 93971 EXTREMITY STUDY: CPT

## 2024-06-04 PROCEDURE — 99214 OFFICE O/P EST MOD 30 MIN: CPT | Performed by: FAMILY MEDICINE

## 2024-06-04 RX ORDER — DONEPEZIL HYDROCHLORIDE 10 MG/1
10 TABLET, FILM COATED ORAL NIGHTLY
COMMUNITY
Start: 2024-01-11

## 2024-06-04 RX ORDER — DOXYCYCLINE 100 MG/1
100 TABLET ORAL 2 TIMES DAILY
Qty: 20 TABLET | Refills: 0 | Status: SHIPPED | OUTPATIENT
Start: 2024-06-04 | End: 2024-06-04 | Stop reason: SDUPTHER

## 2024-06-04 RX ORDER — AMLODIPINE BESYLATE 2.5 MG/1
2.5 TABLET ORAL DAILY
COMMUNITY
Start: 2024-01-04

## 2024-06-04 RX ORDER — DOXYCYCLINE 100 MG/1
100 TABLET ORAL 2 TIMES DAILY
Qty: 20 TABLET | Refills: 0 | Status: SHIPPED | OUTPATIENT
Start: 2024-06-04 | End: 2024-06-14

## 2024-06-04 RX ORDER — TRIAMCINOLONE ACETONIDE 1 MG/G
1 OINTMENT TOPICAL 2 TIMES DAILY
Qty: 30 G | Refills: 0 | Status: SHIPPED | OUTPATIENT
Start: 2024-06-04

## 2024-06-04 RX ORDER — FAMOTIDINE 20 MG/1
20 TABLET, FILM COATED ORAL 2 TIMES DAILY PRN
COMMUNITY
Start: 2024-01-04

## 2024-06-04 NOTE — PROGRESS NOTES
Established Patient Office Visit      Patient Name: Marcelino Mesa  : 1927   MRN: [id number]   Care Team: Patient Care Team:  Earnest Ingram DO as PCP - General (Family Medicine)    Chief Complaint:    Chief Complaint   Patient presents with    Skin Lesion     Right leg lesion not healing         History of Present Illness: Marcelino Mesa is a 89 y.o. female who is here today for chief complaint.    HPI    Answers submitted by the patient for this visit:  Other (Submitted on 6/3/2024)  Please describe your symptoms.: Right leg  Have you had these symptoms before?: Yes  How long have you been having these symptoms?: 1-2 weeks  Please describe any probable cause for these symptoms. : fell  Primary Reason for Visit (Submitted on 6/3/2024)  What is the primary reason for your visit?: Other    She is still having redness, pain, and swelling in the right lower leg.  Swelling has gotten worse in her calf.  She completed 1 week of cephalexin.    This patient is accompanied by their self who contributes to the history of their care.    The following portions of the patient's history were reviewed and updated as appropriate: allergies, current medications, past family history, past medical history, past social history, past surgical history and problem list.    Subjective      Review of Systems:   Review of Systems - See HPI    Past Medical History:   Past Medical History:   Diagnosis Date    Amaurosis fugax of left eye 2017    5 minutes at home    Cataract     Compression fracture of L1 lumbar vertebra Remote    Chronic lower back pain    Generalized osteoarthritis Adulthood    Bilateral knee replacements    Glaucoma     Hearing impairment 2000    Wears hearing aids    Herpes zoster     Hip fracture, left 2014    Hip pinning with persistent pain    Hyperlipidemia Adulthood    Mild disease not requiring drug therapy    Hypertension 2016    Well compensated on medication    Iron deficiency 2017     Long-term oral replacement    Lipoma of right shoulder     MRI to confirm 8/2023    Low back pain     Lumbar scoliosis Adulthood    Mild disease    Lumbar spondylosis 2015    Status post injection therapy    Macular degeneration     Pneumonia     PONV (postoperative nausea and vomiting)     preprocedural medications will help     Urinary tract infection     Vitamin D deficiency     Wrist fracture, left        Past Surgical History:   Past Surgical History:   Procedure Laterality Date    CATARACT EXTRACTION EXTRACAPSULAR W/ INTRAOCULAR LENS IMPLANTATION Bilateral 2016    COLONOSCOPY  2006    Normal study    EYE SURGERY      FRACTURE SURGERY      HIP FRACTURE SURGERY Left 2014     hip pinning for acute fracture    HIP HEMIARTHROPLASTY Right 12/25/2023    Procedure: HIP HEMIARTHROPLASTY RIGHT;  Surgeon: Edda Edmond MD;  Location:  [facility];  Service: Orthopedics;  Laterality: Right;    JOINT REPLACEMENT      KNEE ARTHROPLASTY UNICOMPARTMENTAL Right 09/22/2016    Procedure: RIGHT KNEE ARTHROPLASTY UNICOMPARTMENTAL;  Surgeon: Jesse Taveras MD;  Location:  [facility];  Service:     KNEE ARTHROPLASTY UNICOMPARTMENTAL Left 12/01/2016    Procedure: LEFT KNEE ARTHROPLASTY UNICOMPARTMENTAL;  Surgeon: Jesse Taveras MD;  Location:  [facility];  Service:     LUMBAR EPIDURAL INJECTION  2015    TONSILLECTOMY  1947    TOTAL HIP ARTHROPLASTY Left 08/10/2017    Procedure: REMOVAL OF HARDWARE LEFT HIP, LEFT ANTERIOR TOTAL HIP ARTHROPLASTY;  Surgeon: Jesse Taveras MD;  Location:  [facility];  Service:        Family History:   Family History   Problem Relation Age of Onset    Alzheimer's disease Mother     No Known Problems Father     Arthritis Sister     Breast cancer Sister     Colon cancer Sister     Hearing loss Sister     Lung cancer Brother     Heart disease Other         2009    Asthma Sister     Colon cancer Sister     Breast cancer Sister     Lumbar disc disease Sister     Skin cancer Brother     Asthma Sister         Social History:   Social History     Socioeconomic History    Marital status:    Tobacco Use    Smoking status: Never     Passive exposure: Never    Smokeless tobacco: Never   Vaping Use    Vaping status: Never Used   Substance and Sexual Activity    Alcohol use: No    Drug use: No    Sexual activity: Never       Tobacco History:   Social History     Tobacco Use   Smoking Status Never    Passive exposure: Never   Smokeless Tobacco Never       Medications:     Current Outpatient Medications:     acetaminophen (TYLENOL) 325 MG tablet, Take 2 tablets by mouth Every 4 (Four) Hours As Needed for Mild Pain (1-3)., Disp: , Rfl: 0    amLODIPine (NORVASC) 2.5 MG tablet, Take 1 tablet by mouth Daily., Disp: , Rfl:     aspirin (Aspirin Low Dose) 81 MG EC tablet, Take 1 tablet by mouth Daily., Disp: 30 tablet, Rfl: 11    cetirizine (zyrTEC) 5 MG tablet, Take 1 tablet by mouth Daily for 30 days. For itching, Disp: 30 tablet, Rfl: 0    Cranberry Extract 250 MG tablet, Take 500 mg by mouth Daily. OTC, Disp: , Rfl:     donepezil (ARICEPT) 10 MG tablet, Take 1 tablet by mouth Every Night., Disp: , Rfl:     doxycycline (ADOXA) 100 MG tablet, Take 1 tablet by mouth 2 (Two) Times a Day for 10 days., Disp: 20 tablet, Rfl: 0    famotidine (PEPCID) 20 MG tablet, Take 1 tablet by mouth 2 (Two) Times a Day As Needed for Indigestion or Heartburn., Disp: , Rfl:     losartan (COZAAR) 50 MG tablet, Take 1 tablet by mouth Daily., Disp: 90 tablet, Rfl: 1    mineral oil-hydrophilic petrolatum (AQUAPHOR) ointment, Apply 1 Application topically to the appropriate area as directed As Needed for Dry Skin., Disp: 50 g, Rfl: 11    sulfamethoxazole-trimethoprim (BACTRIM DS,SEPTRA DS) 800-160 MG per tablet, Take 1 tablet by mouth Daily As Needed (urinary tract infection- take at onset of symptoms for 5 days). (Patient taking differently: Take 1 tablet by mouth Daily As Needed (urinary tract infection- take at onset of symptoms for 5 days). As  "needed), Disp: 30 tablet, Rfl: 2    triamcinolone (KENALOG) 0.1 % ointment, Apply 1 Application topically to the appropriate area as directed 2 (Two) Times a Day., Disp: 30 g, Rfl: 0    Allergies:   Allergies   Allergen Reactions    Asa [Aspirin] GI Bleeding    Lisinopril-Hydrochlorothiazide Rash    Other Rash     STEROID INJECTION IN BACK    Amoxicillin Swelling    Zantac [Ranitidine Hcl] Unknown - Low Severity       Objective   Objective     Physical Exam:  Vital Signs:   Vitals:    06/04/24 1005   BP: 144/86   BP Location: Left arm   Patient Position: Sitting   Cuff Size: Adult   Pulse: 80   Resp: 18   SpO2: 92%   Weight: 56.2 kg (124 lb)   Height: 157.5 cm (62\")     Body mass index is 22.68 kg/m².     Physical Exam  Nursing note reviewed  Const: NAD, A&Ox4, Pleasant, Cooperative  MSK: Right pedal edema and nonpitting edema of the right lower extremity below the knee 1+  Skin: Erythema surrounding the wound has worsened since last visit, starting to extend medially around the calf.  Wounds themselves seem to be healing well, good eschar formation  Procedures/Radiology     Procedures  No radiology results for the last 7 days     Assessment & Plan   Assessment / Plan      Assessment/Plan:   Problems Addressed This Visit  Diagnoses and all orders for this visit:    1. Abrasion of anterior right lower leg, initial encounter (Primary)  -     doxycycline (ADOXA) 100 MG tablet; Take 1 tablet by mouth 2 (Two) Times a Day for 10 days.  Dispense: 20 tablet; Refill: 0    2. Cellulitis of right lower extremity  -     doxycycline (ADOXA) 100 MG tablet; Take 1 tablet by mouth 2 (Two) Times a Day for 10 days.  Dispense: 20 tablet; Refill: 0    3. Localized swelling of right lower leg  -     Duplex Venous Lower Extremity - Right CAR; Future    4. Rash  -     triamcinolone (KENALOG) 0.1 % ointment; Apply 1 Application topically to the appropriate area as directed 2 (Two) Times a Day.  Dispense: 30 g; Refill: 0      Problem List " Items Addressed This Visit    None  Visit Diagnoses       Abrasion of anterior right lower leg, initial encounter    -  Primary    Relevant Medications    triamcinolone (KENALOG) 0.1 % ointment    doxycycline (ADOXA) 100 MG tablet    Cellulitis of right lower extremity        Relevant Medications    doxycycline (ADOXA) 100 MG tablet    Localized swelling of right lower leg        Relevant Orders    Duplex Venous Lower Extremity - Right CAR    Rash        Relevant Medications    triamcinolone (KENALOG) 0.1 % ointment          Recommend changing her antibiotic over from cephalexin to doxycycline  Concern for VTE given relative immobility and the worsening swelling in the right lower extremity.  Will order stat ultrasound.  She does not have a cell phone working with her today, so I will have her wait in the waiting room until we can get this scheduled and then send her over.      There are no Patient Instructions on file for this visit.    Follow Up:   No follow-ups on file.    DO PAULINE Gordillo RD  [facility]  [address]  [facility] [address]  Fax [phone]  Phone [phone]

## 2024-06-10 ENCOUNTER — TELEPHONE (OUTPATIENT)
Dept: FAMILY MEDICINE CLINIC | Facility: CLINIC | Age: 89
End: 2024-06-10
Payer: MEDICARE

## 2024-06-10 NOTE — TELEPHONE ENCOUNTER
Patient's daughter has some concerns about the blood clot in her leg. T is not healing and it still red and hot to the touch. She want's to know if she needs to go to a wound car office or to be seen here for a follow up or needs more antibiotics/meds. Please advise! She also want's to know if you have results from the scan.

## 2024-06-14 ENCOUNTER — TELEPHONE (OUTPATIENT)
Dept: FAMILY MEDICINE CLINIC | Facility: CLINIC | Age: 89
End: 2024-06-14

## 2024-06-14 ENCOUNTER — OFFICE VISIT (OUTPATIENT)
Dept: FAMILY MEDICINE CLINIC | Facility: CLINIC | Age: 89
End: 2024-06-14
Payer: MEDICARE

## 2024-06-14 VITALS
BODY MASS INDEX: 22.82 KG/M2 | OXYGEN SATURATION: 96 % | HEART RATE: 83 BPM | DIASTOLIC BLOOD PRESSURE: 86 MMHG | SYSTOLIC BLOOD PRESSURE: 134 MMHG | WEIGHT: 124 LBS | HEIGHT: 62 IN

## 2024-06-14 DIAGNOSIS — L03.115 CELLULITIS OF RIGHT LOWER EXTREMITY: ICD-10-CM

## 2024-06-14 DIAGNOSIS — S80.811A ABRASION OF ANTERIOR RIGHT LOWER LEG, INITIAL ENCOUNTER: Primary | ICD-10-CM

## 2024-06-14 DIAGNOSIS — R21 RASH: ICD-10-CM

## 2024-06-14 PROCEDURE — 99214 OFFICE O/P EST MOD 30 MIN: CPT | Performed by: FAMILY MEDICINE

## 2024-06-14 PROCEDURE — 1125F AMNT PAIN NOTED PAIN PRSNT: CPT | Performed by: FAMILY MEDICINE

## 2024-06-14 RX ORDER — LEVOFLOXACIN 750 MG/1
750 TABLET, FILM COATED ORAL DAILY
Qty: 7 TABLET | Refills: 0 | Status: SHIPPED | OUTPATIENT
Start: 2024-06-14 | End: 2024-06-21

## 2024-06-14 RX ORDER — CETIRIZINE HYDROCHLORIDE 5 MG/1
5 TABLET ORAL DAILY
Qty: 30 TABLET | Refills: 0 | Status: SHIPPED | OUTPATIENT
Start: 2024-06-14 | End: 2024-07-14

## 2024-06-14 RX ORDER — SACCHAROMYCES BOULARDII 250 MG
250 CAPSULE ORAL 2 TIMES DAILY
Qty: 20 CAPSULE | Refills: 0 | Status: SHIPPED | OUTPATIENT
Start: 2024-06-14 | End: 2024-06-24

## 2024-06-14 RX ORDER — TRIAMCINOLONE ACETONIDE 1 MG/G
1 OINTMENT TOPICAL 2 TIMES DAILY
Qty: 30 G | Refills: 0 | Status: SHIPPED | OUTPATIENT
Start: 2024-06-14

## 2024-06-14 RX ORDER — ZINC GLUCONATE 50 MG
50 TABLET ORAL DAILY
Qty: 30 TABLET | Refills: 0 | Status: SHIPPED | OUTPATIENT
Start: 2024-06-14

## 2024-06-14 NOTE — PROGRESS NOTES
Established Patient Office Visit      Patient Name: Marcelino Mesa  : 1927   MRN: [id number]   Care Team: Patient Care Team:  Earnest Ingram DO as PCP - General (Family Medicine)    Chief Complaint:    Chief Complaint   Patient presents with   • Leg Swelling     Sore on right leg not healing, still red and swollen        History of Present Illness: Marcelino Mesa is a 89 y.o. female who is here today for chief complaint.    Leg Swelling        Answers submitted by the patient for this visit:  Other (Submitted on 2024)  Please describe your symptoms.: right leg not healing  Have you had these symptoms before?: Yes  How long have you been having these symptoms?: Greater than 2 weeks  Primary Reason for Visit (Submitted on 2024)  What is the primary reason for your visit?: Other    Going on 4 weeks of right anterior lower leg abrasion and swelling. Previously completed course of keflex, finished doxy course this morning.    This patient is accompanied by their daughter who contributes to the history of their care.    The following portions of the patient's history were reviewed and updated as appropriate: allergies, current medications, past family history, past medical history, past social history, past surgical history and problem list.    Subjective      Review of Systems:   Review of Systems - See HPI    Past Medical History:   Past Medical History:   Diagnosis Date   • Amaurosis fugax of left eye 2017    5 minutes at home   • Cataract    • Compression fracture of L1 lumbar vertebra Remote    Chronic lower back pain   • Generalized osteoarthritis Adulthood    Bilateral knee replacements   • Glaucoma    • Hearing impairment     Wears hearing aids   • Herpes zoster    • Hip fracture, left 2014    Hip pinning with persistent pain   • Hyperlipidemia Adulthood    Mild disease not requiring drug therapy   • Hypertension 2016    Well compensated on medication   • Iron deficiency 2017     Long-term oral replacement   • Lipoma of right shoulder     MRI to confirm 8/2023   • Low back pain    • Lumbar scoliosis Adulthood    Mild disease   • Lumbar spondylosis 2015    Status post injection therapy   • Macular degeneration    • Pneumonia    • PONV (postoperative nausea and vomiting)     preprocedural medications will help    • Urinary tract infection    • Vitamin D deficiency    • Wrist fracture, left        Past Surgical History:   Past Surgical History:   Procedure Laterality Date   • CATARACT EXTRACTION EXTRACAPSULAR W/ INTRAOCULAR LENS IMPLANTATION Bilateral 2016   • COLONOSCOPY  2006    Normal study   • EYE SURGERY     • FRACTURE SURGERY     • HIP FRACTURE SURGERY Left 2014     hip pinning for acute fracture   • HIP HEMIARTHROPLASTY Right 12/25/2023    Procedure: HIP HEMIARTHROPLASTY RIGHT;  Surgeon: Edda Edmond MD;  Location:  [facility];  Service: Orthopedics;  Laterality: Right;   • JOINT REPLACEMENT     • KNEE ARTHROPLASTY UNICOMPARTMENTAL Right 09/22/2016    Procedure: RIGHT KNEE ARTHROPLASTY UNICOMPARTMENTAL;  Surgeon: Jesse Taveras MD;  Location:  [facility];  Service:    • KNEE ARTHROPLASTY UNICOMPARTMENTAL Left 12/01/2016    Procedure: LEFT KNEE ARTHROPLASTY UNICOMPARTMENTAL;  Surgeon: Jesse Taveras MD;  Location:  [facility];  Service:    • LUMBAR EPIDURAL INJECTION  2015   • TONSILLECTOMY  1947   • TOTAL HIP ARTHROPLASTY Left 08/10/2017    Procedure: REMOVAL OF HARDWARE LEFT HIP, LEFT ANTERIOR TOTAL HIP ARTHROPLASTY;  Surgeon: Jesse Taveras MD;  Location:  [facility];  Service:        Family History:   Family History   Problem Relation Age of Onset   • Alzheimer's disease Mother    • No Known Problems Father    • Arthritis Sister    • Breast cancer Sister    • Colon cancer Sister    • Hearing loss Sister    • Lung cancer Brother    • Heart disease Other         2009   • Asthma Sister    • Colon cancer Sister    • Breast cancer Sister    • Lumbar disc disease Sister    •  Skin cancer Brother    • Asthma Sister        Social History:   Social History     Socioeconomic History   • Marital status:    Tobacco Use   • Smoking status: Never     Passive exposure: Never   • Smokeless tobacco: Never   Vaping Use   • Vaping status: Never Used   Substance and Sexual Activity   • Alcohol use: No   • Drug use: No   • Sexual activity: Never       Tobacco History:   Social History     Tobacco Use   Smoking Status Never   • Passive exposure: Never   Smokeless Tobacco Never       Medications:     Current Outpatient Medications:   •  acetaminophen (TYLENOL) 325 MG tablet, Take 2 tablets by mouth Every 4 (Four) Hours As Needed for Mild Pain (1-3)., Disp: , Rfl: 0  •  amLODIPine (NORVASC) 2.5 MG tablet, Take 1 tablet by mouth Daily., Disp: , Rfl:   •  aspirin (Aspirin Low Dose) 81 MG EC tablet, Take 1 tablet by mouth Daily., Disp: 30 tablet, Rfl: 11  •  Cranberry Extract 250 MG tablet, Take 500 mg by mouth Daily. OTC, Disp: , Rfl:   •  donepezil (ARICEPT) 10 MG tablet, Take 1 tablet by mouth Every Night., Disp: , Rfl:   •  famotidine (PEPCID) 20 MG tablet, Take 1 tablet by mouth 2 (Two) Times a Day As Needed for Indigestion or Heartburn., Disp: , Rfl:   •  losartan (COZAAR) 50 MG tablet, Take 1 tablet by mouth Daily., Disp: 90 tablet, Rfl: 1  •  mineral oil-hydrophilic petrolatum (AQUAPHOR) ointment, Apply 1 Application topically to the appropriate area as directed As Needed for Dry Skin., Disp: 50 g, Rfl: 11  •  sulfamethoxazole-trimethoprim (BACTRIM DS,SEPTRA DS) 800-160 MG per tablet, Take 1 tablet by mouth Daily As Needed (urinary tract infection- take at onset of symptoms for 5 days). (Patient taking differently: Take 1 tablet by mouth Daily As Needed (urinary tract infection- take at onset of symptoms for 5 days). As needed), Disp: 30 tablet, Rfl: 2  •  cetirizine (zyrTEC) 5 MG tablet, Take 1 tablet by mouth Daily for 30 days. For itching, Disp: 30 tablet, Rfl: 0  •  levoFLOXacin (Levaquin)  "750 MG tablet, Take 1 tablet by mouth Daily for 7 days., Disp: 7 tablet, Rfl: 0  •  saccharomyces boulardii (Florastor) 250 MG capsule, Take 1 capsule by mouth 2 (Two) Times a Day for 10 days., Disp: 20 capsule, Rfl: 0  •  triamcinolone (KENALOG) 0.1 % ointment, Apply 1 Application topically to the appropriate area as directed 2 (Two) Times a Day., Disp: 30 g, Rfl: 0  •  zinc gluconate 50 MG tablet, Take 1 tablet by mouth Daily., Disp: 30 tablet, Rfl: 0    Allergies:   Allergies   Allergen Reactions   • Asa [Aspirin] GI Bleeding   • Lisinopril-Hydrochlorothiazide Rash   • Other Rash     STEROID INJECTION IN BACK   • Amoxicillin Swelling   • Zantac [Ranitidine Hcl] Unknown - Low Severity       Objective   Objective     Physical Exam:  Vital Signs:   Vitals:    06/14/24 0957   BP: 134/86   Pulse: 83   SpO2: 96%   Weight: 56.2 kg (124 lb)   Height: 157.5 cm (62.01\")     Body mass index is 22.67 kg/m².     Physical Exam  Skin:     Findings: Abrasion present.              Nursing note reviewed  Const: NAD, A&Ox4, Pleasant, Cooperative  MSK: Right pedal edema and nonpitting edema of the right lower extremity below the knee 1+  Skin: Erythema surrounding the wound has worsened since last visit, starting to extend medially around the calf.  Wounds themselves seem to be healing well, good eschar formation  Procedures/Radiology     Procedures  No radiology results for the last 7 days     Assessment & Plan   Assessment / Plan      Assessment/Plan:   Problems Addressed This Visit  Diagnoses and all orders for this visit:    1. Abrasion of anterior right lower leg, initial encounter (Primary)  Comments:  Has failed multiple rounds of antibiotics, slow healing wound. Does not look aggressive, but still swollen and erythematous. Negative duplex US for DVT.    2. Cellulitis of right lower extremity  -     zinc gluconate 50 MG tablet; Take 1 tablet by mouth Daily.  Dispense: 30 tablet; Refill: 0  -     saccharomyces boulardii " (Florastor) 250 MG capsule; Take 1 capsule by mouth 2 (Two) Times a Day for 10 days.  Dispense: 20 capsule; Refill: 0  -     Ambulatory Referral to Wound Clinic  -     levoFLOXacin (Levaquin) 750 MG tablet; Take 1 tablet by mouth Daily for 7 days.  Dispense: 7 tablet; Refill: 0    3. Rash  -     triamcinolone (KENALOG) 0.1 % ointment; Apply 1 Application topically to the appropriate area as directed 2 (Two) Times a Day.  Dispense: 30 g; Refill: 0  -     cetirizine (zyrTEC) 5 MG tablet; Take 1 tablet by mouth Daily for 30 days. For itching  Dispense: 30 tablet; Refill: 0      Problem List Items Addressed This Visit    None  Visit Diagnoses       Abrasion of anterior right lower leg, initial encounter    -  Primary    Has failed multiple rounds of antibiotics, slow healing wound. Does not look aggressive, but still swollen and erythematous. Negative duplex US for DVT.    Relevant Medications    triamcinolone (KENALOG) 0.1 % ointment    Cellulitis of right lower extremity        Relevant Medications    zinc gluconate 50 MG tablet    saccharomyces boulardii (Florastor) 250 MG capsule    levoFLOXacin (Levaquin) 750 MG tablet    Other Relevant Orders    Ambulatory Referral to Wound Clinic    Rash        Relevant Medications    triamcinolone (KENALOG) 0.1 % ointment    cetirizine (zyrTEC) 5 MG tablet          Referral to wound care at [place] close to her home. Abx as ordered, probiotic, zinc supplement. May need ID referral, but I think mostly it is just a slow healing wound.    There are no Patient Instructions on file for this visit.    Follow Up:   No follow-ups on file.    DO PAULINE Gordillo RD  [facility]  [address]  [facility] [address]  Fax [phone]  Phone [phone]

## 2024-11-12 DIAGNOSIS — R32 URINARY INCONTINENCE, UNSPECIFIED TYPE: ICD-10-CM

## 2024-11-12 DIAGNOSIS — R21 RASH: ICD-10-CM

## 2024-11-12 RX ORDER — TRIAMCINOLONE ACETONIDE 1 MG/G
1 OINTMENT TOPICAL 2 TIMES DAILY
Qty: 30 G | Refills: 0 | Status: SHIPPED | OUTPATIENT
Start: 2024-11-12

## 2024-11-12 RX ORDER — SULFAMETHOXAZOLE AND TRIMETHOPRIM 800; 160 MG/1; MG/1
1 TABLET ORAL DAILY PRN
Qty: 30 TABLET | Refills: 2 | Status: SHIPPED | OUTPATIENT
Start: 2024-11-12

## 2024-11-13 RX ORDER — DONEPEZIL HYDROCHLORIDE 10 MG/1
10 TABLET, FILM COATED ORAL NIGHTLY
Qty: 90 TABLET | Refills: 1 | Status: SHIPPED | OUTPATIENT
Start: 2024-11-13

## 2024-11-13 NOTE — TELEPHONE ENCOUNTER
Rx Refill Note  Requested Prescriptions     Pending Prescriptions Disp Refills    donepezil (ARICEPT) 10 MG tablet       Sig: Take 1 tablet by mouth Every Night.      Last office visit with prescribing clinician: 6/14/2024   Last telemedicine visit with prescribing clinician: Visit date not found   Next office visit with prescribing clinician: Visit date not found                         Would you like a call back once the refill request has been completed: [] Yes [] No    If the office needs to give you a call back, can they leave a voicemail: [] Yes [] No    Janet Camarillo MA  11/13/24, 10:26 EST

## 2025-02-13 DIAGNOSIS — I10 ESSENTIAL HYPERTENSION: ICD-10-CM

## 2025-02-13 NOTE — TELEPHONE ENCOUNTER
Rx Refill Note  Requested Prescriptions     Pending Prescriptions Disp Refills    losartan (COZAAR) 50 MG tablet [Pharmacy Med Name: LOSARTAN POTASSIUM 50 MG TA 50 Tablet] 90 tablet 1     Sig: TAKE ONE TABLET BY MOUTH EVERY DAY      Last office visit with prescribing clinician: 6/14/2024   Last telemedicine visit with prescribing clinician: Visit date not found   Next office visit with prescribing clinician: Visit date not found                         Would you like a call back once the refill request has been completed: [] Yes [] No    If the office needs to give you a call back, can they leave a voicemail: [] Yes [] No    Janet Camarillo MA  02/13/25, 16:10 EST

## 2025-02-14 RX ORDER — LOSARTAN POTASSIUM 50 MG/1
50 TABLET ORAL DAILY
Qty: 90 TABLET | Refills: 1 | Status: SHIPPED | OUTPATIENT
Start: 2025-02-14

## 2025-08-27 DIAGNOSIS — I10 ESSENTIAL HYPERTENSION: ICD-10-CM

## 2025-08-27 RX ORDER — LOSARTAN POTASSIUM 50 MG/1
50 TABLET ORAL DAILY
Qty: 90 TABLET | Refills: 0 | Status: SHIPPED | OUTPATIENT
Start: 2025-08-27

## (undated) DEVICE — TRY EPID SFTY 18G 3.5IN 1T7680

## (undated) DEVICE — ANTIBACTERIAL UNDYED BRAIDED (POLYGLACTIN 910), SYNTHETIC ABSORBABLE SUTURE: Brand: COATED VICRYL

## (undated) DEVICE — ADAPT ST INFUS ADMIN SYR 70IN

## (undated) DEVICE — PAD ARMBRD SURG CONVOL 7.5X20X2IN

## (undated) DEVICE — STRYKER PERFORMANCE SERIES SAGITTAL BLADE: Brand: STRYKER PERFORMANCE SERIES

## (undated) DEVICE — SYR LUERLOK 30CC

## (undated) DEVICE — Device

## (undated) DEVICE — PEG PAD FOR SURG DISP

## (undated) DEVICE — T4 PULLOVER TOGA, X-LARGE

## (undated) DEVICE — SUT ETHIB 5 V37 30IN MB66G

## (undated) DEVICE — SYS SKIN CLS DERMABOND PRINEO W/22CM MESH TP

## (undated) DEVICE — TBG PENCL TELESCP MEGADYNE SMOKE EVAC 10FT

## (undated) DEVICE — GLV SURG TRIUMPH CLASSIC PF LTX 8 STRL

## (undated) DEVICE — PK HIP TOTL UNIV 10

## (undated) DEVICE — GLV SURG SENSICARE PI MIC PF SZ8.5 LF STRL

## (undated) DEVICE — NDL SPINE 22G 31/2IN BLK

## (undated) DEVICE — APPL CHLORAPREP W/TINT 26ML BLU

## (undated) DEVICE — TRAP FLD MINIVAC MEGADYNE 100ML

## (undated) DEVICE — DRAPE,REIN 53X77,STERILE: Brand: MEDLINE

## (undated) DEVICE — T4 PULLOVER TOGA, LARGE

## (undated) DEVICE — HDRST POSTIN FM CRDL TRACH SLOT NONCOMRESS 9X8X4IN

## (undated) DEVICE — GLV SURG SENSICARE PI MIC PF SZ8 LF STRL

## (undated) DEVICE — DRSNG SURG AQUACEL AG/ADVNTGE 9X25CM 3.5X10IN

## (undated) DEVICE — PATIENT RETURN ELECTRODE, SINGLE-USE, CONTACT QUALITY MONITORING, ADULT, WITH 9FT CORD, FOR PATIENTS WEIGING OVER 33LBS. (15KG): Brand: MEGADYNE

## (undated) DEVICE — 450 ML BOTTLE OF 0.05% CHLORHEXIDINE GLUCONATE IN 99.95% STERILE WATER FOR IRRIGATION, USP AND APPLICATOR.: Brand: IRRISEPT ANTIMICROBIAL WOUND LAVAGE

## (undated) DEVICE — CANN NASL CO2 DIVIDED A/

## (undated) DEVICE — GLV SURG SIGNATURE TOUCH PF LTX 8 STRL BX/50

## (undated) DEVICE — PROXIMATE RH ROTATING HEAD SKIN STAPLERS (35 WIDE) CONTAINS 35 STAINLESS STEEL STAPLES: Brand: PROXIMATE

## (undated) DEVICE — INTENDED USE FOR SURGICAL MARKING ON INTACT SKIN, ALSO PROVIDES A PERMANENT METHOD OF IDENTIFYING OBJECTS IN THE OPERATING ROOM: Brand: WRITESITE® REGULAR TIP SKIN MARKER

## (undated) DEVICE — SYR LUERLOK 50ML

## (undated) DEVICE — BLANKT WARM UPPR/BDY ARM/OUT 57X196CM